# Patient Record
Sex: FEMALE | Race: WHITE | Employment: OTHER
[De-identification: names, ages, dates, MRNs, and addresses within clinical notes are randomized per-mention and may not be internally consistent; named-entity substitution may affect disease eponyms.]

---

## 2017-01-13 ENCOUNTER — TELEPHONE (OUTPATIENT)
Dept: INTERNAL MEDICINE | Facility: CLINIC | Age: 77
End: 2017-01-13

## 2017-01-13 RX ORDER — METRONIDAZOLE 250 MG/1
250 TABLET ORAL 3 TIMES DAILY
Qty: 30 TABLET | Refills: 0 | Status: SHIPPED | OUTPATIENT
Start: 2017-01-13 | End: 2017-01-23

## 2017-03-16 ENCOUNTER — OFFICE VISIT (OUTPATIENT)
Dept: INTERNAL MEDICINE CLINIC | Age: 77
End: 2017-03-16
Payer: MEDICARE

## 2017-03-16 VITALS
BODY MASS INDEX: 21.18 KG/M2 | WEIGHT: 143 LBS | DIASTOLIC BLOOD PRESSURE: 80 MMHG | HEIGHT: 69 IN | SYSTOLIC BLOOD PRESSURE: 136 MMHG

## 2017-03-16 DIAGNOSIS — I10 ESSENTIAL HYPERTENSION, BENIGN: Primary | ICD-10-CM

## 2017-03-16 DIAGNOSIS — K52.9 COLITIS: ICD-10-CM

## 2017-03-16 DIAGNOSIS — M79.7 FIBROMYALGIA: ICD-10-CM

## 2017-03-16 DIAGNOSIS — E06.3 HYPOTHYROIDISM DUE TO HASHIMOTO'S THYROIDITIS: ICD-10-CM

## 2017-03-16 DIAGNOSIS — E03.8 HYPOTHYROIDISM DUE TO HASHIMOTO'S THYROIDITIS: ICD-10-CM

## 2017-03-16 DIAGNOSIS — E78.00 PURE HYPERCHOLESTEROLEMIA: ICD-10-CM

## 2017-03-16 DIAGNOSIS — N18.30 CHRONIC KIDNEY DISEASE, STAGE III (MODERATE) (HCC): ICD-10-CM

## 2017-03-16 PROCEDURE — 99214 OFFICE O/P EST MOD 30 MIN: CPT | Performed by: INTERNAL MEDICINE

## 2017-03-16 PROCEDURE — 4040F PNEUMOC VAC/ADMIN/RCVD: CPT | Performed by: INTERNAL MEDICINE

## 2017-03-16 PROCEDURE — 1090F PRES/ABSN URINE INCON ASSESS: CPT | Performed by: INTERNAL MEDICINE

## 2017-03-16 PROCEDURE — 1123F ACP DISCUSS/DSCN MKR DOCD: CPT | Performed by: INTERNAL MEDICINE

## 2017-03-16 PROCEDURE — G8484 FLU IMMUNIZE NO ADMIN: HCPCS | Performed by: INTERNAL MEDICINE

## 2017-03-16 PROCEDURE — 1036F TOBACCO NON-USER: CPT | Performed by: INTERNAL MEDICINE

## 2017-03-16 PROCEDURE — G8427 DOCREV CUR MEDS BY ELIG CLIN: HCPCS | Performed by: INTERNAL MEDICINE

## 2017-03-16 PROCEDURE — G8419 CALC BMI OUT NRM PARAM NOF/U: HCPCS | Performed by: INTERNAL MEDICINE

## 2017-03-16 PROCEDURE — G8399 PT W/DXA RESULTS DOCUMENT: HCPCS | Performed by: INTERNAL MEDICINE

## 2017-03-16 ASSESSMENT — ENCOUNTER SYMPTOMS
ANAL BLEEDING: 0
VOMITING: 0
RECTAL PAIN: 0
VOICE CHANGE: 0
EYE DISCHARGE: 0
EYE ITCHING: 0
TROUBLE SWALLOWING: 0
RHINORRHEA: 0
BACK PAIN: 1
SORE THROAT: 0
EYE REDNESS: 0
CONSTIPATION: 0
APNEA: 0
COLOR CHANGE: 0
WHEEZING: 0
CHOKING: 0
SINUS PRESSURE: 0
COUGH: 0
PHOTOPHOBIA: 0
ABDOMINAL PAIN: 0
EYE PAIN: 0
FACIAL SWELLING: 0
SHORTNESS OF BREATH: 0
BLOOD IN STOOL: 0
ABDOMINAL DISTENTION: 0
STRIDOR: 0
NAUSEA: 0
DIARRHEA: 0
CHEST TIGHTNESS: 0

## 2017-06-13 ENCOUNTER — HOSPITAL ENCOUNTER (OUTPATIENT)
Age: 77
Setting detail: SPECIMEN
Discharge: HOME | End: 2017-06-13
Payer: MEDICARE

## 2017-06-13 DIAGNOSIS — E06.3 HYPOTHYROIDISM DUE TO HASHIMOTO'S THYROIDITIS: ICD-10-CM

## 2017-06-13 DIAGNOSIS — E03.8 HYPOTHYROIDISM DUE TO HASHIMOTO'S THYROIDITIS: ICD-10-CM

## 2017-06-13 LAB — TSH SERPL DL<=0.05 MIU/L-ACNC: 0.05 MIU/L (ref 0.3–5)

## 2017-06-23 ENCOUNTER — OFFICE VISIT (OUTPATIENT)
Dept: INTERNAL MEDICINE CLINIC | Age: 77
End: 2017-06-23
Payer: MEDICARE

## 2017-06-23 VITALS
DIASTOLIC BLOOD PRESSURE: 78 MMHG | WEIGHT: 141 LBS | HEIGHT: 69 IN | BODY MASS INDEX: 20.88 KG/M2 | SYSTOLIC BLOOD PRESSURE: 122 MMHG

## 2017-06-23 DIAGNOSIS — E06.3 HYPOTHYROIDISM DUE TO HASHIMOTO'S THYROIDITIS: ICD-10-CM

## 2017-06-23 DIAGNOSIS — K52.9 COLITIS: ICD-10-CM

## 2017-06-23 DIAGNOSIS — B02.29: ICD-10-CM

## 2017-06-23 DIAGNOSIS — M79.7 FIBROMYALGIA: Primary | ICD-10-CM

## 2017-06-23 DIAGNOSIS — N18.30 CHRONIC KIDNEY DISEASE, STAGE III (MODERATE) (HCC): ICD-10-CM

## 2017-06-23 DIAGNOSIS — E03.8 HYPOTHYROIDISM DUE TO HASHIMOTO'S THYROIDITIS: ICD-10-CM

## 2017-06-23 DIAGNOSIS — I10 ESSENTIAL HYPERTENSION, BENIGN: ICD-10-CM

## 2017-06-23 PROCEDURE — 99214 OFFICE O/P EST MOD 30 MIN: CPT | Performed by: INTERNAL MEDICINE

## 2017-06-23 PROCEDURE — 4040F PNEUMOC VAC/ADMIN/RCVD: CPT | Performed by: INTERNAL MEDICINE

## 2017-06-23 PROCEDURE — G8399 PT W/DXA RESULTS DOCUMENT: HCPCS | Performed by: INTERNAL MEDICINE

## 2017-06-23 PROCEDURE — G8427 DOCREV CUR MEDS BY ELIG CLIN: HCPCS | Performed by: INTERNAL MEDICINE

## 2017-06-23 PROCEDURE — G8420 CALC BMI NORM PARAMETERS: HCPCS | Performed by: INTERNAL MEDICINE

## 2017-06-23 PROCEDURE — 1090F PRES/ABSN URINE INCON ASSESS: CPT | Performed by: INTERNAL MEDICINE

## 2017-06-23 PROCEDURE — 1123F ACP DISCUSS/DSCN MKR DOCD: CPT | Performed by: INTERNAL MEDICINE

## 2017-06-23 PROCEDURE — 1036F TOBACCO NON-USER: CPT | Performed by: INTERNAL MEDICINE

## 2017-06-23 RX ORDER — LEVOTHYROXINE SODIUM 112 UG/1
112 TABLET ORAL DAILY
Qty: 30 TABLET | Refills: 3 | Status: SHIPPED | OUTPATIENT
Start: 2017-06-23 | End: 2017-08-08 | Stop reason: SDUPTHER

## 2017-06-23 RX ORDER — GABAPENTIN 100 MG/1
300 CAPSULE ORAL 4 TIMES DAILY
Qty: 120 CAPSULE | Refills: 5 | Status: SHIPPED | OUTPATIENT
Start: 2017-06-23 | End: 2017-07-13 | Stop reason: SDUPTHER

## 2017-06-23 ASSESSMENT — ENCOUNTER SYMPTOMS
VOMITING: 0
DIARRHEA: 0
CHOKING: 0
EYE PAIN: 0
ABDOMINAL DISTENTION: 0
TROUBLE SWALLOWING: 0
CONSTIPATION: 0
FACIAL SWELLING: 0
RECTAL PAIN: 0
SORE THROAT: 0
APNEA: 0
VOICE CHANGE: 0
STRIDOR: 0
SINUS PRESSURE: 0
CHEST TIGHTNESS: 0
EYE REDNESS: 0
RHINORRHEA: 0
EYE ITCHING: 0
SHORTNESS OF BREATH: 0
EYE DISCHARGE: 0
NAUSEA: 0
ABDOMINAL PAIN: 0
COLOR CHANGE: 0
COUGH: 0
PHOTOPHOBIA: 0
ANAL BLEEDING: 0
BACK PAIN: 0
BLOOD IN STOOL: 0
WHEEZING: 0

## 2017-07-13 DIAGNOSIS — M10.9 ACUTE GOUTY ARTHROPATHY: ICD-10-CM

## 2017-07-13 DIAGNOSIS — M25.50 ARTHRALGIA, UNSPECIFIED JOINT: ICD-10-CM

## 2017-07-13 DIAGNOSIS — B02.29: ICD-10-CM

## 2017-07-13 RX ORDER — MELOXICAM 15 MG/1
15 TABLET ORAL DAILY
Qty: 30 TABLET | Refills: 5 | Status: SHIPPED | OUTPATIENT
Start: 2017-07-13 | End: 2018-04-19

## 2017-07-13 RX ORDER — GABAPENTIN 100 MG/1
300 CAPSULE ORAL 4 TIMES DAILY
Qty: 360 CAPSULE | Refills: 5 | Status: SHIPPED | OUTPATIENT
Start: 2017-07-13 | End: 2018-07-17 | Stop reason: SDUPTHER

## 2017-07-13 RX ORDER — ALLOPURINOL 100 MG/1
100 TABLET ORAL DAILY
Qty: 30 TABLET | Refills: 5 | Status: SHIPPED | OUTPATIENT
Start: 2017-07-13 | End: 2018-04-19 | Stop reason: SDUPTHER

## 2017-07-27 ENCOUNTER — HOSPITAL ENCOUNTER (OUTPATIENT)
Age: 77
Setting detail: SPECIMEN
Discharge: HOME | End: 2017-07-27
Payer: MEDICARE

## 2017-07-27 DIAGNOSIS — E03.8 HYPOTHYROIDISM DUE TO HASHIMOTO'S THYROIDITIS: ICD-10-CM

## 2017-07-27 DIAGNOSIS — E06.3 HYPOTHYROIDISM DUE TO HASHIMOTO'S THYROIDITIS: ICD-10-CM

## 2017-07-27 LAB — TSH SERPL DL<=0.05 MIU/L-ACNC: 0.6 MIU/L (ref 0.3–5)

## 2017-08-08 ENCOUNTER — TELEPHONE (OUTPATIENT)
Dept: INTERNAL MEDICINE CLINIC | Age: 77
End: 2017-08-08

## 2017-08-08 ENCOUNTER — OFFICE VISIT (OUTPATIENT)
Dept: INTERNAL MEDICINE CLINIC | Age: 77
End: 2017-08-08
Payer: MEDICARE

## 2017-08-08 VITALS
HEIGHT: 69 IN | WEIGHT: 138 LBS | BODY MASS INDEX: 20.44 KG/M2 | SYSTOLIC BLOOD PRESSURE: 118 MMHG | DIASTOLIC BLOOD PRESSURE: 72 MMHG

## 2017-08-08 DIAGNOSIS — E06.3 HYPOTHYROIDISM DUE TO HASHIMOTO'S THYROIDITIS: ICD-10-CM

## 2017-08-08 DIAGNOSIS — M94.9 DISORDER OF CARTILAGE: ICD-10-CM

## 2017-08-08 DIAGNOSIS — M70.51 POPLITEAL BURSITIS OF RIGHT KNEE: ICD-10-CM

## 2017-08-08 DIAGNOSIS — H83.02 OTITIS INTERNA, LEFT: ICD-10-CM

## 2017-08-08 DIAGNOSIS — I10 ESSENTIAL HYPERTENSION, BENIGN: ICD-10-CM

## 2017-08-08 DIAGNOSIS — N18.9 CKD (CHRONIC KIDNEY DISEASE), UNSPECIFIED STAGE: ICD-10-CM

## 2017-08-08 DIAGNOSIS — N18.30 CHRONIC KIDNEY DISEASE, STAGE III (MODERATE) (HCC): ICD-10-CM

## 2017-08-08 DIAGNOSIS — E03.8 HYPOTHYROIDISM DUE TO HASHIMOTO'S THYROIDITIS: ICD-10-CM

## 2017-08-08 DIAGNOSIS — E78.00 PURE HYPERCHOLESTEROLEMIA: Primary | ICD-10-CM

## 2017-08-08 PROCEDURE — 1090F PRES/ABSN URINE INCON ASSESS: CPT | Performed by: INTERNAL MEDICINE

## 2017-08-08 PROCEDURE — G8399 PT W/DXA RESULTS DOCUMENT: HCPCS | Performed by: INTERNAL MEDICINE

## 2017-08-08 PROCEDURE — 4040F PNEUMOC VAC/ADMIN/RCVD: CPT | Performed by: INTERNAL MEDICINE

## 2017-08-08 PROCEDURE — 1036F TOBACCO NON-USER: CPT | Performed by: INTERNAL MEDICINE

## 2017-08-08 PROCEDURE — 20610 DRAIN/INJ JOINT/BURSA W/O US: CPT | Performed by: INTERNAL MEDICINE

## 2017-08-08 PROCEDURE — 99214 OFFICE O/P EST MOD 30 MIN: CPT | Performed by: INTERNAL MEDICINE

## 2017-08-08 PROCEDURE — G8427 DOCREV CUR MEDS BY ELIG CLIN: HCPCS | Performed by: INTERNAL MEDICINE

## 2017-08-08 PROCEDURE — G8420 CALC BMI NORM PARAMETERS: HCPCS | Performed by: INTERNAL MEDICINE

## 2017-08-08 PROCEDURE — 1123F ACP DISCUSS/DSCN MKR DOCD: CPT | Performed by: INTERNAL MEDICINE

## 2017-08-08 RX ORDER — FOLIC ACID 1 MG/1
1 TABLET ORAL DAILY
Qty: 90 TABLET | Refills: 3 | Status: SHIPPED | OUTPATIENT
Start: 2017-08-08

## 2017-08-08 RX ORDER — DOXYCYCLINE HYCLATE 100 MG
100 TABLET ORAL 2 TIMES DAILY
Qty: 20 TABLET | Refills: 0 | Status: SHIPPED | OUTPATIENT
Start: 2017-08-08 | End: 2017-08-18

## 2017-08-08 RX ORDER — METHYLPREDNISOLONE ACETATE 80 MG/ML
80 INJECTION, SUSPENSION INTRA-ARTICULAR; INTRALESIONAL; INTRAMUSCULAR; SOFT TISSUE ONCE
Status: COMPLETED | OUTPATIENT
Start: 2017-08-08 | End: 2017-08-08

## 2017-08-08 RX ORDER — LEVOTHYROXINE SODIUM 112 UG/1
112 TABLET ORAL DAILY
Qty: 90 TABLET | Refills: 3 | Status: SHIPPED | OUTPATIENT
Start: 2017-08-08 | End: 2018-04-19 | Stop reason: SDUPTHER

## 2017-08-08 RX ADMIN — METHYLPREDNISOLONE ACETATE 80 MG: 80 INJECTION, SUSPENSION INTRA-ARTICULAR; INTRALESIONAL; INTRAMUSCULAR; SOFT TISSUE at 15:23

## 2017-08-08 ASSESSMENT — ENCOUNTER SYMPTOMS
SINUS PRESSURE: 0
NAUSEA: 0
EYE REDNESS: 0
ABDOMINAL DISTENTION: 0
RHINORRHEA: 0
CONSTIPATION: 0
VOICE CHANGE: 0
COUGH: 0
WHEEZING: 0
BLOOD IN STOOL: 0
EYE ITCHING: 0
TROUBLE SWALLOWING: 0
CHOKING: 0
COLOR CHANGE: 0
ABDOMINAL PAIN: 0
FACIAL SWELLING: 0
CHEST TIGHTNESS: 0
EYE DISCHARGE: 0
BACK PAIN: 0
DIARRHEA: 0
SHORTNESS OF BREATH: 0
EYE PAIN: 0
VOMITING: 0
SORE THROAT: 0
APNEA: 0
ANAL BLEEDING: 0
RECTAL PAIN: 0
STRIDOR: 0
PHOTOPHOBIA: 0

## 2017-10-10 ENCOUNTER — HOSPITAL ENCOUNTER (OUTPATIENT)
Age: 77
Setting detail: SPECIMEN
Discharge: HOME | End: 2017-10-10
Payer: MEDICARE

## 2017-10-10 DIAGNOSIS — E06.3 HYPOTHYROIDISM DUE TO HASHIMOTO'S THYROIDITIS: ICD-10-CM

## 2017-10-10 DIAGNOSIS — M70.51 POPLITEAL BURSITIS OF RIGHT KNEE: ICD-10-CM

## 2017-10-10 DIAGNOSIS — E78.00 PURE HYPERCHOLESTEROLEMIA: ICD-10-CM

## 2017-10-10 DIAGNOSIS — I10 ESSENTIAL HYPERTENSION, BENIGN: ICD-10-CM

## 2017-10-10 DIAGNOSIS — M94.9 DISORDER OF CARTILAGE: ICD-10-CM

## 2017-10-10 DIAGNOSIS — N18.30 CHRONIC KIDNEY DISEASE, STAGE III (MODERATE) (HCC): ICD-10-CM

## 2017-10-10 DIAGNOSIS — E03.8 HYPOTHYROIDISM DUE TO HASHIMOTO'S THYROIDITIS: ICD-10-CM

## 2017-10-10 LAB
-: ABNORMAL
ABSOLUTE EOS #: 0.5 K/UL (ref 0–0.4)
ABSOLUTE LYMPH #: 1.2 K/UL (ref 1–4.8)
ABSOLUTE MONO #: 0.6 K/UL (ref 0.1–1.2)
ALBUMIN SERPL-MCNC: 4.1 G/DL (ref 3.5–5.2)
ALBUMIN/GLOBULIN RATIO: 1.5 (ref 1–2.5)
ALP BLD-CCNC: 76 U/L (ref 35–104)
ALT SERPL-CCNC: 10 U/L (ref 5–33)
AMORPHOUS: ABNORMAL
ANION GAP SERPL CALCULATED.3IONS-SCNC: 10 MMOL/L (ref 9–17)
AST SERPL-CCNC: 20 U/L
BACTERIA: ABNORMAL
BASOPHILS # BLD: 1 %
BASOPHILS ABSOLUTE: 0.1 K/UL (ref 0–0.2)
BILIRUB SERPL-MCNC: 0.47 MG/DL (ref 0.3–1.2)
BILIRUBIN URINE: NEGATIVE
BUN BLDV-MCNC: 16 MG/DL (ref 8–23)
BUN/CREAT BLD: NORMAL (ref 9–20)
CALCIUM SERPL-MCNC: 10.1 MG/DL (ref 8.6–10.4)
CASTS UA: ABNORMAL /LPF (ref 0–8)
CHLORIDE BLD-SCNC: 104 MMOL/L (ref 98–107)
CHOLESTEROL/HDL RATIO: 3.6
CHOLESTEROL: 200 MG/DL
CO2: 28 MMOL/L (ref 20–31)
COLOR: YELLOW
COMMENT UA: ABNORMAL
CREAT SERPL-MCNC: 0.82 MG/DL (ref 0.5–0.9)
CRYSTALS, UA: ABNORMAL /HPF
DIFFERENTIAL TYPE: ABNORMAL
EOSINOPHILS RELATIVE PERCENT: 8 %
EPITHELIAL CELLS UA: ABNORMAL /HPF (ref 0–5)
GFR AFRICAN AMERICAN: >60 ML/MIN
GFR NON-AFRICAN AMERICAN: >60 ML/MIN
GFR SERPL CREATININE-BSD FRML MDRD: NORMAL ML/MIN/{1.73_M2}
GFR SERPL CREATININE-BSD FRML MDRD: NORMAL ML/MIN/{1.73_M2}
GLUCOSE BLD-MCNC: 95 MG/DL (ref 70–99)
GLUCOSE URINE: NEGATIVE
HCT VFR BLD CALC: 37.6 % (ref 36–46)
HDLC SERPL-MCNC: 55 MG/DL
HEMOGLOBIN: 12.5 G/DL (ref 12–16)
KETONES, URINE: NEGATIVE
LDL CHOLESTEROL: 119 MG/DL (ref 0–130)
LEUKOCYTE ESTERASE, URINE: ABNORMAL
LYMPHOCYTES # BLD: 18 %
MCH RBC QN AUTO: 29.3 PG (ref 26–34)
MCHC RBC AUTO-ENTMCNC: 33.3 G/DL (ref 31–37)
MCV RBC AUTO: 88.1 FL (ref 80–100)
MONOCYTES # BLD: 10 %
MUCUS: ABNORMAL
NITRITE, URINE: POSITIVE
OTHER OBSERVATIONS UA: ABNORMAL
PDW BLD-RTO: 15.7 % (ref 12.5–15.4)
PH UA: 7 (ref 5–8)
PLATELET # BLD: 194 K/UL (ref 140–450)
PLATELET ESTIMATE: ABNORMAL
PMV BLD AUTO: 8.1 FL (ref 6–12)
POTASSIUM SERPL-SCNC: 4.2 MMOL/L (ref 3.7–5.3)
PROTEIN UA: NEGATIVE
RBC # BLD: 4.26 M/UL (ref 4–5.2)
RBC # BLD: ABNORMAL 10*6/UL
RBC UA: ABNORMAL /HPF (ref 0–4)
RENAL EPITHELIAL, UA: ABNORMAL /HPF
SEG NEUTROPHILS: 63 %
SEGMENTED NEUTROPHILS ABSOLUTE COUNT: 4.3 K/UL (ref 1.8–7.7)
SODIUM BLD-SCNC: 142 MMOL/L (ref 135–144)
SPECIFIC GRAVITY UA: 1.01 (ref 1–1.03)
TOTAL PROTEIN: 6.8 G/DL (ref 6.4–8.3)
TRICHOMONAS: ABNORMAL
TRIGL SERPL-MCNC: 129 MG/DL
TSH SERPL DL<=0.05 MIU/L-ACNC: 2.6 MIU/L (ref 0.3–5)
TURBIDITY: ABNORMAL
URINE HGB: ABNORMAL
UROBILINOGEN, URINE: NORMAL
VITAMIN D 25-HYDROXY: 32.1 NG/ML (ref 30–100)
VLDLC SERPL CALC-MCNC: ABNORMAL MG/DL (ref 1–30)
WBC # BLD: 6.8 K/UL (ref 3.5–11)
WBC # BLD: ABNORMAL 10*3/UL
WBC UA: ABNORMAL /HPF (ref 0–5)
YEAST: ABNORMAL

## 2017-10-11 RX ORDER — NITROFURANTOIN 25; 75 MG/1; MG/1
100 CAPSULE ORAL 2 TIMES DAILY
Qty: 20 CAPSULE | Refills: 0 | Status: SHIPPED | OUTPATIENT
Start: 2017-10-11 | End: 2017-10-21

## 2017-10-16 ENCOUNTER — TELEPHONE (OUTPATIENT)
Dept: INTERNAL MEDICINE CLINIC | Age: 77
End: 2017-10-16

## 2017-10-16 DIAGNOSIS — N18.30 CHRONIC KIDNEY DISEASE, STAGE III (MODERATE) (HCC): Primary | ICD-10-CM

## 2017-10-16 NOTE — TELEPHONE ENCOUNTER
Called pt and informed her to stop taking medication and to repeat ua before appt on Friday. Pt understood and I placed ua order as directed by .

## 2017-10-16 NOTE — TELEPHONE ENCOUNTER
Patient has been taking macrobid for UTI since 10/11/17. States that she has an upset stomach after taking it. Is taking with food. Please advise.     Allergies   Allergen Reactions    Etomidate     Penicillins Hives

## 2017-10-17 ENCOUNTER — HOSPITAL ENCOUNTER (OUTPATIENT)
Age: 77
Setting detail: SPECIMEN
Discharge: HOME | End: 2017-10-17
Payer: MEDICARE

## 2017-10-17 LAB
-: NORMAL
AMORPHOUS: NORMAL
BACTERIA: NORMAL
BILIRUBIN URINE: NEGATIVE
CASTS UA: NORMAL /LPF (ref 0–8)
COLOR: YELLOW
COMMENT UA: ABNORMAL
CRYSTALS, UA: NORMAL /HPF
EPITHELIAL CELLS UA: NORMAL /HPF (ref 0–5)
GLUCOSE URINE: NEGATIVE
KETONES, URINE: NEGATIVE
LEUKOCYTE ESTERASE, URINE: ABNORMAL
MUCUS: NORMAL
NITRITE, URINE: NEGATIVE
OTHER OBSERVATIONS UA: NORMAL
PH UA: 6.5 (ref 5–8)
PROTEIN UA: NEGATIVE
RBC UA: NORMAL /HPF (ref 0–4)
RENAL EPITHELIAL, UA: NORMAL /HPF
SPECIFIC GRAVITY UA: 1.01 (ref 1–1.03)
TRICHOMONAS: NORMAL
TURBIDITY: CLEAR
URINE HGB: NEGATIVE
UROBILINOGEN, URINE: NORMAL
WBC UA: NORMAL /HPF (ref 0–5)
YEAST: NORMAL

## 2017-10-20 ENCOUNTER — OFFICE VISIT (OUTPATIENT)
Dept: INTERNAL MEDICINE CLINIC | Age: 77
End: 2017-10-20
Payer: MEDICARE

## 2017-10-20 VITALS
SYSTOLIC BLOOD PRESSURE: 122 MMHG | DIASTOLIC BLOOD PRESSURE: 74 MMHG | HEIGHT: 69 IN | BODY MASS INDEX: 21.18 KG/M2 | WEIGHT: 143 LBS

## 2017-10-20 DIAGNOSIS — E78.00 PURE HYPERCHOLESTEROLEMIA: ICD-10-CM

## 2017-10-20 DIAGNOSIS — E03.8 HYPOTHYROIDISM DUE TO HASHIMOTO'S THYROIDITIS: ICD-10-CM

## 2017-10-20 DIAGNOSIS — N18.30 CHRONIC KIDNEY DISEASE, STAGE III (MODERATE) (HCC): ICD-10-CM

## 2017-10-20 DIAGNOSIS — M17.0 PRIMARY OSTEOARTHRITIS OF BOTH KNEES: ICD-10-CM

## 2017-10-20 DIAGNOSIS — I10 ESSENTIAL HYPERTENSION, BENIGN: Primary | ICD-10-CM

## 2017-10-20 DIAGNOSIS — N30.00 ACUTE CYSTITIS WITHOUT HEMATURIA: ICD-10-CM

## 2017-10-20 DIAGNOSIS — E06.3 HYPOTHYROIDISM DUE TO HASHIMOTO'S THYROIDITIS: ICD-10-CM

## 2017-10-20 PROCEDURE — 1123F ACP DISCUSS/DSCN MKR DOCD: CPT | Performed by: INTERNAL MEDICINE

## 2017-10-20 PROCEDURE — G8484 FLU IMMUNIZE NO ADMIN: HCPCS | Performed by: INTERNAL MEDICINE

## 2017-10-20 PROCEDURE — G8420 CALC BMI NORM PARAMETERS: HCPCS | Performed by: INTERNAL MEDICINE

## 2017-10-20 PROCEDURE — 1090F PRES/ABSN URINE INCON ASSESS: CPT | Performed by: INTERNAL MEDICINE

## 2017-10-20 PROCEDURE — 99213 OFFICE O/P EST LOW 20 MIN: CPT | Performed by: INTERNAL MEDICINE

## 2017-10-20 PROCEDURE — 1036F TOBACCO NON-USER: CPT | Performed by: INTERNAL MEDICINE

## 2017-10-20 PROCEDURE — G8399 PT W/DXA RESULTS DOCUMENT: HCPCS | Performed by: INTERNAL MEDICINE

## 2017-10-20 PROCEDURE — 4040F PNEUMOC VAC/ADMIN/RCVD: CPT | Performed by: INTERNAL MEDICINE

## 2017-10-20 PROCEDURE — G8427 DOCREV CUR MEDS BY ELIG CLIN: HCPCS | Performed by: INTERNAL MEDICINE

## 2017-10-20 RX ORDER — METHYLPREDNISOLONE 4 MG/1
TABLET ORAL
Qty: 1 KIT | Refills: 0 | Status: SHIPPED | OUTPATIENT
Start: 2017-10-20 | End: 2017-10-26

## 2017-10-20 ASSESSMENT — ENCOUNTER SYMPTOMS
EYE DISCHARGE: 0
EYE ITCHING: 0
ABDOMINAL PAIN: 0
FACIAL SWELLING: 0
COUGH: 0
CHEST TIGHTNESS: 0
SHORTNESS OF BREATH: 0
WHEEZING: 0
VOMITING: 0
SINUS PRESSURE: 0
BLOOD IN STOOL: 0
STRIDOR: 0
VOICE CHANGE: 0
CHOKING: 0
TROUBLE SWALLOWING: 0
DIARRHEA: 0
PHOTOPHOBIA: 0
BACK PAIN: 0
NAUSEA: 0
APNEA: 0
ABDOMINAL DISTENTION: 0
RHINORRHEA: 0
COLOR CHANGE: 0
CONSTIPATION: 0
EYE PAIN: 0
RECTAL PAIN: 0
ANAL BLEEDING: 0
EYE REDNESS: 0
SORE THROAT: 0

## 2017-10-20 ASSESSMENT — PATIENT HEALTH QUESTIONNAIRE - PHQ9
SUM OF ALL RESPONSES TO PHQ9 QUESTIONS 1 & 2: 0
2. FEELING DOWN, DEPRESSED OR HOPELESS: 0
SUM OF ALL RESPONSES TO PHQ QUESTIONS 1-9: 0
1. LITTLE INTEREST OR PLEASURE IN DOING THINGS: 0

## 2017-10-20 NOTE — PROGRESS NOTES
Visit Information    Have you changed or started any medications since your last visit including any over-the-counter medicines, vitamins, or herbal medicines? no   Are you having any side effects from any of your medications? -  no  Have you stopped taking any of your medications? Is so, why? -  no    Have you seen any other physician or provider since your last visit? No  Have you had any other diagnostic tests since your last visit? Yes - Records Obtained  Have you been seen in the emergency room and/or had an admission to a hospital since we last saw you? No  Have you had your routine dental cleaning in the past 6 months? no    Have you activated your makr account? If not, what are your barriers?  Yes     Patient Care Team:  Migdalia Siddiqi MD as PCP - General    Medical History Review  Past Medical, Family, and Social History reviewed and does contribute to the patient presenting condition    Health Maintenance   Topic Date Due    DTaP/Tdap/Td vaccine (1 - Tdap) 07/19/1959    Flu vaccine (1) 09/01/2017    Lipid screen  10/10/2022    Zostavax vaccine  Addressed    DEXA (modify frequency per FRAX score)  Addressed    Pneumococcal low/med risk  Completed     Chief Complaint   Patient presents with    Thyroid Problem     hypothyroid had labs     Hypertension     management     Knee Pain     had cortisone injection feels some better

## 2017-10-20 NOTE — PROGRESS NOTES
Never Used    Alcohol use No    Drug use: No    Sexual activity: Not Asked     Other Topics Concern    None     Social History Narrative    None       Current Outpatient Prescriptions   Medication Sig Dispense Refill    methylPREDNISolone (MEDROL, MATTHIEU,) 4 MG tablet Take as directed 1 kit 0    nitrofurantoin, macrocrystal-monohydrate, (MACROBID) 100 MG capsule Take 1 capsule by mouth 2 times daily for 10 days 20 capsule 0    folic acid (FOLVITE) 1 MG tablet Take 1 tablet by mouth daily 90 tablet 3    levothyroxine (LEVOTHROID) 112 MCG tablet Take 1 tablet by mouth daily 90 tablet 3    gabapentin (NEURONTIN) 100 MG capsule Take 3 capsules by mouth 4 times daily 360 capsule 5    allopurinol (ZYLOPRIM) 100 MG tablet Take 1 tablet by mouth daily 30 tablet 5    meloxicam (MOBIC) 15 MG tablet Take 1 tablet by mouth daily 30 tablet 5    atorvastatin (LIPITOR) 40 MG tablet Take 1 tablet by mouth daily 90 tablet 3    carvedilol (COREG) 12.5 MG tablet Take 0.5 tablets by mouth 2 times daily 180 tablet 5    fexofenadine (ALLEGRA ALLERGY) 180 MG tablet Take 180 mg by mouth daily as needed      triamcinolone (NASACORT AQ) 55 MCG/ACT nasal inhaler 1 spray by Nasal route daily as needed      Multiple Vitamins-Minerals (HAIR/SKIN/NAILS) TABS Take 1 tablet by mouth daily      Cholecalciferol (VITAMIN D) 2000 UNITS CAPS capsule Take 2,000 Units by mouth daily      Misc Natural Products (JOINT SUPPORT) CAPS Take 2 capsules by mouth daily       Current Facility-Administered Medications   Medication Dose Route Frequency Provider Last Rate Last Dose    methylPREDNISolone acetate (DEPO-MEDROL) injection 80 mg  80 mg Intramuscular Once Simone Rashid MD        triamcinolone acetonide (KENALOG-40) injection 60 mg  60 mg Intramuscular Once Simone Rashid MD             Review of Systems:    Review of Systems   Constitutional: Negative for activity change, appetite change, chills, diaphoresis, fatigue and fever.    HENT: Negative for congestion, dental problem, drooling, ear discharge, ear pain, facial swelling, hearing loss, mouth sores, nosebleeds, postnasal drip, rhinorrhea, sinus pressure, sneezing, sore throat, tinnitus, trouble swallowing and voice change. Eyes: Negative for photophobia, pain, discharge, redness, itching and visual disturbance. Respiratory: Negative for apnea, cough, choking, chest tightness, shortness of breath, wheezing and stridor. Cardiovascular: Negative for chest pain, palpitations and leg swelling. Gastrointestinal: Negative for abdominal distention, abdominal pain, anal bleeding, blood in stool, constipation, diarrhea, nausea, rectal pain and vomiting. Endocrine: Negative for cold intolerance, heat intolerance, polydipsia, polyphagia and polyuria. Genitourinary: Negative for decreased urine volume, difficulty urinating, dyspareunia, dysuria, enuresis, flank pain, frequency, genital sores, hematuria, menstrual problem, pelvic pain, urgency, vaginal bleeding and vaginal discharge. Musculoskeletal: Positive for arthralgias. Negative for back pain, gait problem, joint swelling, myalgias, neck pain and neck stiffness. Skin: Negative for color change, pallor, rash and wound. Neurological: Negative for dizziness, tremors, seizures, syncope, facial asymmetry, speech difficulty, weakness, light-headedness, numbness and headaches. Hematological: Negative for adenopathy. Does not bruise/bleed easily. Psychiatric/Behavioral: Positive for sleep disturbance. Negative for agitation, behavioral problems, confusion, decreased concentration and dysphoric mood. The patient is nervous/anxious. Objective:     Physical Exam:      Physical Exam   Constitutional: She is oriented to person, place, and time. She appears well-developed and well-nourished. No distress. HENT:   Head: Normocephalic and atraumatic.    Right Ear: External ear normal.   Left Ear: External ear normal.   Nose: Nose normal.   Mouth/Throat: Oropharynx is clear and moist. No oropharyngeal exudate. Eyes: Conjunctivae and EOM are normal. Pupils are equal, round, and reactive to light. Right eye exhibits no discharge. Left eye exhibits no discharge. No scleral icterus. Neck: Normal range of motion. Neck supple. No JVD present. No tracheal deviation present. No thyromegaly present. Cardiovascular: Normal rate, regular rhythm and intact distal pulses. Exam reveals no gallop and no friction rub. Murmur heard. Pulmonary/Chest: Effort normal and breath sounds normal. No respiratory distress. She has no wheezes. She has no rales. She exhibits no tenderness. Abdominal: Soft. Bowel sounds are normal. She exhibits no distension and no mass. There is no tenderness. There is no rebound and no guarding. Genitourinary: Rectal exam shows guaiac negative stool. No vaginal discharge found. Musculoskeletal: She exhibits tenderness. She exhibits no edema. Lymphadenopathy:     She has no cervical adenopathy. Neurological: She is alert and oriented to person, place, and time. She has normal reflexes. No cranial nerve deficit. She exhibits normal muscle tone. Coordination normal.   Skin: Skin is warm and dry. No rash noted. She is not diaphoretic. No erythema. No pallor. Psychiatric: Her behavior is normal. Judgment and thought content normal. Her mood appears anxious.          Results for orders placed or performed during the hospital encounter of 10/17/17   Urinalysis   Result Value Ref Range    Color, UA YELLOW YEL    Turbidity UA CLEAR CLEAR    Glucose, Ur NEGATIVE NEG    Bilirubin Urine NEGATIVE NEG    Ketones, Urine NEGATIVE NEG    Specific Gravity, UA 1.013 1.005 - 1.030    Urine Hgb NEGATIVE NEG    pH, UA 6.5 5.0 - 8.0    Protein, UA NEGATIVE NEG    Urobilinogen, Urine Normal NORM    Nitrite, Urine NEGATIVE NEG    Leukocyte Esterase, Urine MODERATE (A) NEG    Urinalysis Comments NOT REPORTED    Microscopic Urinalysis Result Value Ref Range    -          WBC, UA 2 TO 5 0 - 5 /HPF    RBC, UA 0 TO 2 0 - 4 /HPF    Casts UA 0 TO 2 HYALINE 0 - 8 /LPF    Crystals UA NOT REPORTED NONE /HPF    Epithelial Cells UA 2 TO 5 0 - 5 /HPF    Renal Epithelial, Urine NOT REPORTED 0 /HPF    Bacteria, UA NOT REPORTED NONE    Mucus, UA NOT REPORTED NONE    Trichomonas, UA NOT REPORTED NONE    Amorphous, UA NOT REPORTED NONE    Other Observations UA NOT REPORTED NREQ    Yeast, UA NOT REPORTED NONE     No results found. Assessment:     1. Essential hypertension, benign   controlled cr nlk k nlk cont same   2. Chronic kidney disease, stage III (moderate)   cr 0.8 better    3. Primary osteoarthritis of both knees   worse due to cols\  D weather on mobic add medrol   4. Hypothyroidism due to Hashimoto's thyroiditis   tsh 2 nl cont same dose   5. Pure hypercholesterolemia  At goal cont same   6. Acute cystitis without hematuria   improved completed atb  No diarrhea         Plan:      cont same meds       No orders of the defined types were placed in this encounter. Orders Placed This Encounter   Medications    methylPREDNISolone (MEDROL, MATTHIEU,) 4 MG tablet     Sig: Take as directed     Dispense:  1 kit     Refill:  0             Yaz received counseling on the following healthy behaviors: medication adherence  Reviewed prior labs and health maintenance. Continue current medications, diet and exercise. Discussed use, benefit, and side effects of prescribed medications. Barriers to medication compliance addressed. Patient given educational materials - see patient instructions. All patient questions answered. Patient voiced understanding. 1.  Patient given educational materials - see patient instructions  2. Discussed use, benefit, and side effects of prescribed medications. Barriers to medication compliance addressed. All patient questions answered. Pt voiced understanding. 3.  Reviewed prior labs and health maintenance  4. Continue current medications, diet and exercise.   5. 3 m

## 2018-02-02 ENCOUNTER — OFFICE VISIT (OUTPATIENT)
Dept: INTERNAL MEDICINE CLINIC | Age: 78
End: 2018-02-02
Payer: MEDICARE

## 2018-02-02 VITALS
OXYGEN SATURATION: 92 % | HEIGHT: 69 IN | BODY MASS INDEX: 20.73 KG/M2 | DIASTOLIC BLOOD PRESSURE: 92 MMHG | SYSTOLIC BLOOD PRESSURE: 149 MMHG | WEIGHT: 140 LBS | HEART RATE: 75 BPM

## 2018-02-02 DIAGNOSIS — K50.90 CROHN'S DISEASE IN REMISSION (HCC): ICD-10-CM

## 2018-02-02 DIAGNOSIS — I10 ESSENTIAL HYPERTENSION, BENIGN: ICD-10-CM

## 2018-02-02 DIAGNOSIS — M17.0 PRIMARY OSTEOARTHRITIS OF BOTH KNEES: ICD-10-CM

## 2018-02-02 DIAGNOSIS — K50.10 CROHN'S DISEASE OF LARGE INTESTINE WITHOUT COMPLICATION (HCC): ICD-10-CM

## 2018-02-02 DIAGNOSIS — J44.9 CHRONIC OBSTRUCTIVE PULMONARY DISEASE, UNSPECIFIED COPD TYPE (HCC): Primary | ICD-10-CM

## 2018-02-02 DIAGNOSIS — J43.1 PANLOBULAR EMPHYSEMA (HCC): ICD-10-CM

## 2018-02-02 PROCEDURE — 1123F ACP DISCUSS/DSCN MKR DOCD: CPT | Performed by: INTERNAL MEDICINE

## 2018-02-02 PROCEDURE — G8926 SPIRO NO PERF OR DOC: HCPCS | Performed by: INTERNAL MEDICINE

## 2018-02-02 PROCEDURE — 3023F SPIROM DOC REV: CPT | Performed by: INTERNAL MEDICINE

## 2018-02-02 PROCEDURE — 4040F PNEUMOC VAC/ADMIN/RCVD: CPT | Performed by: INTERNAL MEDICINE

## 2018-02-02 PROCEDURE — G8399 PT W/DXA RESULTS DOCUMENT: HCPCS | Performed by: INTERNAL MEDICINE

## 2018-02-02 PROCEDURE — 1090F PRES/ABSN URINE INCON ASSESS: CPT | Performed by: INTERNAL MEDICINE

## 2018-02-02 PROCEDURE — G8427 DOCREV CUR MEDS BY ELIG CLIN: HCPCS | Performed by: INTERNAL MEDICINE

## 2018-02-02 PROCEDURE — G8484 FLU IMMUNIZE NO ADMIN: HCPCS | Performed by: INTERNAL MEDICINE

## 2018-02-02 PROCEDURE — 1036F TOBACCO NON-USER: CPT | Performed by: INTERNAL MEDICINE

## 2018-02-02 PROCEDURE — 20610 DRAIN/INJ JOINT/BURSA W/O US: CPT | Performed by: INTERNAL MEDICINE

## 2018-02-02 PROCEDURE — 99214 OFFICE O/P EST MOD 30 MIN: CPT | Performed by: INTERNAL MEDICINE

## 2018-02-02 PROCEDURE — G8420 CALC BMI NORM PARAMETERS: HCPCS | Performed by: INTERNAL MEDICINE

## 2018-02-02 RX ORDER — METHYLPREDNISOLONE ACETATE 80 MG/ML
80 INJECTION, SUSPENSION INTRA-ARTICULAR; INTRALESIONAL; INTRAMUSCULAR; SOFT TISSUE ONCE
Status: COMPLETED | OUTPATIENT
Start: 2018-02-02 | End: 2018-02-02

## 2018-02-02 RX ORDER — CARVEDILOL 12.5 MG/1
6.25 TABLET ORAL 2 TIMES DAILY
Qty: 180 TABLET | Refills: 5 | Status: SHIPPED | OUTPATIENT
Start: 2018-02-02 | End: 2019-02-21 | Stop reason: SDUPTHER

## 2018-02-02 RX ADMIN — METHYLPREDNISOLONE ACETATE 80 MG: 80 INJECTION, SUSPENSION INTRA-ARTICULAR; INTRALESIONAL; INTRAMUSCULAR; SOFT TISSUE at 12:01

## 2018-02-02 RX ADMIN — METHYLPREDNISOLONE ACETATE 80 MG: 80 INJECTION, SUSPENSION INTRA-ARTICULAR; INTRALESIONAL; INTRAMUSCULAR; SOFT TISSUE at 12:00

## 2018-02-02 ASSESSMENT — ENCOUNTER SYMPTOMS
RHINORRHEA: 0
EYE DISCHARGE: 0
ABDOMINAL PAIN: 0
CHEST TIGHTNESS: 0
COLOR CHANGE: 0
TROUBLE SWALLOWING: 0
CONSTIPATION: 0
EYE ITCHING: 0
VOICE CHANGE: 0
DIARRHEA: 0
COUGH: 0
EYE PAIN: 0
CHOKING: 0
ABDOMINAL DISTENTION: 0
EYE REDNESS: 0
WHEEZING: 0
STRIDOR: 0
SORE THROAT: 0
BACK PAIN: 0
FACIAL SWELLING: 0
PHOTOPHOBIA: 0
APNEA: 0
SINUS PRESSURE: 0
NAUSEA: 0
VOMITING: 0
RECTAL PAIN: 0
ANAL BLEEDING: 0
SHORTNESS OF BREATH: 0
BLOOD IN STOOL: 0

## 2018-02-02 NOTE — PROGRESS NOTES
Subjective: Mary Jordan is a 76 y.o. female who presents today for follow up and management of her chronic medical conditions as noted below. HPI:    Mary Jordan is c/o of   Chief Complaint   Patient presents with    Hypertension     management     Hypothyroidism     follow up     Other     risk assessment    . both knee worse pred pills helped for few days   myalgias    bp elevayed off coreg hr 90    Past Medical History:   Diagnosis Date    Abdominal pain, unspecified site     Acquired cyst of kidney     Acute gouty arthropathy     Allergic rhinitis, cause unspecified     Anxiety state, unspecified     Arthropathy, unspecified, site unspecified     Chronic airway obstruction, not elsewhere classified     Chronic kidney disease, stage III (moderate)     Diarrhea     Edema     Esophageal reflux     Essential hypertension, benign     Herpes zoster with other nervous system complications(053.19)     Herpes zoster without mention of complication     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)     Mononeuritis of unspecified site     Myalgia and myositis, unspecified     Osteoarthrosis, unspecified whether generalized or localized, unspecified site     Other general symptoms(780.99)     Other iatrogenic hypothyroidism     Other nonspecific abnormal finding of lung field     Other psoriasis     Pure hypercholesterolemia     Regional enteritis of unspecified site     Senile osteoporosis     Sprain of ankle, unspecified site     Unspecified vitamin D deficiency        Past Surgical History:   Procedure Laterality Date    COLONOSCOPY      DILATION AND CURETTAGE OF UTERUS         Family History   Problem Relation Age of Onset    Coronary Art Dis Father        Social History     Social History    Marital status:       Spouse name: N/A    Number of children: N/A    Years of education: N/A     Social History Main Topics    Smoking status: Former Smoker    Smokeless tobacco: Never Used    Alcohol use No    Drug use: No    Sexual activity: Not Asked     Other Topics Concern    None     Social History Narrative    None       Current Outpatient Prescriptions   Medication Sig Dispense Refill    carvedilol (COREG) 12.5 MG tablet Take 0.5 tablets by mouth 2 times daily 987 tablet 5    folic acid (FOLVITE) 1 MG tablet Take 1 tablet by mouth daily 90 tablet 3    levothyroxine (LEVOTHROID) 112 MCG tablet Take 1 tablet by mouth daily 90 tablet 3    gabapentin (NEURONTIN) 100 MG capsule Take 3 capsules by mouth 4 times daily 360 capsule 5    allopurinol (ZYLOPRIM) 100 MG tablet Take 1 tablet by mouth daily 30 tablet 5    meloxicam (MOBIC) 15 MG tablet Take 1 tablet by mouth daily 30 tablet 5    atorvastatin (LIPITOR) 40 MG tablet Take 1 tablet by mouth daily 90 tablet 3    fexofenadine (ALLEGRA ALLERGY) 180 MG tablet Take 180 mg by mouth daily as needed      triamcinolone (NASACORT AQ) 55 MCG/ACT nasal inhaler 1 spray by Nasal route daily as needed      Multiple Vitamins-Minerals (HAIR/SKIN/NAILS) TABS Take 1 tablet by mouth daily      Cholecalciferol (VITAMIN D) 2000 UNITS CAPS capsule Take 2,000 Units by mouth daily      Misc Natural Products (JOINT SUPPORT) CAPS Take 2 capsules by mouth daily       Current Facility-Administered Medications   Medication Dose Route Frequency Provider Last Rate Last Dose    methylPREDNISolone acetate (DEPO-MEDROL) injection 80 mg  80 mg Intramuscular Once Arlean Fothergill, MD        methylPREDNISolone acetate (DEPO-MEDROL) injection 80 mg  80 mg Intramuscular Once Arlean Fothergill, MD        methylPREDNISolone acetate (DEPO-MEDROL) injection 80 mg  80 mg Intramuscular Once Arlean Fothergill, MD        triamcinolone acetonide (KENALOG-40) injection 60 mg  60 mg Intramuscular Once Arlean Fothergill, MD             Review of Systems:    Review of Systems   Constitutional: Positive for fatigue. Negative for activity change, appetite change, chills, diaphoresis and fever. Nose: Nose normal.   Mouth/Throat: Oropharynx is clear and moist. No oropharyngeal exudate. Eyes: Conjunctivae and EOM are normal. Pupils are equal, round, and reactive to light. Right eye exhibits no discharge. Left eye exhibits no discharge. No scleral icterus. Neck: Normal range of motion. Neck supple. No JVD present. No tracheal deviation present. No thyromegaly present. Cardiovascular: Normal rate, regular rhythm, normal heart sounds and intact distal pulses. Exam reveals no gallop and no friction rub. No murmur heard. Pulmonary/Chest: Effort normal and breath sounds normal. No respiratory distress. She has no wheezes. She has no rales. She exhibits no tenderness. Abdominal: Soft. Bowel sounds are normal. She exhibits no distension and no mass. There is no tenderness. There is no rebound and no guarding. Genitourinary: Rectal exam shows guaiac negative stool. No vaginal discharge found. Musculoskeletal: She exhibits no edema. Right knee: She exhibits decreased range of motion. Tenderness found. Left knee: She exhibits decreased range of motion. Tenderness found. Lymphadenopathy:     She has no cervical adenopathy. Neurological: She is alert and oriented to person, place, and time. She has normal reflexes. No cranial nerve deficit. She exhibits normal muscle tone. Coordination normal.   Skin: Skin is warm and dry. No rash noted. She is not diaphoretic. No erythema. No pallor. Psychiatric: She has a normal mood and affect.  Her behavior is normal. Judgment and thought content normal.         Results for orders placed or performed during the hospital encounter of 10/17/17   Urinalysis   Result Value Ref Range    Color, UA YELLOW YEL    Turbidity UA CLEAR CLEAR    Glucose, Ur NEGATIVE NEG    Bilirubin Urine NEGATIVE NEG    Ketones, Urine NEGATIVE NEG    Specific Gravity, UA 1.013 1.005 - 1.030    Urine Hgb NEGATIVE NEG    pH, UA 6.5 5.0 - 8.0    Protein, UA NEGATIVE NEG Urobilinogen, Urine Normal NORM    Nitrite, Urine NEGATIVE NEG    Leukocyte Esterase, Urine MODERATE (A) NEG    Urinalysis Comments NOT REPORTED    Microscopic Urinalysis   Result Value Ref Range    -          WBC, UA 2 TO 5 0 - 5 /HPF    RBC, UA 0 TO 2 0 - 4 /HPF    Casts UA 0 TO 2 HYALINE 0 - 8 /LPF    Crystals UA NOT REPORTED NONE /HPF    Epithelial Cells UA 2 TO 5 0 - 5 /HPF    Renal Epithelial, Urine NOT REPORTED 0 /HPF    Bacteria, UA NOT REPORTED NONE    Mucus, UA NOT REPORTED NONE    Trichomonas, UA NOT REPORTED NONE    Amorphous, UA NOT REPORTED NONE    Other Observations UA NOT REPORTED NREQ    Yeast, UA NOT REPORTED NONE     No results found. Assessment:     1. Chronic obstructive pulmonary disease, unspecified COPD type ([facility] [address]   baseline cont same    2. Crohn's disease of large intestine without complication (HCC)   stable cont same   3.     4.     5. Primary osteoarthritis of both knees  MO ARTHROCENTESIS ASPIR&/INJ MAJOR JT/BURSA W/O US    MO ARTHROCENTESIS ASPIR&/INJ MAJOR JT/BURSA W/O US   6. Essential hypertension, benign  restrt coreg      Injection of Joint Procedure Note    Joint: lt kneeIndication: djd  Material Injected: depomedrol 80 mg    Procedure: The joint was cleansed with iodine and alcohol. Under sterile conditions the joint was injected without complications. The patient tolerated the procedure well. Follow up in 4 weeks was scheduled for joint examination and results. Injection of Joint Procedure Note    Joint: rt knee  Indication:  djd  Material Injected: depomedrol 80 mg    Procedure: The joint was cleansed with iodine and alcohol. Under sterile conditions the joint was injected without complications. The patient tolerated the procedure well. Follow up in 4 weeks was scheduled for joint examination and results.         Plan:     Orders Placed This Encounter   Procedures    MO ARTHROCENTESIS ASPIR&/INJ MAJOR JT/BURSA W/O US    MO ARTHROCENTESIS ASPIR&/INJ MAJOR FARSHAD/DAYDAY W/O US       Orders Placed This Encounter   Medications    carvedilol (COREG) 12.5 MG tablet     Sig: Take 0.5 tablets by mouth 2 times daily     Dispense:  180 tablet     Refill:  5    methylPREDNISolone acetate (DEPO-MEDROL) injection 80 mg    methylPREDNISolone acetate (DEPO-MEDROL) injection 80 mg       Quality & Risk Score Accuracy - MEDICARE ADVANTAGE    Visit Dx:  Panlobular emphysema (HCC)  Stable based upon symptoms and exam. Continue current treatment plan and follow up at least yearly. Visit Dx:  Crohn's disease in remission ([facility] [address]  Stable based upon symptoms and exam. Continue current treatment plan and follow up at least yearly. Additional documentation:  Based on review of the following:   Last edited 02/02/18 11:20 EST by Mary Lou Live MD      restrt coreg     both knees inj   cont neurontin      Yaz received counseling on the following healthy behaviors: exercise  Reviewed prior labs and health maintenance. Continue current medications, diet and exercise. Discussed use, benefit, and side effects of prescribed medications. Barriers to medication compliance addressed. Patient given educational materials - see patient instructions. All patient questions answered. Patient voiced understanding. 1.  Patient given educational materials - see patient instructions  2. Discussed use, benefit, and side effects of prescribed medications. Barriers to medication compliance addressed. All patient questions answered. Pt voiced understanding. 3.  Reviewed prior labs and health maintenance  4. Continue current medications, diet and exercise.   5.  6 weeks

## 2018-04-19 ENCOUNTER — OFFICE VISIT (OUTPATIENT)
Dept: INTERNAL MEDICINE CLINIC | Age: 78
End: 2018-04-19
Payer: MEDICARE

## 2018-04-19 VITALS
HEIGHT: 69 IN | WEIGHT: 138 LBS | SYSTOLIC BLOOD PRESSURE: 151 MMHG | BODY MASS INDEX: 20.44 KG/M2 | DIASTOLIC BLOOD PRESSURE: 77 MMHG | HEART RATE: 55 BPM

## 2018-04-19 DIAGNOSIS — N18.30 CHRONIC KIDNEY DISEASE, STAGE III (MODERATE) (HCC): ICD-10-CM

## 2018-04-19 DIAGNOSIS — M17.0 PRIMARY OSTEOARTHRITIS OF BOTH KNEES: ICD-10-CM

## 2018-04-19 DIAGNOSIS — M79.7 FIBROMYALGIA: ICD-10-CM

## 2018-04-19 DIAGNOSIS — M16.11 PRIMARY OSTEOARTHRITIS OF RIGHT HIP: Primary | ICD-10-CM

## 2018-04-19 DIAGNOSIS — M10.9 ACUTE GOUTY ARTHROPATHY: ICD-10-CM

## 2018-04-19 DIAGNOSIS — I10 ESSENTIAL HYPERTENSION, BENIGN: ICD-10-CM

## 2018-04-19 PROCEDURE — 1123F ACP DISCUSS/DSCN MKR DOCD: CPT | Performed by: INTERNAL MEDICINE

## 2018-04-19 PROCEDURE — 1036F TOBACCO NON-USER: CPT | Performed by: INTERNAL MEDICINE

## 2018-04-19 PROCEDURE — G8427 DOCREV CUR MEDS BY ELIG CLIN: HCPCS | Performed by: INTERNAL MEDICINE

## 2018-04-19 PROCEDURE — G8420 CALC BMI NORM PARAMETERS: HCPCS | Performed by: INTERNAL MEDICINE

## 2018-04-19 PROCEDURE — 99214 OFFICE O/P EST MOD 30 MIN: CPT | Performed by: INTERNAL MEDICINE

## 2018-04-19 PROCEDURE — 1090F PRES/ABSN URINE INCON ASSESS: CPT | Performed by: INTERNAL MEDICINE

## 2018-04-19 PROCEDURE — 4040F PNEUMOC VAC/ADMIN/RCVD: CPT | Performed by: INTERNAL MEDICINE

## 2018-04-19 PROCEDURE — G8399 PT W/DXA RESULTS DOCUMENT: HCPCS | Performed by: INTERNAL MEDICINE

## 2018-04-19 RX ORDER — DULOXETIN HYDROCHLORIDE 30 MG/1
30 CAPSULE, DELAYED RELEASE ORAL DAILY
Qty: 30 CAPSULE | Refills: 3 | Status: SHIPPED | OUTPATIENT
Start: 2018-04-19 | End: 2018-06-04

## 2018-04-19 RX ORDER — LEVOTHYROXINE SODIUM 112 UG/1
112 TABLET ORAL DAILY
Qty: 90 TABLET | Refills: 3 | Status: SHIPPED | OUTPATIENT
Start: 2018-04-19 | End: 2019-07-02 | Stop reason: SDUPTHER

## 2018-04-19 RX ORDER — ALLOPURINOL 100 MG/1
100 TABLET ORAL DAILY
Qty: 90 TABLET | Refills: 3 | Status: SHIPPED | OUTPATIENT
Start: 2018-04-19 | End: 2018-10-02 | Stop reason: SDUPTHER

## 2018-04-19 ASSESSMENT — ENCOUNTER SYMPTOMS
BLOOD IN STOOL: 0
VOICE CHANGE: 0
CHOKING: 0
COLOR CHANGE: 0
RECTAL PAIN: 0
CONSTIPATION: 0
SINUS PRESSURE: 0
PHOTOPHOBIA: 0
VOMITING: 0
FACIAL SWELLING: 0
ABDOMINAL DISTENTION: 0
SORE THROAT: 0
SHORTNESS OF BREATH: 0
BACK PAIN: 1
ANAL BLEEDING: 0
EYE ITCHING: 0
DIARRHEA: 0
ABDOMINAL PAIN: 0
APNEA: 0
EYE PAIN: 0
EYE DISCHARGE: 0
CHEST TIGHTNESS: 0
TROUBLE SWALLOWING: 0
EYE REDNESS: 0
WHEEZING: 0
NAUSEA: 0
STRIDOR: 0
COUGH: 0
RHINORRHEA: 0

## 2018-04-23 ENCOUNTER — TELEPHONE (OUTPATIENT)
Dept: INTERNAL MEDICINE CLINIC | Age: 78
End: 2018-04-23

## 2018-05-23 ENCOUNTER — TELEPHONE (OUTPATIENT)
Dept: INTERNAL MEDICINE CLINIC | Age: 78
End: 2018-05-23

## 2018-05-23 RX ORDER — CIPROFLOXACIN 250 MG/1
250 TABLET, FILM COATED ORAL 2 TIMES DAILY
Qty: 14 TABLET | Refills: 0 | Status: SHIPPED | OUTPATIENT
Start: 2018-05-23 | End: 2018-05-30

## 2018-05-23 RX ORDER — METRONIDAZOLE 250 MG/1
250 TABLET ORAL 2 TIMES DAILY
Qty: 14 TABLET | Refills: 0 | Status: SHIPPED | OUTPATIENT
Start: 2018-05-23 | End: 2018-05-30

## 2018-05-29 ENCOUNTER — HOSPITAL ENCOUNTER (OUTPATIENT)
Dept: GENERAL RADIOLOGY | Facility: CLINIC | Age: 78
Discharge: HOME | End: 2018-05-31
Payer: MEDICARE

## 2018-05-29 ENCOUNTER — HOSPITAL ENCOUNTER (OUTPATIENT)
Facility: CLINIC | Age: 78
Discharge: HOME | End: 2018-05-31
Payer: MEDICARE

## 2018-05-29 DIAGNOSIS — M17.0 PRIMARY OSTEOARTHRITIS OF BOTH KNEES: ICD-10-CM

## 2018-05-29 PROCEDURE — 73502 X-RAY EXAM HIP UNI 2-3 VIEWS: CPT

## 2018-06-04 ENCOUNTER — OFFICE VISIT (OUTPATIENT)
Dept: INTERNAL MEDICINE CLINIC | Age: 78
End: 2018-06-04
Payer: MEDICARE

## 2018-06-04 VITALS
SYSTOLIC BLOOD PRESSURE: 136 MMHG | HEIGHT: 69 IN | WEIGHT: 136 LBS | BODY MASS INDEX: 20.14 KG/M2 | DIASTOLIC BLOOD PRESSURE: 78 MMHG | HEART RATE: 55 BPM

## 2018-06-04 DIAGNOSIS — I10 ESSENTIAL HYPERTENSION, BENIGN: Primary | ICD-10-CM

## 2018-06-04 DIAGNOSIS — N18.30 CHRONIC KIDNEY DISEASE, STAGE III (MODERATE) (HCC): ICD-10-CM

## 2018-06-04 DIAGNOSIS — M70.61 TROCHANTERIC BURSITIS OF RIGHT HIP: ICD-10-CM

## 2018-06-04 DIAGNOSIS — K52.9 COLITIS: ICD-10-CM

## 2018-06-04 PROCEDURE — G8427 DOCREV CUR MEDS BY ELIG CLIN: HCPCS | Performed by: INTERNAL MEDICINE

## 2018-06-04 PROCEDURE — 99213 OFFICE O/P EST LOW 20 MIN: CPT | Performed by: INTERNAL MEDICINE

## 2018-06-04 PROCEDURE — 20610 DRAIN/INJ JOINT/BURSA W/O US: CPT | Performed by: INTERNAL MEDICINE

## 2018-06-04 PROCEDURE — 4040F PNEUMOC VAC/ADMIN/RCVD: CPT | Performed by: INTERNAL MEDICINE

## 2018-06-04 PROCEDURE — 1123F ACP DISCUSS/DSCN MKR DOCD: CPT | Performed by: INTERNAL MEDICINE

## 2018-06-04 PROCEDURE — G8420 CALC BMI NORM PARAMETERS: HCPCS | Performed by: INTERNAL MEDICINE

## 2018-06-04 PROCEDURE — G8399 PT W/DXA RESULTS DOCUMENT: HCPCS | Performed by: INTERNAL MEDICINE

## 2018-06-04 PROCEDURE — 1090F PRES/ABSN URINE INCON ASSESS: CPT | Performed by: INTERNAL MEDICINE

## 2018-06-04 PROCEDURE — 1036F TOBACCO NON-USER: CPT | Performed by: INTERNAL MEDICINE

## 2018-06-04 RX ORDER — METHYLPREDNISOLONE ACETATE 80 MG/ML
80 INJECTION, SUSPENSION INTRA-ARTICULAR; INTRALESIONAL; INTRAMUSCULAR; SOFT TISSUE ONCE
Status: COMPLETED | OUTPATIENT
Start: 2018-06-04 | End: 2018-06-04

## 2018-06-04 RX ORDER — METHYLPREDNISOLONE SODIUM SUCCINATE 125 MG/2ML
125 INJECTION, POWDER, LYOPHILIZED, FOR SOLUTION INTRAMUSCULAR; INTRAVENOUS ONCE
Status: SHIPPED | OUTPATIENT
Start: 2018-06-04

## 2018-06-04 RX ADMIN — METHYLPREDNISOLONE ACETATE 80 MG: 80 INJECTION, SUSPENSION INTRA-ARTICULAR; INTRALESIONAL; INTRAMUSCULAR; SOFT TISSUE at 15:46

## 2018-06-04 ASSESSMENT — ENCOUNTER SYMPTOMS
EYE ITCHING: 0
RHINORRHEA: 0
STRIDOR: 0
SHORTNESS OF BREATH: 0
VOICE CHANGE: 0
CHOKING: 0
NAUSEA: 0
FACIAL SWELLING: 0
SORE THROAT: 0
SINUS PRESSURE: 0
APNEA: 0
COLOR CHANGE: 0
CONSTIPATION: 0
BACK PAIN: 1
EYE PAIN: 0
TROUBLE SWALLOWING: 0
EYE REDNESS: 0
EYE DISCHARGE: 0
ANAL BLEEDING: 0
PHOTOPHOBIA: 0
COUGH: 0
VOMITING: 0
RECTAL PAIN: 0
BLOOD IN STOOL: 0
ABDOMINAL DISTENTION: 1
CHEST TIGHTNESS: 0
DIARRHEA: 0
ABDOMINAL PAIN: 0
WHEEZING: 0

## 2018-07-17 RX ORDER — GABAPENTIN 100 MG/1
300 CAPSULE ORAL 4 TIMES DAILY
Qty: 360 CAPSULE | Refills: 5 | Status: SHIPPED | OUTPATIENT
Start: 2018-07-17 | End: 2019-09-24 | Stop reason: SDUPTHER

## 2018-07-17 NOTE — TELEPHONE ENCOUNTER
Medication: gabapentin  Last visit: 06/04/18  Next visit: 9/25/2018  Last refill: 07/2017 + 5 refills  [facility]

## 2018-10-02 DIAGNOSIS — M10.9 ACUTE GOUTY ARTHROPATHY: ICD-10-CM

## 2018-10-02 RX ORDER — ATORVASTATIN CALCIUM 40 MG/1
40 TABLET, FILM COATED ORAL DAILY
Qty: 90 TABLET | Refills: 3 | Status: SHIPPED | OUTPATIENT
Start: 2018-10-02 | End: 2020-02-26 | Stop reason: SDUPTHER

## 2018-10-02 RX ORDER — ALLOPURINOL 100 MG/1
100 TABLET ORAL DAILY
Qty: 90 TABLET | Refills: 3 | Status: SHIPPED | OUTPATIENT
Start: 2018-10-02 | End: 2019-09-24 | Stop reason: SDUPTHER

## 2018-10-02 NOTE — TELEPHONE ENCOUNTER
From: Emma Robledo  Sent: 10/2/2018 12:44 PM EDT  Subject: Medication Renewal Request    Jasper Molly.  Herman Moseley would like a refill of the following medications:     atorvastatin (LIPITOR) 40 MG tablet [Edgar Sanchez MD]    Preferred pharmacy: [facility]

## 2018-10-25 ENCOUNTER — OFFICE VISIT (OUTPATIENT)
Dept: INTERNAL MEDICINE CLINIC | Age: 78
End: 2018-10-25
Payer: MEDICARE

## 2018-10-25 VITALS
WEIGHT: 133 LBS | HEIGHT: 68 IN | DIASTOLIC BLOOD PRESSURE: 86 MMHG | BODY MASS INDEX: 20.16 KG/M2 | SYSTOLIC BLOOD PRESSURE: 157 MMHG

## 2018-10-25 DIAGNOSIS — I10 ESSENTIAL HYPERTENSION, BENIGN: Primary | ICD-10-CM

## 2018-10-25 DIAGNOSIS — E06.3 HYPOTHYROIDISM DUE TO HASHIMOTO'S THYROIDITIS: ICD-10-CM

## 2018-10-25 DIAGNOSIS — N18.30 CHRONIC KIDNEY DISEASE, STAGE III (MODERATE) (HCC): ICD-10-CM

## 2018-10-25 DIAGNOSIS — E03.8 HYPOTHYROIDISM DUE TO HASHIMOTO'S THYROIDITIS: ICD-10-CM

## 2018-10-25 DIAGNOSIS — M79.7 FIBROMYALGIA: ICD-10-CM

## 2018-10-25 DIAGNOSIS — K50.10 CROHN'S DISEASE OF LARGE INTESTINE WITHOUT COMPLICATION (HCC): ICD-10-CM

## 2018-10-25 DIAGNOSIS — M70.62 TROCHANTERIC BURSITIS OF LEFT HIP: ICD-10-CM

## 2018-10-25 DIAGNOSIS — E78.00 PURE HYPERCHOLESTEROLEMIA: ICD-10-CM

## 2018-10-25 PROCEDURE — 1036F TOBACCO NON-USER: CPT | Performed by: INTERNAL MEDICINE

## 2018-10-25 PROCEDURE — 1090F PRES/ABSN URINE INCON ASSESS: CPT | Performed by: INTERNAL MEDICINE

## 2018-10-25 PROCEDURE — 1101F PT FALLS ASSESS-DOCD LE1/YR: CPT | Performed by: INTERNAL MEDICINE

## 2018-10-25 PROCEDURE — G8420 CALC BMI NORM PARAMETERS: HCPCS | Performed by: INTERNAL MEDICINE

## 2018-10-25 PROCEDURE — 99214 OFFICE O/P EST MOD 30 MIN: CPT | Performed by: INTERNAL MEDICINE

## 2018-10-25 PROCEDURE — 4040F PNEUMOC VAC/ADMIN/RCVD: CPT | Performed by: INTERNAL MEDICINE

## 2018-10-25 PROCEDURE — G8482 FLU IMMUNIZE ORDER/ADMIN: HCPCS | Performed by: INTERNAL MEDICINE

## 2018-10-25 PROCEDURE — G8399 PT W/DXA RESULTS DOCUMENT: HCPCS | Performed by: INTERNAL MEDICINE

## 2018-10-25 PROCEDURE — G8427 DOCREV CUR MEDS BY ELIG CLIN: HCPCS | Performed by: INTERNAL MEDICINE

## 2018-10-25 PROCEDURE — 1123F ACP DISCUSS/DSCN MKR DOCD: CPT | Performed by: INTERNAL MEDICINE

## 2018-10-25 RX ORDER — METHYLPREDNISOLONE 4 MG/1
TABLET ORAL
Qty: 1 KIT | Refills: 0 | Status: SHIPPED | OUTPATIENT
Start: 2018-10-25 | End: 2018-10-25 | Stop reason: SDUPTHER

## 2018-10-25 RX ORDER — METHYLPREDNISOLONE 4 MG/1
TABLET ORAL
Qty: 1 KIT | Refills: 0 | Status: SHIPPED | OUTPATIENT
Start: 2018-10-25 | End: 2018-10-31

## 2018-10-25 ASSESSMENT — ENCOUNTER SYMPTOMS
PHOTOPHOBIA: 0
ABDOMINAL DISTENTION: 0
CHEST TIGHTNESS: 0
COUGH: 0
ANAL BLEEDING: 0
VOICE CHANGE: 0
WHEEZING: 0
VOMITING: 0
EYE REDNESS: 0
APNEA: 0
BLOOD IN STOOL: 0
SINUS PRESSURE: 0
NAUSEA: 0
STRIDOR: 0
CONSTIPATION: 0
FACIAL SWELLING: 0
DIARRHEA: 0
SORE THROAT: 0
COLOR CHANGE: 0
SHORTNESS OF BREATH: 0
RHINORRHEA: 0
EYE ITCHING: 0
TROUBLE SWALLOWING: 0
BACK PAIN: 0
CHOKING: 0
EYE PAIN: 0
EYE DISCHARGE: 0
ABDOMINAL PAIN: 0
RECTAL PAIN: 0

## 2018-10-25 ASSESSMENT — PATIENT HEALTH QUESTIONNAIRE - PHQ9
2. FEELING DOWN, DEPRESSED OR HOPELESS: 0
SUM OF ALL RESPONSES TO PHQ QUESTIONS 1-9: 0
1. LITTLE INTEREST OR PLEASURE IN DOING THINGS: 0
SUM OF ALL RESPONSES TO PHQ9 QUESTIONS 1 & 2: 0
SUM OF ALL RESPONSES TO PHQ QUESTIONS 1-9: 0

## 2018-10-25 NOTE — PROGRESS NOTES
Subjective: Stef Porter is a [de-identified] y.o. female who presents today for follow up and management of her chronic medical conditions as noted below. HPI:    Stef Porter is c/o of   Chief Complaint   Patient presents with    Hypertension     management elevated today     Chronic Kidney Disease     due for blood work     COPD     baseline    .   hip pain back   inj helped for a while     cr better     taking meds     Did not take this morning     Past Medical History:   Diagnosis Date    Abdominal pain, unspecified site     Acquired cyst of kidney     Acute gouty arthropathy     Allergic rhinitis, cause unspecified     Anxiety state, unspecified     Arthropathy, unspecified, site unspecified     Chronic airway obstruction, not elsewhere classified     Chronic kidney disease, stage III (moderate) (HCC)     Diarrhea     Edema     Esophageal reflux     Essential hypertension, benign     Herpes zoster with other nervous system complications(053.19)     Herpes zoster without mention of complication     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)     Mononeuritis of unspecified site     Myalgia and myositis, unspecified     Osteoarthrosis, unspecified whether generalized or localized, unspecified site     Other general symptoms(780.99)     Other iatrogenic hypothyroidism     Other nonspecific abnormal finding of lung field     Other psoriasis     Pure hypercholesterolemia     Regional enteritis of unspecified site     Senile osteoporosis     Sprain of ankle, unspecified site     Unspecified vitamin D deficiency        Past Surgical History:   Procedure Laterality Date    COLONOSCOPY      DILATION AND CURETTAGE OF UTERUS         Family History   Problem Relation Age of Onset    Coronary Art Dis Father        Social History     Social History    Marital status:       Spouse name: N/A    Number of children: N/A    Years of education: N/A     Social History Main Topics    Smoking status: Former discharge. Left eye exhibits no discharge. No scleral icterus. Neck: Normal range of motion. Neck supple. No JVD present. No tracheal deviation present. No thyromegaly present. Cardiovascular: Normal rate, regular rhythm, normal heart sounds and intact distal pulses. Exam reveals no gallop and no friction rub. No murmur heard. Pulmonary/Chest: Effort normal and breath sounds normal. No respiratory distress. She has no wheezes. She has no rales. She exhibits no tenderness. Abdominal: Soft. Bowel sounds are normal. She exhibits no distension and no mass. There is no tenderness. There is no rebound and no guarding. Genitourinary: Rectal exam shows guaiac negative stool. No vaginal discharge found. Musculoskeletal: She exhibits tenderness. She exhibits no edema. Lymphadenopathy:     She has no cervical adenopathy. Neurological: She is alert and oriented to person, place, and time. She has normal reflexes. She displays normal reflexes. No cranial nerve deficit. She exhibits normal muscle tone. Coordination normal.   Skin: Skin is warm and dry. No rash noted. She is not diaphoretic. No erythema. No pallor. Psychiatric: Her behavior is normal. Judgment and thought content normal. Her mood appears anxious.          Results for orders placed or performed during the hospital encounter of 10/17/17   Urinalysis   Result Value Ref Range    Color, UA YELLOW YEL    Turbidity UA CLEAR CLEAR    Glucose, Ur NEGATIVE NEG    Bilirubin Urine NEGATIVE NEG    Ketones, Urine NEGATIVE NEG    Specific Gravity, UA 1.013 1.005 - 1.030    Urine Hgb NEGATIVE NEG    pH, UA 6.5 5.0 - 8.0    Protein, UA NEGATIVE NEG    Urobilinogen, Urine Normal NORM    Nitrite, Urine NEGATIVE NEG    Leukocyte Esterase, Urine MODERATE (A) NEG    Urinalysis Comments NOT REPORTED    Microscopic Urinalysis   Result Value Ref Range    -          WBC, UA 2 TO 5 0 - 5 /HPF    RBC, UA 0 TO 2 0 - 4 /HPF    Casts UA 0 TO 2 HYALINE 0 - 8 /LPF    Crystals UA NOT REPORTED NONE /HPF    Epithelial Cells UA 2 TO 5 0 - 5 /HPF    Renal Epithelial, Urine NOT REPORTED 0 /HPF    Bacteria, UA NOT REPORTED NONE    Mucus, UA NOT REPORTED NONE    Trichomonas, UA NOT REPORTED NONE    Amorphous, UA NOT REPORTED NONE    Other Observations UA NOT REPORTED NREQ    Yeast, UA NOT REPORTED NONE     No results found. Assessment:      Diagnosis Orders   1. Essential hypertension, benign   elevated  Did notb take meds today   Check bmp will follow    2. Trochanteric bursitis of left hip   inj helped  Try medrol pack   3. Pure hypercholesterolemia  At goal cont same   4. Chronic kidney disease, stage III (moderate) (HCC)   cr better  recheck   5. Fibromyalgia    Worse  Cold weather  Cont neurontin   6. Hypothyroidism due to Hashimoto's thyroiditis   recheck tsh    7. Crohn's disease of large intestine without complication ([facility] [address]   stable         Plan:       Orders Placed This Encounter   Procedures    Comprehensive Metabolic Panel     Standing Status:   Future     Standing Expiration Date:   10/25/2019       Orders Placed This Encounter   Medications    methylPREDNISolone (MEDROL, MATTHIEU,) 4 MG tablet     Sig: Take as directed     Dispense:  1 kit     Refill:  0        cont other meds     recheck bp      take meds  On appt day        will inj hip again if no betetr   last ;asted 4 mo       Yaz received counseling on the following healthy behaviors: nutrition, exercise and medication adherence  Reviewed prior labs and health maintenance. Continue current medications, diet and exercise. Discussed use, benefit, and side effects of prescribed medications. Barriers to medication compliance addressed. Patient given educational materials - see patient instructions. All patient questions answered. Patient voiced understanding. 1.  Patient given educational materials - see patient instructions  2. Discussed use, benefit, and side effects of prescribed medications.   Barriers to

## 2018-12-27 ENCOUNTER — HOSPITAL ENCOUNTER (OUTPATIENT)
Age: 78
Setting detail: SPECIMEN
Discharge: HOME | End: 2018-12-27
Payer: MEDICARE

## 2018-12-27 DIAGNOSIS — I10 ESSENTIAL HYPERTENSION, BENIGN: ICD-10-CM

## 2018-12-27 LAB
ALBUMIN SERPL-MCNC: 4 G/DL (ref 3.5–5.2)
ALBUMIN/GLOBULIN RATIO: 1.6 (ref 1–2.5)
ALP BLD-CCNC: 135 U/L (ref 35–104)
ALT SERPL-CCNC: 34 U/L (ref 5–33)
ANION GAP SERPL CALCULATED.3IONS-SCNC: 8 MMOL/L (ref 9–17)
AST SERPL-CCNC: 45 U/L
BILIRUB SERPL-MCNC: 0.46 MG/DL (ref 0.3–1.2)
BUN BLDV-MCNC: 11 MG/DL (ref 8–23)
BUN/CREAT BLD: ABNORMAL (ref 9–20)
CALCIUM SERPL-MCNC: 10 MG/DL (ref 8.6–10.4)
CHLORIDE BLD-SCNC: 102 MMOL/L (ref 98–107)
CO2: 29 MMOL/L (ref 20–31)
CREAT SERPL-MCNC: 0.78 MG/DL (ref 0.5–0.9)
GFR AFRICAN AMERICAN: >60 ML/MIN
GFR NON-AFRICAN AMERICAN: >60 ML/MIN
GFR SERPL CREATININE-BSD FRML MDRD: ABNORMAL ML/MIN/{1.73_M2}
GFR SERPL CREATININE-BSD FRML MDRD: ABNORMAL ML/MIN/{1.73_M2}
GLUCOSE BLD-MCNC: 149 MG/DL (ref 70–99)
POTASSIUM SERPL-SCNC: 3.9 MMOL/L (ref 3.7–5.3)
SODIUM BLD-SCNC: 139 MMOL/L (ref 135–144)
TOTAL PROTEIN: 6.5 G/DL (ref 6.4–8.3)

## 2019-01-07 ENCOUNTER — OFFICE VISIT (OUTPATIENT)
Dept: INTERNAL MEDICINE CLINIC | Age: 79
End: 2019-01-07
Payer: COMMERCIAL

## 2019-01-07 VITALS
SYSTOLIC BLOOD PRESSURE: 170 MMHG | DIASTOLIC BLOOD PRESSURE: 90 MMHG | HEIGHT: 68 IN | WEIGHT: 134 LBS | BODY MASS INDEX: 20.31 KG/M2

## 2019-01-07 DIAGNOSIS — I10 ESSENTIAL HYPERTENSION: Primary | ICD-10-CM

## 2019-01-07 DIAGNOSIS — M79.89 RIGHT LEG SWELLING: ICD-10-CM

## 2019-01-07 DIAGNOSIS — E03.9 HYPOTHYROIDISM, UNSPECIFIED TYPE: ICD-10-CM

## 2019-01-07 PROCEDURE — 1090F PRES/ABSN URINE INCON ASSESS: CPT | Performed by: INTERNAL MEDICINE

## 2019-01-07 PROCEDURE — G8482 FLU IMMUNIZE ORDER/ADMIN: HCPCS | Performed by: INTERNAL MEDICINE

## 2019-01-07 PROCEDURE — 4040F PNEUMOC VAC/ADMIN/RCVD: CPT | Performed by: INTERNAL MEDICINE

## 2019-01-07 PROCEDURE — G8420 CALC BMI NORM PARAMETERS: HCPCS | Performed by: INTERNAL MEDICINE

## 2019-01-07 PROCEDURE — 99213 OFFICE O/P EST LOW 20 MIN: CPT | Performed by: INTERNAL MEDICINE

## 2019-01-07 PROCEDURE — 1123F ACP DISCUSS/DSCN MKR DOCD: CPT | Performed by: INTERNAL MEDICINE

## 2019-01-07 PROCEDURE — G8427 DOCREV CUR MEDS BY ELIG CLIN: HCPCS | Performed by: INTERNAL MEDICINE

## 2019-01-07 PROCEDURE — 1036F TOBACCO NON-USER: CPT | Performed by: INTERNAL MEDICINE

## 2019-01-07 PROCEDURE — G8399 PT W/DXA RESULTS DOCUMENT: HCPCS | Performed by: INTERNAL MEDICINE

## 2019-01-07 PROCEDURE — 1101F PT FALLS ASSESS-DOCD LE1/YR: CPT | Performed by: INTERNAL MEDICINE

## 2019-01-07 RX ORDER — MEDICAL SUPPLY, MISCELLANEOUS
1 EACH MISCELLANEOUS DAILY
Qty: 1 EACH | Refills: 0 | Status: SHIPPED | OUTPATIENT
Start: 2019-01-07 | End: 2019-04-30

## 2019-01-10 ENCOUNTER — HOSPITAL ENCOUNTER (OUTPATIENT)
Dept: VASCULAR LAB | Age: 79
Discharge: HOME | End: 2019-01-10
Payer: COMMERCIAL

## 2019-01-10 DIAGNOSIS — M79.89 RIGHT LEG SWELLING: ICD-10-CM

## 2019-01-10 PROCEDURE — 93971 EXTREMITY STUDY: CPT

## 2019-01-16 ENCOUNTER — HOSPITAL ENCOUNTER (OUTPATIENT)
Age: 79
Setting detail: SPECIMEN
Discharge: HOME | End: 2019-01-16
Payer: COMMERCIAL

## 2019-01-16 DIAGNOSIS — E03.9 HYPOTHYROIDISM, UNSPECIFIED TYPE: ICD-10-CM

## 2019-01-16 LAB — TSH SERPL DL<=0.05 MIU/L-ACNC: 0.89 MIU/L (ref 0.3–5)

## 2019-01-17 ENCOUNTER — HOSPITAL ENCOUNTER (OUTPATIENT)
Age: 79
Setting detail: SPECIMEN
Discharge: HOME | End: 2019-01-17
Payer: COMMERCIAL

## 2019-01-17 DIAGNOSIS — E03.9 HYPOTHYROIDISM, UNSPECIFIED TYPE: ICD-10-CM

## 2019-01-17 DIAGNOSIS — M79.89 RIGHT LEG SWELLING: ICD-10-CM

## 2019-01-17 LAB
-: ABNORMAL
AMORPHOUS: ABNORMAL
BACTERIA: ABNORMAL
BILIRUBIN URINE: NEGATIVE
CASTS UA: ABNORMAL /LPF (ref 0–8)
COLOR: YELLOW
COMMENT UA: ABNORMAL
CRYSTALS, UA: ABNORMAL /HPF
EPITHELIAL CELLS UA: ABNORMAL /HPF (ref 0–5)
GLUCOSE URINE: NEGATIVE
KETONES, URINE: NEGATIVE
LEUKOCYTE ESTERASE, URINE: ABNORMAL
MUCUS: ABNORMAL
NITRITE, URINE: POSITIVE
OTHER OBSERVATIONS UA: ABNORMAL
PH UA: 6 (ref 5–8)
PROTEIN UA: NEGATIVE
RBC UA: ABNORMAL /HPF (ref 0–4)
RENAL EPITHELIAL, UA: ABNORMAL /HPF
SPECIFIC GRAVITY UA: 1.01 (ref 1–1.03)
TRICHOMONAS: ABNORMAL
TURBIDITY: ABNORMAL
URINE HGB: ABNORMAL
UROBILINOGEN, URINE: NORMAL
WBC UA: ABNORMAL /HPF (ref 0–5)
YEAST: ABNORMAL

## 2019-01-20 ASSESSMENT — ENCOUNTER SYMPTOMS
DIARRHEA: 0
BACK PAIN: 0
CONSTIPATION: 0
CHEST TIGHTNESS: 0
APNEA: 0
COLOR CHANGE: 0
BLOOD IN STOOL: 0
EYE ITCHING: 0
COUGH: 0
EYE DISCHARGE: 0
EYE REDNESS: 0
CHOKING: 0
ABDOMINAL PAIN: 0
EYE PAIN: 0
ABDOMINAL DISTENTION: 0
SHORTNESS OF BREATH: 0

## 2019-01-21 ENCOUNTER — TELEPHONE (OUTPATIENT)
Dept: INTERNAL MEDICINE CLINIC | Age: 79
End: 2019-01-21

## 2019-01-21 DIAGNOSIS — N39.0 URINARY TRACT INFECTION WITHOUT HEMATURIA, SITE UNSPECIFIED: Primary | ICD-10-CM

## 2019-01-21 RX ORDER — CIPROFLOXACIN 500 MG/1
500 TABLET, FILM COATED ORAL 2 TIMES DAILY
Qty: 10 TABLET | Refills: 0 | Status: SHIPPED | OUTPATIENT
Start: 2019-01-21 | End: 2019-01-26

## 2019-01-28 ENCOUNTER — OFFICE VISIT (OUTPATIENT)
Dept: INTERNAL MEDICINE CLINIC | Age: 79
End: 2019-01-28
Payer: COMMERCIAL

## 2019-01-28 VITALS
SYSTOLIC BLOOD PRESSURE: 110 MMHG | HEIGHT: 68 IN | DIASTOLIC BLOOD PRESSURE: 80 MMHG | BODY MASS INDEX: 21.67 KG/M2 | WEIGHT: 143 LBS

## 2019-01-28 DIAGNOSIS — M79.10 MYALGIA: ICD-10-CM

## 2019-01-28 DIAGNOSIS — K50.10 CROHN'S DISEASE OF LARGE INTESTINE WITHOUT COMPLICATION (HCC): ICD-10-CM

## 2019-01-28 DIAGNOSIS — E78.00 PURE HYPERCHOLESTEROLEMIA: ICD-10-CM

## 2019-01-28 DIAGNOSIS — N18.30 CHRONIC KIDNEY DISEASE, STAGE III (MODERATE) (HCC): ICD-10-CM

## 2019-01-28 DIAGNOSIS — M79.7 FIBROMYALGIA: ICD-10-CM

## 2019-01-28 DIAGNOSIS — J44.9 CHRONIC OBSTRUCTIVE PULMONARY DISEASE, UNSPECIFIED COPD TYPE (HCC): ICD-10-CM

## 2019-01-28 DIAGNOSIS — I10 ESSENTIAL HYPERTENSION, BENIGN: Primary | ICD-10-CM

## 2019-01-28 PROCEDURE — 99214 OFFICE O/P EST MOD 30 MIN: CPT | Performed by: INTERNAL MEDICINE

## 2019-01-28 PROCEDURE — G8926 SPIRO NO PERF OR DOC: HCPCS | Performed by: INTERNAL MEDICINE

## 2019-01-28 PROCEDURE — 1090F PRES/ABSN URINE INCON ASSESS: CPT | Performed by: INTERNAL MEDICINE

## 2019-01-28 PROCEDURE — G8399 PT W/DXA RESULTS DOCUMENT: HCPCS | Performed by: INTERNAL MEDICINE

## 2019-01-28 PROCEDURE — G8427 DOCREV CUR MEDS BY ELIG CLIN: HCPCS | Performed by: INTERNAL MEDICINE

## 2019-01-28 PROCEDURE — G8420 CALC BMI NORM PARAMETERS: HCPCS | Performed by: INTERNAL MEDICINE

## 2019-01-28 PROCEDURE — 4040F PNEUMOC VAC/ADMIN/RCVD: CPT | Performed by: INTERNAL MEDICINE

## 2019-01-28 PROCEDURE — 1101F PT FALLS ASSESS-DOCD LE1/YR: CPT | Performed by: INTERNAL MEDICINE

## 2019-01-28 PROCEDURE — 3023F SPIROM DOC REV: CPT | Performed by: INTERNAL MEDICINE

## 2019-01-28 PROCEDURE — G8482 FLU IMMUNIZE ORDER/ADMIN: HCPCS | Performed by: INTERNAL MEDICINE

## 2019-01-28 PROCEDURE — 1123F ACP DISCUSS/DSCN MKR DOCD: CPT | Performed by: INTERNAL MEDICINE

## 2019-01-28 PROCEDURE — 1036F TOBACCO NON-USER: CPT | Performed by: INTERNAL MEDICINE

## 2019-01-28 ASSESSMENT — ENCOUNTER SYMPTOMS
NAUSEA: 0
COUGH: 0
SORE THROAT: 0
BLOOD IN STOOL: 0
SHORTNESS OF BREATH: 0
BACK PAIN: 0
TROUBLE SWALLOWING: 0
DIARRHEA: 0
CHOKING: 0
STRIDOR: 0
EYE DISCHARGE: 0
COLOR CHANGE: 0
EYE ITCHING: 0
APNEA: 0
EYE PAIN: 0
VOMITING: 0
VOICE CHANGE: 0
RECTAL PAIN: 0
RHINORRHEA: 0
FACIAL SWELLING: 0
CHEST TIGHTNESS: 0
WHEEZING: 0
ABDOMINAL DISTENTION: 0
EYE REDNESS: 0
CONSTIPATION: 0
ABDOMINAL PAIN: 0
ANAL BLEEDING: 0
PHOTOPHOBIA: 0
SINUS PRESSURE: 0

## 2019-02-21 RX ORDER — CARVEDILOL 12.5 MG/1
TABLET ORAL
Qty: 90 TABLET | Refills: 4 | Status: SHIPPED | OUTPATIENT
Start: 2019-02-21 | End: 2020-02-26 | Stop reason: SDUPTHER

## 2019-04-04 ENCOUNTER — TELEPHONE (OUTPATIENT)
Dept: INTERNAL MEDICINE CLINIC | Age: 79
End: 2019-04-04

## 2019-04-04 NOTE — LETTER
[facility]AR[facility]  [address] [place] [address]  Phone: [phone]  Fax: Mohit Garvey MD        April 5, 2019     Patient: Fer Oconnor   YOB: 1940   Date of Visit: 4/4/2019       To Whom It May Concern: It is my medical opinion that Uma Ruiz requires a disability parking placard for the following reasons:  She has limited walking ability due to an arthritic condition. Duration of need: permanent    If you have any questions or concerns, please don't hesitate to call.     Sincerely,        Radha English MD

## 2019-04-04 NOTE — LETTER
KAMARI MENEZES [facility]  [address] [place] [address]  Phone: [phone]  Fax: Vi Devries MD        April 5, 2019     Patient: Nithya Henson   YOB: 1940   Date of Visit: 4/4/2019       To Whom It May Concern: It is my medical opinion that Mitali Smith requires a disability parking placard for the following reasons:  She has limited walking ability due to an arthritic condition. Duration of need: permanent    If you have any questions or concerns, please don't hesitate to call.     Sincerely,        Christa Grey MD

## 2019-04-30 ENCOUNTER — OFFICE VISIT (OUTPATIENT)
Dept: INTERNAL MEDICINE CLINIC | Age: 79
End: 2019-04-30
Payer: COMMERCIAL

## 2019-04-30 VITALS
DIASTOLIC BLOOD PRESSURE: 84 MMHG | SYSTOLIC BLOOD PRESSURE: 124 MMHG | BODY MASS INDEX: 19.7 KG/M2 | HEIGHT: 68 IN | WEIGHT: 130 LBS

## 2019-04-30 DIAGNOSIS — M79.7 FIBROMYALGIA: ICD-10-CM

## 2019-04-30 DIAGNOSIS — K52.9 COLITIS: ICD-10-CM

## 2019-04-30 DIAGNOSIS — L40.8 OTHER PSORIASIS: ICD-10-CM

## 2019-04-30 DIAGNOSIS — I10 ESSENTIAL HYPERTENSION, BENIGN: Primary | ICD-10-CM

## 2019-04-30 DIAGNOSIS — N18.30 CHRONIC KIDNEY DISEASE, STAGE III (MODERATE) (HCC): ICD-10-CM

## 2019-04-30 PROCEDURE — 1090F PRES/ABSN URINE INCON ASSESS: CPT | Performed by: INTERNAL MEDICINE

## 2019-04-30 PROCEDURE — 1036F TOBACCO NON-USER: CPT | Performed by: INTERNAL MEDICINE

## 2019-04-30 PROCEDURE — G8399 PT W/DXA RESULTS DOCUMENT: HCPCS | Performed by: INTERNAL MEDICINE

## 2019-04-30 PROCEDURE — 4040F PNEUMOC VAC/ADMIN/RCVD: CPT | Performed by: INTERNAL MEDICINE

## 2019-04-30 PROCEDURE — G8427 DOCREV CUR MEDS BY ELIG CLIN: HCPCS | Performed by: INTERNAL MEDICINE

## 2019-04-30 PROCEDURE — G8420 CALC BMI NORM PARAMETERS: HCPCS | Performed by: INTERNAL MEDICINE

## 2019-04-30 PROCEDURE — 99214 OFFICE O/P EST MOD 30 MIN: CPT | Performed by: INTERNAL MEDICINE

## 2019-04-30 PROCEDURE — 1123F ACP DISCUSS/DSCN MKR DOCD: CPT | Performed by: INTERNAL MEDICINE

## 2019-04-30 RX ORDER — CLOBETASOL PROPIONATE 0.5 MG/G
AEROSOL, FOAM TOPICAL
Qty: 1 CAN | Refills: 5 | Status: SHIPPED | OUTPATIENT
Start: 2019-04-30 | End: 2019-09-24

## 2019-04-30 ASSESSMENT — ENCOUNTER SYMPTOMS
DIARRHEA: 0
SORE THROAT: 0
CONSTIPATION: 0
WHEEZING: 0
PHOTOPHOBIA: 0
VOICE CHANGE: 0
RHINORRHEA: 0
ABDOMINAL PAIN: 0
EYE ITCHING: 0
BACK PAIN: 0
CHOKING: 0
VOMITING: 0
ABDOMINAL DISTENTION: 0
RECTAL PAIN: 0
ANAL BLEEDING: 0
BLOOD IN STOOL: 0
TROUBLE SWALLOWING: 0
FACIAL SWELLING: 0
EYE DISCHARGE: 0
STRIDOR: 0
EYE PAIN: 0
CHEST TIGHTNESS: 0
SHORTNESS OF BREATH: 0
COUGH: 0
COLOR CHANGE: 0
NAUSEA: 0
SINUS PRESSURE: 0
EYE REDNESS: 0
APNEA: 0

## 2019-04-30 ASSESSMENT — PATIENT HEALTH QUESTIONNAIRE - PHQ9
SUM OF ALL RESPONSES TO PHQ QUESTIONS 1-9: 1
1. LITTLE INTEREST OR PLEASURE IN DOING THINGS: 0
SUM OF ALL RESPONSES TO PHQ9 QUESTIONS 1 & 2: 1
2. FEELING DOWN, DEPRESSED OR HOPELESS: 1
SUM OF ALL RESPONSES TO PHQ QUESTIONS 1-9: 1

## 2019-04-30 NOTE — PROGRESS NOTES
Chronic Disease Visit Information    BP Readings from Last 3 Encounters:   01/28/19 110/80   01/07/19 (!) 170/90   10/25/18 (!) 157/86          LDL Cholesterol (mg/dL)   Date Value   10/10/2017 119     HDL (mg/dL)   Date Value   10/10/2017 55     BUN (mg/dL)   Date Value   12/27/2018 11     CREATININE (mg/dL)   Date Value   12/27/2018 0.78     Glucose (mg/dL)   Date Value   12/27/2018 149 (H)   12/02/2011 88            Have you changed or started any medications since your last visit including any over-the-counter medicines, vitamins, or herbal medicines? no   Are you having any side effects from any of your medications? -  no  Have you stopped taking any of your medications? Is so, why? -  no    Have you seen any other physician or provider since your last visit? No  Have you had any other diagnostic tests since your last visit? No  Have you been seen in the emergency room and/or had an admission to a hospital since we last saw you? No  Have you had your annual diabetic retinal (eye) exam? No  Have you had your routine dental cleaning in the past 6 months? no    Have you activated your [employer] account? If not, what are your barriers?  No:      Patient Care Team:  Jyotsna Villarreal MD as PCP - Neha Sanchez MD as PCP - S Attributed Provider         Medical History Review  Past Medical, Family, and Social History reviewed and does contribute to the patient presenting condition  Chief Complaint   Patient presents with    Hypertension     management     Chronic Kidney Disease     cr 0.7     Health Maintenance   Topic Date Due    DTaP/Tdap/Td vaccine (1 - Tdap) 07/19/1957    Shingles Vaccine (1 of 2) 07/19/1988    Potassium monitoring  12/27/2019    Creatinine monitoring  12/27/2019    Flu vaccine  Completed    Pneumococcal 65+ years Vaccine  Completed    DEXA (modify frequency per FRAX score)  Addressed

## 2019-04-30 NOTE — PROGRESS NOTES
Subjective: Mona Carpio is a [de-identified] y.o. female who presents today for follow up and management of her chronic medical conditions as noted below. HPI:    Mona Carpio is c/o of   Chief Complaint   Patient presents with    Hypertension     management     Chronic Kidney Disease     cr 0.7   . Joint pains   psoarisis worse  Past Medical History:   Diagnosis Date    Abdominal pain, unspecified site     Acquired cyst of kidney     Acute gouty arthropathy     Allergic rhinitis, cause unspecified     Anxiety state, unspecified     Arthropathy, unspecified, site unspecified     Chronic airway obstruction, not elsewhere classified     Chronic kidney disease, stage III (moderate) (HCC)     Diarrhea     Edema     Esophageal reflux     Essential hypertension, benign     Herpes zoster with other nervous system complications(053.19)     Herpes zoster without mention of complication     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)     Mononeuritis of unspecified site     Myalgia and myositis, unspecified     Osteoarthrosis, unspecified whether generalized or localized, unspecified site     Other general symptoms(780.99)     Other iatrogenic hypothyroidism     Other nonspecific abnormal finding of lung field     Other psoriasis     Pure hypercholesterolemia     Regional enteritis of unspecified site     Senile osteoporosis     Sprain of ankle, unspecified site     Unspecified vitamin D deficiency        Past Surgical History:   Procedure Laterality Date    COLONOSCOPY      DILATION AND CURETTAGE OF UTERUS         Family History   Problem Relation Age of Onset    Coronary Art Dis Father        Social History     Socioeconomic History    Marital status:       Spouse name: None    Number of children: None    Years of education: None    Highest education level: None   Occupational History    None   Social Needs    Financial resource strain: None    Food insecurity:     Worry: None     Inability: None  Transportation needs:     Medical: None     Non-medical: None   Tobacco Use    Smoking status: Former Smoker     Packs/day: 0.75     Years: 2.00     Pack years: 1.50     Types: Cigarettes     Start date: 10/25/1958     Last attempt to quit: 10/25/2018     Years since quittin.5    Smokeless tobacco: Never Used   Substance and Sexual Activity    Alcohol use: No     Alcohol/week: 0.0 oz    Drug use: No    Sexual activity: None   Lifestyle    Physical activity:     Days per week: None     Minutes per session: None    Stress: None   Relationships    Social connections:     Talks on phone: None     Gets together: None     Attends [employer] service: None     Active member of club or organization: None     Attends meetings of clubs or organizations: None     Relationship status: None    Intimate partner violence:     Fear of current or ex partner: None     Emotionally abused: None     Physically abused: None     Forced sexual activity: None   Other Topics Concern    None   Social History Narrative    None       Current Outpatient Medications   Medication Sig Dispense Refill    carvedilol (COREG) 12.5 MG tablet TAKE 1/2 TABLET BY MOUTH TWO TIMES A DAY 90 tablet 4    atorvastatin (LIPITOR) 40 MG tablet Take 1 tablet by mouth daily 90 tablet 3    allopurinol (ZYLOPRIM) 100 MG tablet Take 1 tablet by mouth daily 90 tablet 3    levothyroxine (LEVOTHROID) 112 MCG tablet Take 1 tablet by mouth daily 90 tablet 3    folic acid (FOLVITE) 1 MG tablet Take 1 tablet by mouth daily 90 tablet 3    Multiple Vitamins-Minerals (HAIR/SKIN/NAILS) TABS Take 1 tablet by mouth daily      Cholecalciferol (VITAMIN D) 2000 UNITS CAPS capsule Take 2,000 Units by mouth daily      gabapentin (NEURONTIN) 100 MG capsule Take 3 capsules by mouth 4 times daily for 31 days. . 360 capsule 5     Current Facility-Administered Medications   Medication Dose Route Frequency Provider Last Rate Last Dose    methylPREDNISolone sodium (SOLU-MEDROL) injection 125 mg  125 mg Intramuscular Once Radha English MD        methylPREDNISolone acetate (DEPO-MEDROL) injection 80 mg  80 mg Intramuscular Once Radha English MD        triamcinolone acetonide (KENALOG-40) injection 60 mg  60 mg Intramuscular Once Radha English MD             Review of Systems:    Review of Systems   Constitutional: Positive for fatigue. Negative for activity change, appetite change, chills, diaphoresis and fever. HENT: Negative for congestion, dental problem, drooling, ear discharge, ear pain, facial swelling, hearing loss, mouth sores, nosebleeds, postnasal drip, rhinorrhea, sinus pressure, sneezing, sore throat, tinnitus, trouble swallowing and voice change. Eyes: Negative for photophobia, pain, discharge, redness, itching and visual disturbance. Respiratory: Negative for apnea, cough, choking, chest tightness, shortness of breath, wheezing and stridor. Cardiovascular: Negative for chest pain, palpitations and leg swelling. Gastrointestinal: Negative for abdominal distention, abdominal pain, anal bleeding, blood in stool, constipation, diarrhea, nausea, rectal pain and vomiting. Endocrine: Negative for cold intolerance, heat intolerance, polydipsia, polyphagia and polyuria. Genitourinary: Negative for decreased urine volume, difficulty urinating, dyspareunia, dysuria, enuresis, flank pain, frequency, genital sores, hematuria, menstrual problem, pelvic pain, urgency, vaginal bleeding and vaginal discharge. Musculoskeletal: Positive for arthralgias and myalgias. Negative for back pain, gait problem, joint swelling, neck pain and neck stiffness. Skin: Positive for rash. Negative for color change, pallor and wound. Neurological: Negative for dizziness, tremors, seizures, syncope, facial asymmetry, speech difficulty, weakness, light-headedness, numbness and headaches. Hematological: Negative for adenopathy. Does not bruise/bleed easily. Psychiatric/Behavioral: Positive for sleep disturbance. Negative for agitation, behavioral problems, confusion, decreased concentration and dysphoric mood. The patient is nervous/anxious. Objective:     PhysicalExam:      Physical Exam   Constitutional: She is oriented to person, place, and time. She appears well-developed and well-nourished. No distress. HENT:   Head: Normocephalic and atraumatic. Right Ear: External ear normal.   Left Ear: External ear normal.   Nose: Nose normal.   Mouth/Throat: Oropharynx is clear and moist. No oropharyngeal exudate. Eyes: Pupils are equal, round, and reactive to light. Conjunctivae and EOM are normal. Right eye exhibits no discharge. Left eye exhibits no discharge. No scleral icterus. Neck: Normal range of motion. Neck supple. No JVD present. No tracheal deviation present. No thyromegaly present. Cardiovascular: Normal rate, regular rhythm, normal heart sounds and intact distal pulses. Exam reveals no gallop and no friction rub. No murmur heard. Pulmonary/Chest: Effort normal and breath sounds normal. No respiratory distress. She has no wheezes. She has no rales. She exhibits no tenderness. Abdominal: Soft. Bowel sounds are normal. She exhibits no distension and no mass. There is no tenderness. There is no rebound and no guarding. Genitourinary: Rectal exam shows guaiac negative stool. No vaginal discharge found. Musculoskeletal: She exhibits tenderness. She exhibits no edema. Lymphadenopathy:     She has no cervical adenopathy. Neurological: She is alert and oriented to person, place, and time. She has normal reflexes. She displays normal reflexes. No cranial nerve deficit. She exhibits normal muscle tone. Coordination normal.   Skin: Skin is warm and dry. Rash noted. She is not diaphoretic. No erythema. No pallor. Psychiatric: She has a normal mood and affect.  Her behavior is normal. Judgment and thought content normal.         Results psoariasis        Yaz received counseling on the following healthy behaviors: nutrition, exercise and medication adherence  Reviewed prior labs and health maintenance. Continue current medications, diet and exercise. Discussed use, benefit, and side effects of prescribed medications. Barriers to medication compliance addressed. Patient given educational materials - see patient instructions. All patient questions answered. Patient voiced understanding. 1.  Patient given educational materials - see patient instructions  2. Discussed use, benefit, and side effects of prescribed medications. Barriers to medication compliance addressed. All patient questions answered. Pt voiced understanding. 3.  Reviewed prior labs and health maintenance  4. Continue current medications, diet and exercise.   5.   3m

## 2019-06-06 ENCOUNTER — HOSPITAL ENCOUNTER (OUTPATIENT)
Age: 79
Setting detail: SPECIMEN
Discharge: HOME | End: 2019-06-06
Payer: COMMERCIAL

## 2019-06-06 DIAGNOSIS — E78.00 PURE HYPERCHOLESTEROLEMIA: ICD-10-CM

## 2019-06-06 LAB
CHOLESTEROL/HDL RATIO: 3
CHOLESTEROL: 181 MG/DL
HDLC SERPL-MCNC: 60 MG/DL
LDL CHOLESTEROL: 101 MG/DL (ref 0–130)
TRIGL SERPL-MCNC: 101 MG/DL
VLDLC SERPL CALC-MCNC: NORMAL MG/DL (ref 1–30)

## 2019-06-14 ENCOUNTER — HOSPITAL ENCOUNTER (EMERGENCY)
Age: 79
Discharge: HOME | End: 2019-06-14
Attending: EMERGENCY MEDICINE
Payer: COMMERCIAL

## 2019-06-14 ENCOUNTER — ANESTHESIA (OUTPATIENT)
Dept: OPERATING ROOM | Age: 79
End: 2019-06-14
Payer: COMMERCIAL

## 2019-06-14 ENCOUNTER — ANESTHESIA EVENT (OUTPATIENT)
Dept: OPERATING ROOM | Age: 79
End: 2019-06-14
Payer: COMMERCIAL

## 2019-06-14 ENCOUNTER — APPOINTMENT (OUTPATIENT)
Dept: GENERAL RADIOLOGY | Age: 79
End: 2019-06-14
Payer: COMMERCIAL

## 2019-06-14 VITALS
SYSTOLIC BLOOD PRESSURE: 129 MMHG | OXYGEN SATURATION: 94 % | WEIGHT: 135 LBS | HEART RATE: 64 BPM | RESPIRATION RATE: 18 BRPM | DIASTOLIC BLOOD PRESSURE: 89 MMHG | HEIGHT: 68 IN | TEMPERATURE: 97.7 F | BODY MASS INDEX: 20.46 KG/M2

## 2019-06-14 VITALS — TEMPERATURE: 96.8 F | OXYGEN SATURATION: 100 % | DIASTOLIC BLOOD PRESSURE: 77 MMHG | SYSTOLIC BLOOD PRESSURE: 155 MMHG

## 2019-06-14 DIAGNOSIS — T18.128A ESOPHAGEAL OBSTRUCTION DUE TO FOOD IMPACTION: Primary | ICD-10-CM

## 2019-06-14 DIAGNOSIS — K22.2 ESOPHAGEAL OBSTRUCTION DUE TO FOOD IMPACTION: Primary | ICD-10-CM

## 2019-06-14 PROCEDURE — 6360000002 HC RX W HCPCS: Performed by: NURSE ANESTHETIST, CERTIFIED REGISTERED

## 2019-06-14 PROCEDURE — 99283 EMERGENCY DEPT VISIT LOW MDM: CPT | Performed by: INTERNAL MEDICINE

## 2019-06-14 PROCEDURE — 99284 EMERGENCY DEPT VISIT MOD MDM: CPT

## 2019-06-14 PROCEDURE — 96374 THER/PROPH/DIAG INJ IV PUSH: CPT

## 2019-06-14 PROCEDURE — 3700000001 HC ADD 15 MINUTES (ANESTHESIA): Performed by: INTERNAL MEDICINE

## 2019-06-14 PROCEDURE — 2709999900 HC NON-CHARGEABLE SUPPLY: Performed by: INTERNAL MEDICINE

## 2019-06-14 PROCEDURE — 7100000001 HC PACU RECOVERY - ADDTL 15 MIN: Performed by: INTERNAL MEDICINE

## 2019-06-14 PROCEDURE — 3700000000 HC ANESTHESIA ATTENDED CARE: Performed by: INTERNAL MEDICINE

## 2019-06-14 PROCEDURE — 2500000003 HC RX 250 WO HCPCS: Performed by: NURSE ANESTHETIST, CERTIFIED REGISTERED

## 2019-06-14 PROCEDURE — 7100000000 HC PACU RECOVERY - FIRST 15 MIN: Performed by: INTERNAL MEDICINE

## 2019-06-14 PROCEDURE — 2500000003 HC RX 250 WO HCPCS: Performed by: EMERGENCY MEDICINE

## 2019-06-14 PROCEDURE — 2580000003 HC RX 258: Performed by: EMERGENCY MEDICINE

## 2019-06-14 PROCEDURE — 3609012900 HC EGD FOREIGN BODY REMOVAL: Performed by: INTERNAL MEDICINE

## 2019-06-14 PROCEDURE — 6370000000 HC RX 637 (ALT 250 FOR IP): Performed by: EMERGENCY MEDICINE

## 2019-06-14 PROCEDURE — 71046 X-RAY EXAM CHEST 2 VIEWS: CPT

## 2019-06-14 RX ORDER — OXYCODONE HYDROCHLORIDE AND ACETAMINOPHEN 5; 325 MG/1; MG/1
1 TABLET ORAL PRN
Status: DISCONTINUED | OUTPATIENT
Start: 2019-06-14 | End: 2019-06-14 | Stop reason: HOSPADM

## 2019-06-14 RX ORDER — SODIUM CHLORIDE 9 MG/ML
INJECTION, SOLUTION INTRAVENOUS CONTINUOUS
Status: DISCONTINUED | OUTPATIENT
Start: 2019-06-14 | End: 2019-06-14 | Stop reason: HOSPADM

## 2019-06-14 RX ORDER — OXYCODONE HYDROCHLORIDE AND ACETAMINOPHEN 5; 325 MG/1; MG/1
2 TABLET ORAL PRN
Status: DISCONTINUED | OUTPATIENT
Start: 2019-06-14 | End: 2019-06-14 | Stop reason: HOSPADM

## 2019-06-14 RX ORDER — PROMETHAZINE HYDROCHLORIDE 25 MG/ML
6.25 INJECTION, SOLUTION INTRAMUSCULAR; INTRAVENOUS
Status: DISCONTINUED | OUTPATIENT
Start: 2019-06-14 | End: 2019-06-14 | Stop reason: HOSPADM

## 2019-06-14 RX ORDER — NITROGLYCERIN 0.4 MG/1
0.4 TABLET SUBLINGUAL EVERY 5 MIN PRN
Status: DISCONTINUED | OUTPATIENT
Start: 2019-06-14 | End: 2019-06-14 | Stop reason: HOSPADM

## 2019-06-14 RX ORDER — DIPHENHYDRAMINE HYDROCHLORIDE 50 MG/ML
12.5 INJECTION INTRAMUSCULAR; INTRAVENOUS
Status: DISCONTINUED | OUTPATIENT
Start: 2019-06-14 | End: 2019-06-14 | Stop reason: HOSPADM

## 2019-06-14 RX ORDER — ROCURONIUM BROMIDE 10 MG/ML
INJECTION, SOLUTION INTRAVENOUS PRN
Status: DISCONTINUED | OUTPATIENT
Start: 2019-06-14 | End: 2019-06-14 | Stop reason: SDUPTHER

## 2019-06-14 RX ORDER — PROPOFOL 10 MG/ML
INJECTION, EMULSION INTRAVENOUS PRN
Status: DISCONTINUED | OUTPATIENT
Start: 2019-06-14 | End: 2019-06-14 | Stop reason: SDUPTHER

## 2019-06-14 RX ORDER — SUCCINYLCHOLINE/SOD CL,ISO/PF 200MG/10ML
SYRINGE (ML) INTRAVENOUS PRN
Status: DISCONTINUED | OUTPATIENT
Start: 2019-06-14 | End: 2019-06-14 | Stop reason: SDUPTHER

## 2019-06-14 RX ORDER — DEXAMETHASONE SODIUM PHOSPHATE 4 MG/ML
INJECTION, SOLUTION INTRA-ARTICULAR; INTRALESIONAL; INTRAMUSCULAR; INTRAVENOUS; SOFT TISSUE PRN
Status: DISCONTINUED | OUTPATIENT
Start: 2019-06-14 | End: 2019-06-14 | Stop reason: SDUPTHER

## 2019-06-14 RX ORDER — LIDOCAINE HYDROCHLORIDE 20 MG/ML
INJECTION, SOLUTION EPIDURAL; INFILTRATION; INTRACAUDAL; PERINEURAL PRN
Status: DISCONTINUED | OUTPATIENT
Start: 2019-06-14 | End: 2019-06-14 | Stop reason: SDUPTHER

## 2019-06-14 RX ORDER — ONDANSETRON 2 MG/ML
INJECTION INTRAMUSCULAR; INTRAVENOUS PRN
Status: DISCONTINUED | OUTPATIENT
Start: 2019-06-14 | End: 2019-06-14 | Stop reason: SDUPTHER

## 2019-06-14 RX ORDER — MEPERIDINE HYDROCHLORIDE 50 MG/ML
12.5 INJECTION INTRAMUSCULAR; INTRAVENOUS; SUBCUTANEOUS EVERY 5 MIN PRN
Status: DISCONTINUED | OUTPATIENT
Start: 2019-06-14 | End: 2019-06-14 | Stop reason: HOSPADM

## 2019-06-14 RX ORDER — LABETALOL 20 MG/4 ML (5 MG/ML) INTRAVENOUS SYRINGE
5 EVERY 10 MIN PRN
Status: DISCONTINUED | OUTPATIENT
Start: 2019-06-14 | End: 2019-06-14 | Stop reason: HOSPADM

## 2019-06-14 RX ADMIN — SODIUM CHLORIDE: 9 INJECTION, SOLUTION INTRAVENOUS at 15:05

## 2019-06-14 RX ADMIN — DEXAMETHASONE SODIUM PHOSPHATE 4 MG: 4 INJECTION, SOLUTION INTRA-ARTICULAR; INTRALESIONAL; INTRAMUSCULAR; INTRAVENOUS; SOFT TISSUE at 17:21

## 2019-06-14 RX ADMIN — ROCURONIUM BROMIDE 5 MG: 10 INJECTION INTRAVENOUS at 17:13

## 2019-06-14 RX ADMIN — ONDANSETRON 4 MG: 2 INJECTION INTRAMUSCULAR; INTRAVENOUS at 17:21

## 2019-06-14 RX ADMIN — LIDOCAINE HYDROCHLORIDE 60 MG: 20 INJECTION, SOLUTION EPIDURAL; INFILTRATION; INTRACAUDAL; PERINEURAL at 17:13

## 2019-06-14 RX ADMIN — NITROGLYCERIN 0.4 MG: 0.4 TABLET SUBLINGUAL at 15:45

## 2019-06-14 RX ADMIN — GLUCAGON HYDROCHLORIDE 1 MG: KIT at 15:04

## 2019-06-14 RX ADMIN — Medication 100 MG: at 17:14

## 2019-06-14 RX ADMIN — PROPOFOL 130 MG: 10 INJECTION, EMULSION INTRAVENOUS at 17:13

## 2019-06-14 ASSESSMENT — ENCOUNTER SYMPTOMS
NAUSEA: 1
TROUBLE SWALLOWING: 1
BACK PAIN: 0
SHORTNESS OF BREATH: 0
VOMITING: 1

## 2019-06-14 ASSESSMENT — PULMONARY FUNCTION TESTS
PIF_VALUE: 9
PIF_VALUE: 1
PIF_VALUE: 19
PIF_VALUE: 25
PIF_VALUE: 13
PIF_VALUE: 9
PIF_VALUE: 9
PIF_VALUE: 1
PIF_VALUE: 11
PIF_VALUE: 9
PIF_VALUE: 1
PIF_VALUE: 2
PIF_VALUE: 18
PIF_VALUE: 13
PIF_VALUE: 10
PIF_VALUE: 1
PIF_VALUE: 15
PIF_VALUE: 1
PIF_VALUE: 10
PIF_VALUE: 1
PIF_VALUE: 9
PIF_VALUE: 20
PIF_VALUE: 1
PIF_VALUE: 1
PIF_VALUE: 9
PIF_VALUE: 2
PIF_VALUE: 21
PIF_VALUE: 10
PIF_VALUE: 13
PIF_VALUE: 10
PIF_VALUE: 23
PIF_VALUE: 1
PIF_VALUE: 1

## 2019-06-14 ASSESSMENT — PAIN DESCRIPTION - PAIN TYPE: TYPE: ACUTE PAIN

## 2019-06-14 ASSESSMENT — PAIN SCALES - GENERAL
PAINLEVEL_OUTOF10: 0
PAINLEVEL_OUTOF10: 3

## 2019-06-14 ASSESSMENT — PAIN DESCRIPTION - LOCATION: LOCATION: THROAT

## 2019-06-14 NOTE — ED PROVIDER NOTES
[facility]  eMERGENCY dEPARTMENT eNCOUnter      Pt Name: Tk Gonzalez  MRN: [id number]  [place] 1940  Date of evaluation: 6/14/19      CHIEF COMPLAINT     Hot dog stuck in throat      HISTORY OF PRESENT ILLNESS   HPI [de-identified] y.o. female resents with complaints of a hot dog stuck in her throat. She says that she was eating a hot dog about 20 minutes to arrival when all of a sudden she could not swallow and it felt like it was stuck in her throat. She has been unable to eat or drink anything since that happened. She did not take any medications prior to arrival.  She reports a mild retrosternal discomfort. She has tried to drink some water but she threw it right up. She is able to swallow her spit. REVIEW OF SYSTEMS     Review of Systems   Constitutional: Negative for chills and fever. HENT: Positive for trouble swallowing. Respiratory: Negative for shortness of breath. Cardiovascular: Positive for chest pain. Gastrointestinal: Positive for nausea and vomiting. Genitourinary: Negative for dysuria. Musculoskeletal: Negative for back pain. Skin: Negative for rash. Neurological: Negative for headaches. Hematological: Does not bruise/bleed easily.      PAST MEDICAL HISTORY     Past Medical History:   Diagnosis Date    Abdominal pain, unspecified site     Acquired cyst of kidney     Acute gouty arthropathy     Allergic rhinitis, cause unspecified     Anxiety state, unspecified     Arthropathy, unspecified, site unspecified     Chronic airway obstruction, not elsewhere classified     Chronic kidney disease, stage III (moderate) (HCC)     Diarrhea     Edema     Esophageal reflux     Essential hypertension, benign     Herpes zoster with other nervous system complications(053.19)     Herpes zoster without mention of complication     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)     Mononeuritis of unspecified site     Myalgia and myositis, unspecified     Osteoarthrosis, unspecified whether generalized She has a 1.50 pack-year smoking history. She has never used smokeless tobacco. She reports that she does not drink alcohol or use drugs. PHYSICAL EXAM     INITIAL VITALS: /89   Pulse 64   Temp 97.7 °F (36.5 °C) (Infrared)   Resp 18   Ht 5' 8\" (1.727 m)   Wt 135 lb (61.2 kg)   SpO2 94%   BMI 20.53 kg/m²   Gen: Appears uncomfortable  Head: NC/at  Eyes: PERRL   Neck:   No stridor  CVS: RRR  PULM: CTA  ABD: Soft, no TTP  MSK: Decreased muscle bulk  SKIN: No Rash or wound. HEME: No ecchymosis. Neuro: Alert and oriented. Speech is fluent. MEDICAL DECISION MAKING:     MDM  This is an 56-year-old female presenting with esophageal food impaction. She is unable to swallow her secretions. Will treat her with nitroglycerin if that does not relieve the obstruction will give a dose of glucagon, if that does not relieve the instruction she will need to have an EGD. Hospital course:  Patient was treated with nitroglycerin, glucagon, cola soda, she is unable to swallow she continues to vomit. Discussed with Dr. Rashad Jones, and they will perform an EGD. DIAGNOSTIC RESULTS     RADIOLOGY:All plain film, CT, MRI, and formal ultrasound images (except ED bedside ultrasound) are read by the radiologist and the images and interpretations are directly viewed by the emergency physician. XR CHEST STANDARD (2 VW)   Final Result   No acute abnormality seen.            EMERGENCY DEPARTMENT COURSE:   Vitals:    Vitals:    06/14/19 1800 06/14/19 1810 06/14/19 1820 06/14/19 1830   BP: 138/61 (!) 134/59 117/88 129/89   Pulse: 61 63 62 64   Resp: 13 16 16 18   Temp:       TempSrc:       SpO2: 97% 97% 97% 94%   Weight:       Height:           The patient was given the following medications while in the emergency department:  Orders Placed This Encounter   Medications    DISCONTD: nitroGLYCERIN (NITROSTAT) SL tablet 0.4 mg    DISCONTD: 0.9 % sodium chloride infusion    glucagon (rDNA) injection 1 mg    DISCONTD: HYDROmorphone (DILAUDID) injection 0.25 mg    DISCONTD: HYDROmorphone (DILAUDID) injection 0.5 mg    DISCONTD: HYDROmorphone (DILAUDID) injection 0.25 mg    DISCONTD: HYDROmorphone (DILAUDID) injection 0.5 mg    DISCONTD: oxyCODONE-acetaminophen (PERCOCET) 5-325 MG per tablet 1 tablet    DISCONTD: oxyCODONE-acetaminophen (PERCOCET) 5-325 MG per tablet 2 tablet    DISCONTD: diphenhydrAMINE (BENADRYL) injection 12.5 mg    DISCONTD: promethazine (PHENERGAN) injection 6.25 mg    DISCONTD: labetalol (NORMODYNE;TRANDATE) injection syringe 5 mg    DISCONTD: meperidine (DEMEROL) injection 12.5 mg     -------------------------  CRITICAL CARE:   CONSULTS: IP CONSULT TO GI  PROCEDURES: Procedures     FINAL IMPRESSION      1.  Esophageal obstruction due to food impaction          DISPOSITION/PLAN   DISPOSITION        PATIENT REFERRED TO:  Heath Mosher MD  [address] [place] [address]  [phone]    Schedule an appointment as soon as possible for a visit in 3 weeks  Post op follow up      DISCHARGE MEDICATIONS:  Discharge Medication List as of 6/14/2019  6:33 PM            Savannah Salas MD  Attending Emergency Physician                      Savannah Salas MD  06/15/19 0615       Savannah Salas MD  06/15/19 0036

## 2019-06-14 NOTE — ED NOTES
Per Dr Charles Skaggs RN gave pt a cola to see if airway was clear to swallow.  Upon drinking a sip the pt began to throw up     Leighton Fernandez RN  06/14/19 3332

## 2019-06-14 NOTE — OP NOTE
ESOPHAGOGASTRODUODENOSCOPY   ( EGD )  DATE OF PROCEDURE: 6/16/2019     SURGEON: Romina Armstrong MD    ASSISTANT: None    PREOPERATIVE DIAGNOSIS: Complete dysphagia. POSTOPERATIVE DIAGNOSIS: Foreign body at the superior esophageal sphincter removed. OPERATION: EGD, foreign body removal.    ANESTHESIA: MAC    ESTIMATED BLOOD LOSS: None    COMPLICATIONS: None. SPECIMENS:  Was Not Obtained    HISTORY: The patient is a [de-identified]y.o. year old female with history of above preop diagnosis. I recommended esophagogastroduodenoscopy with possible biopsy and I explained the risk, benefits, expected outcome, and alternatives to the procedure. Risks included but are not limited to bleeding, infection, respiratory distress, hypotension, and perforation of the esophagus, stomach, or duodenum. Patient understands and is in agreement. PROCEDURE: The patient was given IV conscious sedation. The patient's SPO2 remained above 90% throughout the procedure. Cetacaine spray given. Patient placed in left lateral position. Olympus  videogastroscope was inserted orally under vision into the esophagus without difficulty and advanced into the stomach then through the pylorus up to the second part of duodenum. Findings:    Retropharyngeal area was grossly normal appearing    Esophagus: abnormal: Just at the superior esophageal sphincter there is a huge foreign body/meat noted. It was very difficult as the foreign body is just at the superior esophageal splinter, partially occluding the sphincter. I was able to remove part of the meat piece by using different snares. Also Betsy Nordmann net was used. After removing the foreign body there appears to be a ring just below the superior esophageal sphincter which is broken. Mucosae and the rest of the body of the esophagus unremarkable. No critical stricture seen.     Stomach:    Fundus and Cardia Examined in Retroflexed View: normal    Body: normal    Antrum: normal    Duodenum:     Descending: normal    Bulb: normal    While withdrawing the scope the above findings were verified and the scope was removed. The patient has tolerated the procedure and conscious sedation without unusual events. Recommendations/Plan:   1. Make food into small pieces, chewing well and swallow. 2. F/U In Office as instructed  3. Discussed with the family  4.  to see in the office in the next 3 weeks.   5.                    Electronically signed by Jailene Payne MD  on 06/14/1019 at 5:00 PM

## 2019-06-14 NOTE — CONSULTS
GI Consult Note:    Name: Ba Blevins  MRN: [id number]     [place]: [de-identified]  Room: [facility] OR Pool/NONE    Admit Date: 6/14/2019  PCP: Pricila Giron MD    Physician Requesting Consult: No att. providers found     Reason for Consult: Complete dysphagia and discomfort in the throat. Chief Complaint:     Chief Complaint   Patient presents with    Airway Obstruction     hot dog       History Obtained From:     patient, electronic medical record, emergency room physician. History of Present Illness:      Ba Blevins is a  [de-identified] y.o.  female who presents with Airway Obstruction (hot dog)      Symptoms:  Patient seen at [facility] emergency department. Patient seen with the symptoms of complete dysphagia since about 1 PM.  At that time she was swallowing hotdog, and it got stuck in the throat. Since then she is not able to swallow saliva. She has discomfort. She also is having mild shortness of breath. No chest pain. No abdominal pain. No dyspepsia. Denies taking anticoagulation therapy. No prior history of liver disease. No weight loss. Bowel movements has been satisfactory. In the emergency department patient was given glucagon and other smooth muscle relaxants and patient symptoms did not resolve. Subsequently I am asked to see the patient. She did not have similar symptoms in the past.  No GERD symptoms. Her medications are reviewed and a past medical history reviewed. Has a history of anxiety as well. She has a remote history of reflux disease.   Past Medical History:     Past Medical History:   Diagnosis Date    Abdominal pain, unspecified site     Acquired cyst of kidney     Acute gouty arthropathy     Allergic rhinitis, cause unspecified     Anxiety state, unspecified     Arthropathy, unspecified, site unspecified     Chronic airway obstruction, not elsewhere classified     Chronic kidney disease, stage III (moderate) (HCC)     Diarrhea     Edema     Esophageal reflux     Essential hypertension, benign     Herpes zoster with other nervous system complications(053.19)     Herpes zoster without mention of complication     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)     Mononeuritis of unspecified site     Myalgia and myositis, unspecified     Osteoarthrosis, unspecified whether generalized or localized, unspecified site     Other general symptoms(780.99)     Other iatrogenic hypothyroidism     Other nonspecific abnormal finding of lung field     Other psoriasis     Pure hypercholesterolemia     Regional enteritis of unspecified site     Senile osteoporosis     Sprain of ankle, unspecified site     Unspecified vitamin D deficiency         Past Surgical History:     Past Surgical History:   Procedure Laterality Date    COLONOSCOPY      DILATION AND CURETTAGE OF UTERUS          Medications Prior to Admission:       Prior to Admission medications    Medication Sig Start Date End Date Taking? Authorizing Provider   clobetasol (OLUX) 0.05 % foam Apply topically 2 times daily. 4/30/19  Yes Luis Huertas MD   carvedilol (COREG) 12.5 MG tablet TAKE 1/2 TABLET BY MOUTH TWO TIMES A DAY 2/21/19  Yes Luis Huertas MD   atorvastatin (LIPITOR) 40 MG tablet Take 1 tablet by mouth daily 10/2/18  Yes Luis Huertas MD   allopurinol (ZYLOPRIM) 100 MG tablet Take 1 tablet by mouth daily 10/2/18  Yes Luis Huertas MD   gabapentin (NEURONTIN) 100 MG capsule Take 3 capsules by mouth 4 times daily for 31 days. . 7/17/18 6/14/19 Yes Marcin Tyson MD   levothyroxine (LEVOTHROID) 112 MCG tablet Take 1 tablet by mouth daily 4/19/18  Yes Luis Huertas MD   folic acid (FOLVITE) 1 MG tablet Take 1 tablet by mouth daily 8/8/17  Yes Luis Huertas MD   Multiple Vitamins-Minerals (HAIR/SKIN/NAILS) TABS Take 1 tablet by mouth daily   Yes Historical Provider, MD   Cholecalciferol (VITAMIN D) 2000 UNITS CAPS capsule Take 2,000 Units by mouth daily   Yes Historical Provider, MD        Allergies: Etomidate and Penicillins    Social History:     Tobacco:    reports that she quit smoking about 7 months ago. Her smoking use included cigarettes. She started smoking about 60 years ago. She has a 1.50 pack-year smoking history. She has never used smokeless tobacco.  Alcohol:      reports that she does not drink alcohol. Drug Use: reports that she does not use drugs. Family History:     Family History   Problem Relation Age of Onset    Coronary Art Dis Father        Review of Systems:     Review of Systems   Constitutional: Negative for appetite change, fatigue, fever and unexpected weight change. HENT: Positive for trouble swallowing. Negative for mouth sores, sore throat and voice change. Eyes: Negative. Respiratory: Negative for cough, choking, shortness of breath and wheezing. Cardiovascular: Negative for chest pain, palpitations and leg swelling. Gastrointestinal: Negative for abdominal distention, abdominal pain, blood in stool, constipation, diarrhea, nausea and vomiting. Endocrine: Negative for polyphagia and polyuria. Genitourinary: Negative for difficulty urinating, frequency, hematuria, pelvic pain, urgency and vaginal bleeding. Musculoskeletal: Negative for arthralgias, back pain, gait problem and joint swelling. Skin: Negative for color change, pallor and rash. Allergic/Immunologic: Negative for food allergies. Neurological: Negative for dizziness, seizures, weakness, light-headedness and headaches. Hematological: Negative for adenopathy. Does not bruise/bleed easily. Psychiatric/Behavioral: Negative for sleep disturbance. The patient is not nervous/anxious.         Code Status:  Prior    Physical Exam:     Vitals:  /75   Pulse 68   Temp 97.4 °F (36.3 °C) (Axillary)   Resp 17   Ht 5' 8\" (1.727 m)   Wt 135 lb (61.2 kg)   SpO2 98%   BMI 20.53 kg/m²   Temp (24hrs), Av °F (36.1 °C), Min:96.8 °F (36 °C), Max:97.4 °F (36.3 °C)      Physical Exam Constitutional: She is oriented to person, place, and time. She appears well-developed and well-nourished. Patient is very anxious. She is spitting saliva constantly. HENT:   Head: Normocephalic and atraumatic. No oral lesions   Eyes: Pupils are equal, round, and reactive to light. Conjunctivae are normal. No scleral icterus. Neck: Normal range of motion. Neck supple. No hepatojugular reflux and no JVD present. No tracheal deviation present. No thyromegaly present. Cardiovascular: Normal rate, regular rhythm, normal heart sounds and intact distal pulses. Pulmonary/Chest: Effort normal and breath sounds normal. No respiratory distress. She has no wheezes. She has no rales. Abdominal: Soft. Bowel sounds are normal. She exhibits no distension, no ascites and no mass. There is no hepatomegaly. There is no tenderness. There is no rebound. No hernia. Musculoskeletal: She exhibits no edema or tenderness. No joint swelling   Lymphadenopathy:     She has no cervical adenopathy. Neurological: She is alert and oriented to person, place, and time. No cranial nerve deficit. Skin: Skin is warm. No bruising, no ecchymosis and no rash noted. No erythema. Psychiatric: Thought content normal.   Nursing note and vitals reviewed.     Data:   CBC:   Lab Results   Component Value Date    WBC 6.8 10/10/2017    RBC 4.26 10/10/2017    RBC 4.06 12/02/2011    HGB 12.5 10/10/2017    HCT 37.6 10/10/2017    MCV 88.1 10/10/2017    MCH 29.3 10/10/2017    MCHC 33.3 10/10/2017    RDW 15.7 10/10/2017     10/10/2017     12/02/2011    MPV 8.1 10/10/2017     CBC with Differential:  Lab Results   Component Value Date    WBC 6.8 10/10/2017    RBC 4.26 10/10/2017    RBC 4.06 12/02/2011    HGB 12.5 10/10/2017    HCT 37.6 10/10/2017     10/10/2017     12/02/2011    MCV 88.1 10/10/2017    MCH 29.3 10/10/2017    MCHC 33.3 10/10/2017    RDW 15.7 10/10/2017    LYMPHOPCT 18 10/10/2017    MONOPCT 10 10/10/2017 BASOPCT 1 10/10/2017    MONOSABS 0.60 10/10/2017    LYMPHSABS 1.20 10/10/2017    EOSABS 0.50 10/10/2017    BASOSABS 0.10 10/10/2017    DIFFTYPE NOT REPORTED 10/10/2017     Hemoglobin/Hematocrit:  Lab Results   Component Value Date    HGB 12.5 10/10/2017    HCT 37.6 10/10/2017     CMP:    Lab Results   Component Value Date     12/27/2018    K 3.9 12/27/2018     12/27/2018    CO2 29 12/27/2018    BUN 11 12/27/2018    CREATININE 0.78 12/27/2018    GFRAA >60 12/27/2018    LABGLOM >60 12/27/2018    GLUCOSE 149 12/27/2018    GLUCOSE 88 12/02/2011    PROT 6.5 12/27/2018    LABALBU 4.0 12/27/2018    LABALBU 3.8 12/02/2011    CALCIUM 10.0 12/27/2018    BILITOT 0.46 12/27/2018    ALKPHOS 135 12/27/2018    AST 45 12/27/2018    ALT 34 12/27/2018     BMP:  Lab Results   Component Value Date     12/27/2018    K 3.9 12/27/2018     12/27/2018    CO2 29 12/27/2018    BUN 11 12/27/2018    LABALBU 4.0 12/27/2018    LABALBU 3.8 12/02/2011    CREATININE 0.78 12/27/2018    CALCIUM 10.0 12/27/2018    GFRAA >60 12/27/2018    LABGLOM >60 12/27/2018    GLUCOSE 149 12/27/2018    GLUCOSE 88 12/02/2011     PT/INR:  No results found for: PROTIME, INR  PTT:  No results found for: APTT, PTT[APTT}    Assesment:     Primary Problem  <principal problem not specified>    There are no active hospital problems to display for this patient. Complete dysphagia. Possible foreign body esophagus. Plan:     1. As patient is having difficulty with swallowing and severe discomfort in the throat, she may need EGD as early as possible. 2. Discussed with the patient regarding the procedure, risks and benefits. 3. She understood and consented and this is arranged. Thank you for allowing me to participate in the care of your patient. Please feel free to contact me with anyquestions or concerns.      Electronically signed by Theadore Buchanan, MD on 6/14/2019 at 5:31 PM     Copy sent to Dr. Sherman Jeong MD    Note is dictated utilizing voice recognition software. Unfortunately this leads to occasional typographical errors. Please contact our office if you have any questions.

## 2019-06-14 NOTE — ANESTHESIA PRE PROCEDURE
Department of Anesthesiology  Preprocedure Note       Name:  Angelic Griffin   Age:  [de-identified] y.o.  :  1940                                          MRN:  [id number]         Date:  2019      Surgeon: Santos Galaviz):  Aleks Lemons MD    Procedure: EGD FOREIGN BODY REMOVAL (N/A )    Medications prior to admission:   Prior to Admission medications    Medication Sig Start Date End Date Taking? Authorizing Provider   clobetasol (OLUX) 0.05 % foam Apply topically 2 times daily. 19  Yes J Luis Elder MD   carvedilol (COREG) 12.5 MG tablet TAKE 1/2 TABLET BY MOUTH TWO TIMES A DAY 19  Yes J Luis Elder MD   atorvastatin (LIPITOR) 40 MG tablet Take 1 tablet by mouth daily 10/2/18  Yes J Luis Elder MD   allopurinol (ZYLOPRIM) 100 MG tablet Take 1 tablet by mouth daily 10/2/18  Yes J Luis Elder MD   gabapentin (NEURONTIN) 100 MG capsule Take 3 capsules by mouth 4 times daily for 31 days. . 18 Yes Angelika Avila MD   levothyroxine (LEVOTHROID) 112 MCG tablet Take 1 tablet by mouth daily 18  Yes J Luis Elder MD   folic acid (FOLVITE) 1 MG tablet Take 1 tablet by mouth daily 17  Yes J Luis Elder MD   Multiple Vitamins-Minerals (HAIR/SKIN/NAILS) TABS Take 1 tablet by mouth daily   Yes Historical Provider, MD   Cholecalciferol (VITAMIN D) 2000 UNITS CAPS capsule Take 2,000 Units by mouth daily   Yes Historical Provider, MD       Current medications:    Current Facility-Administered Medications   Medication Dose Route Frequency Provider Last Rate Last Dose    nitroGLYCERIN (NITROSTAT) SL tablet 0.4 mg  0.4 mg Sublingual Q5 Min PRN Penny Benson MD   0.4 mg at 19 1545    0.9 % sodium chloride infusion   Intravenous Continuous Penny Benson  mL/hr at 19 1505      methylPREDNISolone sodium (SOLU-MEDROL) injection 125 mg  125 mg Intramuscular Once J Luis Elder MD        methylPREDNISolone acetate (DEPO-MEDROL) injection 80 mg  80 mg Intramuscular Once Jay Iverson MD        triamcinolone acetonide [facility]) injection 60 mg  60 mg Intramuscular Once Jay Iverson MD         Current Outpatient Medications   Medication Sig Dispense Refill    clobetasol (OLUX) 0.05 % foam Apply topically 2 times daily. 1 Can 5    carvedilol (COREG) 12.5 MG tablet TAKE 1/2 TABLET BY MOUTH TWO TIMES A DAY 90 tablet 4    atorvastatin (LIPITOR) 40 MG tablet Take 1 tablet by mouth daily 90 tablet 3    allopurinol (ZYLOPRIM) 100 MG tablet Take 1 tablet by mouth daily 90 tablet 3    gabapentin (NEURONTIN) 100 MG capsule Take 3 capsules by mouth 4 times daily for 31 days. . 360 capsule 5    levothyroxine (LEVOTHROID) 112 MCG tablet Take 1 tablet by mouth daily 90 tablet 3    folic acid (FOLVITE) 1 MG tablet Take 1 tablet by mouth daily 90 tablet 3    Multiple Vitamins-Minerals (HAIR/SKIN/NAILS) TABS Take 1 tablet by mouth daily      Cholecalciferol (VITAMIN D) 2000 UNITS CAPS capsule Take 2,000 Units by mouth daily         Allergies:     Allergies   Allergen Reactions    Etomidate     Penicillins Hives       Problem List:    Patient Active Problem List   Diagnosis Code    Vitamin D deficiency E55.9    Other general symptoms R68.89    Abdominal pain R10.9    Herpes zoster with other nervous system complications N96.84    Acute gouty arthropathy M10.9    Herpes zoster B02.9    Sprain of ankle S93.409A    Edema R60.9    Acquired cyst of kidney N28.1    Myalgia and myositis VNT8626    Other nonspecific abnormal finding of lung field R91.8    COPD (chronic obstructive pulmonary disease) (HCC) J44.9    Mitral valve disorder I05.9    Allergic rhinitis J30.9    Diarrhea R19.7    Esophageal reflux K21.9    Other psoriasis L40.8    Arthropathy M12.9    Pure hypercholesterolemia E78.00    Anxiety state F41.1    Osteoarthritis M19.90    Essential hypertension, benign I10    Chronic kidney disease, stage III (moderate) ([facility]) N18.3    Regional enteritis ([address] Alcohol/week: 0.0 oz                                Counseling given: Not Answered      Vital Signs (Current):   Vitals:    06/14/19 1356 06/14/19 1500 06/14/19 1524 06/14/19 1636   BP: (!) 180/90 124/77 (!) 163/94 137/75   Pulse: 95  77 68   Resp: 18 18 17   Temp: 97.4 °F (36.3 °C)      TempSrc: Axillary      SpO2: 97%  98% 98%   Weight: 135 lb (61.2 kg)      Height: 5' 8\" (1.727 m)                                                 BP Readings from Last 3 Encounters:   06/14/19 137/75   04/30/19 124/84   01/28/19 110/80       NPO Status: Time of last liquid consumption: 1340                        Time of last solid consumption: 1340(hot dog)                        Date of last liquid consumption: 06/14/19                        Date of last solid food consumption: 06/14/19    BMI:   Wt Readings from Last 3 Encounters:   06/14/19 135 lb (61.2 kg)   04/30/19 130 lb (59 kg)   01/28/19 143 lb (64.9 kg)     Body mass index is 20.53 kg/m². CBC:   Lab Results   Component Value Date    WBC 6.8 10/10/2017    RBC 4.26 10/10/2017    RBC 4.06 12/02/2011    HGB 12.5 10/10/2017    HCT 37.6 10/10/2017    MCV 88.1 10/10/2017    RDW 15.7 10/10/2017     10/10/2017     12/02/2011       CMP:   Lab Results   Component Value Date     12/27/2018    K 3.9 12/27/2018     12/27/2018    CO2 29 12/27/2018    BUN 11 12/27/2018    CREATININE 0.78 12/27/2018    GFRAA >60 12/27/2018    LABGLOM >60 12/27/2018    GLUCOSE 149 12/27/2018    GLUCOSE 88 12/02/2011    PROT 6.5 12/27/2018    CALCIUM 10.0 12/27/2018    BILITOT 0.46 12/27/2018    ALKPHOS 135 12/27/2018    AST 45 12/27/2018    ALT 34 12/27/2018       POC Tests: No results for input(s): POCGLU, POCNA, POCK, POCCL, POCBUN, POCHEMO, POCHCT in the last 72 hours.     Coags: No results found for: PROTIME, INR, APTT    HCG (If Applicable): No results found for: PREGTESTUR, PREGSERUM, HCG, HCGQUANT     ABGs: No results found for: PHART, PO2ART, RNG7JAB, MBL7IFQ, BEART,

## 2019-06-16 ASSESSMENT — ENCOUNTER SYMPTOMS
VOICE CHANGE: 0
VOMITING: 0
TROUBLE SWALLOWING: 1
BLOOD IN STOOL: 0
ABDOMINAL PAIN: 0
DIARRHEA: 0
ABDOMINAL DISTENTION: 0
COUGH: 0
NAUSEA: 0
WHEEZING: 0
CONSTIPATION: 0
SORE THROAT: 0
EYES NEGATIVE: 1
COLOR CHANGE: 0
SHORTNESS OF BREATH: 0
CHOKING: 0
BACK PAIN: 0

## 2019-06-26 ENCOUNTER — OFFICE VISIT (OUTPATIENT)
Dept: INTERNAL MEDICINE CLINIC | Age: 79
End: 2019-06-26
Payer: COMMERCIAL

## 2019-06-26 ENCOUNTER — HOSPITAL ENCOUNTER (OUTPATIENT)
Age: 79
Setting detail: SPECIMEN
Discharge: HOME | End: 2019-06-26
Payer: COMMERCIAL

## 2019-06-26 VITALS
SYSTOLIC BLOOD PRESSURE: 138 MMHG | DIASTOLIC BLOOD PRESSURE: 84 MMHG | HEIGHT: 68 IN | HEART RATE: 68 BPM | WEIGHT: 135 LBS | BODY MASS INDEX: 20.46 KG/M2 | OXYGEN SATURATION: 97 %

## 2019-06-26 DIAGNOSIS — R30.0 DYSURIA: Primary | ICD-10-CM

## 2019-06-26 DIAGNOSIS — R35.0 FREQUENCY OF MICTURITION: ICD-10-CM

## 2019-06-26 DIAGNOSIS — R30.0 DYSURIA: ICD-10-CM

## 2019-06-26 LAB
-: ABNORMAL
AMORPHOUS: ABNORMAL
BACTERIA: ABNORMAL
BILIRUBIN URINE: NEGATIVE
CASTS UA: ABNORMAL /LPF (ref 0–2)
COLOR: YELLOW
COMMENT UA: ABNORMAL
CRYSTALS, UA: ABNORMAL /HPF
EPITHELIAL CELLS UA: ABNORMAL /HPF (ref 0–5)
GLUCOSE URINE: NEGATIVE
KETONES, URINE: NEGATIVE
LEUKOCYTE ESTERASE, URINE: ABNORMAL
MUCUS: ABNORMAL
NITRITE, URINE: NEGATIVE
OTHER OBSERVATIONS UA: ABNORMAL
PH UA: 7 (ref 5–8)
PROTEIN UA: ABNORMAL
RBC UA: ABNORMAL /HPF (ref 0–2)
RENAL EPITHELIAL, UA: ABNORMAL /HPF
SPECIFIC GRAVITY UA: 1.01 (ref 1–1.03)
TRICHOMONAS: ABNORMAL
TURBIDITY: ABNORMAL
URINE HGB: ABNORMAL
UROBILINOGEN, URINE: NORMAL
WBC UA: ABNORMAL /HPF (ref 0–5)
YEAST: ABNORMAL

## 2019-06-26 PROCEDURE — 1090F PRES/ABSN URINE INCON ASSESS: CPT | Performed by: PHYSICIAN ASSISTANT

## 2019-06-26 PROCEDURE — G8399 PT W/DXA RESULTS DOCUMENT: HCPCS | Performed by: PHYSICIAN ASSISTANT

## 2019-06-26 PROCEDURE — G8420 CALC BMI NORM PARAMETERS: HCPCS | Performed by: PHYSICIAN ASSISTANT

## 2019-06-26 PROCEDURE — 4040F PNEUMOC VAC/ADMIN/RCVD: CPT | Performed by: PHYSICIAN ASSISTANT

## 2019-06-26 PROCEDURE — 1036F TOBACCO NON-USER: CPT | Performed by: PHYSICIAN ASSISTANT

## 2019-06-26 PROCEDURE — 99213 OFFICE O/P EST LOW 20 MIN: CPT | Performed by: PHYSICIAN ASSISTANT

## 2019-06-26 PROCEDURE — 1123F ACP DISCUSS/DSCN MKR DOCD: CPT | Performed by: PHYSICIAN ASSISTANT

## 2019-06-26 PROCEDURE — G8427 DOCREV CUR MEDS BY ELIG CLIN: HCPCS | Performed by: PHYSICIAN ASSISTANT

## 2019-06-26 RX ORDER — NITROFURANTOIN 25; 75 MG/1; MG/1
100 CAPSULE ORAL 2 TIMES DAILY
Qty: 14 CAPSULE | Refills: 0 | Status: SHIPPED | OUTPATIENT
Start: 2019-06-26 | End: 2019-07-03

## 2019-06-26 RX ORDER — NITROFURANTOIN 25; 75 MG/1; MG/1
100 CAPSULE ORAL 2 TIMES DAILY
Qty: 14 CAPSULE | Refills: 0 | Status: SHIPPED | OUTPATIENT
Start: 2019-06-26 | End: 2019-06-26

## 2019-06-26 ASSESSMENT — ENCOUNTER SYMPTOMS
SHORTNESS OF BREATH: 0
BACK PAIN: 0
CHEST TIGHTNESS: 0
NAUSEA: 0
ABDOMINAL PAIN: 0
COUGH: 0
VOMITING: 0

## 2019-06-28 LAB
CULTURE: ABNORMAL
Lab: ABNORMAL
SPECIMEN DESCRIPTION: ABNORMAL

## 2019-07-02 RX ORDER — LEVOTHYROXINE SODIUM 112 UG/1
TABLET ORAL
Qty: 90 TABLET | Refills: 3 | Status: SHIPPED | OUTPATIENT
Start: 2019-07-02 | End: 2020-02-26 | Stop reason: SDUPTHER

## 2019-07-08 ENCOUNTER — TELEPHONE (OUTPATIENT)
Dept: INTERNAL MEDICINE CLINIC | Age: 79
End: 2019-07-08

## 2019-07-08 DIAGNOSIS — N30.00 ACUTE CYSTITIS WITHOUT HEMATURIA: Primary | ICD-10-CM

## 2019-07-08 RX ORDER — NITROFURANTOIN 25; 75 MG/1; MG/1
100 CAPSULE ORAL 2 TIMES DAILY
Qty: 14 CAPSULE | Refills: 0 | Status: SHIPPED | OUTPATIENT
Start: 2019-07-08 | End: 2019-07-15

## 2019-07-08 NOTE — TELEPHONE ENCOUNTER
Patient saw Nilesh Murray a few weeks ago for dysuria, had urine culture done. Finished atbx therapy from ana and states that she is still having burning and urgency. Wants to know if you want to repeat atbx?     Allergies   Allergen Reactions    Etomidate     Penicillins Hives         kroger patric

## 2019-07-24 ENCOUNTER — TELEPHONE (OUTPATIENT)
Dept: INTERNAL MEDICINE CLINIC | Age: 79
End: 2019-07-24

## 2019-09-24 ENCOUNTER — OFFICE VISIT (OUTPATIENT)
Dept: INTERNAL MEDICINE CLINIC | Age: 79
End: 2019-09-24
Payer: COMMERCIAL

## 2019-09-24 VITALS
WEIGHT: 137 LBS | DIASTOLIC BLOOD PRESSURE: 64 MMHG | SYSTOLIC BLOOD PRESSURE: 122 MMHG | BODY MASS INDEX: 20.76 KG/M2 | HEIGHT: 68 IN

## 2019-09-24 DIAGNOSIS — E78.00 PURE HYPERCHOLESTEROLEMIA: ICD-10-CM

## 2019-09-24 DIAGNOSIS — M10.9 ACUTE GOUTY ARTHROPATHY: ICD-10-CM

## 2019-09-24 DIAGNOSIS — R10.84 GENERALIZED ABDOMINAL PAIN: ICD-10-CM

## 2019-09-24 DIAGNOSIS — I10 ESSENTIAL HYPERTENSION, BENIGN: Primary | ICD-10-CM

## 2019-09-24 DIAGNOSIS — N18.30 CHRONIC KIDNEY DISEASE, STAGE III (MODERATE) (HCC): ICD-10-CM

## 2019-09-24 DIAGNOSIS — M25.551 PAIN OF RIGHT HIP JOINT: ICD-10-CM

## 2019-09-24 DIAGNOSIS — K21.9 GASTROESOPHAGEAL REFLUX DISEASE WITHOUT ESOPHAGITIS: ICD-10-CM

## 2019-09-24 PROCEDURE — 4040F PNEUMOC VAC/ADMIN/RCVD: CPT | Performed by: INTERNAL MEDICINE

## 2019-09-24 PROCEDURE — 1090F PRES/ABSN URINE INCON ASSESS: CPT | Performed by: INTERNAL MEDICINE

## 2019-09-24 PROCEDURE — G8399 PT W/DXA RESULTS DOCUMENT: HCPCS | Performed by: INTERNAL MEDICINE

## 2019-09-24 PROCEDURE — 99214 OFFICE O/P EST MOD 30 MIN: CPT | Performed by: INTERNAL MEDICINE

## 2019-09-24 PROCEDURE — G8427 DOCREV CUR MEDS BY ELIG CLIN: HCPCS | Performed by: INTERNAL MEDICINE

## 2019-09-24 PROCEDURE — 1036F TOBACCO NON-USER: CPT | Performed by: INTERNAL MEDICINE

## 2019-09-24 PROCEDURE — 1123F ACP DISCUSS/DSCN MKR DOCD: CPT | Performed by: INTERNAL MEDICINE

## 2019-09-24 PROCEDURE — G8420 CALC BMI NORM PARAMETERS: HCPCS | Performed by: INTERNAL MEDICINE

## 2019-09-24 RX ORDER — ALLOPURINOL 100 MG/1
100 TABLET ORAL DAILY
Qty: 90 TABLET | Refills: 3 | Status: SHIPPED | OUTPATIENT
Start: 2019-09-24 | End: 2020-02-26 | Stop reason: SDUPTHER

## 2019-09-24 RX ORDER — METHYLPREDNISOLONE 4 MG/1
TABLET ORAL
Qty: 1 KIT | Refills: 0 | Status: SHIPPED | OUTPATIENT
Start: 2019-09-24 | End: 2019-09-30

## 2019-09-24 RX ORDER — GABAPENTIN 100 MG/1
300 CAPSULE ORAL 4 TIMES DAILY
Qty: 360 CAPSULE | Refills: 5 | Status: SHIPPED | OUTPATIENT
Start: 2019-09-24 | End: 2020-02-26 | Stop reason: SDUPTHER

## 2019-09-24 RX ORDER — ALLOPURINOL 100 MG/1
100 TABLET ORAL DAILY
Qty: 90 TABLET | Refills: 3 | Status: SHIPPED | OUTPATIENT
Start: 2019-09-24 | End: 2019-09-24 | Stop reason: SDUPTHER

## 2019-09-24 RX ORDER — GABAPENTIN 100 MG/1
300 CAPSULE ORAL 4 TIMES DAILY
Qty: 360 CAPSULE | Refills: 5 | Status: SHIPPED | OUTPATIENT
Start: 2019-09-24 | End: 2019-09-24 | Stop reason: SDUPTHER

## 2019-09-24 ASSESSMENT — ENCOUNTER SYMPTOMS
EYE ITCHING: 0
EYE REDNESS: 0
ABDOMINAL PAIN: 1
RHINORRHEA: 0
EYE DISCHARGE: 0
PHOTOPHOBIA: 0
SHORTNESS OF BREATH: 0
CHEST TIGHTNESS: 0
BLOOD IN STOOL: 0
TROUBLE SWALLOWING: 0
ABDOMINAL DISTENTION: 1
COLOR CHANGE: 0
BACK PAIN: 0
COUGH: 0
SORE THROAT: 0
VOICE CHANGE: 0
EYE PAIN: 0
NAUSEA: 0
DIARRHEA: 0
WHEEZING: 0
APNEA: 0
VOMITING: 0
ANAL BLEEDING: 0
CHOKING: 0
RECTAL PAIN: 0
CONSTIPATION: 0
STRIDOR: 0
FACIAL SWELLING: 0
SINUS PRESSURE: 0

## 2019-09-24 NOTE — PROGRESS NOTES
Needs    Financial resource strain: None    Food insecurity:     Worry: None     Inability: None    Transportation needs:     Medical: None     Non-medical: None   Tobacco Use    Smoking status: Former Smoker     Packs/day: 0.75     Years: 2.00     Pack years: 1.50     Types: Cigarettes     Start date: 10/25/1958     Last attempt to quit: 10/25/2018     Years since quittin.9    Smokeless tobacco: Never Used   Substance and Sexual Activity    Alcohol use: No     Alcohol/week: 0.0 standard drinks    Drug use: No    Sexual activity: None   Lifestyle    Physical activity:     Days per week: None     Minutes per session: None    Stress: None   Relationships    Social connections:     Talks on phone: None     Gets together: None     Attends [employer] service: None     Active member of club or organization: None     Attends meetings of clubs or organizations: None     Relationship status: None    Intimate partner violence:     Fear of current or ex partner: None     Emotionally abused: None     Physically abused: None     Forced sexual activity: None   Other Topics Concern    None   Social History Narrative    None       Current Outpatient Medications   Medication Sig Dispense Refill    levothyroxine (SYNTHROID) 112 MCG tablet TAKE 1 TABLET BY MOUTH ONCE DAILY 90 tablet 3    carvedilol (COREG) 12.5 MG tablet TAKE 1/2 TABLET BY MOUTH TWO TIMES A DAY 90 tablet 4    atorvastatin (LIPITOR) 40 MG tablet Take 1 tablet by mouth daily 90 tablet 3    allopurinol (ZYLOPRIM) 100 MG tablet Take 1 tablet by mouth daily 90 tablet 3    folic acid (FOLVITE) 1 MG tablet Take 1 tablet by mouth daily 90 tablet 3    Multiple Vitamins-Minerals (HAIR/SKIN/NAILS) TABS Take 1 tablet by mouth daily      Cholecalciferol (VITAMIN D) 2000 UNITS CAPS capsule Take 2,000 Units by mouth daily      gabapentin (NEURONTIN) 100 MG capsule Take 3 capsules by mouth 4 times daily for 31 days. . 360 capsule 5     Current Facility-Administered Medications   Medication Dose Route Frequency Provider Last Rate Last Dose    methylPREDNISolone sodium (SOLU-MEDROL) injection 125 mg  125 mg Intramuscular Once Shaun Layne MD        methylPREDNISolone acetate (DEPO-MEDROL) injection 80 mg  80 mg Intramuscular Once Shaun Layne MD        triamcinolone acetonide (KENALOG-40) injection 60 mg  60 mg Intramuscular Once Shaun Layne MD             Review of Systems:    Review of Systems   Constitutional: Positive for fatigue. Negative for activity change, appetite change, chills, diaphoresis and fever. HENT: Negative for congestion, dental problem, drooling, ear discharge, ear pain, facial swelling, hearing loss, mouth sores, nosebleeds, postnasal drip, rhinorrhea, sinus pressure, sneezing, sore throat, tinnitus, trouble swallowing and voice change. Eyes: Negative for photophobia, pain, discharge, redness, itching and visual disturbance. Respiratory: Negative for apnea, cough, choking, chest tightness, shortness of breath, wheezing and stridor. Cardiovascular: Negative for chest pain, palpitations and leg swelling. Gastrointestinal: Positive for abdominal distention and abdominal pain. Negative for anal bleeding, blood in stool, constipation, diarrhea, nausea, rectal pain and vomiting. Endocrine: Negative for cold intolerance, heat intolerance, polydipsia, polyphagia and polyuria. Genitourinary: Negative for decreased urine volume, difficulty urinating, dyspareunia, dysuria, enuresis, flank pain, frequency, genital sores, hematuria, menstrual problem, pelvic pain, urgency, vaginal bleeding and vaginal discharge. Musculoskeletal: Positive for arthralgias and myalgias. Negative for back pain, gait problem, joint swelling, neck pain and neck stiffness. Skin: Negative for color change, pallor, rash and wound.    Neurological: Negative for dizziness, tremors, seizures, syncope, facial asymmetry, speech difficulty, weakness, light-headedness, numbness and headaches. Hematological: Negative for adenopathy. Does not bruise/bleed easily. Psychiatric/Behavioral: Positive for sleep disturbance. Negative for agitation, behavioral problems, confusion, decreased concentration and dysphoric mood. The patient is nervous/anxious. Objective:     PhysicalExam:      Physical Exam   Constitutional: She is oriented to person, place, and time. She appears well-developed and well-nourished. No distress. HENT:   Head: Normocephalic and atraumatic. Right Ear: External ear normal.   Left Ear: External ear normal.   Nose: Nose normal.   Mouth/Throat: Oropharynx is clear and moist. No oropharyngeal exudate. Eyes: Pupils are equal, round, and reactive to light. Conjunctivae and EOM are normal. Right eye exhibits no discharge. Left eye exhibits no discharge. No scleral icterus. Neck: Normal range of motion. Neck supple. No JVD present. No tracheal deviation present. No thyromegaly present. Cardiovascular: Normal rate, regular rhythm, normal heart sounds and intact distal pulses. Exam reveals no gallop and no friction rub. No murmur heard. Pulmonary/Chest: Effort normal and breath sounds normal. No respiratory distress. She has no wheezes. She has no rales. She exhibits no tenderness. Abdominal: Soft. Bowel sounds are normal. She exhibits no distension and no mass. There is no tenderness. There is no rebound and no guarding. Genitourinary: Rectal exam shows guaiac negative stool. No vaginal discharge found. Musculoskeletal: She exhibits tenderness. She exhibits no edema. Right hip: She exhibits decreased range of motion, decreased strength and tenderness. Lymphadenopathy:     She has no cervical adenopathy. Neurological: She is alert and oriented to person, place, and time. She has normal reflexes. She displays normal reflexes. No cranial nerve deficit. She exhibits normal muscle tone.  Coordination normal. counseling on the following healthy behaviors: nutrition, exercise and medication adherence  Reviewed prior labs and health maintenance. Continue current medications, diet and exercise. Discussed use, benefit, and side effects of prescribed medications. Barriers to medication compliance addressed. Patient given educational materials - see patient instructions. All patient questions answered. Patient voiced understanding. 1.  Patient given educational materials - see patient instructions  2. Discussed use, benefit, and side effects of prescribed medications. Barriers to medication compliance addressed. All patient questions answered. Pt voiced understanding. 3.  Reviewed prior labs and health maintenance  4. Continue current medications, diet and exercise.   5.   3m

## 2019-09-24 NOTE — PROGRESS NOTES
Visit Information    Have you changed or started any medications since your last visit including any over-the-counter medicines, vitamins, or herbal medicines? no   Are you having any side effects from any of your medications? -  no  Have you stopped taking any of your medications? Is so, why? -  no    Have you seen any other physician or provider since your last visit? No  Have you had any other diagnostic tests since your last visit? No  Have you been seen in the emergency room and/or had an admission to a hospital since we last saw you? No  Have you had your routine dental cleaning in the past 6 months? no    Have you activated your [employer] account? If not, what are your barriers?  Yes     Patient Care Team:  Darell Rowell MD as PCP - Kamini Sanchez MD as PCP - [facility]    Medical History Review  Past Medical, Family, and Social History reviewed and does not contribute to the patient presenting condition    Health Maintenance   Topic Date Due    DTaP/Tdap/Td vaccine (1 - Tdap) 07/19/1957    Shingles Vaccine (1 of 2) 07/19/1988    Annual Wellness Visit (AWV)  05/29/2019    Flu vaccine (1) 09/01/2019    Potassium monitoring  12/27/2019    Creatinine monitoring  12/27/2019    Pneumococcal 65+ years Vaccine  Completed    DEXA (modify frequency per FRAX score)  Addressed     Chief Complaint   Patient presents with    Hip Pain     rt hip pain,     Gas     causes low abdominal pain

## 2019-10-08 ENCOUNTER — TELEPHONE (OUTPATIENT)
Dept: INTERNAL MEDICINE CLINIC | Age: 79
End: 2019-10-08

## 2019-10-08 RX ORDER — COLCHICINE 0.6 MG/1
TABLET ORAL
Qty: 10 TABLET | Refills: 11 | Status: SHIPPED | OUTPATIENT
Start: 2019-10-08 | End: 2021-06-07 | Stop reason: SDUPTHER

## 2020-02-26 ENCOUNTER — OFFICE VISIT (OUTPATIENT)
Dept: INTERNAL MEDICINE CLINIC | Age: 80
End: 2020-02-26
Payer: MEDICARE

## 2020-02-26 VITALS
OXYGEN SATURATION: 96 % | SYSTOLIC BLOOD PRESSURE: 128 MMHG | BODY MASS INDEX: 20.76 KG/M2 | HEART RATE: 70 BPM | DIASTOLIC BLOOD PRESSURE: 74 MMHG | WEIGHT: 137 LBS | HEIGHT: 68 IN

## 2020-02-26 PROBLEM — R31.0 GROSS HEMATURIA: Status: ACTIVE | Noted: 2020-02-26

## 2020-02-26 PROCEDURE — G8427 DOCREV CUR MEDS BY ELIG CLIN: HCPCS | Performed by: INTERNAL MEDICINE

## 2020-02-26 PROCEDURE — G8420 CALC BMI NORM PARAMETERS: HCPCS | Performed by: INTERNAL MEDICINE

## 2020-02-26 PROCEDURE — 1036F TOBACCO NON-USER: CPT | Performed by: INTERNAL MEDICINE

## 2020-02-26 PROCEDURE — G8482 FLU IMMUNIZE ORDER/ADMIN: HCPCS | Performed by: INTERNAL MEDICINE

## 2020-02-26 PROCEDURE — 1123F ACP DISCUSS/DSCN MKR DOCD: CPT | Performed by: INTERNAL MEDICINE

## 2020-02-26 PROCEDURE — G8399 PT W/DXA RESULTS DOCUMENT: HCPCS | Performed by: INTERNAL MEDICINE

## 2020-02-26 PROCEDURE — 1090F PRES/ABSN URINE INCON ASSESS: CPT | Performed by: INTERNAL MEDICINE

## 2020-02-26 PROCEDURE — 99214 OFFICE O/P EST MOD 30 MIN: CPT | Performed by: INTERNAL MEDICINE

## 2020-02-26 PROCEDURE — 4040F PNEUMOC VAC/ADMIN/RCVD: CPT | Performed by: INTERNAL MEDICINE

## 2020-02-26 RX ORDER — LEVOTHYROXINE SODIUM 112 UG/1
TABLET ORAL
Qty: 90 TABLET | Refills: 3 | Status: SHIPPED | OUTPATIENT
Start: 2020-02-26 | End: 2020-10-28 | Stop reason: SDUPTHER

## 2020-02-26 RX ORDER — CLOBETASOL PROPIONATE 0.05 G/100ML
1 SHAMPOO TOPICAL WEEKLY
Qty: 1 BOTTLE | Refills: 0 | Status: SHIPPED | OUTPATIENT
Start: 2020-02-26

## 2020-02-26 RX ORDER — NITROFURANTOIN 25; 75 MG/1; MG/1
CAPSULE ORAL
COMMUNITY
Start: 2019-12-20

## 2020-02-26 RX ORDER — CARVEDILOL 12.5 MG/1
TABLET ORAL
Qty: 90 TABLET | Refills: 4 | Status: SHIPPED | OUTPATIENT
Start: 2020-02-26 | End: 2021-02-18

## 2020-02-26 RX ORDER — CIPROFLOXACIN 500 MG/1
500 TABLET, FILM COATED ORAL 2 TIMES DAILY
Qty: 10 TABLET | Refills: 0 | Status: SHIPPED | OUTPATIENT
Start: 2020-02-26 | End: 2020-03-02

## 2020-02-26 RX ORDER — METHYLPREDNISOLONE 4 MG/1
TABLET ORAL
COMMUNITY
Start: 2020-01-16 | End: 2022-04-13

## 2020-02-26 RX ORDER — ALLOPURINOL 100 MG/1
100 TABLET ORAL DAILY
Qty: 90 TABLET | Refills: 3 | Status: SHIPPED | OUTPATIENT
Start: 2020-02-26 | End: 2020-10-28 | Stop reason: SDUPTHER

## 2020-02-26 RX ORDER — GABAPENTIN 100 MG/1
300 CAPSULE ORAL 4 TIMES DAILY
Qty: 360 CAPSULE | Refills: 5 | Status: SHIPPED | OUTPATIENT
Start: 2020-02-26 | End: 2020-08-06 | Stop reason: SDUPTHER

## 2020-02-26 RX ORDER — ATORVASTATIN CALCIUM 40 MG/1
40 TABLET, FILM COATED ORAL DAILY
Qty: 90 TABLET | Refills: 3 | Status: SHIPPED | OUTPATIENT
Start: 2020-02-26 | End: 2020-11-30

## 2020-02-26 ASSESSMENT — ENCOUNTER SYMPTOMS
EYE PAIN: 0
COLOR CHANGE: 0
WHEEZING: 0
TROUBLE SWALLOWING: 0
EYE DISCHARGE: 0
BLOOD IN STOOL: 0
SHORTNESS OF BREATH: 0
COUGH: 0
DIARRHEA: 0
ABDOMINAL DISTENTION: 0

## 2020-02-26 ASSESSMENT — PATIENT HEALTH QUESTIONNAIRE - PHQ9
2. FEELING DOWN, DEPRESSED OR HOPELESS: 0
SUM OF ALL RESPONSES TO PHQ9 QUESTIONS 1 & 2: 0
1. LITTLE INTEREST OR PLEASURE IN DOING THINGS: 0
SUM OF ALL RESPONSES TO PHQ QUESTIONS 1-9: 0
SUM OF ALL RESPONSES TO PHQ QUESTIONS 1-9: 0

## 2020-02-26 NOTE — PROGRESS NOTES
[address] [facility] [address] [id number]  Dept: [phone]  Dept Fax: [phone]     Obie Miguel is a 78 y.o. female who presents today for her medical conditions/complaintsas noted below.   Obie Miguel is c/o of   Chief Complaint   Patient presents with    New Patient         HPI:      gorss hematuia  Fir intermitent  Ex smoker  HTN  Onset more than 2 years ago  luma mild to mod  Controlled with current po meds  Not associated with headaches or blurry vision  No chest pain        No results found for: LABA1C          ( goal A1Cis < 7)   No results found for: LABMICR  LDL Cholesterol (mg/dL)   Date Value   06/06/2019 101   10/10/2017 119   06/29/2016 111       (goal LDL is <100)   AST (U/L)   Date Value   12/27/2018 45 (H)     ALT (U/L)   Date Value   12/27/2018 34 (H)     BUN (mg/dL)   Date Value   12/27/2018 11     BP Readings from Last 3 Encounters:   02/26/20 128/74   09/24/19 122/64   06/26/19 138/84          (goal 120/80)    Past Medical History:   Diagnosis Date    Abdominal pain, unspecified site     Acquired cyst of kidney     Acute gouty arthropathy     Allergic rhinitis, cause unspecified     Anxiety state, unspecified     Arthropathy, unspecified, site unspecified     Chronic airway obstruction, not elsewhere classified     Chronic kidney disease, stage III (moderate) (HCC)     Diarrhea     Edema     Esophageal reflux     Essential hypertension, benign     Herpes zoster with other nervous system complications(053.19)     Herpes zoster without mention of complication     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)     Mononeuritis of unspecified site     Myalgia and myositis, unspecified     Osteoarthrosis, unspecified whether generalized or localized, unspecified site     Other general symptoms(780.99)     Other iatrogenic hypothyroidism     Other nonspecific abnormal finding of lung field     Other psoriasis     Pure hypercholesterolemia     Regional DAILY      colchicine (COLCRYS) 0.6 MG tablet Take 2 tabs immediately, then 1 tablet 1 hour later. Then one tablet a day for 2 more days. (Patient not taking: Reported on 2/26/2020) 10 tablet 11     Current Facility-Administered Medications   Medication Dose Route Frequency Provider Last Rate Last Dose    methylPREDNISolone sodium (SOLU-MEDROL) injection 125 mg  125 mg Intramuscular Once Donya Clifford MD        methylPREDNISolone acetate (DEPO-MEDROL) injection 80 mg  80 mg Intramuscular Once Donya Clifford MD        triamcinolone acetonide (KENALOG-40) injection 60 mg  60 mg Intramuscular Once Donya Clifford MD         Allergies   Allergen Reactions    Etomidate     Penicillins Hives       Health Maintenance   Topic Date Due    DTaP/Tdap/Td vaccine (1 - Tdap) 07/19/1949    Shingles Vaccine (1 of 2) 07/19/1988    Annual Wellness Visit (AWV)  05/29/2019    Potassium monitoring  12/27/2019    Creatinine monitoring  12/27/2019    Lipid screen  06/06/2020    Flu vaccine  Completed    Pneumococcal 65+ years Vaccine  Completed    DEXA (modify frequency per FRAX score)  Addressed    Hepatitis A vaccine  Aged Out    Hepatitis B vaccine  Aged Out    Hib vaccine  Aged Out    Meningococcal (ACWY) vaccine  Aged Out       Subjective:     Review of Systems   Constitutional: Negative for appetite change, diaphoresis and fatigue. HENT: Negative for ear discharge and trouble swallowing. Eyes: Negative for pain and discharge. Respiratory: Negative for cough, shortness of breath and wheezing. Cardiovascular: Negative for chest pain and palpitations. Gastrointestinal: Negative for abdominal distention, blood in stool and diarrhea. Endocrine: Negative for polydipsia and polyphagia. Genitourinary: Negative for difficulty urinating and frequency. Hematuria     Musculoskeletal: Positive for arthralgias. Negative for gait problem, myalgias and neck pain.    Skin: Negative for color change and rash.   Allergic/Immunologic: Negative for environmental allergies and food allergies. Neurological: Negative for dizziness and headaches. Hematological: Negative for adenopathy. Does not bruise/bleed easily. Psychiatric/Behavioral: Negative for behavioral problems and sleep disturbance. Objective:     Physical Exam  Constitutional:       Appearance: She is well-developed. She is not diaphoretic. HENT:      Head: Normocephalic and atraumatic. Eyes:      General:         Right eye: No discharge. Left eye: No discharge. Extraocular Movements:      Right eye: Normal extraocular motion. Left eye: Normal extraocular motion. Conjunctiva/sclera: Conjunctivae normal.      Right eye: Right conjunctiva is not injected. Left eye: Left conjunctiva is not injected. Neck:      Musculoskeletal: Normal range of motion and neck supple. No edema or erythema. Thyroid: No thyroid mass or thyromegaly. Vascular: No JVD. Cardiovascular:      Rate and Rhythm: Normal rate and regular rhythm. Heart sounds: No murmur. No friction rub. Pulmonary:      Effort: Pulmonary effort is normal. No tachypnea, bradypnea, accessory muscle usage or respiratory distress. Breath sounds: Normal breath sounds. No wheezing or rales. Abdominal:      General: Bowel sounds are normal. There is no distension. Palpations: Abdomen is soft. Tenderness: There is no abdominal tenderness. There is no rebound. Musculoskeletal: Normal range of motion. General: No tenderness. Lymphadenopathy:      Head:      Right side of head: No submental or submandibular adenopathy. Left side of head: No submental or submandibular adenopathy. Cervical: No cervical adenopathy. Skin:     General: Skin is warm. Coloration: Skin is not pale. Findings: No bruising, ecchymosis or rash. Neurological:      Mental Status: She is alert and oriented to person, place, and time. Cranial Nerves: No cranial nerve deficit. Sensory: No sensory deficit. Motor: No atrophy or abnormal muscle tone. Coordination: Coordination normal.   Psychiatric:         Mood and Affect: Mood is not anxious. Affect is not angry. Speech: Speech is not slurred. Behavior: Behavior normal. Behavior is not aggressive. Thought Content: Thought content does not include homicidal ideation. Cognition and Memory: Memory is not impaired. /74   Pulse 70   Ht 5' 7.99\" (1.727 m)   Wt 137 lb (62.1 kg)   SpO2 96%   BMI 20.84 kg/m²     Assessment:          Diagnosis Orders   1. Essential hypertension, benign     2. Acute gouty arthropathy  allopurinol (ZYLOPRIM) 100 MG tablet   3. Mitral valve disorder     4. Primary osteoarthritis of both knees     5. Vitamin D deficiency     6. Gross hematuria  Urinalysis    US RENAL COMPLETE    Urine Culture   7. Other psoriasis               Plan:      Return in about 1 month (around 3/26/2020). Orders Placed This Encounter   Procedures    Urine Culture     Standing Status:   Future     Standing Expiration Date:   2/26/2021     Order Specific Question:   Specify (ex-cath, midstream, cysto, etc)? Answer:   clean cath    US RENAL COMPLETE     Standing Status:   Future     Standing Expiration Date:   2/26/2021     Order Specific Question:   Reason for exam:     Answer:   gross hematuria    Urinalysis     Standing Status:   Future     Standing Expiration Date:   2/26/2021        Orders Placed This Encounter   Medications    levothyroxine (SYNTHROID) 112 MCG tablet     Sig: TAKE 1 TABLET BY MOUTH ONCE DAILY     Dispense:  90 tablet     Refill:  3    gabapentin (NEURONTIN) 100 MG capsule     Sig: Take 3 capsules by mouth 4 times daily for 344 days.      Dispense:  360 capsule     Refill:  5    atorvastatin (LIPITOR) 40 MG tablet     Sig: Take 1 tablet by mouth daily     Dispense:  90 tablet     Refill:  3    carvedilol (COREG) 12.5 MG tablet     Sig: TAKE 1/2 TABLET BY MOUTH TWO TIMES A DAY     Dispense:  90 tablet     Refill:  4    allopurinol (ZYLOPRIM) 100 MG tablet     Sig: Take 1 tablet by mouth daily     Dispense:  90 tablet     Refill:  3    ciprofloxacin (CIPRO) 500 MG tablet     Sig: Take 1 tablet by mouth 2 times daily for 5 days     Dispense:  10 tablet     Refill:  0    Clobetasol Propionate (CLOBEX) 0.05 % SHAM     Sig: Apply 1 Act topically once a week     Dispense:  1 Bottle     Refill:  0    no dysuria  intermitendt hematuira  Twice  Ex smoker  Will ret st  Us renal  May need cyst if us negative and peristn hematuai  nw pt   M[facility]  Wants me to be her pcp4   Patient given educational materials - see patient instructions. Discussed use, benefit, and side effects of prescribed medications. All patientquestions answered. Pt voiced understanding. Reviewed health maintenance. Instructedto continue current medications, diet and exercise. Patient agreed with treatmentplan. Follow up as directed.      Electronically signed by Dandre Lacey MD on 2/26/2020 at 1:58 PM

## 2020-03-04 ENCOUNTER — HOSPITAL ENCOUNTER (OUTPATIENT)
Dept: ULTRASOUND IMAGING | Age: 80
Discharge: HOME | End: 2020-03-06
Payer: MEDICARE

## 2020-03-04 PROCEDURE — 76770 US EXAM ABDO BACK WALL COMP: CPT

## 2020-03-12 ENCOUNTER — HOSPITAL ENCOUNTER (OUTPATIENT)
Age: 80
Setting detail: SPECIMEN
Discharge: HOME | End: 2020-03-12
Payer: MEDICARE

## 2020-03-14 LAB
CULTURE: ABNORMAL
Lab: ABNORMAL
SPECIMEN DESCRIPTION: ABNORMAL

## 2020-03-14 RX ORDER — CIPROFLOXACIN 500 MG/1
500 TABLET, FILM COATED ORAL 2 TIMES DAILY
Qty: 10 TABLET | Refills: 0 | Status: SHIPPED | OUTPATIENT
Start: 2020-03-14 | End: 2020-03-19

## 2020-03-18 ENCOUNTER — TELEPHONE (OUTPATIENT)
Dept: INTERNAL MEDICINE CLINIC | Age: 80
End: 2020-03-18

## 2020-03-20 ENCOUNTER — HOSPITAL ENCOUNTER (OUTPATIENT)
Age: 80
Setting detail: SPECIMEN
Discharge: HOME | End: 2020-03-20
Payer: MEDICARE

## 2020-03-21 LAB
CULTURE: NORMAL
Lab: NORMAL
SPECIMEN DESCRIPTION: NORMAL

## 2020-03-24 ENCOUNTER — TELEPHONE (OUTPATIENT)
Dept: INTERNAL MEDICINE CLINIC | Age: 80
End: 2020-03-24

## 2020-05-06 ENCOUNTER — TELEPHONE (OUTPATIENT)
Dept: INTERNAL MEDICINE CLINIC | Age: 80
End: 2020-05-06

## 2020-05-08 ENCOUNTER — TELEMEDICINE (OUTPATIENT)
Dept: INTERNAL MEDICINE CLINIC | Age: 80
End: 2020-05-08
Payer: MEDICARE

## 2020-05-08 PROCEDURE — G8428 CUR MEDS NOT DOCUMENT: HCPCS | Performed by: PHYSICIAN ASSISTANT

## 2020-05-08 PROCEDURE — 1090F PRES/ABSN URINE INCON ASSESS: CPT | Performed by: PHYSICIAN ASSISTANT

## 2020-05-08 PROCEDURE — 4040F PNEUMOC VAC/ADMIN/RCVD: CPT | Performed by: PHYSICIAN ASSISTANT

## 2020-05-08 PROCEDURE — G8399 PT W/DXA RESULTS DOCUMENT: HCPCS | Performed by: PHYSICIAN ASSISTANT

## 2020-05-08 PROCEDURE — 99213 OFFICE O/P EST LOW 20 MIN: CPT | Performed by: PHYSICIAN ASSISTANT

## 2020-05-08 PROCEDURE — 1123F ACP DISCUSS/DSCN MKR DOCD: CPT | Performed by: PHYSICIAN ASSISTANT

## 2020-05-08 RX ORDER — PREDNISONE 20 MG/1
20 TABLET ORAL 2 TIMES DAILY
Qty: 10 TABLET | Refills: 0 | Status: SHIPPED | OUTPATIENT
Start: 2020-05-08 | End: 2020-05-13

## 2020-05-08 ASSESSMENT — ENCOUNTER SYMPTOMS
SHORTNESS OF BREATH: 0
VOMITING: 0
CHEST TIGHTNESS: 0
ABDOMINAL PAIN: 0
COUGH: 0
NAUSEA: 0

## 2020-06-24 ENCOUNTER — OFFICE VISIT (OUTPATIENT)
Dept: INTERNAL MEDICINE CLINIC | Age: 80
End: 2020-06-24
Payer: MEDICARE

## 2020-06-24 VITALS
HEIGHT: 68 IN | BODY MASS INDEX: 21.52 KG/M2 | OXYGEN SATURATION: 94 % | SYSTOLIC BLOOD PRESSURE: 124 MMHG | WEIGHT: 142 LBS | DIASTOLIC BLOOD PRESSURE: 72 MMHG | TEMPERATURE: 97.9 F | HEART RATE: 69 BPM

## 2020-06-24 PROCEDURE — 1123F ACP DISCUSS/DSCN MKR DOCD: CPT | Performed by: INTERNAL MEDICINE

## 2020-06-24 PROCEDURE — 4040F PNEUMOC VAC/ADMIN/RCVD: CPT | Performed by: INTERNAL MEDICINE

## 2020-06-24 PROCEDURE — G0438 PPPS, INITIAL VISIT: HCPCS | Performed by: INTERNAL MEDICINE

## 2020-06-24 ASSESSMENT — PATIENT HEALTH QUESTIONNAIRE - PHQ9
SUM OF ALL RESPONSES TO PHQ QUESTIONS 1-9: 0
SUM OF ALL RESPONSES TO PHQ QUESTIONS 1-9: 0

## 2020-06-24 ASSESSMENT — LIFESTYLE VARIABLES: HOW OFTEN DO YOU HAVE A DRINK CONTAINING ALCOHOL: 0

## 2020-06-24 NOTE — PROGRESS NOTES
Visit Information    Have you changed or started any medications since your last visit including any over-the-counter medicines, vitamins, or herbal medicines? no   Are you having any side effects from any of your medications? -  no  Have you stopped taking any of your medications? Is so, why? -  no    Have you seen any other physician or provider since your last visit? No  Have you had any other diagnostic tests since your last visit? No  Have you been seen in the emergency room and/or had an admission to a hospital since we last saw you? No  Have you had your routine dental cleaning in the past 6 months? no    Have you activated your [employer] account? If not, what are your barriers?  Yes     Patient Care Team:  Zeus Woodard MD as PCP - General (Internal Medicine)  Zeus Woodard MD as PCP - [facility]    Medical History Review  Past Medical, Family, and Social History reviewed and does not contribute to the patient presenting condition    Health Maintenance   Topic Date Due    DTaP/Tdap/Td vaccine (1 - Tdap) 07/19/1957    Shingles Vaccine (1 of 2) 07/19/1988    Annual Wellness Visit (AWV)  05/29/2019    Potassium monitoring  12/27/2019    Creatinine monitoring  12/27/2019    Lipid screen  06/06/2020    Flu vaccine  Completed    Pneumococcal 65+ years Vaccine  Completed    DEXA (modify frequency per FRAX score)  Addressed    Hepatitis A vaccine  Aged Out    Hepatitis B vaccine  Aged Out    Hib vaccine  Aged Out    Meningococcal (ACWY) vaccine  Aged Out

## 2020-06-24 NOTE — PATIENT INSTRUCTIONS
Personalized Preventive Plan for Hector Pham - 6/24/2020  Medicare offers a range of preventive health benefits. Some of the tests and screenings are paid in full while other may be subject to a deductible, co-insurance, and/or copay. Some of these benefits include a comprehensive review of your medical history including lifestyle, illnesses that may run in your family, and various assessments and screenings as appropriate. After reviewing your medical record and screening and assessments performed today your provider may have ordered immunizations, labs, imaging, and/or referrals for you. A list of these orders (if applicable) as well as your Preventive Care list are included within your After Visit Summary for your review. Other Preventive Recommendations:    · A preventive eye exam performed by an eye specialist is recommended every 1-2 years to screen for glaucoma; cataracts, macular degeneration, and other eye disorders. · A preventive dental visit is recommended every 6 months. · Try to get at least 150 minutes of exercise per week or 10,000 steps per day on a pedometer . · Order or download the FREE \"Exercise & Physical Activity: Your Everyday Guide\" from [employer]. Call 5-366.474.9938 or search [employer] online. · You need 5122-4355 mg of calcium and 2553-6791 IU of vitamin D per day. It is possible to meet your calcium requirement with diet alone, but a vitamin D supplement is usually necessary to meet this goal.  · When exposed to the sun, use a sunscreen that protects against both UVA and UVB radiation with an SPF of 30 or greater. Reapply every 2 to 3 hours or after sweating, drying off with a towel, or swimming. · Always wear a seat belt when traveling in a car. Always wear a helmet when riding a bicycle or motorcycle.

## 2020-06-24 NOTE — PROGRESS NOTES
 COLONOSCOPY      DILATION AND CURETTAGE OF UTERUS      UPPER GASTROINTESTINAL ENDOSCOPY N/A 2019    EGD FOREIGN BODY REMOVAL performed by Jose Ascencio MD at [address] History   Problem Relation Age of Onset    Coronary Art Dis Father           Social History     Tobacco Use    Smoking status: Former Smoker     Packs/day: 0.75     Years: 2.00     Pack years: 1.50     Types: Cigarettes     Start date: 10/25/1958     Last attempt to quit: 10/25/2018     Years since quittin.6    Smokeless tobacco: Never Used   Substance Use Topics    Alcohol use: No     Alcohol/week: 0.0 standard drinks         Current Outpatient Medications   Medication Sig Dispense Refill    levothyroxine (SYNTHROID) 112 MCG tablet TAKE 1 TABLET BY MOUTH ONCE DAILY 90 tablet 3    gabapentin (NEURONTIN) 100 MG capsule Take 3 capsules by mouth 4 times daily for 344 days. 360 capsule 5    atorvastatin (LIPITOR) 40 MG tablet Take 1 tablet by mouth daily 90 tablet 3    carvedilol (COREG) 12.5 MG tablet TAKE 1/2 TABLET BY MOUTH TWO TIMES A DAY 90 tablet 4    allopurinol (ZYLOPRIM) 100 MG tablet Take 1 tablet by mouth daily 90 tablet 3    Clobetasol Propionate (CLOBEX) 0.05 % SHAM Apply 1 Act topically once a week 1 Bottle 0    colchicine (COLCRYS) 0.6 MG tablet Take 2 tabs immediately, then 1 tablet 1 hour later. Then one tablet a day for 2 more days.  10 tablet 11    folic acid (FOLVITE) 1 MG tablet Take 1 tablet by mouth daily 90 tablet 3    Multiple Vitamins-Minerals (HAIR/SKIN/NAILS) TABS Take 1 tablet by mouth daily      Cholecalciferol (VITAMIN D) 2000 UNITS CAPS capsule Take 2,000 Units by mouth daily      methylPREDNISolone (MEDROL DOSEPACK) 4 MG tablet       nitrofurantoin, macrocrystal-monohydrate, (MACROBID) 100 MG capsule TAKE 1 CAPSULE BY MOUTH TWICE DAILY       Current Facility-Administered Medications   Medication Dose Route Frequency Provider Last Rate Last Dose    methylPREDNISolone sodium (SOLU-MEDROL) injection 125 mg  125 mg Intramuscular Once Kingsley Santos MD        methylPREDNISolone acetate (DEPO-MEDROL) injection 80 mg  80 mg Intramuscular Once Kingsley Santos MD        triamcinolone acetonide (KENALOG-40) injection 60 mg  60 mg Intramuscular Once Kingsley Santos MD         Allergies   Allergen Reactions    Etomidate     Penicillins Hives       Health Maintenance   Topic Date Due    DTaP/Tdap/Td vaccine (1 - Tdap) 07/19/1957    Shingles Vaccine (1 of 2) 07/19/1988    Annual Wellness Visit (AWV)  05/29/2019    Potassium monitoring  12/27/2019    Creatinine monitoring  12/27/2019    Lipid screen  06/06/2020    Flu vaccine  Completed    Pneumococcal 65+ years Vaccine  Completed    DEXA (modify frequency per FRAX score)  Addressed    Hepatitis A vaccine  Aged Out    Hepatitis B vaccine  Aged Out    Hib vaccine  Aged Out    Meningococcal (ACWY) vaccine  Aged Out       Subjective:     Review of Systems    Objective:     Physical Exam  /72   Pulse 69   Temp 97.9 °F (36.6 °C)   Ht 5' 7.99\" (1.727 m)   Wt 142 lb (64.4 kg)   SpO2 94%   BMI 21.60 kg/m²     Assessment:          Diagnosis Orders   1. Screening for hyperlipidemia  Comprehensive Metabolic Panel    Lipid, Fasting    CBC Auto Differential   2. Routine general medical examination at a health care facility               Plan:      Return for Medicare Annual Wellness Visit in 1 year. Orders Placed This Encounter   Procedures    Comprehensive Metabolic Panel     Standing Status:   Future     Standing Expiration Date:   6/24/2021    Lipid, Fasting     Standing Status:   Future     Standing Expiration Date:   6/24/2021    CBC Auto Differential     Standing Status:   Future     Standing Expiration Date:   9/22/2020      No orders of the defined types were placed in this encounter. Patient given educational materials - see patient instructions. Discussed use, benefit, and side effects of prescribed medications. Take 2,000 Units by mouth daily Yes Historical Provider, MD   methylPREDNISolone (MEDROL DOSEPACK) 4 MG tablet   Historical Provider, MD   nitrofurantoin, macrocrystal-monohydrate, (MACROBID) 100 MG capsule TAKE 1 CAPSULE BY MOUTH TWICE DAILY  Historical Provider, MD         Past Medical History:   Diagnosis Date    Abdominal pain, unspecified site     Acquired cyst of kidney     Acute gouty arthropathy     Allergic rhinitis, cause unspecified     Anxiety state, unspecified     Arthropathy, unspecified, site unspecified     Chronic airway obstruction, not elsewhere classified     Chronic kidney disease, stage III (moderate) (HCC)     Diarrhea     Edema     Esophageal reflux     Essential hypertension, benign     Herpes zoster with other nervous system complications(053.19)     Herpes zoster without mention of complication     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)     Mononeuritis of unspecified site     Myalgia and myositis, unspecified     Osteoarthrosis, unspecified whether generalized or localized, unspecified site     Other general symptoms(780.99)     Other iatrogenic hypothyroidism     Other nonspecific abnormal finding of lung field     Other psoriasis     Pure hypercholesterolemia     Regional enteritis of unspecified site     Senile osteoporosis     Sprain of ankle, unspecified site     Unspecified vitamin D deficiency        Past Surgical History:   Procedure Laterality Date    COLONOSCOPY      DILATION AND CURETTAGE OF UTERUS      UPPER GASTROINTESTINAL ENDOSCOPY N/A 6/14/2019    EGD FOREIGN BODY REMOVAL performed by Lexa Soares MD at [facility] OR         Family History   Problem Relation Age of Onset    Coronary Art Dis Father        CareTeam (Including outside providers/suppliers regularly involved in providing care):   Patient Care Team:  Kristan Monsivais MD as PCP - General (Internal Medicine)  Kristan Monsivais MD as PCP - [facility] Empaneled Provider    Wt Readings from Last 3

## 2020-08-06 RX ORDER — GABAPENTIN 100 MG/1
300 CAPSULE ORAL 4 TIMES DAILY
Qty: 360 CAPSULE | Refills: 5 | Status: SHIPPED | OUTPATIENT
Start: 2020-08-06 | End: 2020-08-17 | Stop reason: SDUPTHER

## 2020-08-07 ENCOUNTER — OFFICE VISIT (OUTPATIENT)
Dept: INTERNAL MEDICINE CLINIC | Age: 80
End: 2020-08-07
Payer: MEDICARE

## 2020-08-07 VITALS
TEMPERATURE: 98.3 F | OXYGEN SATURATION: 95 % | SYSTOLIC BLOOD PRESSURE: 130 MMHG | BODY MASS INDEX: 21.64 KG/M2 | DIASTOLIC BLOOD PRESSURE: 84 MMHG | HEIGHT: 68 IN | HEART RATE: 93 BPM | WEIGHT: 142.8 LBS

## 2020-08-07 PROCEDURE — G8427 DOCREV CUR MEDS BY ELIG CLIN: HCPCS | Performed by: PHYSICIAN ASSISTANT

## 2020-08-07 PROCEDURE — 1123F ACP DISCUSS/DSCN MKR DOCD: CPT | Performed by: PHYSICIAN ASSISTANT

## 2020-08-07 PROCEDURE — 1090F PRES/ABSN URINE INCON ASSESS: CPT | Performed by: PHYSICIAN ASSISTANT

## 2020-08-07 PROCEDURE — 1036F TOBACCO NON-USER: CPT | Performed by: PHYSICIAN ASSISTANT

## 2020-08-07 PROCEDURE — 4040F PNEUMOC VAC/ADMIN/RCVD: CPT | Performed by: PHYSICIAN ASSISTANT

## 2020-08-07 PROCEDURE — 99214 OFFICE O/P EST MOD 30 MIN: CPT | Performed by: PHYSICIAN ASSISTANT

## 2020-08-07 PROCEDURE — G8420 CALC BMI NORM PARAMETERS: HCPCS | Performed by: PHYSICIAN ASSISTANT

## 2020-08-07 PROCEDURE — G8399 PT W/DXA RESULTS DOCUMENT: HCPCS | Performed by: PHYSICIAN ASSISTANT

## 2020-08-07 RX ORDER — METRONIDAZOLE 500 MG/1
500 TABLET ORAL 3 TIMES DAILY
Qty: 30 TABLET | Refills: 0 | Status: SHIPPED | OUTPATIENT
Start: 2020-08-07 | End: 2020-08-17

## 2020-08-07 RX ORDER — CIPROFLOXACIN 500 MG/1
500 TABLET, FILM COATED ORAL 2 TIMES DAILY
Qty: 20 TABLET | Refills: 0 | Status: SHIPPED | OUTPATIENT
Start: 2020-08-07 | End: 2020-09-21 | Stop reason: SDUPTHER

## 2020-08-07 ASSESSMENT — ENCOUNTER SYMPTOMS
VOMITING: 0
EYE PAIN: 0
TROUBLE SWALLOWING: 0
RHINORRHEA: 0
COLOR CHANGE: 0
BACK PAIN: 0
NAUSEA: 1
EYE ITCHING: 0
SORE THROAT: 0
SHORTNESS OF BREATH: 0
CONSTIPATION: 0
ABDOMINAL PAIN: 1
BLOOD IN STOOL: 0
VOICE CHANGE: 0
DIARRHEA: 0
WHEEZING: 0
EYE DISCHARGE: 0
CHEST TIGHTNESS: 0
COUGH: 0
SINUS PAIN: 0

## 2020-08-07 NOTE — PROGRESS NOTES
[address]  [place] [address]  Dept: [phone]  Dept Fax: [phone]    OfficeProgress/Follow Up Note  Date of patient's visit: 8/7/2020  Patient's Name:  Cachorro Brink YOB: 1940            Patient Care Team:  Delgado Bridges MD as PCP - General (Internal Medicine)  Delgado Bridges MD as PCP - [facility] Empaneled Provider    REASON FOR VISIT:  Same day visit    HISTORY OF PRESENT ILLNESS:      Chief Complaint   Patient presents with    Abdominal Pain     onset yesterday morning, more so cramping on left side, possible diverticulitis, may have ate something with seeds, has had gas and been belching   [address] Other     is there anything to do for neuropathy, legs get cold     History was obtained from the patient. Cachorro Brink is a 78 y.o. female here today for above. Patient complains of abdominal pain, started yesterday morning. Pain described as an ache (7/10) left lower quadrant without radiation. She reports associated nausea without vomiting. No constipation, bowel movement yesterday, no rectal bleeding or black stools. Patient reports history of diverticulitis with very similar presentation. She is tolerating oral intake, no fever/chills. No urinary symptoms.      Patient Active Problem List   Diagnosis    Vitamin D deficiency    Other general symptoms    Abdominal pain    Herpes zoster with other nervous system complications    Acute gouty arthropathy    Herpes zoster    Sprain of ankle    Edema    Acquired cyst of kidney    Myalgia and myositis    Other nonspecific abnormal finding of lung field    COPD (chronic obstructive pulmonary disease) (HCC)    Mitral valve disorder    Allergic rhinitis    Diarrhea    Esophageal reflux    Other psoriasis    Arthropathy    Pure hypercholesterolemia    Anxiety state    Osteoarthritis    Essential hypertension, benign    Chronic kidney disease, stage III (moderate) (HCC)    Regional enteritis (HCC)    Senile osteoporosis    Mononeuritis    Conjunctivitis    Skin lesion of left leg    Skin lesion of right leg    Fibromyalgia    DJD (degenerative joint disease) of knee    Delirium due to another medical condition, acute, mixed level of activity    Sepsis ([facility] [address]    Gout attack    Colitis    Esophageal obstruction due to food impaction    Pain of right hip joint    Gross hematuria     MEDICATIONS:      Current Outpatient Medications   Medication Sig Dispense Refill    ciprofloxacin (CIPRO) 500 MG tablet Take 1 tablet by mouth 2 times daily for 10 days 20 tablet 0    metroNIDAZOLE (FLAGYL) 500 MG tablet Take 1 tablet by mouth 3 times daily for 10 days 30 tablet 0    gabapentin (NEURONTIN) 100 MG capsule Take 3 capsules by mouth 4 times daily for 344 days. 360 capsule 5    methylPREDNISolone (MEDROL DOSEPACK) 4 MG tablet       nitrofurantoin, macrocrystal-monohydrate, (MACROBID) 100 MG capsule TAKE 1 CAPSULE BY MOUTH TWICE DAILY      levothyroxine (SYNTHROID) 112 MCG tablet TAKE 1 TABLET BY MOUTH ONCE DAILY 90 tablet 3    atorvastatin (LIPITOR) 40 MG tablet Take 1 tablet by mouth daily 90 tablet 3    carvedilol (COREG) 12.5 MG tablet TAKE 1/2 TABLET BY MOUTH TWO TIMES A DAY 90 tablet 4    allopurinol (ZYLOPRIM) 100 MG tablet Take 1 tablet by mouth daily 90 tablet 3    Clobetasol Propionate (CLOBEX) 0.05 % SHAM Apply 1 Act topically once a week 1 Bottle 0    colchicine (COLCRYS) 0.6 MG tablet Take 2 tabs immediately, then 1 tablet 1 hour later. Then one tablet a day for 2 more days.  10 tablet 11    folic acid (FOLVITE) 1 MG tablet Take 1 tablet by mouth daily 90 tablet 3    Multiple Vitamins-Minerals (HAIR/SKIN/NAILS) TABS Take 1 tablet by mouth daily      Cholecalciferol (VITAMIN D) 2000 UNITS CAPS capsule Take 2,000 Units by mouth daily       Current Facility-Administered Medications   Medication Dose Route Frequency Provider Last Rate Last Dose    methylPREDNISolone sodium (SOLU-MEDROL) injection 125 mg  125 mg Intramuscular Once Yuridia Barrett MD        methylPREDNISolone acetate (DEPO-MEDROL) injection 80 mg  80 mg Intramuscular Once Yuridia Barrett MD        triamcinolone acetonide (KENALOG-40) injection 60 mg  60 mg Intramuscular Once Yuridia Barrett MD         ALLERGIES:      Allergies   Allergen Reactions    Etomidate     Penicillins Hives     SOCIAL HISTORY   Reviewed and no change from previous record. Juan C Blevins  reports that she quit smoking about 21 months ago. Her smoking use included cigarettes. She started smoking about 61 years ago. She has a 1.50 pack-year smoking history. She has never used smokeless tobacco.    FAMILY HISTORY:    Reviewed and no change from previous visit    REVIEW OF SYSTEMS:    Review of Systems   Constitutional: Negative for chills, diaphoresis, fatigue and fever. HENT: Negative for congestion, ear discharge, ear pain, hearing loss, rhinorrhea, sinus pain, sore throat, trouble swallowing and voice change. Eyes: Negative for pain, discharge, itching and visual disturbance. Respiratory: Negative for cough, chest tightness, shortness of breath and wheezing. Cardiovascular: Negative for chest pain, palpitations and leg swelling. Gastrointestinal: Positive for abdominal pain and nausea. Negative for blood in stool, constipation, diarrhea and vomiting. Endocrine: Negative for cold intolerance and heat intolerance. Genitourinary: Negative for difficulty urinating, dysuria, flank pain, frequency, hematuria and urgency. Musculoskeletal: Negative for arthralgias, back pain, neck pain and neck stiffness. Skin: Negative for color change, pallor and rash. Neurological: Negative for dizziness, weakness, light-headedness, numbness and headaches. Hematological: Negative for adenopathy.       PHYSICAL EXAM:      Vitals:    08/07/20 1335   BP: 130/84   Pulse: 93   Temp: 98.3 °F (36.8 °C)   SpO2: 95% Weight: 142 lb 12.8 oz (64.8 kg)   Height: 5' 8\" (1.727 m)     Wt Readings from Last 3 Encounters:   08/07/20 142 lb 12.8 oz (64.8 kg)   06/24/20 142 lb (64.4 kg)   02/26/20 137 lb (62.1 kg)     General -alert, well appearing, non toxic, in no distress  Skin - normal coloration and turgor, no rash  Mouth - mucous membranes are moist, pharynx normal without lesions  Neck - supple, no significant adenopathy  Lymphatics - no palpable lymphadenopathy, no hepatosplenomegaly  Chest - clear to auscultation, no wheezes, rales or rhonchi, symmetric air entry  Heart - normal rate, regular rhythm, no murmurs, rubs, clicks or gallops  Abdomen - inspection unremarkable, normoactive bowel sounds x 4, abdomen is non distended, soft on palpation, localized tenderness to palpation left lower quadrant, no guarding or rigidity, no rebound tenderness, no palpable masses or organomegaly appreciated   Back - no flank tenderness bilaterally  Neurological - alert, oriented x 3, normal speech, no focal sensory or motor deficit  Extremities - peripheral pulses normal, no pedal edema    ASSESSMENT AND PLAN:      1. Acute left lower quadrant pain  - Suspect diverticulitis, patient clinically appears well, discussed labs/imaging, she declines at present  - Start antibiotics, remain hydrated, analgesics, advised ER for new or worsening symptoms   -- ciprofloxacin (CIPRO) 500 MG tablet; Take 1 tablet by mouth 2 times daily for 10 days   -- metroNIDAZOLE (FLAGYL) 500 MG tablet; Take 1 tablet by mouth 3 times daily for 10 days    FOLLOW UP AND INSTRUCTIONS:   Return if symptoms worsen or fail to improve. Patient educated on signs and symptoms which require emergent evaluation and treatment, instructed to present to emergency room should these develop, she understands and agrees with plan. Discussed use, benefit, and side effects of prescribed medications. All patient questions answered. Patient voiced understanding.      Patient given educational materials - see patient instructions    ISSA Stephenson [facility]  8/7/2020, 2:14 PM    Please note that this chart wasgenerated using voice recognition Dragon dictation software. Although every effort was made to ensure the accuracy of this automated transcription, some errors in transcription may have occurred.

## 2020-08-13 ENCOUNTER — TELEPHONE (OUTPATIENT)
Dept: INTERNAL MEDICINE CLINIC | Age: 80
End: 2020-08-13

## 2020-08-17 RX ORDER — GABAPENTIN 100 MG/1
300 CAPSULE ORAL 4 TIMES DAILY
Qty: 360 CAPSULE | Refills: 5 | Status: SHIPPED | OUTPATIENT
Start: 2020-08-17 | End: 2020-12-10

## 2020-08-31 ENCOUNTER — HOSPITAL ENCOUNTER (OUTPATIENT)
Age: 80
Setting detail: SPECIMEN
Discharge: HOME | End: 2020-08-31
Payer: MEDICARE

## 2020-08-31 LAB
ABSOLUTE EOS #: 0.44 K/UL (ref 0–0.44)
ABSOLUTE IMMATURE GRANULOCYTE: <0.03 K/UL (ref 0–0.3)
ABSOLUTE LYMPH #: 1.41 K/UL (ref 1.1–3.7)
ABSOLUTE MONO #: 0.57 K/UL (ref 0.1–1.2)
ALBUMIN SERPL-MCNC: 3.8 G/DL (ref 3.5–5.2)
ALBUMIN/GLOBULIN RATIO: 1.4 (ref 1–2.5)
ALP BLD-CCNC: 96 U/L (ref 35–104)
ALT SERPL-CCNC: 16 U/L (ref 5–33)
ANION GAP SERPL CALCULATED.3IONS-SCNC: 11 MMOL/L (ref 9–17)
AST SERPL-CCNC: 30 U/L
BASOPHILS # BLD: 1 % (ref 0–2)
BASOPHILS ABSOLUTE: 0.09 K/UL (ref 0–0.2)
BILIRUB SERPL-MCNC: 0.45 MG/DL (ref 0.3–1.2)
BUN BLDV-MCNC: 15 MG/DL (ref 8–23)
BUN/CREAT BLD: ABNORMAL (ref 9–20)
CALCIUM SERPL-MCNC: 10.1 MG/DL (ref 8.6–10.4)
CHLORIDE BLD-SCNC: 104 MMOL/L (ref 98–107)
CHOLESTEROL, FASTING: 185 MG/DL
CHOLESTEROL/HDL RATIO: 3.6
CO2: 26 MMOL/L (ref 20–31)
CREAT SERPL-MCNC: 0.92 MG/DL (ref 0.5–0.9)
DIFFERENTIAL TYPE: ABNORMAL
EOSINOPHILS RELATIVE PERCENT: 7 % (ref 1–4)
GFR AFRICAN AMERICAN: >60 ML/MIN
GFR NON-AFRICAN AMERICAN: 58 ML/MIN
GFR SERPL CREATININE-BSD FRML MDRD: ABNORMAL ML/MIN/{1.73_M2}
GFR SERPL CREATININE-BSD FRML MDRD: ABNORMAL ML/MIN/{1.73_M2}
GLUCOSE BLD-MCNC: 94 MG/DL (ref 70–99)
HCT VFR BLD CALC: 43.2 % (ref 36.3–47.1)
HDLC SERPL-MCNC: 52 MG/DL
HEMOGLOBIN: 13.2 G/DL (ref 11.9–15.1)
IMMATURE GRANULOCYTES: 0 %
LDL CHOLESTEROL: 109 MG/DL (ref 0–130)
LYMPHOCYTES # BLD: 22 % (ref 24–43)
MCH RBC QN AUTO: 28.3 PG (ref 25.2–33.5)
MCHC RBC AUTO-ENTMCNC: 30.6 G/DL (ref 28.4–34.8)
MCV RBC AUTO: 92.7 FL (ref 82.6–102.9)
MONOCYTES # BLD: 9 % (ref 3–12)
NRBC AUTOMATED: 0 PER 100 WBC
PDW BLD-RTO: 13.5 % (ref 11.8–14.4)
PLATELET # BLD: 189 K/UL (ref 138–453)
PLATELET ESTIMATE: ABNORMAL
PMV BLD AUTO: 10.6 FL (ref 8.1–13.5)
POTASSIUM SERPL-SCNC: 4.4 MMOL/L (ref 3.7–5.3)
RBC # BLD: 4.66 M/UL (ref 3.95–5.11)
RBC # BLD: ABNORMAL 10*6/UL
SEG NEUTROPHILS: 61 % (ref 36–65)
SEGMENTED NEUTROPHILS ABSOLUTE COUNT: 3.93 K/UL (ref 1.5–8.1)
SODIUM BLD-SCNC: 141 MMOL/L (ref 135–144)
TOTAL PROTEIN: 6.5 G/DL (ref 6.4–8.3)
TRIGLYCERIDE, FASTING: 120 MG/DL
VLDLC SERPL CALC-MCNC: NORMAL MG/DL (ref 1–30)
WBC # BLD: 6.5 K/UL (ref 3.5–11.3)
WBC # BLD: ABNORMAL 10*3/UL

## 2020-09-21 RX ORDER — CIPROFLOXACIN 500 MG/1
500 TABLET, FILM COATED ORAL 2 TIMES DAILY
Qty: 20 TABLET | Refills: 0 | Status: SHIPPED | OUTPATIENT
Start: 2020-09-21 | End: 2020-10-01

## 2020-09-21 NOTE — TELEPHONE ENCOUNTER
Patient is going to [place] for a week to move her daughter. She feels the diverticulitis may be flaring up and she does not want to get there and have nothing. Can you please approve a refill?

## 2020-09-29 ENCOUNTER — OFFICE VISIT (OUTPATIENT)
Dept: INTERNAL MEDICINE CLINIC | Age: 80
End: 2020-09-29
Payer: MEDICARE

## 2020-09-29 VITALS
BODY MASS INDEX: 21.82 KG/M2 | WEIGHT: 144 LBS | HEART RATE: 78 BPM | DIASTOLIC BLOOD PRESSURE: 74 MMHG | TEMPERATURE: 97.4 F | HEIGHT: 68 IN | SYSTOLIC BLOOD PRESSURE: 124 MMHG | OXYGEN SATURATION: 94 %

## 2020-09-29 PROBLEM — M25.561 CHRONIC PAIN OF RIGHT KNEE: Status: ACTIVE | Noted: 2020-09-29

## 2020-09-29 PROBLEM — R31.0 GROSS HEMATURIA: Status: RESOLVED | Noted: 2020-02-26 | Resolved: 2020-09-29

## 2020-09-29 PROBLEM — E03.9 ACQUIRED HYPOTHYROIDISM: Status: ACTIVE | Noted: 2020-09-29

## 2020-09-29 PROBLEM — M1A.09X0 CHRONIC GOUT OF MULTIPLE SITES: Status: ACTIVE | Noted: 2020-09-29

## 2020-09-29 PROBLEM — G89.29 CHRONIC PAIN OF RIGHT KNEE: Status: ACTIVE | Noted: 2020-09-29

## 2020-09-29 PROBLEM — G62.9 NEUROPATHY: Status: ACTIVE | Noted: 2020-09-29

## 2020-09-29 PROCEDURE — 4040F PNEUMOC VAC/ADMIN/RCVD: CPT | Performed by: INTERNAL MEDICINE

## 2020-09-29 PROCEDURE — G8399 PT W/DXA RESULTS DOCUMENT: HCPCS | Performed by: INTERNAL MEDICINE

## 2020-09-29 PROCEDURE — G8420 CALC BMI NORM PARAMETERS: HCPCS | Performed by: INTERNAL MEDICINE

## 2020-09-29 PROCEDURE — G8926 SPIRO NO PERF OR DOC: HCPCS | Performed by: INTERNAL MEDICINE

## 2020-09-29 PROCEDURE — 99214 OFFICE O/P EST MOD 30 MIN: CPT | Performed by: INTERNAL MEDICINE

## 2020-09-29 PROCEDURE — 1123F ACP DISCUSS/DSCN MKR DOCD: CPT | Performed by: INTERNAL MEDICINE

## 2020-09-29 PROCEDURE — 3023F SPIROM DOC REV: CPT | Performed by: INTERNAL MEDICINE

## 2020-09-29 PROCEDURE — G8427 DOCREV CUR MEDS BY ELIG CLIN: HCPCS | Performed by: INTERNAL MEDICINE

## 2020-09-29 PROCEDURE — 1036F TOBACCO NON-USER: CPT | Performed by: INTERNAL MEDICINE

## 2020-09-29 PROCEDURE — 1090F PRES/ABSN URINE INCON ASSESS: CPT | Performed by: INTERNAL MEDICINE

## 2020-09-29 ASSESSMENT — ENCOUNTER SYMPTOMS
SHORTNESS OF BREATH: 0
EYE PAIN: 0
BLOOD IN STOOL: 0
ABDOMINAL DISTENTION: 0
TROUBLE SWALLOWING: 0
EYE DISCHARGE: 0
COUGH: 0
WHEEZING: 0
DIARRHEA: 0
COLOR CHANGE: 0

## 2020-09-29 ASSESSMENT — PATIENT HEALTH QUESTIONNAIRE - PHQ9
SUM OF ALL RESPONSES TO PHQ QUESTIONS 1-9: 2
SUM OF ALL RESPONSES TO PHQ QUESTIONS 1-9: 2
2. FEELING DOWN, DEPRESSED OR HOPELESS: 1
SUM OF ALL RESPONSES TO PHQ9 QUESTIONS 1 & 2: 2
1. LITTLE INTEREST OR PLEASURE IN DOING THINGS: 1

## 2020-09-29 NOTE — PROGRESS NOTES
[address] [address]  Dept: [phone]  Dept Fax: [phone]    Junie Weaver is a 78 y.o. female who presents today for her medical conditions/complaintsas noted below.   Junie Weaver is c/o of   Chief Complaint   Patient presents with    COPD     hcc     Chronic Kidney Disease     hcc         HPI:     Left lower agod pain  For few days  Was seen and treted for diverticuut  Feels better  Fatigue        No results found for: LABA1C          ( goal A1Cis < 7)   No results found for: LABMICR  LDL Cholesterol (mg/dL)   Date Value   08/31/2020 109   06/06/2019 101   10/10/2017 119       (goal LDL is <100)   AST (U/L)   Date Value   08/31/2020 30     ALT (U/L)   Date Value   08/31/2020 16     BUN (mg/dL)   Date Value   08/31/2020 15     BP Readings from Last 3 Encounters:   09/29/20 124/74   08/07/20 130/84   06/24/20 124/72          (goal 120/80)    Past Medical History:   Diagnosis Date    Abdominal pain, unspecified site     Acquired cyst of kidney     Acute gouty arthropathy     Allergic rhinitis, cause unspecified     Anxiety state, unspecified     Arthropathy, unspecified, site unspecified     Chronic airway obstruction, not elsewhere classified     Chronic kidney disease, stage III (moderate) (HCC)     Diarrhea     Edema     Esophageal reflux     Essential hypertension, benign     Herpes zoster with other nervous system complications(053.19)     Herpes zoster without mention of complication     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)     Mononeuritis of unspecified site     Myalgia and myositis, unspecified     Osteoarthrosis, unspecified whether generalized or localized, unspecified site     Other general symptoms(780.99)     Other iatrogenic hypothyroidism     Other nonspecific abnormal finding of lung field     Other psoriasis     Pure hypercholesterolemia     Regional enteritis of unspecified site     Senile osteoporosis     Sprain of ankle, unspecified site     Unspecified vitamin D deficiency       Past Surgical History:   Procedure Laterality Date    COLONOSCOPY      DILATION AND CURETTAGE OF UTERUS      UPPER GASTROINTESTINAL ENDOSCOPY N/A 2019    EGD FOREIGN BODY REMOVAL performed by Alphonse Howard MD at [facility] OR       Family History   Problem Relation Age of Onset    Coronary Art Dis Father        Social History     Tobacco Use    Smoking status: Former Smoker     Packs/day: 0.75     Years: 2.00     Pack years: 1.50     Types: Cigarettes     Start date: 10/25/1958     Last attempt to quit: 10/25/2018     Years since quittin.9    Smokeless tobacco: Never Used   Substance Use Topics    Alcohol use: No     Alcohol/week: 0.0 standard drinks      Current Outpatient Medications   Medication Sig Dispense Refill    gabapentin (NEURONTIN) 100 MG capsule Take 3 capsules by mouth 4 times daily for 344 days. 360 capsule 5    methylPREDNISolone (MEDROL DOSEPACK) 4 MG tablet       nitrofurantoin, macrocrystal-monohydrate, (MACROBID) 100 MG capsule TAKE 1 CAPSULE BY MOUTH TWICE DAILY      levothyroxine (SYNTHROID) 112 MCG tablet TAKE 1 TABLET BY MOUTH ONCE DAILY 90 tablet 3    atorvastatin (LIPITOR) 40 MG tablet Take 1 tablet by mouth daily 90 tablet 3    carvedilol (COREG) 12.5 MG tablet TAKE 1/2 TABLET BY MOUTH TWO TIMES A DAY 90 tablet 4    allopurinol (ZYLOPRIM) 100 MG tablet Take 1 tablet by mouth daily 90 tablet 3    Clobetasol Propionate (CLOBEX) 0.05 % SHAM Apply 1 Act topically once a week 1 Bottle 0    colchicine (COLCRYS) 0.6 MG tablet Take 2 tabs immediately, then 1 tablet 1 hour later. Then one tablet a day for 2 more days.  10 tablet 11    folic acid (FOLVITE) 1 MG tablet Take 1 tablet by mouth daily 90 tablet 3    Multiple Vitamins-Minerals (HAIR/SKIN/NAILS) TABS Take 1 tablet by mouth daily      Cholecalciferol (VITAMIN D) 2000 UNITS CAPS capsule Take 2,000 Units by mouth daily      ciprofloxacin (CIPRO) Neurological: Negative for dizziness and headaches. Hematological: Negative for adenopathy. Does not bruise/bleed easily. Psychiatric/Behavioral: Negative for behavioral problems and sleep disturbance. Objective:     Physical Exam  Constitutional:       Appearance: She is well-developed. She is not diaphoretic. Comments: Walks with cane     HENT:      Head: Normocephalic and atraumatic. Eyes:      General:         Right eye: No discharge. Left eye: No discharge. Extraocular Movements:      Right eye: Normal extraocular motion. Left eye: Normal extraocular motion. Conjunctiva/sclera: Conjunctivae normal.      Right eye: Right conjunctiva is not injected. Left eye: Left conjunctiva is not injected. Neck:      Musculoskeletal: Normal range of motion and neck supple. No edema or erythema. Thyroid: No thyroid mass or thyromegaly. Vascular: No JVD. Cardiovascular:      Rate and Rhythm: Normal rate and regular rhythm. Heart sounds: No murmur. No friction rub. Pulmonary:      Effort: Pulmonary effort is normal. No tachypnea, bradypnea, accessory muscle usage or respiratory distress. Breath sounds: Normal breath sounds. No wheezing or rales. Abdominal:      General: Bowel sounds are normal. There is no distension. Palpations: Abdomen is soft. Tenderness: There is no abdominal tenderness. There is no rebound. Musculoskeletal: Normal range of motion. General: No tenderness. Lymphadenopathy:      Head:      Right side of head: No submental or submandibular adenopathy. Left side of head: No submental or submandibular adenopathy. Cervical: No cervical adenopathy. Skin:     General: Skin is warm. Coloration: Skin is not pale. Findings: No bruising, ecchymosis or rash. Neurological:      Mental Status: She is alert and oriented to person, place, and time. Cranial Nerves: No cranial nerve deficit. Sensory: No sensory deficit. Motor: No atrophy or abnormal muscle tone. Coordination: Coordination normal.   Psychiatric:         Mood and Affect: Mood is not anxious. Affect is not angry. Speech: Speech is not slurred. Behavior: Behavior normal. Behavior is not aggressive. Thought Content: Thought content does not include homicidal ideation. Cognition and Memory: Memory is not impaired. /74   Pulse 78   Temp 97.4 °F (36.3 °C)   Ht 5' 8\" (1.727 m)   Wt 144 lb (65.3 kg)   SpO2 94%   BMI 21.90 kg/m²     Assessment:       Diagnosis Orders   1. Left lower quadrant abdominal pain     2. Chronic obstructive pulmonary disease, unspecified COPD type ([facility] [address]     3. Crohn's disease of large intestine without complication ([facility] [address]     4. Pure hypercholesterolemia     5. Fibromyalgia  Vitamin D 25 Hydroxy   6. Idiopathic chronic gout of multiple sites without tophus  Uric Acid   7. Neuropathy     8. Acquired hypothyroidism  TSH With Reflex Ft4   9. Chronic pain of right knee  XR KNEE RIGHT (3 VIEWS)             Plan:      No follow-ups on file. Orders Placed This Encounter   Procedures    XR KNEE RIGHT (3 VIEWS)     Standing Status:   Future     Standing Expiration Date:   9/29/2021     Order Specific Question:   Reason for exam:     Answer:   right knee pain    TSH With Reflex Ft4     Standing Status:   Future     Standing Expiration Date:   9/29/2021    Vitamin D 25 Hydroxy     Standing Status:   Future     Standing Expiration Date:   9/29/2021    Uric Acid     Standing Status:   Future     Standing Expiration Date:   9/29/2021     No orders of the defined types were placed in this encounter. treated for diverticuluts  Finished abx  Right knee pain  Seen dr Phil Jara in past  Pt has seen rafael before     Patient given educational materials - see patient instructions. Discussed use, benefit, and side effects of prescribed medications. All patientquestions answered.  Pt

## 2020-09-29 NOTE — PROGRESS NOTES
Visit Information    Have you changed or started any medications since your last visit including any over-the-counter medicines, vitamins, or herbal medicines? no   Are you having any side effects from any of your medications? -  no  Have you stopped taking any of your medications? Is so, why? -  no    Have you seen any other physician or provider since your last visit? No  Have you had any other diagnostic tests since your last visit? yes  Have you been seen in the emergency room and/or had an admission to a hospital since we last saw you? no  Have you had your routine dental cleaning in the past 6 months? no    Have you activated your [employer] account? If not, what are your barriers?  Yes     Patient Care Team:  David Gandara MD as PCP - General (Internal Medicine)  David Gandara MD as PCP - [address] [place] [address] Dr Hull Provider    Medical History Review  Past Medical, Family, and Social History reviewed and does not contribute to the patient presenting condition    Health Maintenance   Topic Date Due    DTaP/Tdap/Td vaccine (1 - Tdap) 07/19/1957    Shingles Vaccine (1 of 2) 07/19/1988    Annual Wellness Visit (AWV)  06/25/2021    Lipid screen  08/31/2021    Potassium monitoring  08/31/2021    Creatinine monitoring  08/31/2021    Flu vaccine  Completed    Pneumococcal 65+ years Vaccine  Completed    DEXA (modify frequency per FRAX score)  Addressed    Hepatitis A vaccine  Aged Out    Hepatitis B vaccine  Aged Out    Hib vaccine  Aged Out    Meningococcal (ACWY) vaccine  Aged Out

## 2020-10-21 ENCOUNTER — HOSPITAL ENCOUNTER (OUTPATIENT)
Age: 80
Setting detail: SPECIMEN
Discharge: HOME | End: 2020-10-21
Payer: MEDICARE

## 2020-10-21 ENCOUNTER — HOSPITAL ENCOUNTER (OUTPATIENT)
Facility: CLINIC | Age: 80
Discharge: HOME | End: 2020-10-23
Payer: MEDICARE

## 2020-10-21 ENCOUNTER — HOSPITAL ENCOUNTER (OUTPATIENT)
Dept: GENERAL RADIOLOGY | Facility: CLINIC | Age: 80
Discharge: HOME | End: 2020-10-23
Payer: MEDICARE

## 2020-10-21 LAB
THYROXINE, FREE: 0.93 NG/DL (ref 0.93–1.7)
TSH SERPL DL<=0.05 MIU/L-ACNC: 10.4 MIU/L (ref 0.3–5)
URIC ACID: 7.2 MG/DL (ref 2.4–5.7)

## 2020-10-21 PROCEDURE — 73562 X-RAY EXAM OF KNEE 3: CPT

## 2020-10-28 ENCOUNTER — OFFICE VISIT (OUTPATIENT)
Dept: INTERNAL MEDICINE CLINIC | Age: 80
End: 2020-10-28
Payer: MEDICARE

## 2020-10-28 VITALS
WEIGHT: 142 LBS | BODY MASS INDEX: 21.52 KG/M2 | SYSTOLIC BLOOD PRESSURE: 122 MMHG | DIASTOLIC BLOOD PRESSURE: 74 MMHG | TEMPERATURE: 97.5 F | HEIGHT: 68 IN | OXYGEN SATURATION: 95 % | HEART RATE: 72 BPM

## 2020-10-28 PROCEDURE — 1123F ACP DISCUSS/DSCN MKR DOCD: CPT | Performed by: INTERNAL MEDICINE

## 2020-10-28 PROCEDURE — G8482 FLU IMMUNIZE ORDER/ADMIN: HCPCS | Performed by: INTERNAL MEDICINE

## 2020-10-28 PROCEDURE — 4040F PNEUMOC VAC/ADMIN/RCVD: CPT | Performed by: INTERNAL MEDICINE

## 2020-10-28 PROCEDURE — G8427 DOCREV CUR MEDS BY ELIG CLIN: HCPCS | Performed by: INTERNAL MEDICINE

## 2020-10-28 PROCEDURE — 1090F PRES/ABSN URINE INCON ASSESS: CPT | Performed by: INTERNAL MEDICINE

## 2020-10-28 PROCEDURE — G8926 SPIRO NO PERF OR DOC: HCPCS | Performed by: INTERNAL MEDICINE

## 2020-10-28 PROCEDURE — G8420 CALC BMI NORM PARAMETERS: HCPCS | Performed by: INTERNAL MEDICINE

## 2020-10-28 PROCEDURE — G8399 PT W/DXA RESULTS DOCUMENT: HCPCS | Performed by: INTERNAL MEDICINE

## 2020-10-28 PROCEDURE — 1036F TOBACCO NON-USER: CPT | Performed by: INTERNAL MEDICINE

## 2020-10-28 PROCEDURE — 3023F SPIROM DOC REV: CPT | Performed by: INTERNAL MEDICINE

## 2020-10-28 PROCEDURE — 99214 OFFICE O/P EST MOD 30 MIN: CPT | Performed by: INTERNAL MEDICINE

## 2020-10-28 RX ORDER — ALLOPURINOL 300 MG/1
300 TABLET ORAL DAILY
Qty: 90 TABLET | Refills: 5 | Status: SHIPPED | OUTPATIENT
Start: 2020-10-28 | End: 2022-04-13 | Stop reason: SDUPTHER

## 2020-10-28 RX ORDER — LEVOTHYROXINE SODIUM 0.12 MG/1
TABLET ORAL
Qty: 90 TABLET | Refills: 3 | Status: SHIPPED | OUTPATIENT
Start: 2020-10-28 | End: 2021-08-23

## 2020-10-28 ASSESSMENT — ENCOUNTER SYMPTOMS
ABDOMINAL DISTENTION: 0
EYE DISCHARGE: 0
SHORTNESS OF BREATH: 0
EYE PAIN: 0
COUGH: 0
COLOR CHANGE: 0
DIARRHEA: 0
WHEEZING: 0
TROUBLE SWALLOWING: 0
BLOOD IN STOOL: 0

## 2020-10-28 NOTE — PROGRESS NOTES
Visit Information    Have you changed or started any medications since your last visit including any over-the-counter medicines, vitamins, or herbal medicines? no   Are you having any side effects from any of your medications? -  no  Have you stopped taking any of your medications? Is so, why? -  no    Have you seen any other physician or provider since your last visit? No  Have you had any other diagnostic tests since your last visit? Yes - Records Obtained  Have you been seen in the emergency room and/or had an admission to a hospital since we last saw you? No  Have you had your routine dental cleaning in the past 6 months? no    Have you activated your [employer] account? If not, what are your barriers?  Yes     Patient Care Team:  Brianna Barrett MD as PCP - General (Internal Medicine)  Brianna Barrett MD as PCP - [facility]    Medical History Review  Past Medical, Family, and Social History reviewed and does not contribute to the patient presenting condition    Health Maintenance   Topic Date Due    DTaP/Tdap/Td vaccine (1 - Tdap) 07/19/1957    Shingles Vaccine (1 of 2) 07/19/1988    Annual Wellness Visit (AWV)  06/25/2021    Lipid screen  08/31/2021    Potassium monitoring  08/31/2021    Creatinine monitoring  08/31/2021    TSH testing  10/21/2021    Flu vaccine  Completed    Pneumococcal 65+ years Vaccine  Completed    DEXA (modify frequency per FRAX score)  Addressed    Hepatitis A vaccine  Aged Out    Hepatitis B vaccine  Aged Out    Hib vaccine  Aged Out    Meningococcal (ACWY) vaccine  Aged Out

## 2020-10-28 NOTE — PROGRESS NOTES
[address] [address]  Dept: [phone]  Dept Fax: [phone]    Sarahi Leon is a 78 y.o. female who presents today for her medical conditions/complaintsas noted below.   Sarahi Leon is c/o of   Chief Complaint   Patient presents with    Abdominal Pain     better now          HPI:     abdo pain form diverticulut resovled  Off abx  Here for kne epain right x ray done  High uric acid but no gout  Vit d def        No results found for: LABA1C          ( goal A1Cis < 7)   No results found for: LABMICR  LDL Cholesterol (mg/dL)   Date Value   08/31/2020 109   06/06/2019 101   10/10/2017 119       (goal LDL is <100)   AST (U/L)   Date Value   08/31/2020 30     ALT (U/L)   Date Value   08/31/2020 16     BUN (mg/dL)   Date Value   08/31/2020 15     BP Readings from Last 3 Encounters:   10/28/20 122/74   09/29/20 124/74   08/07/20 130/84          (goal 120/80)    Past Medical History:   Diagnosis Date    Abdominal pain, unspecified site     Acquired cyst of kidney     Acute gouty arthropathy     Allergic rhinitis, cause unspecified     Anxiety state, unspecified     Arthropathy, unspecified, site unspecified     Chronic airway obstruction, not elsewhere classified     Chronic kidney disease, stage III (moderate)     Diarrhea     Edema     Esophageal reflux     Essential hypertension, benign     Herpes zoster with other nervous system complications(053.19)     Herpes zoster without mention of complication     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)     Mononeuritis of unspecified site     Myalgia and myositis, unspecified     Osteoarthrosis, unspecified whether generalized or localized, unspecified site     Other general symptoms(780.99)     Other iatrogenic hypothyroidism     Other nonspecific abnormal finding of lung field     Other psoriasis     Pure hypercholesterolemia     Regional enteritis of unspecified site     Senile osteoporosis     Sprain of ankle, unspecified site     Unspecified vitamin D deficiency       Past Surgical History:   Procedure Laterality Date    COLONOSCOPY      DILATION AND CURETTAGE OF UTERUS      UPPER GASTROINTESTINAL ENDOSCOPY N/A 2019    EGD FOREIGN BODY REMOVAL performed by Alexander De La O MD at [facility] OR       Family History   Problem Relation Age of Onset    Coronary Art Dis Father        Social History     Tobacco Use    Smoking status: Former Smoker     Packs/day: 0.75     Years: 2.00     Pack years: 1.50     Types: Cigarettes     Start date: 10/25/1958     Last attempt to quit: 10/25/2018     Years since quittin.0    Smokeless tobacco: Never Used   Substance Use Topics    Alcohol use: No     Alcohol/week: 0.0 standard drinks      Current Outpatient Medications   Medication Sig Dispense Refill    allopurinol (ZYLOPRIM) 300 MG tablet Take 1 tablet by mouth daily 90 tablet 5    levothyroxine (SYNTHROID) 125 MCG tablet TAKE 1 TABLET BY MOUTH ONCE DAILY 90 tablet 3    gabapentin (NEURONTIN) 100 MG capsule Take 3 capsules by mouth 4 times daily for 344 days. 360 capsule 5    nitrofurantoin, macrocrystal-monohydrate, (MACROBID) 100 MG capsule TAKE 1 CAPSULE BY MOUTH TWICE DAILY      atorvastatin (LIPITOR) 40 MG tablet Take 1 tablet by mouth daily 90 tablet 3    carvedilol (COREG) 12.5 MG tablet TAKE 1/2 TABLET BY MOUTH TWO TIMES A DAY 90 tablet 4    Clobetasol Propionate (CLOBEX) 0.05 % SHAM Apply 1 Act topically once a week 1 Bottle 0    colchicine (COLCRYS) 0.6 MG tablet Take 2 tabs immediately, then 1 tablet 1 hour later. Then one tablet a day for 2 more days.  10 tablet 11    folic acid (FOLVITE) 1 MG tablet Take 1 tablet by mouth daily 90 tablet 3    Multiple Vitamins-Minerals (HAIR/SKIN/NAILS) TABS Take 1 tablet by mouth daily      Cholecalciferol (VITAMIN D) 2000 UNITS CAPS capsule Take 2,000 Units by mouth daily      methylPREDNISolone (MEDROL DOSEPACK) 4 MG tablet        Current and Affect: Mood is not anxious. Affect is not angry. Speech: Speech is not slurred. Behavior: Behavior normal. Behavior is not aggressive. Thought Content: Thought content does not include homicidal ideation. Cognition and Memory: Memory is not impaired. /74   Pulse 72   Temp 97.5 °F (36.4 °C)   Ht 5' 8\" (1.727 m)   Wt 142 lb (64.4 kg)   SpO2 95%   BMI 21.59 kg/m²     Assessment:       Diagnosis Orders   1. Acquired hypothyroidism  levothyroxine (SYNTHROID) 125 MCG tablet   2. Idiopathic chronic gout of multiple sites without tophus     3. Chronic pain of right knee     4. Chronic obstructive pulmonary disease, unspecified COPD type ([facility]     5. Primary osteoarthritis of right knee     6. Essential hypertension, benign     7. Cro[facility]'s disease of large intestine without complication ([facility]     8. Vitamin D deficiency     9. Acute gouty arthropathy  allopurinol (ZYLOPRIM) 300 MG tablet             Plan:      No follow-ups on file. No orders of the defined types were placed in this encounter. Orders Placed This Encounter   Medications    allopurinol (ZYLOPRIM) 300 MG tablet     Sig: Take 1 tablet by mouth daily     Dispense:  90 tablet     Refill:  5    levothyroxine (SYNTHROID) 125 MCG tablet     Sig: TAKE 1 TABLET BY MOUTH ONCE DAILY     Dispense:  90 tablet     Refill:  3    incrase syntrhorid to 125  incrase allopurinol to 300  Oa righ knee  But very functional no surg at this time     Patient given educational materials - see patient instructions. Discussed use, benefit, and side effects of prescribed medications. All patientquestions answered. Pt voiced understanding. Reviewed health maintenance. Instructedto continue current medications, diet and exercise. Patient agreed with treatmentplan. Follow up as directed.      Electronically signed by John Paul Morton MD on 10/28/2020 at 9:18 AM

## 2020-11-30 RX ORDER — ATORVASTATIN CALCIUM 40 MG/1
40 TABLET, FILM COATED ORAL DAILY
Qty: 90 TABLET | Refills: 3 | Status: SHIPPED | OUTPATIENT
Start: 2020-11-30 | End: 2021-10-22

## 2020-12-10 RX ORDER — GABAPENTIN 300 MG/1
300 CAPSULE ORAL 4 TIMES DAILY
Qty: 120 CAPSULE | Refills: 0 | Status: SHIPPED | OUTPATIENT
Start: 2020-12-10 | End: 2022-05-05

## 2021-01-14 ENCOUNTER — OFFICE VISIT (OUTPATIENT)
Dept: INTERNAL MEDICINE CLINIC | Age: 81
End: 2021-01-14
Payer: MEDICARE

## 2021-01-14 VITALS
BODY MASS INDEX: 21.67 KG/M2 | TEMPERATURE: 97.4 F | SYSTOLIC BLOOD PRESSURE: 138 MMHG | HEART RATE: 77 BPM | OXYGEN SATURATION: 97 % | WEIGHT: 143 LBS | HEIGHT: 68 IN | DIASTOLIC BLOOD PRESSURE: 86 MMHG

## 2021-01-14 DIAGNOSIS — E03.9 ACQUIRED HYPOTHYROIDISM: Primary | ICD-10-CM

## 2021-01-14 DIAGNOSIS — E55.9 VITAMIN D DEFICIENCY: ICD-10-CM

## 2021-01-14 DIAGNOSIS — J01.00 ACUTE NON-RECURRENT MAXILLARY SINUSITIS: ICD-10-CM

## 2021-01-14 DIAGNOSIS — N18.31 STAGE 3A CHRONIC KIDNEY DISEASE (HCC): ICD-10-CM

## 2021-01-14 DIAGNOSIS — J43.1 PANLOBULAR EMPHYSEMA (HCC): ICD-10-CM

## 2021-01-14 DIAGNOSIS — G62.9 NEUROPATHY: ICD-10-CM

## 2021-01-14 DIAGNOSIS — R91.8 PULMONARY NODULES: ICD-10-CM

## 2021-01-14 DIAGNOSIS — K50.10 CROHN'S DISEASE OF LARGE INTESTINE WITHOUT COMPLICATION (HCC): ICD-10-CM

## 2021-01-14 DIAGNOSIS — I10 ESSENTIAL HYPERTENSION, BENIGN: ICD-10-CM

## 2021-01-14 PROCEDURE — G8926 SPIRO NO PERF OR DOC: HCPCS | Performed by: INTERNAL MEDICINE

## 2021-01-14 PROCEDURE — 99214 OFFICE O/P EST MOD 30 MIN: CPT | Performed by: INTERNAL MEDICINE

## 2021-01-14 PROCEDURE — 4040F PNEUMOC VAC/ADMIN/RCVD: CPT | Performed by: INTERNAL MEDICINE

## 2021-01-14 PROCEDURE — 1090F PRES/ABSN URINE INCON ASSESS: CPT | Performed by: INTERNAL MEDICINE

## 2021-01-14 PROCEDURE — G8427 DOCREV CUR MEDS BY ELIG CLIN: HCPCS | Performed by: INTERNAL MEDICINE

## 2021-01-14 PROCEDURE — 1123F ACP DISCUSS/DSCN MKR DOCD: CPT | Performed by: INTERNAL MEDICINE

## 2021-01-14 PROCEDURE — 3023F SPIROM DOC REV: CPT | Performed by: INTERNAL MEDICINE

## 2021-01-14 PROCEDURE — G8399 PT W/DXA RESULTS DOCUMENT: HCPCS | Performed by: INTERNAL MEDICINE

## 2021-01-14 PROCEDURE — G8420 CALC BMI NORM PARAMETERS: HCPCS | Performed by: INTERNAL MEDICINE

## 2021-01-14 PROCEDURE — G8482 FLU IMMUNIZE ORDER/ADMIN: HCPCS | Performed by: INTERNAL MEDICINE

## 2021-01-14 PROCEDURE — 1036F TOBACCO NON-USER: CPT | Performed by: INTERNAL MEDICINE

## 2021-01-14 RX ORDER — DOXYCYCLINE HYCLATE 100 MG/1
100 CAPSULE ORAL 2 TIMES DAILY
Qty: 20 CAPSULE | Refills: 0 | Status: SHIPPED | OUTPATIENT
Start: 2021-01-14 | End: 2021-01-24

## 2021-01-14 ASSESSMENT — ENCOUNTER SYMPTOMS
TROUBLE SWALLOWING: 0
ABDOMINAL DISTENTION: 0
EYE DISCHARGE: 0
WHEEZING: 0
SHORTNESS OF BREATH: 0
EYE PAIN: 0
BACK PAIN: 1
SINUS PAIN: 1
BLOOD IN STOOL: 0
DIARRHEA: 0
COUGH: 0
COLOR CHANGE: 0

## 2021-01-14 ASSESSMENT — PATIENT HEALTH QUESTIONNAIRE - PHQ9
SUM OF ALL RESPONSES TO PHQ QUESTIONS 1-9: 0
1. LITTLE INTEREST OR PLEASURE IN DOING THINGS: 0
SUM OF ALL RESPONSES TO PHQ9 QUESTIONS 1 & 2: 0
SUM OF ALL RESPONSES TO PHQ QUESTIONS 1-9: 0
SUM OF ALL RESPONSES TO PHQ QUESTIONS 1-9: 0
2. FEELING DOWN, DEPRESSED OR HOPELESS: 0

## 2021-01-14 NOTE — PROGRESS NOTES
Visit Information    Have you changed or started any medications since your last visit including any over-the-counter medicines, vitamins, or herbal medicines? no   Are you having any side effects from any of your medications? -  no  Have you stopped taking any of your medications? Is so, why? -  no    Have you seen any other physician or provider since your last visit? No  Have you had any other diagnostic tests since your last visit? No  Have you been seen in the emergency room and/or had an admission to a hospital since we last saw you? No  Have you had your routine dental cleaning in the past 6 months? no    Have you activated your [employer] account? If not, what are your barriers?  Yes     Patient Care Team:  Oren Gutierrez MD as PCP - General (Internal Medicine)  Oren Gutierrez MD as PCP - [facility]    Medical History Review  Past Medical, Family, and Social History reviewed and does not contribute to the patient presenting condition    Health Maintenance   Topic Date Due    DTaP/Tdap/Td vaccine (1 - Tdap) 07/19/1957    Shingles Vaccine (1 of 2) 07/19/1988    Annual Wellness Visit (AWV)  06/25/2021    Lipid screen  08/31/2021    Potassium monitoring  08/31/2021    Creatinine monitoring  08/31/2021    TSH testing  10/21/2021    Flu vaccine  Completed    Pneumococcal 65+ years Vaccine  Completed    DEXA (modify frequency per FRAX score)  Addressed    Hepatitis A vaccine  Aged Out    Hepatitis B vaccine  Aged Out    Hib vaccine  Aged Out    Meningococcal (ACWY) vaccine  Aged Out

## 2021-01-14 NOTE — PROGRESS NOTES
[address] [address]  Dept: [phone]  Dept Fax: [phone]    Jennifer Collier is a 78 y.o. female who presents today for her medical conditions/complaintsas noted below.   Jennifer Collier is c/o of   Chief Complaint   Patient presents with    COPD    Sinusitis         HPI:     Copd stable  Sinus   Left max  No fever        No results found for: LABA1C          ( goal A1Cis < 7)   No results found for: LABMICR  LDL Cholesterol (mg/dL)   Date Value   08/31/2020 109   06/06/2019 101   10/10/2017 119       (goal LDL is <100)   AST (U/L)   Date Value   08/31/2020 30     ALT (U/L)   Date Value   08/31/2020 16     BUN (mg/dL)   Date Value   08/31/2020 15     BP Readings from Last 3 Encounters:   01/14/21 138/86   10/28/20 122/74   09/29/20 124/74          (goal 120/80)    Past Medical History:   Diagnosis Date    Abdominal pain, unspecified site     Acquired cyst of kidney     Acute gouty arthropathy     Allergic rhinitis, cause unspecified     Anxiety state, unspecified     Arthropathy, unspecified, site unspecified     Chronic airway obstruction, not elsewhere classified     Chronic kidney disease, stage III (moderate)     Diarrhea     Edema     Esophageal reflux     Essential hypertension, benign     Herpes zoster with other nervous system complications(053.19)     Herpes zoster without mention of complication     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)     Mononeuritis of unspecified site     Myalgia and myositis, unspecified     Osteoarthrosis, unspecified whether generalized or localized, unspecified site     Other general symptoms(780.99)     Other iatrogenic hypothyroidism     Other nonspecific abnormal finding of lung field     Other psoriasis     Pure hypercholesterolemia     Regional enteritis of unspecified site     Senile osteoporosis     Sprain of ankle, unspecified site     Unspecified vitamin D deficiency       Past Surgical History: Procedure Laterality Date    COLONOSCOPY      DILATION AND CURETTAGE OF UTERUS      UPPER GASTROINTESTINAL ENDOSCOPY N/A 2019    EGD FOREIGN BODY REMOVAL performed by Kathy Mandel MD at [address]  History   Problem Relation Age of Onset    Coronary Art Dis Father        Social History     Tobacco Use    Smoking status: Former Smoker     Packs/day: 0.75     Years: 2.00     Pack years: 1.50     Types: Cigarettes     Start date: 10/25/1958     Quit date: 10/25/2018     Years since quittin.2    Smokeless tobacco: Never Used   Substance Use Topics    Alcohol use: No     Alcohol/week: 0.0 standard drinks      Current Outpatient Medications   Medication Sig Dispense Refill    doxycycline hyclate (VIBRAMYCIN) 100 MG capsule Take 1 capsule by mouth 2 times daily for 10 days Take with food, but avoid dairy, calcium and MTV's 2 hours before and after the dose 20 capsule 0    atorvastatin (LIPITOR) 40 MG tablet TAKE 1 TABLET BY MOUTH  DAILY 90 tablet 3    allopurinol (ZYLOPRIM) 300 MG tablet Take 1 tablet by mouth daily 90 tablet 5    levothyroxine (SYNTHROID) 125 MCG tablet TAKE 1 TABLET BY MOUTH ONCE DAILY 90 tablet 3    methylPREDNISolone (MEDROL DOSEPACK) 4 MG tablet       carvedilol (COREG) 12.5 MG tablet TAKE 1/2 TABLET BY MOUTH TWO TIMES A DAY 90 tablet 4    Clobetasol Propionate (CLOBEX) 0.05 % SHAM Apply 1 Act topically once a week 1 Bottle 0    colchicine (COLCRYS) 0.6 MG tablet Take 2 tabs immediately, then 1 tablet 1 hour later. Then one tablet a day for 2 more days. 10 tablet 11    folic acid (FOLVITE) 1 MG tablet Take 1 tablet by mouth daily 90 tablet 3    Multiple Vitamins-Minerals (HAIR/SKIN/NAILS) TABS Take 1 tablet by mouth daily      Cholecalciferol (VITAMIN D) 2000 UNITS CAPS capsule Take 2,000 Units by mouth daily      gabapentin (NEURONTIN) 300 MG capsule Take 1 capsule by mouth 4 times daily for 30 days.  120 capsule 0    nitrofurantoin, macrocrystal-monohydrate, (MACROBID) 100 MG capsule TAKE 1 CAPSULE BY MOUTH TWICE DAILY       Current Facility-Administered Medications   Medication Dose Route Frequency Provider Last Rate Last Admin    methylPREDNISolone sodium (SOLU-MEDROL) injection 125 mg  125 mg Intramuscular Once Feliciano Saxena MD        methylPREDNISolone acetate (DEPO-MEDROL) injection 80 mg  80 mg Intramuscular Once Feliciano Saxena MD        triamcinolone acetonide (KENALOG-40) injection 60 mg  60 mg Intramuscular Once Feliciano Saxena MD         Allergies   Allergen Reactions    Etomidate     Penicillins Hives       Health Maintenance   Topic Date Due    DTaP/Tdap/Td vaccine (1 - Tdap) 07/19/1957    Shingles Vaccine (1 of 2) 07/19/1988    Annual Wellness Visit (AWV)  06/25/2021    Lipid screen  08/31/2021    Potassium monitoring  08/31/2021    Creatinine monitoring  08/31/2021    TSH testing  10/21/2021    Flu vaccine  Completed    Pneumococcal 65+ years Vaccine  Completed    DEXA (modify frequency per FRAX score)  Addressed    Hepatitis A vaccine  Aged Out    Hepatitis B vaccine  Aged Out    Hib vaccine  Aged Out    Meningococcal (ACWY) vaccine  Aged Out       Subjective:     Review of Systems   Constitutional: Negative for appetite change, diaphoresis and fatigue. HENT: Positive for sinus pain. Negative for ear discharge and trouble swallowing. Eyes: Negative for pain and discharge. Respiratory: Negative for cough, shortness of breath and wheezing. Cardiovascular: Negative for chest pain and palpitations. Gastrointestinal: Negative for abdominal distention, blood in stool and diarrhea. Endocrine: Negative for polydipsia and polyphagia. Genitourinary: Negative for difficulty urinating and frequency. Musculoskeletal: Positive for back pain. Negative for gait problem, myalgias and neck pain. Skin: Negative for color change and rash.    Allergic/Immunologic: Negative for environmental allergies and food allergies. Neurological: Negative for dizziness and headaches. Hematological: Negative for adenopathy. Does not bruise/bleed easily. Psychiatric/Behavioral: Negative for behavioral problems and sleep disturbance. Objective:     Physical Exam  Constitutional:       Appearance: She is well-developed. She is not diaphoretic. HENT:      Head: Normocephalic and atraumatic. Eyes:      General:         Right eye: No discharge. Left eye: No discharge. Extraocular Movements:      Right eye: Normal extraocular motion. Left eye: Normal extraocular motion. Conjunctiva/sclera: Conjunctivae normal.      Right eye: Right conjunctiva is not injected. Left eye: Left conjunctiva is not injected. Neck:      Musculoskeletal: Normal range of motion and neck supple. No edema or erythema. Thyroid: No thyroid mass or thyromegaly. Vascular: No JVD. Cardiovascular:      Rate and Rhythm: Normal rate and regular rhythm. Heart sounds: No murmur. No friction rub. Pulmonary:      Effort: Pulmonary effort is normal. No tachypnea, bradypnea, accessory muscle usage or respiratory distress. Breath sounds: Normal breath sounds. No wheezing or rales. Abdominal:      General: Bowel sounds are normal. There is no distension. Palpations: Abdomen is soft. Tenderness: There is no abdominal tenderness. There is no rebound. Musculoskeletal: Normal range of motion. General: No tenderness. Lymphadenopathy:      Head:      Right side of head: No submental or submandibular adenopathy. Left side of head: No submental or submandibular adenopathy. Cervical: No cervical adenopathy. Skin:     General: Skin is warm. Coloration: Skin is not pale. Findings: No bruising, ecchymosis or rash. Neurological:      Mental Status: She is alert and oriented to person, place, and time. Cranial Nerves: No cranial nerve deficit. Sensory: No sensory deficit. Motor: No atrophy or abnormal muscle tone. Coordination: Coordination normal.   Psychiatric:         Mood and Affect: Mood is not anxious. Affect is not angry. Speech: Speech is not slurred. Behavior: Behavior normal. Behavior is not aggressive. Thought Content: Thought content does not include homicidal ideation. Cognition and Memory: Memory is not impaired. /86   Pulse 77   Temp 97.4 °F (36.3 °C)   Ht 5' 8\" (1.727 m)   Wt 143 lb (64.9 kg)   SpO2 97%   BMI 21.74 kg/m²     Assessment:       Diagnosis Orders   1. Acquired hypothyroidism  TSH With Reflex Ft4   2. Stage 3a chronic kidney disease     3. Panlobular emphysema ([facility]  CT CHEST W CONTRAST   4. Essential hypertension, benign     5. Vitamin D deficiency  Vitamin D 25 Hydroxy   6. Crohn's disease of large intestine without complication ([facility]     7. Neuropathy     8. Acute non-recurrent maxillary sinusitis     9. Pulmonary nodules               Plan:      Return in about 2 months (around 3/14/2021). Orders Placed This Encounter   Procedures    CT CHEST W CONTRAST     Standing Status:   Future     Standing Expiration Date:   2/14/2021     Order Specific Question:   Reason for exam:     Answer:   pulm nodules    Vitamin D 25 Hydroxy     Standing Status:   Future     Standing Expiration Date:   1/14/2022    TSH With Reflex Ft4     Standing Status:   Future     Standing Expiration Date:   1/14/2022     Orders Placed This Encounter   Medications    doxycycline hyclate (VIBRAMYCIN) 100 MG capsule     Sig: Take 1 capsule by mouth 2 times daily for 10 days Take with food, but avoid dairy, calcium and MTV's 2 hours before and after the dose     Dispense:  20 capsule     Refill:  0    hx of smokign only short preorid  Quit in her 25s  Lung nodules noted on abdo ct  Will rev with ct lungs  No wt loss no other sytmoms     Patient given educational materials - see patient instructions. Discussed use, benefit, and side effects of prescribed medications. All patientquestions answered. Pt voiced understanding. Reviewed health maintenance. Instructedto continue current medications, diet and exercise. Patient agreed with treatmentplan. Follow up as directed.      Electronically signed by Kimberly Nassar MD on 1/14/2021 at 9:42 AM

## 2021-02-05 ENCOUNTER — APPOINTMENT (OUTPATIENT)
Dept: CT IMAGING | Facility: CLINIC | Age: 81
End: 2021-02-05
Payer: MEDICARE

## 2021-02-17 ENCOUNTER — HOSPITAL ENCOUNTER (OUTPATIENT)
Age: 81
Setting detail: SPECIMEN
Discharge: HOME | End: 2021-02-17
Payer: MEDICARE

## 2021-02-17 ENCOUNTER — HOSPITAL ENCOUNTER (OUTPATIENT)
Dept: CT IMAGING | Facility: CLINIC | Age: 81
Discharge: HOME | End: 2021-02-19
Payer: MEDICARE

## 2021-02-17 DIAGNOSIS — E03.9 ACQUIRED HYPOTHYROIDISM: ICD-10-CM

## 2021-02-17 DIAGNOSIS — E55.9 VITAMIN D DEFICIENCY: ICD-10-CM

## 2021-02-17 DIAGNOSIS — J43.1 PANLOBULAR EMPHYSEMA (HCC): ICD-10-CM

## 2021-02-17 LAB
POC CREATININE: 1.1 MG/DL (ref 0.6–1.4)
THYROXINE, FREE: 1.19 NG/DL (ref 0.93–1.7)
TSH SERPL DL<=0.05 MIU/L-ACNC: 7.53 MIU/L (ref 0.3–5)
VITAMIN D 25-HYDROXY: 42.4 NG/ML (ref 30–100)

## 2021-02-17 PROCEDURE — 6360000004 HC RX CONTRAST MEDICATION: Performed by: INTERNAL MEDICINE

## 2021-02-17 PROCEDURE — 71260 CT THORAX DX C+: CPT

## 2021-02-17 PROCEDURE — 2580000003 HC RX 258: Performed by: INTERNAL MEDICINE

## 2021-02-17 RX ORDER — SODIUM CHLORIDE 0.9 % (FLUSH) 0.9 %
10 SYRINGE (ML) INJECTION PRN
Status: DISCONTINUED | OUTPATIENT
Start: 2021-02-17 | End: 2021-02-20 | Stop reason: HOSPADM

## 2021-02-17 RX ORDER — 0.9 % SODIUM CHLORIDE 0.9 %
80 INTRAVENOUS SOLUTION INTRAVENOUS ONCE
Status: COMPLETED | OUTPATIENT
Start: 2021-02-17 | End: 2021-02-17

## 2021-02-17 RX ADMIN — SODIUM CHLORIDE 80 ML: 9 INJECTION, SOLUTION INTRAVENOUS at 10:37

## 2021-02-17 RX ADMIN — Medication 10 ML: at 10:37

## 2021-02-17 RX ADMIN — IOVERSOL 75 ML: 741 INJECTION INTRA-ARTERIAL; INTRAVENOUS at 10:37

## 2021-02-18 RX ORDER — CARVEDILOL 12.5 MG/1
TABLET ORAL
Qty: 90 TABLET | Refills: 3 | Status: SHIPPED | OUTPATIENT
Start: 2021-02-18 | End: 2022-01-17

## 2021-03-10 ENCOUNTER — OFFICE VISIT (OUTPATIENT)
Dept: INTERNAL MEDICINE CLINIC | Age: 81
End: 2021-03-10
Payer: MEDICARE

## 2021-03-10 VITALS
TEMPERATURE: 98.2 F | WEIGHT: 140 LBS | OXYGEN SATURATION: 98 % | HEART RATE: 79 BPM | DIASTOLIC BLOOD PRESSURE: 74 MMHG | BODY MASS INDEX: 21.22 KG/M2 | HEIGHT: 68 IN | SYSTOLIC BLOOD PRESSURE: 118 MMHG

## 2021-03-10 DIAGNOSIS — J43.1 PANLOBULAR EMPHYSEMA (HCC): Primary | ICD-10-CM

## 2021-03-10 DIAGNOSIS — E03.9 ACQUIRED HYPOTHYROIDISM: ICD-10-CM

## 2021-03-10 DIAGNOSIS — M79.7 FIBROMYALGIA: ICD-10-CM

## 2021-03-10 DIAGNOSIS — I10 ESSENTIAL HYPERTENSION, BENIGN: ICD-10-CM

## 2021-03-10 DIAGNOSIS — K50.10 CROHN'S DISEASE OF LARGE INTESTINE WITHOUT COMPLICATION (HCC): ICD-10-CM

## 2021-03-10 PROCEDURE — G8420 CALC BMI NORM PARAMETERS: HCPCS | Performed by: INTERNAL MEDICINE

## 2021-03-10 PROCEDURE — 99214 OFFICE O/P EST MOD 30 MIN: CPT | Performed by: INTERNAL MEDICINE

## 2021-03-10 PROCEDURE — G8427 DOCREV CUR MEDS BY ELIG CLIN: HCPCS | Performed by: INTERNAL MEDICINE

## 2021-03-10 PROCEDURE — G8482 FLU IMMUNIZE ORDER/ADMIN: HCPCS | Performed by: INTERNAL MEDICINE

## 2021-03-10 PROCEDURE — 1090F PRES/ABSN URINE INCON ASSESS: CPT | Performed by: INTERNAL MEDICINE

## 2021-03-10 PROCEDURE — 1123F ACP DISCUSS/DSCN MKR DOCD: CPT | Performed by: INTERNAL MEDICINE

## 2021-03-10 PROCEDURE — 4040F PNEUMOC VAC/ADMIN/RCVD: CPT | Performed by: INTERNAL MEDICINE

## 2021-03-10 PROCEDURE — G8399 PT W/DXA RESULTS DOCUMENT: HCPCS | Performed by: INTERNAL MEDICINE

## 2021-03-10 PROCEDURE — 1036F TOBACCO NON-USER: CPT | Performed by: INTERNAL MEDICINE

## 2021-03-10 PROCEDURE — G8926 SPIRO NO PERF OR DOC: HCPCS | Performed by: INTERNAL MEDICINE

## 2021-03-10 PROCEDURE — 3023F SPIROM DOC REV: CPT | Performed by: INTERNAL MEDICINE

## 2021-03-10 ASSESSMENT — ENCOUNTER SYMPTOMS
EYE DISCHARGE: 0
TROUBLE SWALLOWING: 0
COLOR CHANGE: 0
BLOOD IN STOOL: 0
SHORTNESS OF BREATH: 0
COUGH: 0
DIARRHEA: 0
EYE PAIN: 0
ABDOMINAL DISTENTION: 0
WHEEZING: 0

## 2021-03-10 ASSESSMENT — PATIENT HEALTH QUESTIONNAIRE - PHQ9
SUM OF ALL RESPONSES TO PHQ QUESTIONS 1-9: 0
SUM OF ALL RESPONSES TO PHQ QUESTIONS 1-9: 0
SUM OF ALL RESPONSES TO PHQ9 QUESTIONS 1 & 2: 0

## 2021-03-10 NOTE — PROGRESS NOTES
[address] [address]  Dept: [phone]  Dept Fax: [phone]    Kulwant Ndiaye is a 78 y.o. female who presents today for her medical conditions/complaintsas noted below.   Kulwant Ndiaye is c/o of   Chief Complaint   Patient presents with    Hypertension         HPI:     Myalgia  Aches and pain    HTN  Onset more than 2 years ago  luma mild to mod  Controlled with current po meds  Not associated with headaches or blurry vision  No chest pain        No results found for: LABA1C          ( goal A1Cis < 7)   No results found for: LABMICR  LDL Cholesterol (mg/dL)   Date Value   08/31/2020 109   06/06/2019 101   10/10/2017 119       (goal LDL is <100)   AST (U/L)   Date Value   08/31/2020 30     ALT (U/L)   Date Value   08/31/2020 16     BUN (mg/dL)   Date Value   08/31/2020 15     BP Readings from Last 3 Encounters:   03/10/21 118/74   01/14/21 138/86   10/28/20 122/74          (goal 120/80)    Past Medical History:   Diagnosis Date    Abdominal pain, unspecified site     Acquired cyst of kidney     Acute gouty arthropathy     Allergic rhinitis, cause unspecified     Anxiety state, unspecified     Arthropathy, unspecified, site unspecified     Chronic airway obstruction, not elsewhere classified     Chronic kidney disease, stage III (moderate)     Diarrhea     Edema     Esophageal reflux     Essential hypertension, benign     Herpes zoster with other nervous system complications(053.19)     Herpes zoster without mention of complication     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)     Mononeuritis of unspecified site     Myalgia and myositis, unspecified     Osteoarthrosis, unspecified whether generalized or localized, unspecified site     Other general symptoms(780.99)     Other iatrogenic hypothyroidism     Other nonspecific abnormal finding of lung field     Other psoriasis     Pure hypercholesterolemia     Regional enteritis of unspecified site  Senile osteoporosis     Sprain of ankle, unspecified site     Unspecified vitamin D deficiency       Past Surgical History:   Procedure Laterality Date    COLONOSCOPY      DILATION AND CURETTAGE OF UTERUS      UPPER GASTROINTESTINAL ENDOSCOPY N/A 2019    EGD FOREIGN BODY REMOVAL performed by Kathy Mandel MD at [address] [place] [address] OR       Family History   Problem Relation Age of Onset    Coronary Art Dis Father        Social History     Tobacco Use    Smoking status: Former Smoker     Packs/day: 0.75     Years: 2.00     Pack years: 1.50     Types: Cigarettes     Start date: 10/25/1958     Quit date: 10/25/2018     Years since quittin.3    Smokeless tobacco: Never Used   Substance Use Topics    Alcohol use: No     Alcohol/week: 0.0 standard drinks      Current Outpatient Medications   Medication Sig Dispense Refill    carvedilol (COREG) 12.5 MG tablet TAKE ONE-HALF TABLET BY  MOUTH TWO TIMES A DAY 90 tablet 3    atorvastatin (LIPITOR) 40 MG tablet TAKE 1 TABLET BY MOUTH  DAILY 90 tablet 3    allopurinol (ZYLOPRIM) 300 MG tablet Take 1 tablet by mouth daily 90 tablet 5    levothyroxine (SYNTHROID) 125 MCG tablet TAKE 1 TABLET BY MOUTH ONCE DAILY 90 tablet 3    methylPREDNISolone (MEDROL DOSEPACK) 4 MG tablet       nitrofurantoin, macrocrystal-monohydrate, (MACROBID) 100 MG capsule TAKE 1 CAPSULE BY MOUTH TWICE DAILY      Clobetasol Propionate (CLOBEX) 0.05 % SHAM Apply 1 Act topically once a week 1 Bottle 0    colchicine (COLCRYS) 0.6 MG tablet Take 2 tabs immediately, then 1 tablet 1 hour later. Then one tablet a day for 2 more days. 10 tablet 11    folic acid (FOLVITE) 1 MG tablet Take 1 tablet by mouth daily 90 tablet 3    Multiple Vitamins-Minerals (HAIR/SKIN/NAILS) TABS Take 1 tablet by mouth daily      Cholecalciferol (VITAMIN D) 2000 UNITS CAPS capsule Take 2,000 Units by mouth daily      gabapentin (NEURONTIN) 300 MG capsule Take 1 capsule by mouth 4 times daily for 30 days.  106 Omayra Marie capsule 0     Current Facility-Administered Medications   Medication Dose Route Frequency Provider Last Rate Last Admin    methylPREDNISolone sodium (SOLU-MEDROL) injection 125 mg  125 mg Intramuscular Once Leon Brower MD        methylPREDNISolone acetate (DEPO-MEDROL) injection 80 mg  80 mg Intramuscular Once Leon Brower MD        triamcinolone acetonide (KENALOG-40) injection 60 mg  60 mg Intramuscular Once Leon Brower MD         Allergies   Allergen Reactions    Etomidate     Penicillins Hives       Health Maintenance   Topic Date Due    DTaP/Tdap/Td vaccine (1 - Tdap) Never done    Shingles Vaccine (1 of 2) Never done   [employer] Visit (AWV)  06/25/2021    Lipid screen  08/31/2021    Potassium monitoring  08/31/2021    Creatinine monitoring  08/31/2021    TSH testing  02/17/2022    Flu vaccine  Completed    Pneumococcal 65+ years Vaccine  Completed    COVID-19 Vaccine  Completed    DEXA (modify frequency per FRAX score)  Addressed    Hepatitis A vaccine  Aged Out    Hepatitis B vaccine  Aged Out    Hib vaccine  Aged Out    Meningococcal (ACWY) vaccine  Aged Out       Subjective:     Review of Systems   Constitutional: Negative for appetite change, diaphoresis and fatigue. HENT: Negative for ear discharge and trouble swallowing. Eyes: Negative for pain and discharge. Respiratory: Negative for cough, shortness of breath and wheezing. Cardiovascular: Negative for chest pain and palpitations. Gastrointestinal: Negative for abdominal distention, blood in stool and diarrhea. Endocrine: Negative for polydipsia and polyphagia. Genitourinary: Negative for difficulty urinating and frequency. Musculoskeletal: Positive for arthralgias and myalgias. Negative for gait problem and neck pain. Skin: Negative for color change and rash. Allergic/Immunologic: Negative for environmental allergies and food allergies. Neurological: Negative for dizziness and headaches. Hematological: Negative for adenopathy. Does not bruise/bleed easily. Psychiatric/Behavioral: Negative for behavioral problems and sleep disturbance. Objective:     Physical Exam  Constitutional:       Appearance: She is well-developed. She is not diaphoretic. Comments: Walks with cane     HENT:      Head: Normocephalic and atraumatic. Eyes:      General:         Right eye: No discharge. Left eye: No discharge. Extraocular Movements:      Right eye: Normal extraocular motion. Left eye: Normal extraocular motion. Conjunctiva/sclera: Conjunctivae normal.      Right eye: Right conjunctiva is not injected. Left eye: Left conjunctiva is not injected. Neck:      Musculoskeletal: Normal range of motion and neck supple. No edema or erythema. Thyroid: No thyroid mass or thyromegaly. Vascular: No JVD. Cardiovascular:      Rate and Rhythm: Normal rate and regular rhythm. Heart sounds: No murmur. No friction rub. Pulmonary:      Effort: Pulmonary effort is normal. No tachypnea, bradypnea, accessory muscle usage or respiratory distress. Breath sounds: Normal breath sounds. No wheezing or rales. Abdominal:      General: Bowel sounds are normal. There is no distension. Palpations: Abdomen is soft. Tenderness: There is no abdominal tenderness. There is no rebound. Musculoskeletal: Normal range of motion. General: No tenderness. Lymphadenopathy:      Head:      Right side of head: No submental or submandibular adenopathy. Left side of head: No submental or submandibular adenopathy. Cervical: No cervical adenopathy. Skin:     General: Skin is warm. Coloration: Skin is not pale. Findings: No bruising, ecchymosis or rash. Neurological:      Mental Status: She is alert and oriented to person, place, and time. Cranial Nerves: No cranial nerve deficit. Sensory: No sensory deficit.       Motor: No atrophy or abnormal muscle tone. Coordination: Coordination normal.   Psychiatric:         Mood and Affect: Mood is not anxious. Affect is not angry. Speech: Speech is not slurred. Behavior: Behavior normal. Behavior is not aggressive. Thought Content: Thought content does not include homicidal ideation. Cognition and Memory: Memory is not impaired. /74   Pulse 79   Temp 98.2 °F (36.8 °C)   Ht 5' 8\" (1.727 m)   Wt 140 lb (63.5 kg)   SpO2 98%   BMI 21.29 kg/m²     Assessment:       Diagnosis Orders   1. Panlobular emphysema ([facility] [address] [address]     2. Essential hypertension, benign     3. Fibromyalgia     4. Crohn's disease of large intestine without complication ([facility] [address]     5. Acquired hypothyroidism  CK    Myoglobin, Serum    CHRISTINA Screen With Reflex    Sedimentation Rate             Plan:      No follow-ups on file. Orders Placed This Encounter   Procedures    CK     Standing Status:   Future     Standing Expiration Date:   3/10/2022    Myoglobin, Serum     Standing Status:   Future     Standing Expiration Date:   3/10/2022    CHRISTINA Screen With Reflex     Standing Status:   Future     Standing Expiration Date:   3/10/2022    Sedimentation Rate     Standing Status:   Future     Standing Expiration Date:   6/8/2021     No orders of the defined types were placed in this encounter. hx of fibormyalgia  Aches and pains in muscles sometimes also has some weakness in the right upper extremity unlike of fibromyalgia will rule out myositis will review with above blood test TSH noted at 7 for her age and malnutrition will continue the current dose   Patient given educational materials - see patient instructions. Discussed use, benefit, and side effects of prescribed medications. All patientquestions answered. Pt voiced understanding. Reviewed health maintenance. Instructedto continue current medications, diet and exercise. Patient agreed with treatmentplan. Follow up as directed. Electronically signed by Sharyn Rogers MD on 3/10/2021 at 10:16 AM

## 2021-04-13 ENCOUNTER — HOSPITAL ENCOUNTER (OUTPATIENT)
Age: 81
Setting detail: SPECIMEN
Discharge: HOME | End: 2021-04-13
Payer: MEDICARE

## 2021-04-13 DIAGNOSIS — E03.9 ACQUIRED HYPOTHYROIDISM: ICD-10-CM

## 2021-04-13 LAB
MYOGLOBIN: 65 NG/ML (ref 25–58)
SEDIMENTATION RATE, ERYTHROCYTE: 19 MM (ref 0–30)
TOTAL CK: 72 U/L (ref 26–192)

## 2021-04-14 LAB
ANA REFERENCE RANGE:: ABNORMAL
ANTI DNA DOUBLE STRANDED: 14 IU/ML
ANTI JO-1 IGG: 7 U/ML
ANTI RNP AB: 26 U/ML
ANTI SSA: 282 U/ML
ANTI SSB: 19 U/ML
ANTI-CENTROMERE: 7 U/ML
ANTI-NUCLEAR ANTIBODY (ANA): POSITIVE
ANTI-SCLERODERMA: 8 U/ML
ANTI-SMITH: 13 U/ML
HISTONE ANTIBODY: 20 U/ML

## 2021-05-11 ENCOUNTER — OFFICE VISIT (OUTPATIENT)
Dept: INTERNAL MEDICINE CLINIC | Age: 81
End: 2021-05-11
Payer: MEDICARE

## 2021-05-11 VITALS
BODY MASS INDEX: 21.22 KG/M2 | HEART RATE: 66 BPM | OXYGEN SATURATION: 97 % | WEIGHT: 140 LBS | TEMPERATURE: 97.6 F | DIASTOLIC BLOOD PRESSURE: 76 MMHG | SYSTOLIC BLOOD PRESSURE: 128 MMHG | HEIGHT: 68 IN

## 2021-05-11 DIAGNOSIS — E03.9 ACQUIRED HYPOTHYROIDISM: ICD-10-CM

## 2021-05-11 DIAGNOSIS — J43.1 PANLOBULAR EMPHYSEMA (HCC): Primary | ICD-10-CM

## 2021-05-11 DIAGNOSIS — K50.10 CROHN'S DISEASE OF LARGE INTESTINE WITHOUT COMPLICATION (HCC): ICD-10-CM

## 2021-05-11 DIAGNOSIS — I10 ESSENTIAL HYPERTENSION, BENIGN: ICD-10-CM

## 2021-05-11 DIAGNOSIS — N18.31 STAGE 3A CHRONIC KIDNEY DISEASE (HCC): ICD-10-CM

## 2021-05-11 PROCEDURE — 1090F PRES/ABSN URINE INCON ASSESS: CPT | Performed by: INTERNAL MEDICINE

## 2021-05-11 PROCEDURE — 99214 OFFICE O/P EST MOD 30 MIN: CPT | Performed by: INTERNAL MEDICINE

## 2021-05-11 PROCEDURE — G8926 SPIRO NO PERF OR DOC: HCPCS | Performed by: INTERNAL MEDICINE

## 2021-05-11 PROCEDURE — 1123F ACP DISCUSS/DSCN MKR DOCD: CPT | Performed by: INTERNAL MEDICINE

## 2021-05-11 PROCEDURE — 1036F TOBACCO NON-USER: CPT | Performed by: INTERNAL MEDICINE

## 2021-05-11 PROCEDURE — 4040F PNEUMOC VAC/ADMIN/RCVD: CPT | Performed by: INTERNAL MEDICINE

## 2021-05-11 PROCEDURE — G8427 DOCREV CUR MEDS BY ELIG CLIN: HCPCS | Performed by: INTERNAL MEDICINE

## 2021-05-11 PROCEDURE — G8420 CALC BMI NORM PARAMETERS: HCPCS | Performed by: INTERNAL MEDICINE

## 2021-05-11 PROCEDURE — G8399 PT W/DXA RESULTS DOCUMENT: HCPCS | Performed by: INTERNAL MEDICINE

## 2021-05-11 PROCEDURE — 3023F SPIROM DOC REV: CPT | Performed by: INTERNAL MEDICINE

## 2021-05-11 RX ORDER — DULOXETIN HYDROCHLORIDE 20 MG/1
20 CAPSULE, DELAYED RELEASE ORAL DAILY
Qty: 30 CAPSULE | Refills: 3 | Status: SHIPPED | OUTPATIENT
Start: 2021-05-11 | End: 2021-10-04

## 2021-05-11 ASSESSMENT — PATIENT HEALTH QUESTIONNAIRE - PHQ9
SUM OF ALL RESPONSES TO PHQ QUESTIONS 1-9: 0
SUM OF ALL RESPONSES TO PHQ QUESTIONS 1-9: 0
SUM OF ALL RESPONSES TO PHQ9 QUESTIONS 1 & 2: 0
1. LITTLE INTEREST OR PLEASURE IN DOING THINGS: 0
2. FEELING DOWN, DEPRESSED OR HOPELESS: 0
SUM OF ALL RESPONSES TO PHQ QUESTIONS 1-9: 0

## 2021-05-11 NOTE — PROGRESS NOTES
Visit Information    Have you changed or started any medications since your last visit including any over-the-counter medicines, vitamins, or herbal medicines? no   Are you having any side effects from any of your medications? -  no  Have you stopped taking any of your medications? Is so, why? -  no    Have you seen any other physician or provider since your last visit? No  Have you had any other diagnostic tests since your last visit? Yes - Records Obtained  Have you been seen in the emergency room and/or had an admission to a hospital since we last saw you? No  Have you had your routine dental cleaning in the past 6 months? yes -     Have you activated your [employer] account? If not, what are your barriers?  Yes     Patient Care Team:  Kalina Martins MD as PCP - General (Internal Medicine)  Kalina Martins MD as PCP - [facility]    Medical History Review  Past Medical, Family, and Social History reviewed and does not contribute to the patient presenting condition    Health Maintenance   Topic Date Due    DTaP/Tdap/Td vaccine (1 - Tdap) Never done    Shingles Vaccine (1 of 2) Never done   [employer] Visit (AWV)  06/25/2021    Lipid screen  08/31/2021    Potassium monitoring  08/31/2021    Creatinine monitoring  08/31/2021    TSH testing  02/17/2022    Flu vaccine  Completed    Pneumococcal 65+ years Vaccine  Completed    COVID-19 Vaccine  Completed    DEXA (modify frequency per FRAX score)  Addressed    Hepatitis A vaccine  Aged Out    Hepatitis B vaccine  Aged Out    Hib vaccine  Aged Out    Meningococcal (ACWY) vaccine  Aged Out

## 2021-05-11 NOTE — PROGRESS NOTES
[address] [address]  Dept: [phone]  Dept Fax: [phone]    Reagan Rodriguez is a 78 y.o. female who presents today for her medical conditions/complaintsas noted below.   Reagan Rodriguez is c/o of   Chief Complaint   Patient presents with    Hypertension         HPI:     HTN  Onset more than 2 years ago  luma mild to mod  Controlled with current po meds  Not associated with headaches or blurry vision  No chest pain          No results found for: LABA1C          ( goal A1Cis < 7)   No results found for: LABMICR  LDL Cholesterol (mg/dL)   Date Value   08/31/2020 109   06/06/2019 101   10/10/2017 119       (goal LDL is <100)   AST (U/L)   Date Value   08/31/2020 30     ALT (U/L)   Date Value   08/31/2020 16     BUN (mg/dL)   Date Value   08/31/2020 15     BP Readings from Last 3 Encounters:   05/11/21 128/76   03/10/21 118/74   01/14/21 138/86          (goal 120/80)    Past Medical History:   Diagnosis Date    Abdominal pain, unspecified site     Acquired cyst of kidney     Acute gouty arthropathy     Allergic rhinitis, cause unspecified     Anxiety state, unspecified     Arthropathy, unspecified, site unspecified     Chronic airway obstruction, not elsewhere classified     Chronic kidney disease, stage III (moderate)     Diarrhea     Edema     Esophageal reflux     Essential hypertension, benign     Herpes zoster with other nervous system complications(053.19)     Herpes zoster without mention of complication     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)     Mononeuritis of unspecified site     Myalgia and myositis, unspecified     Osteoarthrosis, unspecified whether generalized or localized, unspecified site     Other general symptoms(780.99)     Other iatrogenic hypothyroidism     Other nonspecific abnormal finding of lung field     Other psoriasis     Pure hypercholesterolemia     Regional enteritis of unspecified site     Senile osteoporosis  Sprain of ankle, unspecified site     Unspecified vitamin D deficiency       Past Surgical History:   Procedure Laterality Date    COLONOSCOPY      DILATION AND CURETTAGE OF UTERUS      UPPER GASTROINTESTINAL ENDOSCOPY N/A 2019    EGD FOREIGN BODY REMOVAL performed by Seth Hutton MD at [address] [place] [address] OR       Family History   Problem Relation Age of Onset    Coronary Art Dis Father        Social History     Tobacco Use    Smoking status: Former Smoker     Packs/day: 0.75     Years: 2.00     Pack years: 1.50     Types: Cigarettes     Start date: 10/25/1958     Quit date: 10/25/2018     Years since quittin.5    Smokeless tobacco: Never Used   Substance Use Topics    Alcohol use: No     Alcohol/week: 0.0 standard drinks      Current Outpatient Medications   Medication Sig Dispense Refill    DULoxetine (CYMBALTA) 20 MG extended release capsule Take 1 capsule by mouth daily 30 capsule 3    carvedilol (COREG) 12.5 MG tablet TAKE ONE-HALF TABLET BY  MOUTH TWO TIMES A DAY 90 tablet 3    atorvastatin (LIPITOR) 40 MG tablet TAKE 1 TABLET BY MOUTH  DAILY 90 tablet 3    allopurinol (ZYLOPRIM) 300 MG tablet Take 1 tablet by mouth daily 90 tablet 5    levothyroxine (SYNTHROID) 125 MCG tablet TAKE 1 TABLET BY MOUTH ONCE DAILY 90 tablet 3    Clobetasol Propionate (CLOBEX) 0.05 % SHAM Apply 1 Act topically once a week 1 Bottle 0    colchicine (COLCRYS) 0.6 MG tablet Take 2 tabs immediately, then 1 tablet 1 hour later. Then one tablet a day for 2 more days. 10 tablet 11    folic acid (FOLVITE) 1 MG tablet Take 1 tablet by mouth daily 90 tablet 3    Multiple Vitamins-Minerals (HAIR/SKIN/NAILS) TABS Take 1 tablet by mouth daily      Cholecalciferol (VITAMIN D) 2000 UNITS CAPS capsule Take 2,000 Units by mouth daily      gabapentin (NEURONTIN) 300 MG capsule Take 1 capsule by mouth 4 times daily for 30 days.  120 capsule 0    methylPREDNISolone (MEDROL DOSEPACK) 4 MG tablet       nitrofurantoin, Vascular: No JVD. Cardiovascular:      Rate and Rhythm: Normal rate and regular rhythm. Heart sounds: No murmur. No friction rub. Pulmonary:      Effort: Pulmonary effort is normal. No tachypnea, bradypnea, accessory muscle usage or respiratory distress. Breath sounds: Normal breath sounds. No wheezing or rales. Abdominal:      General: Bowel sounds are normal. There is no distension. Palpations: Abdomen is soft. Tenderness: There is no abdominal tenderness. There is no rebound. Musculoskeletal: Normal range of motion. General: No tenderness. Lymphadenopathy:      Head:      Right side of head: No submental or submandibular adenopathy. Left side of head: No submental or submandibular adenopathy. Cervical: No cervical adenopathy. Skin:     General: Skin is warm. Coloration: Skin is not pale. Findings: No bruising, ecchymosis or rash. Neurological:      Mental Status: She is alert and oriented to person, place, and time. Cranial Nerves: No cranial nerve deficit. Sensory: No sensory deficit. Motor: No atrophy or abnormal muscle tone. Coordination: Coordination normal.   Psychiatric:         Mood and Affect: Mood is not anxious. Affect is not angry. Speech: Speech is not slurred. Behavior: Behavior normal. Behavior is not aggressive. Thought Content: Thought content does not include homicidal ideation. Cognition and Memory: Memory is not impaired. /76   Pulse 66   Temp 97.6 °F (36.4 °C)   Ht 5' 8\" (1.727 m)   Wt 140 lb (63.5 kg)   SpO2 97%   BMI 21.29 kg/m²     Assessment:       Diagnosis Orders   1. Panlobular emphysema (HCC)  No exacerbation   2. Acquired hypothyroidism     3. Stage 3a chronic kidney disease     4. Essential hypertension, benign     5. Crohn's disease of large intestine without complication ([facility]  stable             Plan:      No follow-ups on file.     No orders of the defined types were placed in this encounter. Orders Placed This Encounter   Medications    DULoxetine (CYMBALTA) 20 MG extended release capsule     Sig: Take 1 capsule by mouth daily     Dispense:  30 capsule     Refill:  3    muscler pain fibrmyalgia  Ck normal range  Normal sed rate  streching and workout recommended  jeniffer add cymbalta     Patient given educational materials - see patient instructions. Discussed use, benefit, and side effects of prescribed medications. All patientquestions answered. Pt voiced understanding. Reviewed health maintenance. Instructedto continue current medications, diet and exercise. Patient agreed with treatmentplan. Follow up as directed.      Electronically signed by Hussein Zuñiga MD on 5/11/2021 at 2:57 PM

## 2021-06-04 ENCOUNTER — TELEPHONE (OUTPATIENT)
Dept: INTERNAL MEDICINE CLINIC | Age: 81
End: 2021-06-04

## 2021-06-07 ENCOUNTER — OFFICE VISIT (OUTPATIENT)
Dept: INTERNAL MEDICINE CLINIC | Age: 81
End: 2021-06-07
Payer: MEDICARE

## 2021-06-07 ENCOUNTER — HOSPITAL ENCOUNTER (OUTPATIENT)
Age: 81
Setting detail: SPECIMEN
Discharge: HOME | End: 2021-06-07
Payer: MEDICARE

## 2021-06-07 VITALS
SYSTOLIC BLOOD PRESSURE: 120 MMHG | HEART RATE: 73 BPM | BODY MASS INDEX: 20.76 KG/M2 | WEIGHT: 137 LBS | OXYGEN SATURATION: 97 % | HEIGHT: 68 IN | DIASTOLIC BLOOD PRESSURE: 78 MMHG

## 2021-06-07 DIAGNOSIS — M25.462 EFFUSION OF LEFT KNEE: Primary | ICD-10-CM

## 2021-06-07 DIAGNOSIS — M25.562 ACUTE PAIN OF LEFT KNEE: ICD-10-CM

## 2021-06-07 PROCEDURE — 20610 DRAIN/INJ JOINT/BURSA W/O US: CPT | Performed by: INTERNAL MEDICINE

## 2021-06-07 RX ORDER — METHYLPREDNISOLONE ACETATE 80 MG/ML
80 INJECTION, SUSPENSION INTRA-ARTICULAR; INTRALESIONAL; INTRAMUSCULAR; SOFT TISSUE ONCE
Status: COMPLETED | OUTPATIENT
Start: 2021-06-07 | End: 2021-06-07

## 2021-06-07 RX ORDER — COLCHICINE 0.6 MG/1
TABLET ORAL
Qty: 10 TABLET | Refills: 11 | Status: SHIPPED | OUTPATIENT
Start: 2021-06-07

## 2021-06-07 RX ADMIN — METHYLPREDNISOLONE ACETATE 80 MG: 80 INJECTION, SUSPENSION INTRA-ARTICULAR; INTRALESIONAL; INTRAMUSCULAR; SOFT TISSUE at 15:39

## 2021-06-09 LAB
CRYSTALS, FLUID: NORMAL
SOURCE: NORMAL
SPECIMEN TYPE: NORMAL
URIC ACID, BODY FLUID: 3.9 MG/DL

## 2021-06-14 LAB
CULTURE: ABNORMAL
DIRECT EXAM: ABNORMAL
Lab: ABNORMAL
SPECIMEN DESCRIPTION: ABNORMAL

## 2021-07-26 ENCOUNTER — OFFICE VISIT (OUTPATIENT)
Dept: INTERNAL MEDICINE CLINIC | Age: 81
End: 2021-07-26
Payer: MEDICARE

## 2021-07-26 VITALS
DIASTOLIC BLOOD PRESSURE: 80 MMHG | WEIGHT: 139 LBS | BODY MASS INDEX: 21.07 KG/M2 | SYSTOLIC BLOOD PRESSURE: 120 MMHG | HEIGHT: 68 IN | OXYGEN SATURATION: 96 % | HEART RATE: 74 BPM

## 2021-07-26 DIAGNOSIS — M35.01 SJOGREN'S SYNDROME WITH KERATOCONJUNCTIVITIS SICCA (HCC): ICD-10-CM

## 2021-07-26 DIAGNOSIS — Z00.00 ROUTINE GENERAL MEDICAL EXAMINATION AT A HEALTH CARE FACILITY: Primary | ICD-10-CM

## 2021-07-26 PROCEDURE — G0439 PPPS, SUBSEQ VISIT: HCPCS | Performed by: INTERNAL MEDICINE

## 2021-07-26 PROCEDURE — 4040F PNEUMOC VAC/ADMIN/RCVD: CPT | Performed by: INTERNAL MEDICINE

## 2021-07-26 PROCEDURE — 1123F ACP DISCUSS/DSCN MKR DOCD: CPT | Performed by: INTERNAL MEDICINE

## 2021-07-26 SDOH — ECONOMIC STABILITY: FOOD INSECURITY: WITHIN THE PAST 12 MONTHS, THE FOOD YOU BOUGHT JUST DIDN'T LAST AND YOU DIDN'T HAVE MONEY TO GET MORE.: NEVER TRUE

## 2021-07-26 SDOH — ECONOMIC STABILITY: FOOD INSECURITY: WITHIN THE PAST 12 MONTHS, YOU WORRIED THAT YOUR FOOD WOULD RUN OUT BEFORE YOU GOT MONEY TO BUY MORE.: NEVER TRUE

## 2021-07-26 ASSESSMENT — PATIENT HEALTH QUESTIONNAIRE - PHQ9
SUM OF ALL RESPONSES TO PHQ QUESTIONS 1-9: 0
SUM OF ALL RESPONSES TO PHQ9 QUESTIONS 1 & 2: 0
1. LITTLE INTEREST OR PLEASURE IN DOING THINGS: 0
SUM OF ALL RESPONSES TO PHQ QUESTIONS 1-9: 0
2. FEELING DOWN, DEPRESSED OR HOPELESS: 0
SUM OF ALL RESPONSES TO PHQ QUESTIONS 1-9: 0

## 2021-07-26 ASSESSMENT — LIFESTYLE VARIABLES: HOW OFTEN DO YOU HAVE A DRINK CONTAINING ALCOHOL: 0

## 2021-07-26 ASSESSMENT — SOCIAL DETERMINANTS OF HEALTH (SDOH): HOW HARD IS IT FOR YOU TO PAY FOR THE VERY BASICS LIKE FOOD, HOUSING, MEDICAL CARE, AND HEATING?: NOT HARD AT ALL

## 2021-07-26 NOTE — PROGRESS NOTES
Visit Information    Have you changed or started any medications since your last visit including any over-the-counter medicines, vitamins, or herbal medicines? no   Are you having any side effects from any of your medications? -  no  Have you stopped taking any of your medications? Is so, why? -  no    Have you seen any other physician or provider since your last visit? Yes - Records Obtained  Have you had any other diagnostic tests since your last visit? No  Have you been seen in the emergency room and/or had an admission to a hospital since we last saw you? No  Have you had your routine dental cleaning in the past 6 months? no    Have you activated your [employer] account? If not, what are your barriers?  Yes     Patient Care Team:  Leidy Almendarez MD as PCP - General (Internal Medicine)  Leidy Almendarez MD as PCP - [facility]    Medical History Review  Past Medical, Family, and Social History reviewed and does not contribute to the patient presenting condition    Health Maintenance   Topic Date Due    DTaP/Tdap/Td vaccine (1 - Tdap) Never done    Shingles Vaccine (1 of 2) Never done   [employer] Visit (AWV)  Never done    Lipid screen  08/31/2021    Potassium monitoring  08/31/2021    Creatinine monitoring  08/31/2021    Flu vaccine (1) 09/01/2021    TSH testing  02/17/2022    Pneumococcal 65+ years Vaccine  Completed    COVID-19 Vaccine  Completed    DEXA (modify frequency per FRAX score)  Addressed    Hepatitis A vaccine  Aged Out    Hepatitis B vaccine  Aged Out    Hib vaccine  Aged Out    Meningococcal (ACWY) vaccine  Aged Out

## 2021-07-26 NOTE — PATIENT INSTRUCTIONS
Personalized Preventive Plan for Ama Roberson - 7/26/2021  Medicare offers a range of preventive health benefits. Some of the tests and screenings are paid in full while other may be subject to a deductible, co-insurance, and/or copay. Some of these benefits include a comprehensive review of your medical history including lifestyle, illnesses that may run in your family, and various assessments and screenings as appropriate. After reviewing your medical record and screening and assessments performed today your provider may have ordered immunizations, labs, imaging, and/or referrals for you. A list of these orders (if applicable) as well as your Preventive Care list are included within your After Visit Summary for your review. Other Preventive Recommendations:    · A preventive eye exam performed by an eye specialist is recommended every 1-2 years to screen for glaucoma; cataracts, macular degeneration, and other eye disorders. · A preventive dental visit is recommended every 6 months. · Try to get at least 150 minutes of exercise per week or 10,000 steps per day on a pedometer . · Order or download the FREE \"Exercise & Physical Activity: Your Everyday Guide\" from [employer]. Call 3-194.469.4600 or search [employer] online. · You need 3566-6116 mg of calcium and 7641-3994 IU of vitamin D per day. It is possible to meet your calcium requirement with diet alone, but a vitamin D supplement is usually necessary to meet this goal.  · When exposed to the sun, use a sunscreen that protects against both UVA and UVB radiation with an SPF of 30 or greater. Reapply every 2 to 3 hours or after sweating, drying off with a towel, or swimming. · Always wear a seat belt when traveling in a car. Always wear a helmet when riding a bicycle or motorcycle.

## 2021-07-26 NOTE — PROGRESS NOTES
Medicare Annual Wellness Visit  Name: Placido Bird Date: 2021   MRN: [id number] Sex: Female   Age: 78 y.o. Ethnicity: Non- / Non    : 1940 Race: White (non-)      Sana Raman is here for Medicare AWV    Screenings for behavioral, psychosocial and functional/safety risks, and cognitive dysfunction are all negative except as indicated below. These results, as well as other patient data from the [address] form, are documented in Flowsheets linked to this Encounter. Allergies   Allergen Reactions    Etomidate     Penicillins Hives         Prior to Visit Medications    Medication Sig Taking? Authorizing Provider   colchicine (COLCRYS) 0.6 MG tablet Take 2 tabs immediately, then 1 tablet 1 hour later. Then one tablet a day for 2 more days.  Yes Hardy Siddiqi MD   carvedilol (COREG) 12.5 MG tablet TAKE ONE-HALF TABLET BY  MOUTH TWO TIMES A DAY Yes Hardy Siddiqi MD   atorvastatin (LIPITOR) 40 MG tablet TAKE 1 TABLET BY MOUTH  DAILY Yes Hardy Siddiqi MD   allopurinol (ZYLOPRIM) 300 MG tablet Take 1 tablet by mouth daily Yes Hardy Siddiqi MD   levothyroxine (SYNTHROID) 125 MCG tablet TAKE 1 TABLET BY MOUTH ONCE DAILY Yes Hardy Siddiqi MD   methylPREDNISolone (MEDROL DOSEPACK) 4 MG tablet  Yes Historical Provider, MD   nitrofurantoin, macrocrystal-monohydrate, (MACROBID) 100 MG capsule TAKE 1 CAPSULE BY MOUTH TWICE DAILY Yes Historical Provider, MD   Clobetasol Propionate (CLOBEX) 0.05 % SHAM Apply 1 Act topically once a week Yes Hardy Siddiqi MD   folic acid (FOLVITE) 1 MG tablet Take 1 tablet by mouth daily Yes Batsheva Cummings MD   Multiple Vitamins-Minerals (HAIR/SKIN/NAILS) TABS Take 1 tablet by mouth daily Yes Historical Provider, MD   Cholecalciferol (VITAMIN D) 2000 UNITS CAPS capsule Take 2,000 Units by mouth daily Yes Historical Provider, MD   DULoxetine (CYMBALTA) 20 MG extended release capsule Take 1 capsule by mouth daily  Patient not taking: Reported on 7/26/2021  Kath Dior MD   gabapentin (NEURONTIN) 300 MG capsule Take 1 capsule by mouth 4 times daily for 30 days.   Kath Dior MD         Past Medical History:   Diagnosis Date    Abdominal pain, unspecified site     Acquired cyst of kidney     Acute gouty arthropathy     Allergic rhinitis, cause unspecified     Anxiety state, unspecified     Arthropathy, unspecified, site unspecified     Chronic airway obstruction, not elsewhere classified     Chronic kidney disease, stage III (moderate) (HCC)     Diarrhea     Edema     Esophageal reflux     Essential hypertension, benign     Herpes zoster with other nervous system complications(053.19)     Herpes zoster without mention of complication     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)     Mononeuritis of unspecified site     Myalgia and myositis, unspecified     Osteoarthrosis, unspecified whether generalized or localized, unspecified site     Other general symptoms(780.99)     Other iatrogenic hypothyroidism     Other nonspecific abnormal finding of lung field     Other psoriasis     Pure hypercholesterolemia     Regional enteritis of unspecified site     Senile osteoporosis     Sprain of ankle, unspecified site     Unspecified vitamin D deficiency        Past Surgical History:   Procedure Laterality Date    COLONOSCOPY      DILATION AND CURETTAGE OF UTERUS      UPPER GASTROINTESTINAL ENDOSCOPY N/A 6/14/2019    EGD FOREIGN BODY REMOVAL performed by Leigha Mera MD at [address]         Family History   Problem Relation Age of Onset    Coronary Art Dis Father        CareTeam (Including outside providers/suppliers regularly involved in providing care):   Patient Care Team:  Kath Dior MD as PCP - General (Internal Medicine)  Kath Dior MD as PCP - [facility] Empaneled Provider    Wt Readings from Last 3 Encounters:   07/26/21 139 lb (63 kg)   06/07/21 137 lb (62.1 kg)   05/11/21 140 lb (63.5 kg) Vitals:    07/26/21 0956   BP: 120/80   Pulse: 74   SpO2: 96%   Weight: 139 lb (63 kg)   Height: 5' 8\" (1.727 m)     Body mass index is 21.13 kg/m². Based upon direct observation of the patient, evaluation of cognition reveals recent and remote memory intact. Skin: warm and dry, no rash or erythema  Head: normocephalic and atraumatic  Eyes: pupils equal, round, and reactive to light, extraocular eye movements intact, conjunctivae normal  ENT: tympanic membrane, external ear and ear canal normal bilaterally, nose without deformity, nasal mucosa and turbinates normal without polyps  Neck: supple and non-tender without mass, no thyromegaly or thyroid nodules, no cervical lymphadenopathy  Pulmonary/Chest: clear to auscultation bilaterally- no wheezes, rales or rhonchi, normal air movement, no respiratory distress  Cardiovascular: normal rate, regular rhythm, normal S1 and S2, no murmurs, rubs, clicks, or gallops, distal pulses intact, no carotid bruits  Abdomen: soft, non-tender, non-distended, normal bowel sounds, no masses or organomegaly  Extremities: no cyanosis, clubbing or edema  Musculoskeletal: normal range of motion, no joint swelling, deformity or tenderness  Neurologic: reflexes normal and symmetric, no cranial nerve deficit, gait, coordination and speech normal    Patient's complete Health Risk Assessment and screening values have been reviewed and are found in Flowsheets. The following problems were reviewed today and where indicated follow up appointments were made and/or referrals ordered. Positive Risk Factor Screenings with Interventions:            General Health and ACP:  General  In general, how would you say your health is?: Good  In the past 7 days, have you experienced any of the following?  New or Increased Pain, New or Increased Fatigue, Loneliness, Social Isolation, Stress or Anger?: (!) New or Increased Pain  Do you get the social and emotional support that you need?: Yes  Do you

## 2021-08-22 DIAGNOSIS — E03.9 ACQUIRED HYPOTHYROIDISM: ICD-10-CM

## 2021-08-23 RX ORDER — LEVOTHYROXINE SODIUM 0.12 MG/1
TABLET ORAL
Qty: 90 TABLET | Refills: 3 | Status: SHIPPED | OUTPATIENT
Start: 2021-08-23 | End: 2022-01-05 | Stop reason: SDUPTHER

## 2021-09-16 ENCOUNTER — HOSPITAL ENCOUNTER (OUTPATIENT)
Age: 81
Setting detail: SPECIMEN
Discharge: HOME | End: 2021-09-16
Payer: MEDICARE

## 2021-09-16 DIAGNOSIS — M35.01 SJOGREN'S SYNDROME WITH KERATOCONJUNCTIVITIS SICCA (HCC): ICD-10-CM

## 2021-09-16 LAB — RHEUMATOID FACTOR: <10 IU/ML

## 2021-10-04 ENCOUNTER — OFFICE VISIT (OUTPATIENT)
Dept: INTERNAL MEDICINE CLINIC | Age: 81
End: 2021-10-04
Payer: MEDICARE

## 2021-10-04 VITALS
BODY MASS INDEX: 20.76 KG/M2 | OXYGEN SATURATION: 96 % | SYSTOLIC BLOOD PRESSURE: 118 MMHG | DIASTOLIC BLOOD PRESSURE: 78 MMHG | HEIGHT: 68 IN | HEART RATE: 64 BPM | WEIGHT: 137 LBS

## 2021-10-04 DIAGNOSIS — K21.9 GASTROESOPHAGEAL REFLUX DISEASE WITHOUT ESOPHAGITIS: ICD-10-CM

## 2021-10-04 DIAGNOSIS — J43.1 PANLOBULAR EMPHYSEMA (HCC): ICD-10-CM

## 2021-10-04 DIAGNOSIS — N18.31 STAGE 3A CHRONIC KIDNEY DISEASE (HCC): ICD-10-CM

## 2021-10-04 DIAGNOSIS — J30.1 ALLERGIC RHINITIS DUE TO POLLEN, UNSPECIFIED SEASONALITY: ICD-10-CM

## 2021-10-04 DIAGNOSIS — E55.9 VITAMIN D DEFICIENCY: ICD-10-CM

## 2021-10-04 DIAGNOSIS — I10 ESSENTIAL HYPERTENSION, BENIGN: ICD-10-CM

## 2021-10-04 DIAGNOSIS — Z13.220 SCREENING FOR HYPERLIPIDEMIA: Primary | ICD-10-CM

## 2021-10-04 DIAGNOSIS — E03.9 ACQUIRED HYPOTHYROIDISM: ICD-10-CM

## 2021-10-04 DIAGNOSIS — M35.01 SJOGREN'S SYNDROME WITH KERATOCONJUNCTIVITIS SICCA (HCC): ICD-10-CM

## 2021-10-04 PROCEDURE — 1036F TOBACCO NON-USER: CPT | Performed by: INTERNAL MEDICINE

## 2021-10-04 PROCEDURE — 99214 OFFICE O/P EST MOD 30 MIN: CPT | Performed by: INTERNAL MEDICINE

## 2021-10-04 PROCEDURE — G8484 FLU IMMUNIZE NO ADMIN: HCPCS | Performed by: INTERNAL MEDICINE

## 2021-10-04 PROCEDURE — 1090F PRES/ABSN URINE INCON ASSESS: CPT | Performed by: INTERNAL MEDICINE

## 2021-10-04 PROCEDURE — G8420 CALC BMI NORM PARAMETERS: HCPCS | Performed by: INTERNAL MEDICINE

## 2021-10-04 PROCEDURE — G8926 SPIRO NO PERF OR DOC: HCPCS | Performed by: INTERNAL MEDICINE

## 2021-10-04 PROCEDURE — 3023F SPIROM DOC REV: CPT | Performed by: INTERNAL MEDICINE

## 2021-10-04 PROCEDURE — G8399 PT W/DXA RESULTS DOCUMENT: HCPCS | Performed by: INTERNAL MEDICINE

## 2021-10-04 PROCEDURE — G8427 DOCREV CUR MEDS BY ELIG CLIN: HCPCS | Performed by: INTERNAL MEDICINE

## 2021-10-04 PROCEDURE — 1123F ACP DISCUSS/DSCN MKR DOCD: CPT | Performed by: INTERNAL MEDICINE

## 2021-10-04 PROCEDURE — 4040F PNEUMOC VAC/ADMIN/RCVD: CPT | Performed by: INTERNAL MEDICINE

## 2021-10-04 ASSESSMENT — ENCOUNTER SYMPTOMS
ABDOMINAL DISTENTION: 0
EYE DISCHARGE: 0
COUGH: 0
SHORTNESS OF BREATH: 0
EYE PAIN: 0
DIARRHEA: 0
COLOR CHANGE: 0
TROUBLE SWALLOWING: 0
BLOOD IN STOOL: 0
WHEEZING: 0

## 2021-10-04 NOTE — PROGRESS NOTES
[address] [address]  Dept: [phone]  Dept Fax: [phone]    Michele Bahena is a 78 y.o. female who presents today for her medical conditions/complaintsas noted below. Michele Bahena is c/o of   Chief Complaint   Patient presents with    Hypertension         HPI:     HTN  Onset more than 2 years ago  luma mild to mod  Controlled with current po meds  Not associated with headaches or blurry vision  No chest pain  hypothroid  Onset more than 2 years ago  No worsening  Mod severity  Not associated with wt loss   No sweating no heat or cold tolerance  Controlled with synthroid.         No results found for: LABA1C          ( goal A1Cis < 7)   No results found for: LABMICR  LDL Cholesterol (mg/dL)   Date Value   08/31/2020 109   06/06/2019 101   10/10/2017 119       (goal LDL is <100)   AST (U/L)   Date Value   08/31/2020 30     ALT (U/L)   Date Value   08/31/2020 16     BUN (mg/dL)   Date Value   08/31/2020 15     BP Readings from Last 3 Encounters:   10/04/21 118/78   07/26/21 120/80   06/07/21 120/78          (goal 120/80)    Past Medical History:   Diagnosis Date    Abdominal pain, unspecified site     Acquired cyst of kidney     Acute gouty arthropathy     Allergic rhinitis, cause unspecified     Anxiety state, unspecified     Arthropathy, unspecified, site unspecified     Chronic airway obstruction, not elsewhere classified     Chronic kidney disease, stage III (moderate) (HCC)     Diarrhea     Edema     Esophageal reflux     Essential hypertension, benign     Herpes zoster with other nervous system complications(053.19)     Herpes zoster without mention of complication     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)     Mononeuritis of unspecified site     Myalgia and myositis, unspecified     Osteoarthrosis, unspecified whether generalized or localized, unspecified site     Other general symptoms(780.99)     Other iatrogenic hypothyroidism     Other nonspecific abnormal finding of lung field     Other psoriasis     Pure hypercholesterolemia     Regional enteritis of unspecified site     Senile osteoporosis     Sprain of ankle, unspecified site     Unspecified vitamin D deficiency       Past Surgical History:   Procedure Laterality Date    COLONOSCOPY      DILATION AND CURETTAGE OF UTERUS      UPPER GASTROINTESTINAL ENDOSCOPY N/A 2019    EGD FOREIGN BODY REMOVAL performed by Gaby Yang MD at [facility]       Family History   Problem Relation Age of Onset    Coronary Art Dis Father        Social History     Tobacco Use    Smoking status: Former Smoker     Packs/day: 0.75     Years: 2.00     Pack years: 1.50     Types: Cigarettes     Start date: 10/25/1958     Quit date: 10/25/2018     Years since quittin.9    Smokeless tobacco: Never Used   Substance Use Topics    Alcohol use: No     Alcohol/week: 0.0 standard drinks      Current Outpatient Medications   Medication Sig Dispense Refill    levothyroxine (SYNTHROID) 125 MCG tablet TAKE 1 TABLET BY MOUTH ONCE DAILY 90 tablet 3    colchicine (COLCRYS) 0.6 MG tablet Take 2 tabs immediately, then 1 tablet 1 hour later. Then one tablet a day for 2 more days.  10 tablet 11    carvedilol (COREG) 12.5 MG tablet TAKE ONE-HALF TABLET BY  MOUTH TWO TIMES A DAY 90 tablet 3    atorvastatin (LIPITOR) 40 MG tablet TAKE 1 TABLET BY MOUTH  DAILY 90 tablet 3    allopurinol (ZYLOPRIM) 300 MG tablet Take 1 tablet by mouth daily 90 tablet 5    methylPREDNISolone (MEDROL DOSEPACK) 4 MG tablet       nitrofurantoin, macrocrystal-monohydrate, (MACROBID) 100 MG capsule TAKE 1 CAPSULE BY MOUTH TWICE DAILY      Clobetasol Propionate (CLOBEX) 0.05 % SHAM Apply 1 Act topically once a week 1 Bottle 0    folic acid (FOLVITE) 1 MG tablet Take 1 tablet by mouth daily 90 tablet 3    Multiple Vitamins-Minerals (HAIR/SKIN/NAILS) TABS Take 1 tablet by mouth daily      Cholecalciferol (VITAMIN D) 2000 UNITS CAPS capsule Take 2,000 Units by mouth daily      gabapentin (NEURONTIN) 300 MG capsule Take 1 capsule by mouth 4 times daily for 30 days. 120 capsule 0     Current Facility-Administered Medications   Medication Dose Route Frequency Provider Last Rate Last Admin    methylPREDNISolone sodium (SOLU-MEDROL) injection 125 mg  125 mg IntraMUSCular Once Katie Ring, MD        methylPREDNISolone acetate (DEPO-MEDROL) injection 80 mg  80 mg IntraMUSCular Once Katie Ring, MD        triamcinolone acetonide (KENALOG-40) injection 60 mg  60 mg IntraMUSCular Once Katie Ring, MD         Allergies   Allergen Reactions    Etomidate     Penicillins Hives       Health Maintenance   Topic Date Due    DTaP/Tdap/Td vaccine (1 - Tdap) Never done    Shingles Vaccine (1 of 2) Never done    Lipid screen  08/31/2021    Potassium monitoring  08/31/2021    Creatinine monitoring  08/31/2021    TSH testing  02/17/2022    Annual Wellness Visit (AWV)  07/27/2022    Flu vaccine  Completed    Pneumococcal 65+ years Vaccine  Completed    COVID-19 Vaccine  Completed    DEXA (modify frequency per FRAX score)  Addressed    Hepatitis A vaccine  Aged Out    Hepatitis B vaccine  Aged Out    Hib vaccine  Aged Out    Meningococcal (ACWY) vaccine  Aged Out       Subjective:     Review of Systems   Constitutional: Negative for appetite change, diaphoresis and fatigue. HENT: Negative for ear discharge and trouble swallowing. Eyes: Negative for pain and discharge. Respiratory: Negative for cough, shortness of breath and wheezing. Cardiovascular: Negative for chest pain and palpitations. Gastrointestinal: Negative for abdominal distention, blood in stool and diarrhea. Endocrine: Negative for polydipsia and polyphagia. Genitourinary: Negative for difficulty urinating and frequency. Musculoskeletal: Positive for arthralgias. Negative for gait problem, myalgias and neck pain.    Skin: Negative for color change and rash.   Allergic/Immunologic: Negative for environmental allergies and food allergies. Neurological: Negative for dizziness and headaches. Hematological: Negative for adenopathy. Does not bruise/bleed easily. Psychiatric/Behavioral: Negative for behavioral problems and sleep disturbance. Objective:     Physical Exam  Constitutional:       Appearance: She is well-developed. She is not diaphoretic. Comments: yesika lady walks with cane     HENT:      Head: Normocephalic and atraumatic. Eyes:      General:         Right eye: No discharge. Left eye: No discharge. Extraocular Movements:      Right eye: Normal extraocular motion. Left eye: Normal extraocular motion. Conjunctiva/sclera: Conjunctivae normal.      Right eye: Right conjunctiva is not injected. Left eye: Left conjunctiva is not injected. Neck:      Thyroid: No thyroid mass or thyromegaly. Vascular: No JVD. Cardiovascular:      Rate and Rhythm: Normal rate and regular rhythm. Heart sounds: No murmur heard. No friction rub. Pulmonary:      Effort: Pulmonary effort is normal. No tachypnea, bradypnea, accessory muscle usage or respiratory distress. Breath sounds: Normal breath sounds. No wheezing or rales. Abdominal:      General: Bowel sounds are normal. There is no distension. Palpations: Abdomen is soft. Tenderness: There is no abdominal tenderness. There is no rebound. Musculoskeletal:         General: No tenderness. Normal range of motion. Cervical back: Normal range of motion and neck supple. No edema or erythema. Lymphadenopathy:      Head:      Right side of head: No submental or submandibular adenopathy. Left side of head: No submental or submandibular adenopathy. Cervical: No cervical adenopathy. Skin:     General: Skin is warm. Coloration: Skin is not pale. Findings: No bruising, ecchymosis or rash.    Neurological:      Mental Status: She is alert and oriented to person, place, and time. Cranial Nerves: No cranial nerve deficit. Sensory: No sensory deficit. Motor: No atrophy or abnormal muscle tone. Coordination: Coordination normal.   Psychiatric:         Mood and Affect: Mood is not anxious. Affect is not angry. Speech: Speech is not slurred. Behavior: Behavior normal. Behavior is not aggressive. Thought Content: Thought content does not include homicidal ideation. Cognition and Memory: Memory is not impaired. /78   Pulse 64   Ht 5' 8\" (1.727 m)   Wt 137 lb (62.1 kg)   SpO2 96%   BMI 20.83 kg/m²     Assessment:       Diagnosis Orders   1. Screening for hyperlipidemia  Lipid, Fasting   2. Panlobular emphysema (HCC)     3. Stage 3a chronic kidney disease ([facility] [[email or web address]ca [facility]     4. Essential hypertension, benign  Comprehensive Metabolic Panel    CBC Auto Differential   5. Allergic rhinitis due to pollen, unspecified seasonality     6. Vitamin D deficiency  Comprehensive Metabolic Panel    CBC Auto Differential    TSH With Reflex Ft4   7. Sjogren's syndrome with keratoconjunctivitis sicca ([facility] [[email or web address]ca [facility].)     8. Acquired hypothyroidism  TSH With Reflex Ft4   9. Gastroesophageal reflux disease without esophagitis               Plan:      Return in about 3 months (around 1/4/2022). Orders Placed This Encounter   Procedures    Lipid, Fasting     Standing Status:   Future     Standing Expiration Date:   10/1/2022    Comprehensive Metabolic Panel     Standing Status:   Future     Standing Expiration Date:   1/2/2022    CBC Auto Differential     Standing Status:   Future     Standing Expiration Date:   1/2/2022    TSH With Reflex Ft4     Standing Status:   Future     Standing Expiration Date:   10/4/2022     No orders of the defined types were placed in this encounter. Patient given educational materials - see patient instructions. Discussed use, benefit, and side effects of prescribed medications.   All patientquestions answered. Pt voiced understanding. Reviewed health maintenance. Instructedto continue current medications, diet and exercise. Patient agreed with treatmentplan. Follow up as directed.      Electronically signed by Victorina Mckeon MD on 10/4/2021 at 2:38 PM

## 2021-10-04 NOTE — PROGRESS NOTES
Visit Information    Have you changed or started any medications since your last visit including any over-the-counter medicines, vitamins, or herbal medicines? no   Are you having any side effects from any of your medications? -  no  Have you stopped taking any of your medications? Is so, why? -  no    Have you seen any other physician or provider since your last visit? No  Have you had any other diagnostic tests since your last visit? No  Have you been seen in the emergency room and/or had an admission to a hospital since we last saw you? No  Have you had your routine dental cleaning in the past 6 months? yes -     Have you activated your [employer] account? If not, what are your barriers?  Yes     Patient Care Team:  Ladonna Rose MD as PCP - General (Internal Medicine)  Ladonna Rose MD as PCP - [facility]    Medical History Review  Past Medical, Family, and Social History reviewed and does contribute to the patient presenting condition    Health Maintenance   Topic Date Due    DTaP/Tdap/Td vaccine (1 - Tdap) Never done    Shingles Vaccine (1 of 2) Never done    Lipid screen  08/31/2021    Potassium monitoring  08/31/2021    Creatinine monitoring  08/31/2021    TSH testing  02/17/2022    Annual Wellness Visit (AWV)  07/27/2022    Flu vaccine  Completed    Pneumococcal 65+ years Vaccine  Completed    COVID-19 Vaccine  Completed    DEXA (modify frequency per FRAX score)  Addressed    Hepatitis A vaccine  Aged Out    Hepatitis B vaccine  Aged Out    Hib vaccine  Aged Out    Meningococcal (ACWY) vaccine  Aged Out

## 2021-10-22 RX ORDER — ATORVASTATIN CALCIUM 40 MG/1
40 TABLET, FILM COATED ORAL DAILY
Qty: 90 TABLET | Refills: 3 | Status: SHIPPED | OUTPATIENT
Start: 2021-10-22 | End: 2022-09-26

## 2022-01-04 ENCOUNTER — HOSPITAL ENCOUNTER (OUTPATIENT)
Age: 82
Setting detail: SPECIMEN
Discharge: HOME | End: 2022-01-04

## 2022-01-04 DIAGNOSIS — E55.9 VITAMIN D DEFICIENCY: ICD-10-CM

## 2022-01-04 DIAGNOSIS — Z13.220 SCREENING FOR HYPERLIPIDEMIA: ICD-10-CM

## 2022-01-04 DIAGNOSIS — E03.9 ACQUIRED HYPOTHYROIDISM: ICD-10-CM

## 2022-01-04 LAB
ABSOLUTE EOS #: 0.52 K/UL (ref 0–0.44)
ABSOLUTE IMMATURE GRANULOCYTE: 0.03 K/UL (ref 0–0.3)
ABSOLUTE LYMPH #: 1.95 K/UL (ref 1.1–3.7)
ABSOLUTE MONO #: 0.65 K/UL (ref 0.1–1.2)
ALBUMIN SERPL-MCNC: 4.2 G/DL (ref 3.5–5.2)
ALBUMIN/GLOBULIN RATIO: 1.5 (ref 1–2.5)
ALP BLD-CCNC: 103 U/L (ref 35–104)
ALT SERPL-CCNC: 13 U/L (ref 5–33)
ANION GAP SERPL CALCULATED.3IONS-SCNC: 13 MMOL/L (ref 9–17)
AST SERPL-CCNC: 27 U/L
BASOPHILS # BLD: 1 % (ref 0–2)
BASOPHILS ABSOLUTE: 0.11 K/UL (ref 0–0.2)
BILIRUB SERPL-MCNC: 0.56 MG/DL (ref 0.3–1.2)
BUN BLDV-MCNC: 16 MG/DL (ref 8–23)
BUN/CREAT BLD: ABNORMAL (ref 9–20)
CALCIUM SERPL-MCNC: 10.4 MG/DL (ref 8.6–10.4)
CHLORIDE BLD-SCNC: 102 MMOL/L (ref 98–107)
CHOLESTEROL, FASTING: 178 MG/DL
CHOLESTEROL/HDL RATIO: 3.8
CO2: 24 MMOL/L (ref 20–31)
CREAT SERPL-MCNC: 1 MG/DL (ref 0.5–0.9)
DIFFERENTIAL TYPE: ABNORMAL
EOSINOPHILS RELATIVE PERCENT: 7 % (ref 1–4)
GFR AFRICAN AMERICAN: >60 ML/MIN
GFR NON-AFRICAN AMERICAN: 53 ML/MIN
GFR SERPL CREATININE-BSD FRML MDRD: ABNORMAL ML/MIN/{1.73_M2}
GFR SERPL CREATININE-BSD FRML MDRD: ABNORMAL ML/MIN/{1.73_M2}
GLUCOSE BLD-MCNC: 96 MG/DL (ref 70–99)
HCT VFR BLD CALC: 39.7 % (ref 36.3–47.1)
HDLC SERPL-MCNC: 47 MG/DL
HEMOGLOBIN: 12.2 G/DL (ref 11.9–15.1)
IMMATURE GRANULOCYTES: 0 %
LDL CHOLESTEROL: 101 MG/DL (ref 0–130)
LYMPHOCYTES # BLD: 25 % (ref 24–43)
MCH RBC QN AUTO: 29.5 PG (ref 25.2–33.5)
MCHC RBC AUTO-ENTMCNC: 30.7 G/DL (ref 28.4–34.8)
MCV RBC AUTO: 95.9 FL (ref 82.6–102.9)
MONOCYTES # BLD: 8 % (ref 3–12)
NRBC AUTOMATED: 0 PER 100 WBC
PDW BLD-RTO: 14.4 % (ref 11.8–14.4)
PLATELET # BLD: 174 K/UL (ref 138–453)
PLATELET ESTIMATE: ABNORMAL
PMV BLD AUTO: 10.9 FL (ref 8.1–13.5)
POTASSIUM SERPL-SCNC: 4.3 MMOL/L (ref 3.7–5.3)
RBC # BLD: 4.14 M/UL (ref 3.95–5.11)
RBC # BLD: ABNORMAL 10*6/UL
SEG NEUTROPHILS: 59 % (ref 36–65)
SEGMENTED NEUTROPHILS ABSOLUTE COUNT: 4.46 K/UL (ref 1.5–8.1)
SODIUM BLD-SCNC: 139 MMOL/L (ref 135–144)
THYROXINE, FREE: 0.83 NG/DL (ref 0.93–1.7)
TOTAL PROTEIN: 7 G/DL (ref 6.4–8.3)
TRIGLYCERIDE, FASTING: 152 MG/DL
TSH SERPL DL<=0.05 MIU/L-ACNC: 28.9 MIU/L (ref 0.3–5)
VLDLC SERPL CALC-MCNC: ABNORMAL MG/DL (ref 1–30)
WBC # BLD: 7.7 K/UL (ref 3.5–11.3)
WBC # BLD: ABNORMAL 10*3/UL

## 2022-01-05 DIAGNOSIS — E03.9 ACQUIRED HYPOTHYROIDISM: ICD-10-CM

## 2022-01-05 RX ORDER — LEVOTHYROXINE SODIUM 0.15 MG/1
TABLET ORAL
Qty: 60 TABLET | Refills: 0 | Status: SHIPPED | OUTPATIENT
Start: 2022-01-05 | End: 2022-04-13 | Stop reason: SDUPTHER

## 2022-01-13 ENCOUNTER — OFFICE VISIT (OUTPATIENT)
Dept: INTERNAL MEDICINE CLINIC | Age: 82
End: 2022-01-13
Payer: MEDICARE

## 2022-01-13 VITALS
BODY MASS INDEX: 21.07 KG/M2 | SYSTOLIC BLOOD PRESSURE: 134 MMHG | HEIGHT: 68 IN | HEART RATE: 70 BPM | DIASTOLIC BLOOD PRESSURE: 80 MMHG | OXYGEN SATURATION: 97 % | WEIGHT: 139 LBS

## 2022-01-13 DIAGNOSIS — N18.31 STAGE 3A CHRONIC KIDNEY DISEASE (HCC): ICD-10-CM

## 2022-01-13 DIAGNOSIS — M35.01 SJOGREN'S SYNDROME WITH KERATOCONJUNCTIVITIS SICCA (HCC): ICD-10-CM

## 2022-01-13 DIAGNOSIS — J43.1 PANLOBULAR EMPHYSEMA (HCC): Primary | ICD-10-CM

## 2022-01-13 DIAGNOSIS — K50.10 CROHN'S DISEASE OF LARGE INTESTINE WITHOUT COMPLICATION (HCC): ICD-10-CM

## 2022-01-13 DIAGNOSIS — H10.33 ACUTE CONJUNCTIVITIS OF BOTH EYES, UNSPECIFIED ACUTE CONJUNCTIVITIS TYPE: ICD-10-CM

## 2022-01-13 PROCEDURE — G8484 FLU IMMUNIZE NO ADMIN: HCPCS | Performed by: INTERNAL MEDICINE

## 2022-01-13 PROCEDURE — 3023F SPIROM DOC REV: CPT | Performed by: INTERNAL MEDICINE

## 2022-01-13 PROCEDURE — 1090F PRES/ABSN URINE INCON ASSESS: CPT | Performed by: INTERNAL MEDICINE

## 2022-01-13 PROCEDURE — G8427 DOCREV CUR MEDS BY ELIG CLIN: HCPCS | Performed by: INTERNAL MEDICINE

## 2022-01-13 PROCEDURE — G8399 PT W/DXA RESULTS DOCUMENT: HCPCS | Performed by: INTERNAL MEDICINE

## 2022-01-13 PROCEDURE — 4040F PNEUMOC VAC/ADMIN/RCVD: CPT | Performed by: INTERNAL MEDICINE

## 2022-01-13 PROCEDURE — G8420 CALC BMI NORM PARAMETERS: HCPCS | Performed by: INTERNAL MEDICINE

## 2022-01-13 PROCEDURE — 99214 OFFICE O/P EST MOD 30 MIN: CPT | Performed by: INTERNAL MEDICINE

## 2022-01-13 PROCEDURE — 1036F TOBACCO NON-USER: CPT | Performed by: INTERNAL MEDICINE

## 2022-01-13 PROCEDURE — 1123F ACP DISCUSS/DSCN MKR DOCD: CPT | Performed by: INTERNAL MEDICINE

## 2022-01-13 RX ORDER — BACITRACIN 500 [USP'U]/G
OINTMENT OPHTHALMIC 3 TIMES DAILY
Qty: 1 EACH | Refills: 3 | Status: SHIPPED | OUTPATIENT
Start: 2022-01-13 | End: 2022-01-13 | Stop reason: SDUPTHER

## 2022-01-13 RX ORDER — BACITRACIN 500 [USP'U]/G
OINTMENT OPHTHALMIC 3 TIMES DAILY
Qty: 1 EACH | Refills: 3 | Status: SHIPPED | OUTPATIENT
Start: 2022-01-13 | End: 2022-01-23

## 2022-01-13 ASSESSMENT — ENCOUNTER SYMPTOMS
EYE REDNESS: 1
TROUBLE SWALLOWING: 0
SHORTNESS OF BREATH: 0
WHEEZING: 0
BLOOD IN STOOL: 0
ABDOMINAL DISTENTION: 0
COUGH: 0
DIARRHEA: 0
COLOR CHANGE: 0
EYE ITCHING: 1
EYE DISCHARGE: 1
EYE PAIN: 1

## 2022-01-13 NOTE — PROGRESS NOTES
Visit Information    Have you changed or started any medications since your last visit including any over-the-counter medicines, vitamins, or herbal medicines? no   Are you having any side effects from any of your medications? -  no  Have you stopped taking any of your medications? Is so, why? -  no    Have you seen any other physician or provider since your last visit? No  Have you had any other diagnostic tests since your last visit? Yes - Records Obtained  Have you been seen in the emergency room and/or had an admission to a hospital since we last saw you? No  Have you had your routine dental cleaning in the past 6 months? no    Have you activated your [employer] account? If not, what are your barriers?  Yes     Patient Care Team:  Elizabeth Nelson MD as PCP - General (Internal Medicine)  Elizabeth Nelson MD as PCP - [facility]    Medical History Review  Past Medical, Family, and Social History reviewed and does contribute to the patient presenting condition    Health Maintenance   Topic Date Due    DTaP/Tdap/Td vaccine (1 - Tdap) Never done    Shingles Vaccine (1 of 2) Never done    COVID-19 Vaccine (3 - Booster for [place] series) 08/18/2021    Depression Screen  07/26/2022    Annual Wellness Visit (AWV)  07/27/2022    Lipid screen  01/04/2023    TSH testing  01/04/2023    Potassium monitoring  01/04/2023    Creatinine monitoring  01/04/2023    Flu vaccine  Completed    Pneumococcal 65+ years Vaccine  Completed    DEXA (modify frequency per FRAX score)  Addressed    Hepatitis A vaccine  Aged Out    Hepatitis B vaccine  Aged Out    Hib vaccine  Aged Out    Meningococcal (ACWY) vaccine  Aged Out

## 2022-01-13 NOTE — PROGRESS NOTES
[address] [address]  Dept: [phone]  Dept Fax: [phone]    Emmanuel Rivera is a 78 y.o. female who presents today for her medical conditions/complaintsas noted below.   Emmanuel Rivera is c/o of   Chief Complaint   Patient presents with    Hypertension    Elbow Pain    Eye Problem         HPI:     HTN  Onset more than 2 years ago  luma mild to mod  Controlled with current po meds  Not associated with headaches or blurry vision  No chest pain    Right elbow bursitis  Better now  Eye discharge redness  But no vision disturbance        No results found for: LABA1C          ( goal A1Cis < 7)   No results found for: LABMICR  LDL Cholesterol (mg/dL)   Date Value   01/04/2022 101   08/31/2020 109   06/06/2019 101       (goal LDL is <100)   AST (U/L)   Date Value   01/04/2022 27     ALT (U/L)   Date Value   01/04/2022 13     BUN (mg/dL)   Date Value   01/04/2022 16     BP Readings from Last 3 Encounters:   01/13/22 134/80   10/04/21 118/78   07/26/21 120/80          (goal 120/80)    Past Medical History:   Diagnosis Date    Abdominal pain, unspecified site     Acquired cyst of kidney     Acute gouty arthropathy     Allergic rhinitis, cause unspecified     Anxiety state, unspecified     Arthropathy, unspecified, site unspecified     Chronic airway obstruction, not elsewhere classified     Chronic kidney disease, stage III (moderate) (HCC)     Diarrhea     Edema     Esophageal reflux     Essential hypertension, benign     Herpes zoster with other nervous system complications(053.19)     Herpes zoster without mention of complication     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)     Mononeuritis of unspecified site     Myalgia and myositis, unspecified     Osteoarthrosis, unspecified whether generalized or localized, unspecified site     Other general symptoms(780.99)     Other iatrogenic hypothyroidism     Other nonspecific abnormal finding of lung field  Other psoriasis     Pure hypercholesterolemia     Regional enteritis of unspecified site     Senile osteoporosis     Sprain of ankle, unspecified site     Unspecified vitamin D deficiency       Past Surgical History:   Procedure Laterality Date    COLONOSCOPY      DILATION AND CURETTAGE OF UTERUS      UPPER GASTROINTESTINAL ENDOSCOPY N/A 6/14/2019    EGD FOREIGN BODY REMOVAL performed by Tutu Collins MD at [facility] OR       Family History   Problem Relation Age of Onset    Coronary Art Dis Father        Social History     Tobacco Use    Smoking status: Former Smoker     Packs/day: 0.75     Years: 2.00     Pack years: 1.50     Types: Cigarettes     Start date: 10/25/1958     Quit date: 10/25/2018     Years since quitting: 3.2    Smokeless tobacco: Never Used   Substance Use Topics    Alcohol use: No     Alcohol/week: 0.0 standard drinks      Current Outpatient Medications   Medication Sig Dispense Refill    bacitracin 500 UNIT/GM ophthalmic ointment Place into both eyes 3 times daily for 10 days 1 each 3    levothyroxine (SYNTHROID) 150 MCG tablet Daily fasting in am one hour before breakfast 60 tablet 0    atorvastatin (LIPITOR) 40 MG tablet TAKE 1 TABLET BY MOUTH  DAILY 90 tablet 3    colchicine (COLCRYS) 0.6 MG tablet Take 2 tabs immediately, then 1 tablet 1 hour later. Then one tablet a day for 2 more days. 10 tablet 11    carvedilol (COREG) 12.5 MG tablet TAKE ONE-HALF TABLET BY  MOUTH TWO TIMES A DAY 90 tablet 3    gabapentin (NEURONTIN) 300 MG capsule Take 1 capsule by mouth 4 times daily for 30 days.  120 capsule 0    allopurinol (ZYLOPRIM) 300 MG tablet Take 1 tablet by mouth daily 90 tablet 5    methylPREDNISolone (MEDROL DOSEPACK) 4 MG tablet       nitrofurantoin, macrocrystal-monohydrate, (MACROBID) 100 MG capsule TAKE 1 CAPSULE BY MOUTH TWICE DAILY      Clobetasol Propionate (CLOBEX) 0.05 % SHAM Apply 1 Act topically once a week 1 Bottle 0    folic acid (FOLVITE) 1 MG tablet Take 1 tablet by mouth daily 90 tablet 3    Multiple Vitamins-Minerals (HAIR/SKIN/NAILS) TABS Take 1 tablet by mouth daily      Cholecalciferol (VITAMIN D) 2000 UNITS CAPS capsule Take 2,000 Units by mouth daily       Current Facility-Administered Medications   Medication Dose Route Frequency Provider Last Rate Last Admin    methylPREDNISolone sodium (SOLU-MEDROL) injection 125 mg  125 mg IntraMUSCular Once Bonilla Lee MD        methylPREDNISolone acetate (DEPO-MEDROL) injection 80 mg  80 mg IntraMUSCular Once Bonilladonna Lee MD        triamcinolone acetonide (KENALOG-40) injection 60 mg  60 mg IntraMUSCular Once Bonilla MD Jesus         Allergies   Allergen Reactions    Etomidate     Penicillins Hives       Health Maintenance   Topic Date Due    DTaP/Tdap/Td vaccine (1 - Tdap) Never done    Shingles Vaccine (1 of 2) Never done    COVID-19 Vaccine (3 - Booster for Moderna series) 08/18/2021    Depression Screen  07/26/2022    Annual Wellness Visit (AWV)  07/27/2022    Lipid screen  01/04/2023    TSH testing  01/04/2023    Potassium monitoring  01/04/2023    Creatinine monitoring  01/04/2023    Flu vaccine  Completed    Pneumococcal 65+ years Vaccine  Completed    DEXA (modify frequency per FRAX score)  Addressed    Hepatitis A vaccine  Aged Out    Hepatitis B vaccine  Aged Out    Hib vaccine  Aged Out    Meningococcal (ACWY) vaccine  Aged Out       Subjective:     Review of Systems   Constitutional: Negative for appetite change, diaphoresis and fatigue. HENT: Negative for ear discharge and trouble swallowing. Eyes: Positive for pain, discharge, redness and itching. Respiratory: Negative for cough, shortness of breath and wheezing. Cardiovascular: Negative for chest pain and palpitations. Gastrointestinal: Negative for abdominal distention, blood in stool and diarrhea. Endocrine: Negative for polydipsia and polyphagia.    Genitourinary: Negative for difficulty urinating and frequency. Musculoskeletal: Positive for arthralgias. Negative for gait problem, myalgias and neck pain. Skin: Negative for color change and rash. Allergic/Immunologic: Negative for environmental allergies and food allergies. Neurological: Negative for dizziness and headaches. Hematological: Negative for adenopathy. Does not bruise/bleed easily. Psychiatric/Behavioral: Negative for behavioral problems and sleep disturbance. Objective:     Physical Exam  Constitutional:       Appearance: She is well-developed. She is not diaphoretic. HENT:      Head: Normocephalic and atraumatic. Eyes:      General:         Right eye: No discharge. Left eye: No discharge. Extraocular Movements:      Right eye: Normal extraocular motion. Left eye: Normal extraocular motion. Conjunctiva/sclera: Conjunctivae normal.      Right eye: Right conjunctiva is not injected. Left eye: Left conjunctiva is not injected. Neck:      Thyroid: No thyroid mass or thyromegaly. Vascular: No JVD. Cardiovascular:      Rate and Rhythm: Normal rate and regular rhythm. Heart sounds: No murmur heard. No friction rub. Pulmonary:      Effort: Pulmonary effort is normal. No tachypnea, bradypnea, accessory muscle usage or respiratory distress. Breath sounds: Normal breath sounds. No wheezing or rales. Abdominal:      General: Bowel sounds are normal. There is no distension. Palpations: Abdomen is soft. Tenderness: There is no abdominal tenderness. There is no rebound. Musculoskeletal:         General: No tenderness. Normal range of motion. Cervical back: Normal range of motion and neck supple. No edema or erythema. Lymphadenopathy:      Head:      Right side of head: No submental or submandibular adenopathy. Left side of head: No submental or submandibular adenopathy. Cervical: No cervical adenopathy. Skin:     General: Skin is warm. Coloration: Skin is not pale. Findings: No bruising, ecchymosis or rash. Neurological:      Mental Status: She is alert and oriented to person, place, and time. Cranial Nerves: No cranial nerve deficit. Sensory: No sensory deficit. Motor: No atrophy or abnormal muscle tone. Coordination: Coordination normal.   Psychiatric:         Mood and Affect: Mood is not anxious. Affect is not angry. Speech: Speech is not slurred. Behavior: Behavior normal. Behavior is not aggressive. Thought Content: Thought content does not include homicidal ideation. Cognition and Memory: Memory is not impaired. /80   Pulse 70   Ht 5' 8\" (1.727 m)   Wt 139 lb (63 kg)   SpO2 97%   BMI 21.13 kg/m²     Assessment:       Diagnosis Orders   1. Panlobular emphysema ([facility] [address]     2. Crohn's disease of large intestine without complication ([facility] [address]     3. Stage 3a chronic kidney disease ([facility] [address]     4. Sjogren's syndrome with keratoconjunctivitis sicca ([facility] [address]     5. Acute conjunctivitis of both eyes, unspecified acute conjunctivitis type  PELON - Yesy Gamboa MD, Ophthalmology, [place]             Plan:      Return in about 3 months (around 4/13/2022). Orders Placed This Encounter   Procedures   Merced Guido MD, Ophthalmology, [place]     Referral Priority:   Routine     Referral Type:   Eval and Treat     Referral Reason:   Specialty Services Required     Referred to Provider:   Alecia Rea MD     Requested Specialty:   Ophthalmology     Number of Visits Requested:   1     Orders Placed This Encounter   Medications    bacitracin 500 UNIT/GM ophthalmic ointment     Sig: Place into both eyes 3 times daily for 10 days     Dispense:  1 each     Refill:  3    Right elbow bursitis  Better now  Eye discharge redness  But no vision disturbance       Patient given educational materials - see patient instructions. Discussed use, benefit, and side effects of prescribed medications. All patientquestions answered. Pt voiced understanding. Reviewed health maintenance. Instructedto continue current medications, diet and exercise. Patient agreed with treatmentplan. Follow up as directed.      Electronically signed by Sterling Fishman MD on 1/13/2022 at 1:17 PM

## 2022-01-17 RX ORDER — CARVEDILOL 12.5 MG/1
TABLET ORAL
Qty: 90 TABLET | Refills: 3 | Status: SHIPPED | OUTPATIENT
Start: 2022-01-17

## 2022-01-18 ENCOUNTER — TELEPHONE (OUTPATIENT)
Dept: INTERNAL MEDICINE CLINIC | Age: 82
End: 2022-01-18

## 2022-01-18 NOTE — TELEPHONE ENCOUNTER
The ointment that you prescribed, it not being manufactured any longer. Can you please prescribe something else?     Allergies   Allergen Reactions    Etomidate     Penicillins Hives

## 2022-01-20 NOTE — TELEPHONE ENCOUNTER
Attempted to call [employer] multiple times with no answer, multiple VM left on the machine awaiting a call back

## 2022-04-13 ENCOUNTER — OFFICE VISIT (OUTPATIENT)
Dept: INTERNAL MEDICINE CLINIC | Age: 82
End: 2022-04-13
Payer: MEDICARE

## 2022-04-13 VITALS
HEART RATE: 70 BPM | DIASTOLIC BLOOD PRESSURE: 76 MMHG | SYSTOLIC BLOOD PRESSURE: 126 MMHG | HEIGHT: 68 IN | OXYGEN SATURATION: 97 % | BODY MASS INDEX: 19.85 KG/M2 | WEIGHT: 131 LBS

## 2022-04-13 DIAGNOSIS — E03.9 ACQUIRED HYPOTHYROIDISM: ICD-10-CM

## 2022-04-13 DIAGNOSIS — M10.9 ACUTE GOUTY ARTHROPATHY: ICD-10-CM

## 2022-04-13 DIAGNOSIS — N93.9 ABNORMAL UTERINE BLEEDING (AUB): Primary | ICD-10-CM

## 2022-04-13 DIAGNOSIS — N30.01 ACUTE CYSTITIS WITH HEMATURIA: ICD-10-CM

## 2022-04-13 DIAGNOSIS — N18.31 STAGE 3A CHRONIC KIDNEY DISEASE (HCC): ICD-10-CM

## 2022-04-13 DIAGNOSIS — J43.1 PANLOBULAR EMPHYSEMA (HCC): ICD-10-CM

## 2022-04-13 PROCEDURE — G8420 CALC BMI NORM PARAMETERS: HCPCS | Performed by: INTERNAL MEDICINE

## 2022-04-13 PROCEDURE — 1090F PRES/ABSN URINE INCON ASSESS: CPT | Performed by: INTERNAL MEDICINE

## 2022-04-13 PROCEDURE — 1123F ACP DISCUSS/DSCN MKR DOCD: CPT | Performed by: INTERNAL MEDICINE

## 2022-04-13 PROCEDURE — 4040F PNEUMOC VAC/ADMIN/RCVD: CPT | Performed by: INTERNAL MEDICINE

## 2022-04-13 PROCEDURE — G8427 DOCREV CUR MEDS BY ELIG CLIN: HCPCS | Performed by: INTERNAL MEDICINE

## 2022-04-13 PROCEDURE — 99214 OFFICE O/P EST MOD 30 MIN: CPT | Performed by: INTERNAL MEDICINE

## 2022-04-13 PROCEDURE — 3023F SPIROM DOC REV: CPT | Performed by: INTERNAL MEDICINE

## 2022-04-13 PROCEDURE — 1036F TOBACCO NON-USER: CPT | Performed by: INTERNAL MEDICINE

## 2022-04-13 PROCEDURE — G8399 PT W/DXA RESULTS DOCUMENT: HCPCS | Performed by: INTERNAL MEDICINE

## 2022-04-13 RX ORDER — ALLOPURINOL 300 MG/1
300 TABLET ORAL DAILY
Qty: 90 TABLET | Refills: 3 | Status: SHIPPED | OUTPATIENT
Start: 2022-04-13

## 2022-04-13 RX ORDER — LEVOTHYROXINE SODIUM 0.15 MG/1
TABLET ORAL
Qty: 90 TABLET | Refills: 3 | Status: SHIPPED | OUTPATIENT
Start: 2022-04-13 | End: 2022-09-12 | Stop reason: SDUPTHER

## 2022-04-13 RX ORDER — CIPROFLOXACIN 500 MG/1
500 TABLET, FILM COATED ORAL 2 TIMES DAILY
Qty: 10 TABLET | Refills: 0 | Status: SHIPPED | OUTPATIENT
Start: 2022-04-13 | End: 2022-04-18

## 2022-04-13 ASSESSMENT — ENCOUNTER SYMPTOMS
ABDOMINAL DISTENTION: 0
SHORTNESS OF BREATH: 0
WHEEZING: 0
TROUBLE SWALLOWING: 0
EYE PAIN: 0
DIARRHEA: 0
BLOOD IN STOOL: 0
COLOR CHANGE: 0
EYE DISCHARGE: 0
COUGH: 0

## 2022-04-13 NOTE — PROGRESS NOTES
[address] [place] [address]  Dept: [phone]  Dept Fax: [phone]    Hu Gold is a 78 y.o. female who presents today for her medical conditions/complaintsas noted below.   Hu Gold is c/o of   Chief Complaint   Patient presents with    Hypertension    Thyroid Problem     dose on levothyroxine          HPI:     pv bleed smal amount    uti  HTN  Onset more than 2 years ago  luma mild to mod  Controlled with current po meds  Not associated with headaches or blurry vision  No chest pain        No results found for: LABA1C          ( goal A1Cis < 7)   No results found for: LABMICR  LDL Cholesterol (mg/dL)   Date Value   01/04/2022 101   08/31/2020 109   06/06/2019 101       (goal LDL is <100)   AST (U/L)   Date Value   01/04/2022 27     ALT (U/L)   Date Value   01/04/2022 13     BUN (mg/dL)   Date Value   01/04/2022 16     BP Readings from Last 3 Encounters:   04/13/22 126/76   01/13/22 134/80   10/04/21 118/78          (goal 120/80)    Past Medical History:   Diagnosis Date    Abdominal pain, unspecified site     Acquired cyst of kidney     Acute gouty arthropathy     Allergic rhinitis, cause unspecified     Anxiety state, unspecified     Arthropathy, unspecified, site unspecified     Chronic airway obstruction, not elsewhere classified     Chronic kidney disease, stage III (moderate) (HCC)     Diarrhea     Edema     Esophageal reflux     Essential hypertension, benign     Herpes zoster with other nervous system complications(053.19)     Herpes zoster without mention of complication     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)     Mononeuritis of unspecified site     Myalgia and myositis, unspecified     Osteoarthrosis, unspecified whether generalized or localized, unspecified site     Other general symptoms(780.99)     Other iatrogenic hypothyroidism     Other nonspecific abnormal finding of lung field     Other psoriasis     Pure hypercholesterolemia     Regional enteritis of unspecified site     Senile osteoporosis     Sprain of ankle, unspecified site     Unspecified vitamin D deficiency       Past Surgical History:   Procedure Laterality Date    COLONOSCOPY      DILATION AND CURETTAGE OF UTERUS      UPPER GASTROINTESTINAL ENDOSCOPY N/A 6/14/2019    EGD FOREIGN BODY REMOVAL performed by Neri Mendieta MD at [facility] OR       Family History   Problem Relation Age of Onset    Coronary Art Dis Father        Social History     Tobacco Use    Smoking status: Former Smoker     Packs/day: 0.75     Years: 2.00     Pack years: 1.50     Types: Cigarettes     Start date: 10/25/1958     Quit date: 10/25/2018     Years since quitting: 3.4    Smokeless tobacco: Never Used   Substance Use Topics    Alcohol use: No     Alcohol/week: 0.0 standard drinks      Current Outpatient Medications   Medication Sig Dispense Refill    allopurinol (ZYLOPRIM) 300 MG tablet Take 1 tablet by mouth daily 90 tablet 3    levothyroxine (SYNTHROID) 150 MCG tablet Daily fasting in am one hour before breakfast 90 tablet 3    ciprofloxacin (CIPRO) 500 MG tablet Take 1 tablet by mouth 2 times daily for 5 days 10 tablet 0    carvedilol (COREG) 12.5 MG tablet TAKE ONE-HALF TABLET BY  MOUTH TWICE DAILY 90 tablet 3    atorvastatin (LIPITOR) 40 MG tablet TAKE 1 TABLET BY MOUTH  DAILY 90 tablet 3    colchicine (COLCRYS) 0.6 MG tablet Take 2 tabs immediately, then 1 tablet 1 hour later. Then one tablet a day for 2 more days.  10 tablet 11    nitrofurantoin, macrocrystal-monohydrate, (MACROBID) 100 MG capsule TAKE 1 CAPSULE BY MOUTH TWICE DAILY      Clobetasol Propionate (CLOBEX) 0.05 % SHAM Apply 1 Act topically once a week 1 Bottle 0    folic acid (FOLVITE) 1 MG tablet Take 1 tablet by mouth daily 90 tablet 3    Multiple Vitamins-Minerals (HAIR/SKIN/NAILS) TABS Take 1 tablet by mouth daily      Cholecalciferol (VITAMIN D) 2000 UNITS CAPS capsule Take 2,000 Units by mouth daily      gabapentin (NEURONTIN) 300 MG capsule Take 1 capsule by mouth 4 times daily for 30 days. 120 capsule 0     Current Facility-Administered Medications   Medication Dose Route Frequency Provider Last Rate Last Admin    methylPREDNISolone sodium (SOLU-MEDROL) injection 125 mg  125 mg IntraMUSCular Once Mauricio Nelson MD        methylPREDNISolone acetate (DEPO-MEDROL) injection 80 mg  80 mg IntraMUSCular Once Mauricio Nelson MD        triamcinolone acetonide (KENALOG-40) injection 60 mg  60 mg IntraMUSCular Once Mauricio Nelson MD         Allergies   Allergen Reactions    Etomidate     Penicillins Hives       Health Maintenance   Topic Date Due    DTaP/Tdap/Td vaccine (1 - Tdap) Never done    Shingles Vaccine (1 of 2) Never done    Depression Screen  07/26/2022    Annual Wellness Visit (AWV)  07/27/2022    Lipid screen  01/04/2023    TSH testing  01/04/2023    Potassium monitoring  01/04/2023    Creatinine monitoring  01/04/2023    Flu vaccine  Completed    Pneumococcal 65+ years Vaccine  Completed    COVID-19 Vaccine  Completed    DEXA (modify frequency per FRAX score)  Addressed    Hepatitis A vaccine  Aged Out    Hepatitis B vaccine  Aged Out    Hib vaccine  Aged Out    Meningococcal (ACWY) vaccine  Aged Out       Subjective:     Review of Systems   Constitutional: Negative for appetite change, diaphoresis and fatigue. HENT: Negative for ear discharge and trouble swallowing. Eyes: Negative for pain and discharge. Respiratory: Negative for cough, shortness of breath and wheezing. Cardiovascular: Negative for chest pain and palpitations. Gastrointestinal: Negative for abdominal distention, blood in stool and diarrhea. Endocrine: Negative for polydipsia and polyphagia. Genitourinary: Positive for dysuria and vaginal bleeding. Negative for difficulty urinating and frequency. Musculoskeletal: Negative for gait problem, myalgias and neck pain.    Skin: Negative for color change and rash. Allergic/Immunologic: Negative for environmental allergies and food allergies. Neurological: Negative for dizziness and headaches. Hematological: Negative for adenopathy. Does not bruise/bleed easily. Psychiatric/Behavioral: Negative for behavioral problems and sleep disturbance. Objective:     Physical Exam  Constitutional:       Appearance: She is well-developed. She is not diaphoretic. Comments: malnurshed     HENT:      Head: Normocephalic and atraumatic. Eyes:      General:         Right eye: No discharge. Left eye: No discharge. Extraocular Movements:      Right eye: Normal extraocular motion. Left eye: Normal extraocular motion. Conjunctiva/sclera: Conjunctivae normal.      Right eye: Right conjunctiva is not injected. Left eye: Left conjunctiva is not injected. Neck:      Thyroid: No thyroid mass or thyromegaly. Vascular: No JVD. Cardiovascular:      Rate and Rhythm: Normal rate and regular rhythm. Heart sounds: No murmur heard. No friction rub. Pulmonary:      Effort: Pulmonary effort is normal. No tachypnea, bradypnea, accessory muscle usage or respiratory distress. Breath sounds: Normal breath sounds. No wheezing or rales. Abdominal:      General: Bowel sounds are normal. There is no distension. Palpations: Abdomen is soft. Tenderness: There is no abdominal tenderness. There is no rebound. Musculoskeletal:         General: No tenderness. Normal range of motion. Cervical back: Normal range of motion and neck supple. No edema or erythema. Lymphadenopathy:      Head:      Right side of head: No submental or submandibular adenopathy. Left side of head: No submental or submandibular adenopathy. Cervical: No cervical adenopathy. Skin:     General: Skin is warm. Coloration: Skin is not pale. Findings: No bruising, ecchymosis or rash.    Neurological:      Mental Status: She is alert and oriented to person, place, and time. Cranial Nerves: No cranial nerve deficit. Sensory: No sensory deficit. Motor: No atrophy or abnormal muscle tone. Coordination: Coordination normal.   Psychiatric:         Mood and Affect: Mood is not anxious. Affect is not angry. Speech: Speech is not slurred. Behavior: Behavior normal. Behavior is not aggressive. Thought Content: Thought content does not include homicidal ideation. Cognition and Memory: Memory is not impaired. /76   Pulse 70   Ht 5' 8\" (1.727 m)   Wt 131 lb (59.4 kg)   SpO2 97%   BMI 19.92 kg/m²     Assessment:       Diagnosis Orders   1. Abnormal uterine bleeding (AUB)  Ambulatory referral to Obstetrics / Gynecology   2. Panlobular emphysema (HCC)     3. Stage 3a chronic kidney disease ([facility] [address]     4. Acute gouty arthropathy  allopurinol (ZYLOPRIM) 300 MG tablet   5. Acquired hypothyroidism  levothyroxine (SYNTHROID) 150 MCG tablet    TSH With Reflex Ft4   6. Acute cystitis with hematuria  Culture, Urine             Plan:      Return in about 1 month (around 5/13/2022). Orders Placed This Encounter   Procedures    Culture, Urine     Standing Status:   Future     Standing Expiration Date:   4/13/2023     Order Specific Question:   Specify (ex-cath, midstream, cysto, etc)?      Answer:   clean cath    TSH With Reflex Ft4     Standing Status:   Future     Standing Expiration Date:   4/13/2023    Ambulatory referral to Obstetrics / Gynecology     Referral Priority:   Routine     Referral Type:   Consult for Advice and Opinion     Referral Reason:   Specialty Services Required     Referred to Provider:   Terry Bo DO     Number of Visits Requested:   1     Orders Placed This Encounter   Medications    allopurinol (ZYLOPRIM) 300 MG tablet     Sig: Take 1 tablet by mouth daily     Dispense:  90 tablet     Refill:  3    levothyroxine (SYNTHROID) 150 MCG tablet     Sig: Daily fasting in am one hour before breakfast     Dispense:  90 tablet     Refill:  3     Requesting 1 year supply    ciprofloxacin (CIPRO) 500 MG tablet     Sig: Take 1 tablet by mouth 2 times daily for 5 days     Dispense:  10 tablet     Refill:  0    uterine blood small resent gyn  uti abx  tsh noted high pt not taking pills as advised  advisd to take fasting in am and no meals for drink except wanter for one hour after pillls  Recheck tsh today  Will rev in  A month     Patient given educational materials - see patient instructions. Discussed use, benefit, and side effects of prescribed medications. All patientquestions answered. Pt voiced understanding. Reviewed health maintenance. Instructedto continue current medications, diet and exercise. Patient agreed with treatmentplan. Follow up as directed. Please note that this chart was generated using voice recognition Dragon dictation software. Although every effort was made to ensure the accuracy of this automated transcription, some errors in transcription may have occurred. Electronically signed by Cameron Nelson MD on 4/13/2022 at 10:55 AMVisit Information    Have you changed or started any medications since your last visit including any over-the-counter medicines, vitamins, or herbal medicines? no   Are you having any side effects from any of your medications? -  no  Have you stopped taking any of your medications? Is so, why? -  no    Have you seen any other physician or provider since your last visit? No  Have you had any other diagnostic tests since your last visit? No  Have you been seen in the emergency room and/or had an admission to a hospital since we last saw you? No  Have you had your routine dental cleaning in the past 6 months? no    Have you activated your [employer] account? If not, what are your barriers?  Yes     Patient Care Team:  Cameron Nelson MD as PCP - General (Internal Medicine)  Cameron Nelson MD as PCP - [facility] Provider    Medical History Review  Past Medical, Family, and Social History reviewed and does contribute to the patient presenting condition    Health Maintenance   Topic Date Due    DTaP/Tdap/Td vaccine (1 - Tdap) Never done    Shingles Vaccine (1 of 2) Never done    Depression Screen  07/26/2022    Annual Wellness Visit (AWV)  07/27/2022    Lipid screen  01/04/2023    TSH testing  01/04/2023    Potassium monitoring  01/04/2023    Creatinine monitoring  01/04/2023    Flu vaccine  Completed    Pneumococcal 65+ years Vaccine  Completed    COVID-19 Vaccine  Completed    DEXA (modify frequency per FRAX score)  Addressed    Hepatitis A vaccine  Aged Out    Hepatitis B vaccine  Aged Out    Hib vaccine  Aged Out    Meningococcal (ACWY) vaccine  Aged Out

## 2022-04-20 ENCOUNTER — HOSPITAL ENCOUNTER (OUTPATIENT)
Age: 82
Setting detail: SPECIMEN
Discharge: HOME | End: 2022-04-20

## 2022-04-20 DIAGNOSIS — E03.9 ACQUIRED HYPOTHYROIDISM: ICD-10-CM

## 2022-04-20 LAB — TSH SERPL DL<=0.05 MIU/L-ACNC: 0.03 UIU/ML (ref 0.3–5)

## 2022-04-21 LAB — THYROXINE, FREE: 1.71 NG/DL (ref 0.93–1.7)

## 2022-04-27 ENCOUNTER — HOSPITAL ENCOUNTER (OUTPATIENT)
Age: 82
Setting detail: SPECIMEN
Discharge: HOME | End: 2022-04-27

## 2022-04-27 DIAGNOSIS — N30.01 ACUTE CYSTITIS WITH HEMATURIA: ICD-10-CM

## 2022-04-29 LAB
CULTURE: ABNORMAL
SPECIMEN DESCRIPTION: ABNORMAL

## 2022-04-29 RX ORDER — DOXYCYCLINE HYCLATE 100 MG/1
100 CAPSULE ORAL 2 TIMES DAILY
Qty: 20 CAPSULE | Refills: 0 | Status: SHIPPED | OUTPATIENT
Start: 2022-04-29 | End: 2022-05-09

## 2022-05-04 RX ORDER — LEVOTHYROXINE SODIUM 0.12 MG/1
TABLET ORAL
COMMUNITY
Start: 2022-03-10 | End: 2022-09-12

## 2022-05-05 ENCOUNTER — OFFICE VISIT (OUTPATIENT)
Dept: INTERNAL MEDICINE CLINIC | Age: 82
End: 2022-05-05
Payer: MEDICARE

## 2022-05-05 VITALS
DIASTOLIC BLOOD PRESSURE: 72 MMHG | OXYGEN SATURATION: 97 % | WEIGHT: 127 LBS | BODY MASS INDEX: 19.25 KG/M2 | HEIGHT: 68 IN | SYSTOLIC BLOOD PRESSURE: 124 MMHG | HEART RATE: 77 BPM

## 2022-05-05 DIAGNOSIS — N18.31 STAGE 3A CHRONIC KIDNEY DISEASE (HCC): ICD-10-CM

## 2022-05-05 DIAGNOSIS — M81.0 SENILE OSTEOPOROSIS: ICD-10-CM

## 2022-05-05 DIAGNOSIS — I10 ESSENTIAL HYPERTENSION, BENIGN: Primary | ICD-10-CM

## 2022-05-05 DIAGNOSIS — M35.01 SJOGREN'S SYNDROME WITH KERATOCONJUNCTIVITIS SICCA (HCC): ICD-10-CM

## 2022-05-05 DIAGNOSIS — J43.1 PANLOBULAR EMPHYSEMA (HCC): ICD-10-CM

## 2022-05-05 DIAGNOSIS — M17.11 PRIMARY OSTEOARTHRITIS OF RIGHT KNEE: ICD-10-CM

## 2022-05-05 PROCEDURE — 20610 DRAIN/INJ JOINT/BURSA W/O US: CPT | Performed by: INTERNAL MEDICINE

## 2022-05-05 RX ORDER — METHYLPREDNISOLONE ACETATE 40 MG/ML
40 INJECTION, SUSPENSION INTRA-ARTICULAR; INTRALESIONAL; INTRAMUSCULAR; SOFT TISSUE ONCE
Status: COMPLETED | OUTPATIENT
Start: 2022-05-05 | End: 2022-05-05

## 2022-05-05 RX ORDER — METHYLPREDNISOLONE ACETATE 80 MG/ML
80 INJECTION, SUSPENSION INTRA-ARTICULAR; INTRALESIONAL; INTRAMUSCULAR; SOFT TISSUE ONCE
Status: SHIPPED | OUTPATIENT
Start: 2022-05-05

## 2022-05-05 RX ADMIN — METHYLPREDNISOLONE ACETATE 80 MG: 40 INJECTION, SUSPENSION INTRA-ARTICULAR; INTRALESIONAL; INTRAMUSCULAR; SOFT TISSUE at 14:27

## 2022-05-05 ASSESSMENT — ENCOUNTER SYMPTOMS
COUGH: 0
TROUBLE SWALLOWING: 0
EYE PAIN: 0
SHORTNESS OF BREATH: 0
EYE DISCHARGE: 0
ABDOMINAL DISTENTION: 0
WHEEZING: 0
COLOR CHANGE: 0
BLOOD IN STOOL: 0
DIARRHEA: 0

## 2022-05-05 NOTE — PROGRESS NOTES
[address] [address]  Dept: [phone]  Dept Fax: [phone]    Ashley Brunner is a 78 y.o. female who presents today for her medical conditions/complaintsas noted below.   Ashley Brunner is c/o of   Chief Complaint   Patient presents with    Hypertension    Urinary Tract Infection     follow up     Toe Pain         HPI:     HTN  Onset more than 2 years ago  luma mild to mod  Controlled with current po meds  Not associated with headaches or blurry vision  No chest pain    uti treated        No results found for: LABA1C          ( goal A1Cis < 7)   No results found for: LABMICR  LDL Cholesterol (mg/dL)   Date Value   01/04/2022 101   08/31/2020 109   06/06/2019 101       (goal LDL is <100)   AST (U/L)   Date Value   01/04/2022 27     ALT (U/L)   Date Value   01/04/2022 13     BUN (mg/dL)   Date Value   01/04/2022 16     BP Readings from Last 3 Encounters:   05/05/22 124/72   04/13/22 126/76   01/13/22 134/80          (goal 120/80)    Past Medical History:   Diagnosis Date    Abdominal pain, unspecified site     Acquired cyst of kidney     Acute gouty arthropathy     Allergic rhinitis, cause unspecified     Anxiety state, unspecified     Arthropathy, unspecified, site unspecified     Chronic airway obstruction, not elsewhere classified     Chronic kidney disease, stage III (moderate) (HCC)     Diarrhea     Edema     Esophageal reflux     Essential hypertension, benign     Herpes zoster with other nervous system complications(053.19)     Herpes zoster without mention of complication     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)     Mononeuritis of unspecified site     Myalgia and myositis, unspecified     Osteoarthrosis, unspecified whether generalized or localized, unspecified site     Other general symptoms(780.99)     Other iatrogenic hypothyroidism     Other nonspecific abnormal finding of lung field     Other psoriasis     Pure hypercholesterolemia     Regional enteritis of unspecified site     Senile osteoporosis     Sprain of ankle, unspecified site     Unspecified vitamin D deficiency       Past Surgical History:   Procedure Laterality Date    COLONOSCOPY      DILATION AND CURETTAGE OF UTERUS      UPPER GASTROINTESTINAL ENDOSCOPY N/A 6/14/2019    EGD FOREIGN BODY REMOVAL performed by Kamini Long MD at [facility]       Family History   Problem Relation Age of Onset    Coronary Art Dis Father        Social History     Tobacco Use    Smoking status: Former Smoker     Packs/day: 0.75     Years: 2.00     Pack years: 1.50     Types: Cigarettes     Start date: 10/25/1958     Quit date: 10/25/2018     Years since quitting: 3.5    Smokeless tobacco: Never Used   Substance Use Topics    Alcohol use: No     Alcohol/week: 0.0 standard drinks      Current Outpatient Medications   Medication Sig Dispense Refill    levothyroxine (SYNTHROID) 125 MCG tablet       doxycycline hyclate (VIBRAMYCIN) 100 MG capsule Take 1 capsule by mouth 2 times daily for 10 days Take with food, but avoid dairy, calcium and MTV's 2 hours before and after the dose 20 capsule 0    allopurinol (ZYLOPRIM) 300 MG tablet Take 1 tablet by mouth daily 90 tablet 3    levothyroxine (SYNTHROID) 150 MCG tablet Daily fasting in am one hour before breakfast 90 tablet 3    carvedilol (COREG) 12.5 MG tablet TAKE ONE-HALF TABLET BY  MOUTH TWICE DAILY 90 tablet 3    atorvastatin (LIPITOR) 40 MG tablet TAKE 1 TABLET BY MOUTH  DAILY 90 tablet 3    colchicine (COLCRYS) 0.6 MG tablet Take 2 tabs immediately, then 1 tablet 1 hour later. Then one tablet a day for 2 more days. 10 tablet 11    gabapentin (NEURONTIN) 300 MG capsule Take 1 capsule by mouth 4 times daily for 30 days.  120 capsule 0    nitrofurantoin, macrocrystal-monohydrate, (MACROBID) 100 MG capsule TAKE 1 CAPSULE BY MOUTH TWICE DAILY      Clobetasol Propionate (CLOBEX) 0.05 % SHAM Apply 1 Act topically once a week 1 Bottle 0    folic acid (FOLVITE) 1 MG tablet Take 1 tablet by mouth daily 90 tablet 3    Multiple Vitamins-Minerals (HAIR/SKIN/NAILS) TABS Take 1 tablet by mouth daily      Cholecalciferol (VITAMIN D) 2000 UNITS CAPS capsule Take 2,000 Units by mouth daily       Current Facility-Administered Medications   Medication Dose Route Frequency Provider Last Rate Last Admin    methylPREDNISolone acetate (DEPO-MEDROL) injection 80 mg  80 mg IntraMUSCular Once Sivan Sadler MD        methylPREDNISolone sodium (SOLU-MEDROL) injection 125 mg  125 mg IntraMUSCular Once Jacki De Dios MD        methylPREDNISolone acetate (DEPO-MEDROL) injection 80 mg  80 mg IntraMUSCular Once Jacki De Dios MD        triamcinolone acetonide (KENALOG-40) injection 60 mg  60 mg IntraMUSCular Once Jacki De Dios MD         Allergies   Allergen Reactions    Etomidate     Penicillins Hives       Health Maintenance   Topic Date Due    DTaP/Tdap/Td vaccine (1 - Tdap) Never done    Shingles vaccine (1 of 2) Never done    Depression Screen  07/26/2022    Annual Wellness Visit (AWV)  07/27/2022    Lipids  01/04/2023    Potassium  01/04/2023    Creatinine  01/04/2023    TSH  04/20/2023    Flu vaccine  Completed    Pneumococcal 65+ years Vaccine  Completed    COVID-19 Vaccine  Completed    DEXA (modify frequency per FRAX score)  Addressed    Hepatitis A vaccine  Aged Out    Hepatitis B vaccine  Aged Out    Hib vaccine  Aged Out    Meningococcal (ACWY) vaccine  Aged Out       Subjective:     Review of Systems   Constitutional: Negative for appetite change, diaphoresis and fatigue. HENT: Negative for ear discharge and trouble swallowing. Eyes: Negative for pain and discharge. Respiratory: Negative for cough, shortness of breath and wheezing. Cardiovascular: Negative for chest pain and palpitations. Gastrointestinal: Negative for abdominal distention, blood in stool and diarrhea.    Endocrine: Negative for polydipsia and polyphagia. Genitourinary: Negative for difficulty urinating and frequency. Musculoskeletal: Positive for arthralgias. Negative for gait problem, myalgias and neck pain. Skin: Negative for color change and rash. Allergic/Immunologic: Negative for environmental allergies and food allergies. Neurological: Negative for dizziness and headaches. Hematological: Negative for adenopathy. Does not bruise/bleed easily. Psychiatric/Behavioral: Negative for behavioral problems and sleep disturbance. Objective:     Physical Exam  Constitutional:       Appearance: She is well-developed. She is not diaphoretic. HENT:      Head: Normocephalic and atraumatic. Eyes:      General:         Right eye: No discharge. Left eye: No discharge. Extraocular Movements:      Right eye: Normal extraocular motion. Left eye: Normal extraocular motion. Conjunctiva/sclera: Conjunctivae normal.      Right eye: Right conjunctiva is not injected. Left eye: Left conjunctiva is not injected. Neck:      Thyroid: No thyroid mass or thyromegaly. Vascular: No JVD. Cardiovascular:      Rate and Rhythm: Normal rate and regular rhythm. Heart sounds: No murmur heard. No friction rub. Pulmonary:      Effort: Pulmonary effort is normal. No tachypnea, bradypnea, accessory muscle usage or respiratory distress. Breath sounds: Normal breath sounds. No wheezing or rales. Abdominal:      General: Bowel sounds are normal. There is no distension. Palpations: Abdomen is soft. Tenderness: There is no abdominal tenderness. There is no rebound. Musculoskeletal:         General: No tenderness. Normal range of motion. Cervical back: Normal range of motion and neck supple. No edema or erythema. Lymphadenopathy:      Head:      Right side of head: No submental or submandibular adenopathy. Left side of head: No submental or submandibular adenopathy.       Cervical: No cervical adenopathy. Skin:     General: Skin is warm. Coloration: Skin is not pale. Findings: No bruising, ecchymosis or rash. Neurological:      Mental Status: She is alert and oriented to person, place, and time. Cranial Nerves: No cranial nerve deficit. Sensory: No sensory deficit. Motor: No atrophy or abnormal muscle tone. Coordination: Coordination normal.   Psychiatric:         Mood and Affect: Mood is not anxious. Affect is not angry. Speech: Speech is not slurred. Behavior: Behavior normal. Behavior is not aggressive. Thought Content: Thought content does not include homicidal ideation. Cognition and Memory: Memory is not impaired. /72   Pulse 77   Ht 5' 8\" (1.727 m)   Wt 127 lb (57.6 kg)   SpO2 97%   BMI 19.31 kg/m²     Assessment:       Diagnosis Orders   1. Essential hypertension, benign     2. Panlobular emphysema (HCC)     3. Stage 3a chronic kidney disease ([facility] [address]     4. Sjogren's syndrome with keratoconjunctivitis sicca ([facility] [address]     5. Senile osteoporosis     6. Primary osteoarthritis of right knee  97870 - [place] DRAIN/INJECT LARGE JOINT/BURSA             Plan:      Return in about 1 month (around 6/5/2022). Orders Placed This Encounter   Procedures    42926 - [place] DRAIN/INJECT LARGE JOINT/BURSA     Orders Placed This Encounter   Medications    methylPREDNISolone acetate (DEPO-MEDROL) injection 80 mg    right knee oa  With verbal consent  Aseptic tech  Well tolerated  Above injected  No touch  Advised to call ortho may need knee replacement  Seen dr Ashley Malik before     Patient given educational materials - see patient instructions. Discussed use, benefit, and side effects of prescribed medications. All patientquestions answered. Pt voiced understanding. Reviewed health maintenance. Instructedto continue current medications, diet and exercise. Patient agreed with treatmentplan. Follow up as directed.        Please note that this chart was generated using voice recognition Dragon dictation software. Although every effort was made to ensure the accuracy of this automated transcription, some errors in transcription may have occurred.      Electronically signed by Jaime Leger MD on 5/5/2022 at 2:17 PM

## 2022-05-05 NOTE — PROGRESS NOTES
Visit Information    Have you changed or started any medications since your last visit including any over-the-counter medicines, vitamins, or herbal medicines? no   Are you having any side effects from any of your medications? -  no  Have you stopped taking any of your medications? Is so, why? -  no    Have you seen any other physician or provider since your last visit? Yes - Records Obtained  Have you had any other diagnostic tests since your last visit? Yes - Records Obtained  Have you been seen in the emergency room and/or had an admission to a hospital since we last saw you? No  Have you had your routine dental cleaning in the past 6 months? yes -     Have you activated your [facility] account? If not, what are your barriers?  Yes     Patient Care Team:  Preston Melgar MD as PCP - General (Internal Medicine)  Preston Melgar MD as PCP - [facility] Provider    Medical History Review  Past Medical, Family, and Social History reviewed and does contribute to the patient presenting condition    Health Maintenance   Topic Date Due    DTaP/Tdap/Td vaccine (1 - Tdap) Never done    Shingles vaccine (1 of 2) Never done    Depression Screen  07/26/2022    Annual Wellness Visit (AWV)  07/27/2022    Lipids  01/04/2023    Potassium  01/04/2023    Creatinine  01/04/2023    TSH  04/20/2023    Flu vaccine  Completed    Pneumococcal 65+ years Vaccine  Completed    COVID-19 Vaccine  Completed    DEXA (modify frequency per FRAX score)  Addressed    Hepatitis A vaccine  Aged Out    Hepatitis B vaccine  Aged Out    Hib vaccine  Aged Out    Meningococcal (ACWY) vaccine  Aged Out

## 2022-06-04 ENCOUNTER — APPOINTMENT (OUTPATIENT)
Dept: CT IMAGING | Age: 82
DRG: 516 | End: 2022-06-04
Payer: MEDICARE

## 2022-06-04 ENCOUNTER — HOSPITAL ENCOUNTER (INPATIENT)
Age: 82
LOS: 3 days | Discharge: HOME HEALTH | DRG: 516 | End: 2022-06-07
Attending: STUDENT IN AN ORGANIZED HEALTH CARE EDUCATION/TRAINING PROGRAM | Admitting: ORTHOPAEDIC SURGERY
Payer: MEDICARE

## 2022-06-04 ENCOUNTER — APPOINTMENT (OUTPATIENT)
Dept: GENERAL RADIOLOGY | Age: 82
DRG: 516 | End: 2022-06-04
Payer: MEDICARE

## 2022-06-04 DIAGNOSIS — M84.48XD SACRAL INSUFFICIENCY FRACTURE WITH ROUTINE HEALING: ICD-10-CM

## 2022-06-04 DIAGNOSIS — S32.591A CLOSED FRACTURE OF RIGHT INFERIOR PUBIC RAMUS, INITIAL ENCOUNTER (HCC): Primary | ICD-10-CM

## 2022-06-04 LAB
CREAT SERPL-MCNC: 1.03 MG/DL (ref 0.5–0.9)
GFR AFRICAN AMERICAN: >60 ML/MIN
GFR NON-AFRICAN AMERICAN: 51 ML/MIN
GFR SERPL CREATININE-BSD FRML MDRD: ABNORMAL ML/MIN/{1.73_M2}

## 2022-06-04 PROCEDURE — 99285 EMERGENCY DEPT VISIT HI MDM: CPT

## 2022-06-04 PROCEDURE — 6360000002 HC RX W HCPCS: Performed by: STUDENT IN AN ORGANIZED HEALTH CARE EDUCATION/TRAINING PROGRAM

## 2022-06-04 PROCEDURE — 1200000000 HC SEMI PRIVATE

## 2022-06-04 PROCEDURE — 36415 COLL VENOUS BLD VENIPUNCTURE: CPT

## 2022-06-04 PROCEDURE — 99222 1ST HOSP IP/OBS MODERATE 55: CPT | Performed by: INTERNAL MEDICINE

## 2022-06-04 PROCEDURE — 82565 ASSAY OF CREATININE: CPT

## 2022-06-04 PROCEDURE — 6370000000 HC RX 637 (ALT 250 FOR IP): Performed by: STUDENT IN AN ORGANIZED HEALTH CARE EDUCATION/TRAINING PROGRAM

## 2022-06-04 PROCEDURE — 72192 CT PELVIS W/O DYE: CPT

## 2022-06-04 PROCEDURE — 73502 X-RAY EXAM HIP UNI 2-3 VIEWS: CPT

## 2022-06-04 PROCEDURE — 96374 THER/PROPH/DIAG INJ IV PUSH: CPT

## 2022-06-04 PROCEDURE — 96375 TX/PRO/DX INJ NEW DRUG ADDON: CPT

## 2022-06-04 PROCEDURE — 2580000003 HC RX 258: Performed by: STUDENT IN AN ORGANIZED HEALTH CARE EDUCATION/TRAINING PROGRAM

## 2022-06-04 RX ORDER — SODIUM CHLORIDE 9 MG/ML
INJECTION, SOLUTION INTRAVENOUS PRN
Status: DISCONTINUED | OUTPATIENT
Start: 2022-06-04 | End: 2022-06-06 | Stop reason: SDUPTHER

## 2022-06-04 RX ORDER — ONDANSETRON 4 MG/1
4 TABLET, ORALLY DISINTEGRATING ORAL EVERY 8 HOURS PRN
Status: DISCONTINUED | OUTPATIENT
Start: 2022-06-04 | End: 2022-06-07 | Stop reason: HOSPADM

## 2022-06-04 RX ORDER — SODIUM CHLORIDE 0.9 % (FLUSH) 0.9 %
5-40 SYRINGE (ML) INJECTION EVERY 12 HOURS SCHEDULED
Status: DISCONTINUED | OUTPATIENT
Start: 2022-06-04 | End: 2022-06-06 | Stop reason: SDUPTHER

## 2022-06-04 RX ORDER — SODIUM CHLORIDE 0.9 % (FLUSH) 0.9 %
5-40 SYRINGE (ML) INJECTION PRN
Status: DISCONTINUED | OUTPATIENT
Start: 2022-06-04 | End: 2022-06-06 | Stop reason: SDUPTHER

## 2022-06-04 RX ORDER — ONDANSETRON 2 MG/ML
4 INJECTION INTRAMUSCULAR; INTRAVENOUS ONCE
Status: COMPLETED | OUTPATIENT
Start: 2022-06-04 | End: 2022-06-04

## 2022-06-04 RX ORDER — HYDRALAZINE HYDROCHLORIDE 20 MG/ML
10 INJECTION INTRAMUSCULAR; INTRAVENOUS EVERY 6 HOURS PRN
Status: DISCONTINUED | OUTPATIENT
Start: 2022-06-04 | End: 2022-06-07 | Stop reason: HOSPADM

## 2022-06-04 RX ORDER — ALLOPURINOL 300 MG/1
300 TABLET ORAL DAILY
Status: DISCONTINUED | OUTPATIENT
Start: 2022-06-04 | End: 2022-06-07 | Stop reason: HOSPADM

## 2022-06-04 RX ORDER — ACETAMINOPHEN 325 MG/1
650 TABLET ORAL EVERY 4 HOURS PRN
Status: DISCONTINUED | OUTPATIENT
Start: 2022-06-04 | End: 2022-06-07 | Stop reason: HOSPADM

## 2022-06-04 RX ORDER — FENTANYL CITRATE 50 UG/ML
50 INJECTION, SOLUTION INTRAMUSCULAR; INTRAVENOUS ONCE
Status: COMPLETED | OUTPATIENT
Start: 2022-06-04 | End: 2022-06-04

## 2022-06-04 RX ORDER — ONDANSETRON 2 MG/ML
4 INJECTION INTRAMUSCULAR; INTRAVENOUS EVERY 6 HOURS PRN
Status: DISCONTINUED | OUTPATIENT
Start: 2022-06-04 | End: 2022-06-07 | Stop reason: HOSPADM

## 2022-06-04 RX ORDER — OXYCODONE HYDROCHLORIDE 5 MG/1
5 TABLET ORAL EVERY 6 HOURS PRN
Status: DISCONTINUED | OUTPATIENT
Start: 2022-06-04 | End: 2022-06-06 | Stop reason: SDUPTHER

## 2022-06-04 RX ORDER — FOLIC ACID 1 MG/1
1 TABLET ORAL DAILY
Status: DISCONTINUED | OUTPATIENT
Start: 2022-06-04 | End: 2022-06-07 | Stop reason: HOSPADM

## 2022-06-04 RX ORDER — POLYETHYLENE GLYCOL 3350 17 G/17G
17 POWDER, FOR SOLUTION ORAL DAILY PRN
Status: DISCONTINUED | OUTPATIENT
Start: 2022-06-04 | End: 2022-06-07 | Stop reason: HOSPADM

## 2022-06-04 RX ORDER — CARVEDILOL 12.5 MG/1
12.5 TABLET ORAL 2 TIMES DAILY WITH MEALS
Status: DISCONTINUED | OUTPATIENT
Start: 2022-06-04 | End: 2022-06-07 | Stop reason: HOSPADM

## 2022-06-04 RX ORDER — ENOXAPARIN SODIUM 100 MG/ML
40 INJECTION SUBCUTANEOUS DAILY
Status: DISCONTINUED | OUTPATIENT
Start: 2022-06-04 | End: 2022-06-06

## 2022-06-04 RX ORDER — ATORVASTATIN CALCIUM 40 MG/1
40 TABLET, FILM COATED ORAL DAILY
Status: DISCONTINUED | OUTPATIENT
Start: 2022-06-04 | End: 2022-06-07 | Stop reason: HOSPADM

## 2022-06-04 RX ORDER — LEVOTHYROXINE SODIUM 0.12 MG/1
125 TABLET ORAL DAILY
Status: DISCONTINUED | OUTPATIENT
Start: 2022-06-05 | End: 2022-06-07 | Stop reason: HOSPADM

## 2022-06-04 RX ORDER — MORPHINE SULFATE 4 MG/ML
4 INJECTION, SOLUTION INTRAMUSCULAR; INTRAVENOUS ONCE
Status: COMPLETED | OUTPATIENT
Start: 2022-06-04 | End: 2022-06-04

## 2022-06-04 RX ADMIN — OXYCODONE HYDROCHLORIDE 5 MG: 5 TABLET ORAL at 21:59

## 2022-06-04 RX ADMIN — ONDANSETRON 4 MG: 2 INJECTION INTRAMUSCULAR; INTRAVENOUS at 14:39

## 2022-06-04 RX ADMIN — MORPHINE SULFATE 4 MG: 4 INJECTION, SOLUTION INTRAMUSCULAR; INTRAVENOUS at 14:40

## 2022-06-04 RX ADMIN — SODIUM CHLORIDE, PRESERVATIVE FREE 10 ML: 5 INJECTION INTRAVENOUS at 22:00

## 2022-06-04 RX ADMIN — FOLIC ACID 1 MG: 1 TABLET ORAL at 18:53

## 2022-06-04 RX ADMIN — CARVEDILOL 12.5 MG: 12.5 TABLET, FILM COATED ORAL at 18:53

## 2022-06-04 RX ADMIN — ALLOPURINOL 300 MG: 300 TABLET ORAL at 18:53

## 2022-06-04 RX ADMIN — ATORVASTATIN CALCIUM 40 MG: 40 TABLET, FILM COATED ORAL at 18:53

## 2022-06-04 RX ADMIN — ENOXAPARIN SODIUM 40 MG: 40 INJECTION SUBCUTANEOUS at 18:54

## 2022-06-04 RX ADMIN — FENTANYL CITRATE 50 MCG: 50 INJECTION, SOLUTION INTRAMUSCULAR; INTRAVENOUS at 12:03

## 2022-06-04 ASSESSMENT — PAIN DESCRIPTION - ORIENTATION
ORIENTATION: RIGHT

## 2022-06-04 ASSESSMENT — PAIN DESCRIPTION - LOCATION
LOCATION: HIP

## 2022-06-04 ASSESSMENT — PAIN SCALES - GENERAL
PAINLEVEL_OUTOF10: 3
PAINLEVEL_OUTOF10: 3
PAINLEVEL_OUTOF10: 4
PAINLEVEL_OUTOF10: 4
PAINLEVEL_OUTOF10: 3
PAINLEVEL_OUTOF10: 10
PAINLEVEL_OUTOF10: 9
PAINLEVEL_OUTOF10: 6

## 2022-06-04 ASSESSMENT — ENCOUNTER SYMPTOMS
BACK PAIN: 0
SHORTNESS OF BREATH: 0
ABDOMINAL PAIN: 0
CONSTIPATION: 0
WHEEZING: 0
DIARRHEA: 0
NAUSEA: 0
COUGH: 0
VOMITING: 0
RHINORRHEA: 0

## 2022-06-04 ASSESSMENT — LIFESTYLE VARIABLES: HOW OFTEN DO YOU HAVE A DRINK CONTAINING ALCOHOL: NEVER

## 2022-06-04 ASSESSMENT — PAIN DESCRIPTION - DESCRIPTORS
DESCRIPTORS: ACHING

## 2022-06-04 ASSESSMENT — PAIN DESCRIPTION - FREQUENCY: FREQUENCY: CONTINUOUS

## 2022-06-04 ASSESSMENT — PAIN DESCRIPTION - PAIN TYPE: TYPE: ACUTE PAIN

## 2022-06-04 ASSESSMENT — PAIN - FUNCTIONAL ASSESSMENT: PAIN_FUNCTIONAL_ASSESSMENT: 0-10

## 2022-06-04 NOTE — ED NOTES
Report called to [address] [place] [address] on med-surg, updated to patient condition and plan of care. Pt is ready for transport upstairs.       Luis A Rose RN  06/04/22 [id number]

## 2022-06-04 NOTE — ED PROVIDER NOTES
[facility]  Emergency Department Encounter  Emergency Medicine Physician     Pt Name: Kody Hanley  MRN: [id number]  [place] 1940  Date of evaluation: 6/4/22  PCP:  Shahram Chan MD    [address] [place] [address]       Chief Complaint   Patient presents with   Rondel Cr    Hip Pain       HISTORY OF PRESENT ILLNESS  (Location/Symptom, Timing/Onset, Context/Setting, Quality, Duration, Modifying Factors, Severity.)    Kody Hanley is a 78 y.o. female who presents with right hip pain. Patient states that she was trying to move something in her basement and she lost her , fell backwards and landed on her right buttock. States she had immediate pain to the area. States that she has been trying to walk on her right leg but has been having pain in her right hip and the right inguinal crease since the fall. This happened earlier this morning. Denies loss of consciousness. She states that she did not hit her back, did not hit her head or neck. Did not lose consciousness. Denies any pain down the right leg, only pain is in the right hip.         PAST MEDICAL / SURGICAL / SOCIAL / FAMILY HISTORY    has a past medical history of Abdominal pain, unspecified site, Acquired cyst of kidney, Acute gouty arthropathy, Allergic rhinitis, cause unspecified, Anxiety state, unspecified, Arthropathy, unspecified, site unspecified, Chronic airway obstruction, not elsewhere classified, Chronic kidney disease, stage III (moderate) (HCC), Diarrhea, Edema, Esophageal reflux, Essential hypertension, benign, Herpes zoster with other nervous system complications(053.19), Herpes zoster without mention of complication, 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 has a past surgical history that includes Dilation and curettage of uterus; Colonoscopy; and Upper gastrointestinal endoscopy (N/A, 6/14/2019). Social History     Socioeconomic History    Marital status:      Spouse name: Not on file    Number of children: Not on file    Years of education: Not on file    Highest education level: Not on file   Occupational History    Not on file   Tobacco Use    Smoking status: Former Smoker     Packs/day: 0.75     Years: 2.00     Pack years: 1.50     Types: Cigarettes     Start date: 10/25/1958     Quit date: 10/25/2018     Years since quitting: 3.6    Smokeless tobacco: Never Used   Substance and Sexual Activity    Alcohol use: No     Alcohol/week: 0.0 standard drinks    Drug use: No    Sexual activity: Not on file   Other Topics Concern    Not on file   Social History Narrative    Not on file     Social Determinants of Health     Financial Resource Strain: Low Risk     Difficulty of Paying Living Expenses: Not hard at all   Food Insecurity: No Food Insecurity    Worried About [facility] in the Last Year: Never true    [facility] in the Last Year: Never true   Transportation Needs:     Lack of Transportation (Medical): Not on file    Lack of Transportation (Non-Medical):  Not on file   Physical Activity:     Days of Exercise per Week: Not on file    Minutes of Exercise per Session: Not on file   Stress:     Feeling of Stress : Not on file   Social Connections:     Frequency of Communication with Friends and Family: Not on file    Frequency of Social Gatherings with Friends and Family: Not on file    Attends [employer] Services: Not on file    Active Member of Clubs or Organizations: Not on file    Attends Club or Organization Meetings: Not on file    Marital Status: Not on file   Intimate Partner Violence:     Fear of Current or Ex-Partner: Not on file    Emotionally Abused: Not on file    Physically Abused: Not on file    Sexually Abused: Not on file   Housing Stability:     Unable to Pay for Housing in the Last Year: Not on file    Number of [place] in the Last Year: Not on file    Unstable Housing in the Last Year: Not on file       Family History   Problem Relation Age of Onset    Coronary Art Dis Father        Allergies:    Penicillins and Etomidate    Home Medications:  Prior to Admission medications    Medication Sig Start Date End Date Taking? Authorizing Provider   levothyroxine (SYNTHROID) 125 MCG tablet  3/10/22   Historical Provider, MD   allopurinol (ZYLOPRIM) 300 MG tablet Take 1 tablet by mouth daily 4/13/22   Cameron Nelson MD   levothyroxine (SYNTHROID) 150 MCG tablet Daily fasting in am one hour before breakfast 4/13/22   Cameron Nelson MD   carvedilol (COREG) 12.5 MG tablet TAKE ONE-HALF TABLET BY  MOUTH TWICE DAILY 1/17/22   Cameron Nelson MD   atorvastatin (LIPITOR) 40 MG tablet TAKE 1 TABLET BY MOUTH  DAILY 10/22/21   Cameron Nelson MD   colchicine (COLCRYS) 0.6 MG tablet Take 2 tabs immediately, then 1 tablet 1 hour later. Then one tablet a day for 2 more days. 6/7/21   Cameron Nelson MD   gabapentin (NEURONTIN) 300 MG capsule Take 1 capsule by mouth 4 times daily for 30 days.  12/10/20 5/5/22  Cameron Nelson MD   nitrofurantoin, macrocrystal-monohydrate, (MACROBID) 100 MG capsule TAKE 1 CAPSULE BY MOUTH TWICE DAILY 12/20/19   Historical Provider, MD   Clobetasol Propionate (CLOBEX) 0.05 % SHAM Apply 1 Act topically once a week 2/26/20   Cameron Nelson MD   folic acid (FOLVITE) 1 MG tablet Take 1 tablet by mouth daily 8/8/17   Derick Horn MD   Multiple Vitamins-Minerals (HAIR/SKIN/NAILS) TABS Take 1 tablet by mouth daily    Historical Provider, MD   Cholecalciferol (VITAMIN D) 2000 UNITS CAPS capsule Take 2,000 Units by mouth daily    Historical Provider, MD       REVIEW OF SYSTEMS    (2-9 systems for level 4, 10 or more for level 5)    Review of Systems   Constitutional: Negative for chills, fatigue and fever. HENT: Negative for congestion and rhinorrhea. Respiratory: Negative for cough and shortness of breath. Cardiovascular: Negative for chest pain. Gastrointestinal: Negative for abdominal pain, nausea and vomiting. Genitourinary: Negative for flank pain. Musculoskeletal: Positive for gait problem. Negative for back pain, myalgias and neck pain.        + Right hip pain   Neurological: Negative for headaches. All other systems reviewed and are negative. PHYSICAL EXAM   (up to 7 for level 4, 8 or more for level 5)    INITIAL VITALS:   ED Triage Vitals [06/04/22 1114]   BP Temp Temp Source Heart Rate Resp SpO2 Height Weight   (!) 137/50 98 °F (36.7 °C) Tympanic 68 15 96 % 5' 8\" (1.727 m) 138 lb (62.6 kg)       Physical Exam  Vitals and nursing note reviewed. Constitutional:       General: She is not in acute distress. Appearance: She is well-developed. She is not ill-appearing. Cardiovascular:      Rate and Rhythm: Normal rate and regular rhythm. Pulses: Normal pulses. Radial pulses are 2+ on the right side and 2+ on the left side. Heart sounds: Normal heart sounds. No murmur heard. Pulmonary:      Effort: Pulmonary effort is normal. No respiratory distress. Breath sounds: Normal breath sounds. No decreased breath sounds. Abdominal:      General: There is no distension. Palpations: Abdomen is soft. Tenderness: There is no abdominal tenderness. Musculoskeletal:         General: Tenderness present. No swelling. Cervical back: Full passive range of motion without pain and normal range of motion. No tenderness or bony tenderness. No pain with movement, spinous process tenderness or muscular tenderness. Normal range of motion.       Thoracic back: No tenderness or bony tenderness. Lumbar back: No tenderness or bony tenderness. Right hip: Tenderness present. Decreased range of motion. Legs:       Comments: Tenderness on palpation to the indicated area. Skin:     General: Skin is warm and dry. Capillary Refill: Capillary refill takes less than 2 seconds. Neurological:      Mental Status: She is alert and oriented to person, place, and time. DIFFERENTIAL  DIAGNOSIS   PLAN (LABS / IMAGING / EKG):  Orders Placed This Encounter   Procedures    XR HIP 2-3 VW W PELVIS RIGHT    CT PELVIS WO CONTRAST Additional Contrast? None    Inpatient consult to Orthopedic Surgery    Inpatient consult to Internal Medicine    ADMIT TO INPATIENT       MEDICATIONS ORDERED:  Orders Placed This Encounter   Medications    fentaNYL (SUBLIMAZE) injection 50 mcg    ondansetron (ZOFRAN) injection 4 mg    morphine sulfate (PF) injection 4 mg         DIAGNOSTIC RESULTS / EMERGENCYDEPARTMENT COURSE / MDM   LABS:  Labs Reviewed - No data to display    RADIOLOGY:  CT PELVIS WO CONTRAST Additional Contrast? None    Result Date: 6/4/2022  EXAMINATION: CT OF THE PELVIS WITHOUT CONTRAST, 6/4/2022 1:36 pm TECHNIQUE: CT of the pelvis was performed without the administration of intravenous contrast.  Multiplanar reformatted images are provided for review. Adjustment of mA and/or kV according to patient size was utilized. Automated exposure control, iterative reconstruction, and/or weight based adjustment of the mA/kV was utilized to reduce the radiation dose to as low as reasonably achievable. COMPARISON: Pelvis/right hip plain radiographs from 06/04/2022. HISTORY: ORDERING SYSTEM PROVIDED HISTORY:  Likely right inferior pubic rami fracture. TECHNOLOGIST PROVIDED HISTORY: Likely right inferior pubic rami fracture.  Decision Support Exception - unselect if not a suspected or confirmed emergency medical condition->Emergency Medical Condition (MA) Reason for Exam:  Likely right inferior pubic rami fracture. Additional signs and symptoms:  Pt states she fell this am in her basement, c/o right hip pain. Relevant Medical/Surgical History:  No surgeries to area. 53-year-old female with suspected right inferior pubic ramus fracture. FINDINGS: There is a comminuted, slightly displaced inferior right pubic ramus fracture best seen on image 88, series 5. Mild degenerative changes and chondrocalcinosis at the pubic symphysis. Both femoral heads are properly located in the bilateral acetabula without femoral head dislocation or femoral head flattening. Nondisplaced fracture at the left pubic body on image 74, series 5. Nondisplaced intra-articular fracture of the anterior/inferior sacrum extending into the right SI joint on image 42, series 5. Moderate to severe degenerative changes in the lumbar spine. Mild bilateral hip osteoarthrosis. Chondrocalcinosis of the bilateral hip joint spaces. Atherosclerotic calcification of the aorta and iliac branch vasculature. Diffuse osteopenia. Mild stool burden. Moderate sigmoid diverticulosis. Mild diverticulosis throughout the remainder of the colon. 1. Comminuted, slightly displaced inferior right pubic ramus fracture. 2. Nondisplaced left pubic body fracture. 3. Nondisplaced intra-articular fracture at the anterior inferior right sacrum extending into the right SI joint. 4. Moderate to severe degenerative changes in the lumbar spine. Mild bilateral hip osteoarthrosis. Diffuse osteopenia. CPPD. 5. Moderate sigmoid diverticulosis. Mild diverticulosis throughout the remainder of the colon. 6. Atherosclerotic calcification of the aorta and iliac branch vasculature.      XR HIP 2-3 VW W PELVIS RIGHT    Result Date: 6/4/2022  EXAMINATION: ONE XRAY VIEW OF THE PELVIS AND TWO XRAY VIEWS RIGHT HIP 6/4/2022 12:29 pm COMPARISON: 05/29/2018 HISTORY: ORDERING SYSTEM PROVIDED HISTORY: fall backwards on right buttocks, right hip painful, possible dislocation TECHNOLOGIST PROVIDED HISTORY: fall backwards on right buttocks, right hip painful, possible dislocation Reason for Exam: right hip pain following fall. 58-year-old female with fall on right hip; right hip pain; possible dislocation FINDINGS: Moderate diffuse degenerative changes throughout the spine. Inferior psoas shadows symmetric in appearance. Moderate stool burden. Mild bilateral hip osteoarthrosis. Mild degenerative changes at the pubic symphysis. Suspected inferior right pubic ramus fracture. 1. Suspected nondisplaced inferior pubic ramus fracture. 2. Mild bilateral hip osteoarthrosis. 3. Moderate diffuse degenerative changes throughout the spine. Impression:  Patient presents after mechanical fall. Ongoing right hip and right inguinal pain. Tenderness to palpation to the area. When palpating the hip and when lifting on the femoral head there is a \"clunk\" on examination. Concern for possible dislocation. Does have some tenderness in the ischial tuberosity as well. Concern for either hip fracture or possible pelvic fracture or instability. Will give patient fentanyl, x-ray, reassess. EMERGENCY DEPARTMENT COURSE:   X-ray completed but due to the fact that it said a suspected fracture, I am concerned that x-ray and sensitive for picking up other injuries. CT as above demonstrating a few other fractures. Spoke about these with orthopedics who recommended admission. Since this was a traumatic injury, orthopedics did admit the patient. Internal medicine consulted for medical management per ortho request    PROCEDURES:  none    CONSULTS:  IP CONSULT TO ORTHOPEDIC SURGERY  IP CONSULT TO INTERNAL MEDICINE    CRITICAL CARE:  There was a high probability of clinically significant/life threatening deterioration in this patient's condition which required my urgent intervention. Total critical care time was 10 minutes. This excludes any time for separately reportable procedures. FINAL IMPRESSION     1. Closed fracture of right inferior pubic ramus, initial encounter ([facility] [address]          DISPOSITION / PLAN   DISPOSITION Admitted 06/04/2022 03:30:26 PM      Evaluation and treatment course in the ED, and plan of care upon discharge was discussed in length with the patient/patient representative. Patient/patient representative had no further questions prior to being discharged and was instructed to return to the ED for new or worsening symptoms. Any changes to existing medications or new prescriptions were reviewed with patient/patient representative and they expressed understanding of how to correctly take their medications and the possible side effects. PATIENT REFERRED TO:  No follow-up provider specified.     DISCHARGE MEDICATIONS:  New Prescriptions    No medications on file       Lary Montes DO  Emergency Medicine Attending    (Please note that portions of this note were completed with a voice recognition program.  Efforts were made to edit the dictations but occasionally words are mis-transcribed.)          Lary Montes DO  06/04/22 [id number]

## 2022-06-04 NOTE — ED TRIAGE NOTES
Pt fell backwards at home while moving a box one hr ago. She states she did not hit her head or lose consciousness. She complains of pain to her rt hip and is unable to bear weight.

## 2022-06-04 NOTE — PLAN OF CARE
Problem: Pain  Goal: Verbalizes/displays adequate comfort level or baseline comfort level  Outcome: Progressing, Pt educated on non-pharmacological pain interventions. Pain medications administered as needed. Problem: Skin/Tissue Integrity  Goal: Absence of new skin breakdown  Description: 1. Monitor for areas of redness and/or skin breakdown  2. Assess vascular access sites hourly  3. Every 4-6 hours minimum:  Change oxygen saturation probe site  4. Every 4-6 hours:  If on nasal continuous positive airway pressure, respiratory therapy assess nares and determine need for appliance change or resting period. Outcome: Progressing, Pt remain free of skin breakdown. Educated on the importance of turning and alternating position. Problem: Safety - Adult  Goal: Free from fall injury  Outcome: Progressing, Patient remains free of incidence/ injury. Bed remains in low position. Side rails up x2.

## 2022-06-05 ENCOUNTER — APPOINTMENT (OUTPATIENT)
Dept: GENERAL RADIOLOGY | Age: 82
DRG: 516 | End: 2022-06-05
Payer: MEDICARE

## 2022-06-05 PROBLEM — M25.561 ACUTE PAIN OF RIGHT KNEE: Status: ACTIVE | Noted: 2022-06-05
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
## 2022-06-05 PROBLEM — N30.00 ACUTE CYSTITIS WITHOUT HEMATURIA: Status: ACTIVE | Noted: 2022-06-05

## 2022-06-05 PROBLEM — M84.48XD SACRAL INSUFFICIENCY FRACTURE WITH ROUTINE HEALING: Status: ACTIVE | Noted: 2022-06-05

## 2022-06-05 LAB
ABSOLUTE EOS #: 0.2 K/UL (ref 0–0.4)
ABSOLUTE LYMPH #: 1 K/UL (ref 1–4.8)
ABSOLUTE MONO #: 0.7 K/UL (ref 0.1–1.3)
ANION GAP SERPL CALCULATED.3IONS-SCNC: 10 MMOL/L (ref 9–17)
BACTERIA: ABNORMAL
BASOPHILS # BLD: 1 % (ref 0–2)
BASOPHILS ABSOLUTE: 0.1 K/UL (ref 0–0.2)
BILIRUBIN URINE: NEGATIVE
BUN BLDV-MCNC: 16 MG/DL (ref 8–23)
CALCIUM SERPL-MCNC: 9.9 MG/DL (ref 8.6–10.4)
CASTS UA: ABNORMAL /LPF
CHLORIDE BLD-SCNC: 101 MMOL/L (ref 98–107)
CO2: 24 MMOL/L (ref 20–31)
COLOR: YELLOW
CREAT SERPL-MCNC: 1.03 MG/DL (ref 0.5–0.9)
EOSINOPHILS RELATIVE PERCENT: 2 % (ref 0–4)
EPITHELIAL CELLS UA: ABNORMAL /HPF
GFR AFRICAN AMERICAN: >60 ML/MIN
GFR NON-AFRICAN AMERICAN: 51 ML/MIN
GFR SERPL CREATININE-BSD FRML MDRD: ABNORMAL ML/MIN/{1.73_M2}
GLUCOSE BLD-MCNC: 116 MG/DL (ref 70–99)
GLUCOSE URINE: NEGATIVE
HCT VFR BLD CALC: 33.2 % (ref 36–46)
HEMOGLOBIN: 10.6 G/DL (ref 12–16)
KETONES, URINE: NEGATIVE
LEUKOCYTE ESTERASE, URINE: ABNORMAL
LYMPHOCYTES # BLD: 12 % (ref 24–44)
MCH RBC QN AUTO: 28.2 PG (ref 26–34)
MCHC RBC AUTO-ENTMCNC: 32 G/DL (ref 31–37)
MCV RBC AUTO: 87.9 FL (ref 80–100)
MONOCYTES # BLD: 8 % (ref 1–7)
NITRITE, URINE: POSITIVE
PDW BLD-RTO: 15.9 % (ref 11.5–14.9)
PH UA: 6.5 (ref 5–8)
PLATELET # BLD: 126 K/UL (ref 150–450)
PMV BLD AUTO: 8.1 FL (ref 6–12)
POTASSIUM SERPL-SCNC: 4.3 MMOL/L (ref 3.7–5.3)
PROTEIN UA: ABNORMAL
RBC # BLD: 3.77 M/UL (ref 4–5.2)
RBC UA: ABNORMAL /HPF
SEG NEUTROPHILS: 77 % (ref 36–66)
SEGMENTED NEUTROPHILS ABSOLUTE COUNT: 6.4 K/UL (ref 1.3–9.1)
SODIUM BLD-SCNC: 135 MMOL/L (ref 135–144)
SPECIFIC GRAVITY UA: 1.01 (ref 1–1.03)
TURBIDITY: ABNORMAL
URINE HGB: ABNORMAL
UROBILINOGEN, URINE: NORMAL
WBC # BLD: 8.4 K/UL (ref 3.5–11)
WBC UA: ABNORMAL /HPF

## 2022-06-05 PROCEDURE — 6370000000 HC RX 637 (ALT 250 FOR IP): Performed by: STUDENT IN AN ORGANIZED HEALTH CARE EDUCATION/TRAINING PROGRAM

## 2022-06-05 PROCEDURE — 85025 COMPLETE CBC W/AUTO DIFF WBC: CPT

## 2022-06-05 PROCEDURE — 99222 1ST HOSP IP/OBS MODERATE 55: CPT | Performed by: ORTHOPAEDIC SURGERY

## 2022-06-05 PROCEDURE — 87186 SC STD MICRODIL/AGAR DIL: CPT

## 2022-06-05 PROCEDURE — 2580000003 HC RX 258

## 2022-06-05 PROCEDURE — 87086 URINE CULTURE/COLONY COUNT: CPT

## 2022-06-05 PROCEDURE — 6360000002 HC RX W HCPCS: Performed by: STUDENT IN AN ORGANIZED HEALTH CARE EDUCATION/TRAINING PROGRAM

## 2022-06-05 PROCEDURE — 87077 CULTURE AEROBIC IDENTIFY: CPT

## 2022-06-05 PROCEDURE — 6360000002 HC RX W HCPCS

## 2022-06-05 PROCEDURE — 36415 COLL VENOUS BLD VENIPUNCTURE: CPT

## 2022-06-05 PROCEDURE — 80048 BASIC METABOLIC PNL TOTAL CA: CPT

## 2022-06-05 PROCEDURE — 1200000000 HC SEMI PRIVATE

## 2022-06-05 PROCEDURE — 2580000003 HC RX 258: Performed by: STUDENT IN AN ORGANIZED HEALTH CARE EDUCATION/TRAINING PROGRAM

## 2022-06-05 PROCEDURE — 81001 URINALYSIS AUTO W/SCOPE: CPT

## 2022-06-05 PROCEDURE — 73560 X-RAY EXAM OF KNEE 1 OR 2: CPT

## 2022-06-05 RX ADMIN — OXYCODONE HYDROCHLORIDE 5 MG: 5 TABLET ORAL at 05:38

## 2022-06-05 RX ADMIN — ACETAMINOPHEN 650 MG: 325 TABLET ORAL at 19:23

## 2022-06-05 RX ADMIN — ALLOPURINOL 300 MG: 300 TABLET ORAL at 08:15

## 2022-06-05 RX ADMIN — OXYCODONE HYDROCHLORIDE 5 MG: 5 TABLET ORAL at 11:59

## 2022-06-05 RX ADMIN — CEFTRIAXONE SODIUM 1000 MG: 1 INJECTION, POWDER, FOR SOLUTION INTRAMUSCULAR; INTRAVENOUS at 10:09

## 2022-06-05 RX ADMIN — ENOXAPARIN SODIUM 40 MG: 40 INJECTION SUBCUTANEOUS at 08:15

## 2022-06-05 RX ADMIN — CARVEDILOL 12.5 MG: 12.5 TABLET, FILM COATED ORAL at 18:48

## 2022-06-05 RX ADMIN — ONDANSETRON 4 MG: 4 TABLET, ORALLY DISINTEGRATING ORAL at 11:59

## 2022-06-05 RX ADMIN — LEVOTHYROXINE SODIUM 125 MCG: 0.12 TABLET ORAL at 06:14

## 2022-06-05 RX ADMIN — ONDANSETRON 4 MG: 4 TABLET, ORALLY DISINTEGRATING ORAL at 22:04

## 2022-06-05 RX ADMIN — OXYCODONE HYDROCHLORIDE 5 MG: 5 TABLET ORAL at 22:03

## 2022-06-05 RX ADMIN — ACETAMINOPHEN 650 MG: 325 TABLET ORAL at 08:15

## 2022-06-05 RX ADMIN — ONDANSETRON 4 MG: 4 TABLET, ORALLY DISINTEGRATING ORAL at 05:38

## 2022-06-05 RX ADMIN — SODIUM CHLORIDE, PRESERVATIVE FREE 10 ML: 5 INJECTION INTRAVENOUS at 22:03

## 2022-06-05 RX ADMIN — SODIUM CHLORIDE: 9 INJECTION, SOLUTION INTRAVENOUS at 10:01

## 2022-06-05 RX ADMIN — CARVEDILOL 12.5 MG: 12.5 TABLET, FILM COATED ORAL at 08:15

## 2022-06-05 RX ADMIN — SODIUM CHLORIDE, PRESERVATIVE FREE 10 ML: 5 INJECTION INTRAVENOUS at 08:15

## 2022-06-05 RX ADMIN — ATORVASTATIN CALCIUM 40 MG: 40 TABLET, FILM COATED ORAL at 08:15

## 2022-06-05 RX ADMIN — FOLIC ACID 1 MG: 1 TABLET ORAL at 08:15

## 2022-06-05 ASSESSMENT — ENCOUNTER SYMPTOMS
ABDOMINAL PAIN: 0
SHORTNESS OF BREATH: 0
SORE THROAT: 0
VOMITING: 0
NAUSEA: 1
COUGH: 0

## 2022-06-05 ASSESSMENT — PAIN SCALES - GENERAL
PAINLEVEL_OUTOF10: 7
PAINLEVEL_OUTOF10: 8
PAINLEVEL_OUTOF10: 8

## 2022-06-05 ASSESSMENT — PAIN DESCRIPTION - LOCATION
LOCATION: HIP;KNEE
LOCATION: HIP
LOCATION: HEAD;HIP

## 2022-06-05 ASSESSMENT — PAIN DESCRIPTION - DESCRIPTORS
DESCRIPTORS: ACHING;SORE
DESCRIPTORS: ACHING

## 2022-06-05 ASSESSMENT — PAIN DESCRIPTION - ORIENTATION
ORIENTATION: RIGHT

## 2022-06-05 NOTE — PROGRESS NOTES
[address] [facility].    PROGRESS NOTE             6/5/2022    9:15 AM    Name:   Ron Babcock  MRN:     [id number]     Acct:      [de-identified]   Room:   2068/2068-01  IP Day:  1  Admit Date:  6/4/2022 11:20 AM    PCP:  Ivett Grace MD  Code Status:  Full Code    Subjective:     C/C:   Chief Complaint   Patient presents with   Lupie Ambrosia    Hip Pain     Interval History Status: improved. Patient seen and examined today bedside. No acute events overnight. Complains of dysuria. Afebrile. Vitals stable. Tolerating oral intake. Was slightly nauseated. No other acute concerns at this time. Brief History:     Ms. Ophelia Staples, 78year old male, presented to the ED at [facility] on 06/04/2022 with close fracture of the right inferior pubic ramus following a fall onto buttocks. CT showed comminuted right pubic ramus fracture. Orthopedics consulted. Review of Systems:     Review of Systems   Constitutional: Negative for fever. HENT: Negative for congestion and sore throat. Respiratory: Negative for cough and shortness of breath. Cardiovascular: Negative for chest pain. Gastrointestinal: Positive for nausea. Negative for abdominal pain and vomiting. Genitourinary: Positive for dysuria. Negative for difficulty urinating and hematuria. Neurological: Negative for syncope and headaches. Psychiatric/Behavioral: Negative for agitation, behavioral problems and confusion. Medications: Allergies:     Allergies   Allergen Reactions    Penicillins Itching    Etomidate Nausea And Vomiting     Current Meds:   Scheduled Meds:    cefTRIAXone (ROCEPHIN) IV  1,000 mg IntraVENous Q24H    sodium chloride flush  5-40 mL IntraVENous 2 times per day    enoxaparin  40 mg SubCUTAneous Daily    allopurinol  300 mg Oral Daily    atorvastatin  40 mg Oral Daily    carvedilol  12.5 mg Oral BID WC    folic acid  1 mg Oral Daily    levothyroxine  125 mcg Oral Daily     Continuous Infusions:    sodium chloride       PRN Meds: sodium chloride flush, sodium chloride, acetaminophen, ondansetron **OR** ondansetron, oxyCODONE, hydrALAZINE, polyethylene glycol    Data:     Past Medical History:   has a past medical history of Abdominal pain, unspecified site, Acquired cyst of kidney, Acute gouty arthropathy, Allergic rhinitis, cause unspecified, Anxiety state, unspecified, Arthropathy, unspecified, site unspecified, Chronic airway obstruction, not elsewhere classified, Chronic kidney disease, stage III (moderate) ([facility]), Diarrhea, Edema, Esophageal reflux, Essential hypertension, benign, Herpes zoster with other nervous system complications(053.19), Herpes zoster without mention of complication, 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 Social History:   reports that she quit smoking about 3 years ago. Her smoking use included cigarettes. She started smoking about 63 years ago. She has a 1.50 pack-year smoking history. She has never used smokeless tobacco. She reports that she does not drink alcohol and does not use drugs. Family History:   Family History   Problem Relation Age of Onset    Coronary Art Dis Father      Vitals:  /80   Pulse 69   Temp 97.5 °F (36.4 °C)   Resp 17   Ht 5' 8\" (1.727 m)   Wt 138 lb (62.6 kg)   SpO2 96%   BMI 20.98 kg/m²   Temp (24hrs), Av.3 °F (36.8 °C), Min:97.5 °F (36.4 °C), Max:99.7 °F (37.6 °C)    No results for input(s): POCGLU in the last 72 hours. I/O(24Hr):     Intake/Output Summary (Last 24 hours) at 6/5/2022 0915  Last data filed at 6/5/2022 0815  Gross per 24 hour   Intake 10 ml   Output --   Net 10 ml       Labs:    Recent Results (from the past 48 hour(s))   Creatinine    Collection Time: 06/04/22 12:07 PM   Result Value Ref Range    CREATININE 1.03 (H) 0.50 - 0.90 mg/dL    GFR Non- 51 (L) >60 mL/min    GFR African American >60 >60 mL/min    GFR Comment         Basic Metabolic Panel w/ Reflex to MG    Collection Time: 06/05/22  6:36 AM   Result Value Ref Range    Glucose 116 (H) 70 - 99 mg/dL    BUN 16 8 - 23 mg/dL    CREATININE 1.03 (H) 0.50 - 0.90 mg/dL    Calcium 9.9 8.6 - 10.4 mg/dL    Sodium 135 135 - 144 mmol/L    Potassium 4.3 3.7 - 5.3 mmol/L    Chloride 101 98 - 107 mmol/L    CO2 24 20 - 31 mmol/L    Anion Gap 10 9 - 17 mmol/L    GFR Non-African American 51 (L) >60 mL/min    GFR African American >60 >60 mL/min    GFR Comment         CBC with Auto Differential    Collection Time: 06/05/22  6:36 AM   Result Value Ref Range    WBC 8.4 3.5 - 11.0 k/uL    RBC 3.77 (L) 4.0 - 5.2 m/uL    Hemoglobin 10.6 (L) 12.0 - 16.0 g/dL    Hematocrit 33.2 (L) 36 - 46 %    MCV 87.9 80 - 100 fL    MCH 28.2 26 - 34 pg    MCHC 32.0 31 - 37 g/dL    RDW 15.9 (H) 11.5 - 14.9 %    Platelets 845 (L) 140 - 450 k/uL    MPV 8.1 6.0 - 12.0 fL    Seg Neutrophils 77 (H) 36 - 66 %    Lymphocytes 12 (L) 24 - 44 %    Monocytes 8 (H) 1 - 7 %    Eosinophils % 2 0 - 4 %    Basophils 1 0 - 2 %    Segs Absolute 6.40 1.3 - 9.1 k/uL    Absolute Lymph # 1.00 1.0 - 4.8 k/uL    Absolute Mono # 0.70 0.1 - 1.3 k/uL    Absolute Eos # 0.20 0.0 - 0.4 k/uL    Basophils Absolute 0.10 0.0 - 0.2 k/uL   Urinalysis    Collection Time: 06/05/22  7:08 AM   Result Value Ref Range    Color, UA Yellow Yellow    Turbidity UA Cloudy (A) Clear    Glucose, Ur NEGATIVE NEGATIVE    Bilirubin Urine NEGATIVE NEGATIVE    Ketones, Urine NEGATIVE NEGATIVE    Specific Gravity, UA 1.012 1.000 - 1.030    Urine Hgb TRACE (A) NEGATIVE    pH, UA 6.5 5.0 - 8.0    Protein, UA TRACE (A) NEGATIVE    Urobilinogen, Urine Normal Normal    Nitrite, Urine POSITIVE (A) NEGATIVE    Leukocyte Esterase, Urine MOD (A) NEGATIVE   Microscopic Urinalysis    Collection Time: 06/05/22  7:08 AM   Result Value Ref Range    WBC, UA TOO NUMEROUS TO COUNT /HPF    RBC, UA 0 TO 2 /HPF    Casts UA 0 TO 2 /LPF    Epithelial Cells UA 0 TO 2 /HPF    Bacteria, UA MANY (A) None       Lab Results   Component Value Date/Time    SPECIAL NOT REPORTED 06/07/2021 09:10 AM     Lab Results   Component Value Date/Time    CULTURE STAPHYLOCOCCUS EPIDERMIDIS >310814 CFU/ML (A) 04/27/2022 02:10 PM     Radiology:    CT PELVIS WO CONTRAST Additional Contrast? None  Result Date: 6/4/2022    1. Comminuted, slightly displaced inferior right pubic ramus fracture. 2. Nondisplaced left pubic body fracture. 3. Nondisplaced intra-articular fracture at the anterior inferior right sacrum extending into the right SI joint. 4. Moderate to severe degenerative changes in the lumbar spine. Mild bilateral hip osteoarthrosis. Diffuse osteopenia. CPPD. 5. Moderate sigmoid diverticulosis. Mild diverticulosis throughout the remainder of the colon. 6. Atherosclerotic calcification of the aorta and iliac branch vasculature. XR HIP 2-3 VW W PELVIS RIGHT  Result Date: 6/4/2022     1. Suspected nondisplaced inferior pubic ramus fracture. 2. Mild bilateral hip osteoarthrosis. 3. Moderate diffuse degenerative changes throughout the spine. Physical Examination:        Physical Exam  Vitals and nursing note reviewed. Constitutional:       General: She is not in acute distress. Appearance: Normal appearance. She is not ill-appearing. Cardiovascular:      Rate and Rhythm: Normal rate and regular rhythm. Heart sounds: Murmur (Systolic) heard. Pulmonary:      Effort: No respiratory distress. Breath sounds: Normal breath sounds. No wheezing. Abdominal:      Palpations: Abdomen is soft. Tenderness: There is no abdominal tenderness. There is no guarding or rebound. Musculoskeletal:         General: No tenderness. Right lower leg: No edema. Left lower leg: No edema. Neurological:      Mental Status: She is alert and oriented to person, place, and time. Sensory: No sensory deficit. Motor: No weakness. Psychiatric:         Mood and Affect: Mood normal.         Behavior: Behavior normal.       Assessment:        Primary Problem  Closed fracture of right inferior pubic ramus [facility])    Active Hospital Problems    Diagnosis Date Noted    Acute cystitis without hematuria [N30.00] 06/05/2022     Priority: Medium    Closed fracture of right inferior pubic ramus ([address] [S32.591A] 06/04/2022     Priority: Medium    Chronic gout of multiple sites [M1A.09X0] 09/29/2020    Acquired hypothyroidism [E03.9] 09/29/2020    Pure hypercholesterolemia [E78.00]     Essential hypertension, benign [I10]      Plan:        - Surgery to follow fracture  - Carvedilol 12.5 mg oral BID  - Hydralazine PRN for SBP >160  - Synthroid 125 mcg daily  - Continue allopurinol 300mg  - Lipitor 40mg nightly  - Will add rocephin for UTI    Samara Wood MD  6/5/2022  9:15 AM     Attestation and add on       I have discussed the care of Tutu Herrera , including pertinent history and exam findings,      6/5/22    with the resident. I have seen and examined the patient and the key elements of all parts of the encounter have been performed by me . I agree with the assessment, plan and orders as documented by the resident.      Hospital Problems           Last Modified POA    * (Principal) Closed fracture of right inferior pubic ramus ([address] 6/4/2022 Yes    Arthritis of right knee 6/5/2022 Yes    Acute cystitis without hematuria 6/5/2022 Yes    Acute pain of right knee 6/5/2022 Yes    Sacral insufficiency fracture with routine healing 6/5/2022 Yes    Fall 6/5/2022 Yes    Pure hypercholesterolemia 6/4/2022 Yes Essential hypertension, benign 6/4/2022 Yes    Chronic gout of multiple sites 6/4/2022 Yes    Acquired hypothyroidism 6/4/2022 Yes             ''''''''''       MD KAMARI [address], [address] [place] [address].    Phone [phone]   Fax: [phone]  Answering Service: [phone]

## 2022-06-05 NOTE — PLAN OF CARE
Problem: Pain  Goal: Verbalizes/displays adequate comfort level or baseline comfort level  Outcome: Progressing, Pt educated on non-pharmacological pain interventions. PRN pain medications administered. Problem: Skin/Tissue Integrity  Goal: Absence of new skin breakdown  Description: 1. Monitor for areas of redness and/or skin breakdown  2. Assess vascular access sites hourly  3. Every 4-6 hours minimum:  Change oxygen saturation probe site  4. Every 4-6 hours:  If on nasal continuous positive airway pressure, respiratory therapy assess nares and determine need for appliance change or resting period. Outcome: Progressing, Pt remain free of skin breakdown. Educated on the importance of turning and alternating position. Problem: Safety - Adult  Goal: Free from fall injury  Outcome: Progressing, Patient remains free of incidence/ injury. Bed remains in low position. Side rails up x2.

## 2022-06-05 NOTE — FLOWSHEET NOTE
06/05/22 1116   Encounter Summary   Encounter Overview/Reason  Volunteer Encounter   Service Provided For: Patient   Referral/Consult From: Rounding   Last Encounter  06/05/22  (V)   Complexity of Encounter Low   Spiritual/Emotional needs   Type Spiritual Support   Rituals, Rites and [address]

## 2022-06-05 NOTE — CONSULTS
Consults  Patient: Dagmar Pham  Unit/Bed: 8970/2634-99  YOB: 1940  MRN: [id number]  Acct: [de-identified]   Admitting Diagnosis: Closed fracture of right inferior pubic ramus, initial encounter [facility]) [facility]  Admit Date:  6/4/2022  Hospital Day: 1    Subjective:    Patient is having problems with right buttock hip and knee pain  HPI  Patient Seen, Chart, Labs, Radiologystudies, and Consults reviewed. Ground level fall post fall inability to bear weight and walk on right side with flair presumably arthritic knee pain    Objective:   /80   Pulse 69   Temp 97.5 °F (36.4 °C)   Resp 17   Ht 5' 8\" (1.727 m)   Wt 138 lb (62.6 kg)   SpO2 96%   BMI 20.98 kg/m²   Intake/Output Summary (Last 24 hours) at 6/5/2022 1213  Last data filed at 6/5/2022 0815  Gross per 24 hour   Intake 10 ml   Output --   Net 10 ml     Review of Systems  Physical Exam  Vitals and nursing note reviewed. Constitutional:       Appearance: She is well-developed. HENT:      Head: Normocephalic and atraumatic. Nose: Nose normal.   Eyes:      Conjunctiva/sclera: Conjunctivae normal.   Pulmonary:      Effort: Pulmonary effort is normal. No respiratory distress. Musculoskeletal:      Cervical back: Normal range of motion and neck supple. Comments: Pillow under right knee hip slightly flexed and abducted n/v grossly intact     Skin:     General: Skin is warm and dry. Neurological:      Mental Status: She is alert and oriented to person, place, and time. Sensory: No sensory deficit. Psychiatric:         Behavior: Behavior normal.         Thought Content: Thought content normal.           Drains:No  Imaging: Yes right sacrum with left inferior rami and symphysis fracture      Medications:    cefTRIAXone (ROCEPHIN) IV  1,000 mg IntraVENous Q24H    sodium chloride flush  5-40 mL IntraVENous 2 times per day    enoxaparin  40 mg SubCUTAneous Daily    allopurinol  300 mg Oral Daily    atorvastatin  40 mg Oral Daily    carvedilol  12.5 mg Oral BID     folic acid  1 mg Oral Daily    levothyroxine  125 mcg Oral Daily     PRN Meds:sodium chloride flush, sodium chloride, acetaminophen, ondansetron **OR** ondansetron, oxyCODONE, hydrALAZINE, polyethylene glycol      Data:  CBC:   Recent Labs     06/05/22  0636   WBC 8.4   RBC 3.77*   HGB 10.6*   HCT 33.2*   MCV 87.9   RDW 15.9*   *     BNP: No results for input(s): BNP in the last 72 hours. PT/INR: No results for input(s): PROTIME, INR in the last 72 hours. Assessment/Plan:  Principal Problem:    Closed fracture of right inferior pubic ramus (HCC)  Active Problems:    Arthritis of right knee    Acute cystitis without hematuria    Acute pain of right knee    Sacral insufficiency fracture with routine healing    Pure hypercholesterolemia    Essential hypertension, benign    Chronic gout of multiple sites    Acquired hypothyroidism  Resolved Problems:    * No resolved hospital problems.  *      MRI sacrum  Npo  Likely sacroplasty tomorrow  Electronically signed by Ara Tanner MD on 6/5/2022 at 12:13 PM    [facility]

## 2022-06-05 NOTE — PLAN OF CARE
Please have patient fill out online electronic MRI screening form. Once form is complete pt will then we scheduled for a time to come down. If there are any questions please call MRI at [phone].    Thanks

## 2022-06-06 ENCOUNTER — APPOINTMENT (OUTPATIENT)
Dept: MRI IMAGING | Age: 82
DRG: 516 | End: 2022-06-06
Payer: MEDICARE

## 2022-06-06 ENCOUNTER — ANESTHESIA (OUTPATIENT)
Dept: OPERATING ROOM | Age: 82
DRG: 516 | End: 2022-06-06
Payer: MEDICARE

## 2022-06-06 ENCOUNTER — ANESTHESIA EVENT (OUTPATIENT)
Dept: OPERATING ROOM | Age: 82
DRG: 516 | End: 2022-06-06
Payer: MEDICARE

## 2022-06-06 ENCOUNTER — APPOINTMENT (OUTPATIENT)
Dept: GENERAL RADIOLOGY | Age: 82
DRG: 516 | End: 2022-06-06
Payer: MEDICARE

## 2022-06-06 LAB
ABSOLUTE EOS #: 0.1 K/UL (ref 0–0.4)
ABSOLUTE LYMPH #: 1.1 K/UL (ref 1–4.8)
ABSOLUTE MONO #: 1.1 K/UL (ref 0.1–1.3)
ANION GAP SERPL CALCULATED.3IONS-SCNC: 7 MMOL/L (ref 9–17)
BASOPHILS # BLD: 1 % (ref 0–2)
BASOPHILS ABSOLUTE: 0 K/UL (ref 0–0.2)
BUN BLDV-MCNC: 17 MG/DL (ref 8–23)
CALCIUM SERPL-MCNC: 9.7 MG/DL (ref 8.6–10.4)
CHLORIDE BLD-SCNC: 101 MMOL/L (ref 98–107)
CO2: 27 MMOL/L (ref 20–31)
CREAT SERPL-MCNC: 1.06 MG/DL (ref 0.5–0.9)
CULTURE: ABNORMAL
EOSINOPHILS RELATIVE PERCENT: 1 % (ref 0–4)
GFR AFRICAN AMERICAN: >60 ML/MIN
GFR NON-AFRICAN AMERICAN: 50 ML/MIN
GFR SERPL CREATININE-BSD FRML MDRD: ABNORMAL ML/MIN/{1.73_M2}
GLUCOSE BLD-MCNC: 116 MG/DL (ref 70–99)
HCT VFR BLD CALC: 32.7 % (ref 36–46)
HEMOGLOBIN: 10.8 G/DL (ref 12–16)
LYMPHOCYTES # BLD: 14 % (ref 24–44)
MCH RBC QN AUTO: 28.5 PG (ref 26–34)
MCHC RBC AUTO-ENTMCNC: 32.9 G/DL (ref 31–37)
MCV RBC AUTO: 86.5 FL (ref 80–100)
MONOCYTES # BLD: 14 % (ref 1–7)
PDW BLD-RTO: 15.5 % (ref 11.5–14.9)
PLATELET # BLD: 122 K/UL (ref 150–450)
PMV BLD AUTO: 7.8 FL (ref 6–12)
POTASSIUM SERPL-SCNC: 4.1 MMOL/L (ref 3.7–5.3)
RBC # BLD: 3.77 M/UL (ref 4–5.2)
SEG NEUTROPHILS: 70 % (ref 36–66)
SEGMENTED NEUTROPHILS ABSOLUTE COUNT: 5.5 K/UL (ref 1.3–9.1)
SODIUM BLD-SCNC: 135 MMOL/L (ref 135–144)
SPECIMEN DESCRIPTION: ABNORMAL
WBC # BLD: 7.9 K/UL (ref 3.5–11)

## 2022-06-06 PROCEDURE — 2580000003 HC RX 258: Performed by: ORTHOPAEDIC SURGERY

## 2022-06-06 PROCEDURE — 2500000003 HC RX 250 WO HCPCS: Performed by: ORTHOPAEDIC SURGERY

## 2022-06-06 PROCEDURE — 3600000002 HC SURGERY LEVEL 2 BASE: Performed by: ORTHOPAEDIC SURGERY

## 2022-06-06 PROCEDURE — 2500000003 HC RX 250 WO HCPCS: Performed by: ANESTHESIOLOGY

## 2022-06-06 PROCEDURE — 2580000003 HC RX 258: Performed by: ANESTHESIOLOGY

## 2022-06-06 PROCEDURE — 3700000000 HC ANESTHESIA ATTENDED CARE: Performed by: ORTHOPAEDIC SURGERY

## 2022-06-06 PROCEDURE — 2500000003 HC RX 250 WO HCPCS

## 2022-06-06 PROCEDURE — 3700000001 HC ADD 15 MINUTES (ANESTHESIA): Performed by: ORTHOPAEDIC SURGERY

## 2022-06-06 PROCEDURE — 6370000000 HC RX 637 (ALT 250 FOR IP): Performed by: STUDENT IN AN ORGANIZED HEALTH CARE EDUCATION/TRAINING PROGRAM

## 2022-06-06 PROCEDURE — 6360000002 HC RX W HCPCS: Performed by: ANESTHESIOLOGY

## 2022-06-06 PROCEDURE — C1713 ANCHOR/SCREW BN/BN,TIS/BN: HCPCS | Performed by: ORTHOPAEDIC SURGERY

## 2022-06-06 PROCEDURE — 72195 MRI PELVIS W/O DYE: CPT

## 2022-06-06 PROCEDURE — 2580000003 HC RX 258

## 2022-06-06 PROCEDURE — 36415 COLL VENOUS BLD VENIPUNCTURE: CPT

## 2022-06-06 PROCEDURE — 1200000000 HC SEMI PRIVATE

## 2022-06-06 PROCEDURE — 80048 BASIC METABOLIC PNL TOTAL CA: CPT

## 2022-06-06 PROCEDURE — 6360000002 HC RX W HCPCS

## 2022-06-06 PROCEDURE — C1894 INTRO/SHEATH, NON-LASER: HCPCS | Performed by: ORTHOPAEDIC SURGERY

## 2022-06-06 PROCEDURE — 2709999900 HC NON-CHARGEABLE SUPPLY: Performed by: ORTHOPAEDIC SURGERY

## 2022-06-06 PROCEDURE — 7100000001 HC PACU RECOVERY - ADDTL 15 MIN: Performed by: ORTHOPAEDIC SURGERY

## 2022-06-06 PROCEDURE — 3600000012 HC SURGERY LEVEL 2 ADDTL 15MIN: Performed by: ORTHOPAEDIC SURGERY

## 2022-06-06 PROCEDURE — 85025 COMPLETE CBC W/AUTO DIFF WBC: CPT

## 2022-06-06 PROCEDURE — 99232 SBSQ HOSP IP/OBS MODERATE 35: CPT | Performed by: INTERNAL MEDICINE

## 2022-06-06 PROCEDURE — 6360000002 HC RX W HCPCS: Performed by: STUDENT IN AN ORGANIZED HEALTH CARE EDUCATION/TRAINING PROGRAM

## 2022-06-06 PROCEDURE — 2580000003 HC RX 258: Performed by: STUDENT IN AN ORGANIZED HEALTH CARE EDUCATION/TRAINING PROGRAM

## 2022-06-06 PROCEDURE — 7100000000 HC PACU RECOVERY - FIRST 15 MIN: Performed by: ORTHOPAEDIC SURGERY

## 2022-06-06 PROCEDURE — 3209999900 FLUORO FOR SURGICAL PROCEDURES

## 2022-06-06 PROCEDURE — 6370000000 HC RX 637 (ALT 250 FOR IP): Performed by: ORTHOPAEDIC SURGERY

## 2022-06-06 PROCEDURE — 0QU10JZ SUPPLEMENT SACRUM WITH SYNTHETIC SUBSTITUTE, OPEN APPROACH: ICD-10-PCS | Performed by: ORTHOPAEDIC SURGERY

## 2022-06-06 DEVICE — CEMENT C01A KYPHX HV-R BONE CEMENT EN
Type: IMPLANTABLE DEVICE | Site: SPINE LUMBAR | Status: FUNCTIONAL
Brand: KYPHON® HV-R® BONE CEMENT

## 2022-06-06 RX ORDER — ACETAMINOPHEN 325 MG/1
650 TABLET ORAL EVERY 6 HOURS
Status: DISCONTINUED | OUTPATIENT
Start: 2022-06-06 | End: 2022-06-07 | Stop reason: HOSPADM

## 2022-06-06 RX ORDER — DIPHENHYDRAMINE HYDROCHLORIDE 50 MG/ML
12.5 INJECTION INTRAMUSCULAR; INTRAVENOUS
Status: DISCONTINUED | OUTPATIENT
Start: 2022-06-06 | End: 2022-06-06 | Stop reason: HOSPADM

## 2022-06-06 RX ORDER — EPHEDRINE SULFATE/0.9% NACL/PF 50 MG/5 ML
SYRINGE (ML) INTRAVENOUS PRN
Status: DISCONTINUED | OUTPATIENT
Start: 2022-06-06 | End: 2022-06-06 | Stop reason: SDUPTHER

## 2022-06-06 RX ORDER — FAMOTIDINE 10 MG/ML
INJECTION, SOLUTION INTRAVENOUS
Status: COMPLETED
Start: 2022-06-06 | End: 2022-06-06

## 2022-06-06 RX ORDER — PROPOFOL 10 MG/ML
INJECTION, EMULSION INTRAVENOUS PRN
Status: DISCONTINUED | OUTPATIENT
Start: 2022-06-06 | End: 2022-06-06 | Stop reason: SDUPTHER

## 2022-06-06 RX ORDER — SODIUM CHLORIDE 0.9 % (FLUSH) 0.9 %
5-40 SYRINGE (ML) INJECTION PRN
Status: DISCONTINUED | OUTPATIENT
Start: 2022-06-06 | End: 2022-06-07 | Stop reason: HOSPADM

## 2022-06-06 RX ORDER — SUCCINYLCHOLINE/SOD CL,ISO/PF 200MG/10ML
SYRINGE (ML) INTRAVENOUS PRN
Status: DISCONTINUED | OUTPATIENT
Start: 2022-06-06 | End: 2022-06-06 | Stop reason: SDUPTHER

## 2022-06-06 RX ORDER — OXYCODONE HYDROCHLORIDE 5 MG/1
10 TABLET ORAL EVERY 4 HOURS PRN
Status: DISCONTINUED | OUTPATIENT
Start: 2022-06-06 | End: 2022-06-07

## 2022-06-06 RX ORDER — LIDOCAINE HYDROCHLORIDE 10 MG/ML
INJECTION, SOLUTION EPIDURAL; INFILTRATION; INTRACAUDAL; PERINEURAL PRN
Status: DISCONTINUED | OUTPATIENT
Start: 2022-06-06 | End: 2022-06-06 | Stop reason: SDUPTHER

## 2022-06-06 RX ORDER — BUPIVACAINE HYDROCHLORIDE AND EPINEPHRINE 2.5; 5 MG/ML; UG/ML
INJECTION, SOLUTION EPIDURAL; INFILTRATION; INTRACAUDAL; PERINEURAL PRN
Status: DISCONTINUED | OUTPATIENT
Start: 2022-06-06 | End: 2022-06-06 | Stop reason: ALTCHOICE

## 2022-06-06 RX ORDER — SODIUM CHLORIDE, SODIUM LACTATE, POTASSIUM CHLORIDE, CALCIUM CHLORIDE 600; 310; 30; 20 MG/100ML; MG/100ML; MG/100ML; MG/100ML
INJECTION, SOLUTION INTRAVENOUS CONTINUOUS PRN
Status: DISCONTINUED | OUTPATIENT
Start: 2022-06-06 | End: 2022-06-06 | Stop reason: SDUPTHER

## 2022-06-06 RX ORDER — CEFAZOLIN SODIUM 1 G/3ML
INJECTION, POWDER, FOR SOLUTION INTRAMUSCULAR; INTRAVENOUS PRN
Status: DISCONTINUED | OUTPATIENT
Start: 2022-06-06 | End: 2022-06-06 | Stop reason: SDUPTHER

## 2022-06-06 RX ORDER — MIDAZOLAM HYDROCHLORIDE 1 MG/ML
INJECTION INTRAMUSCULAR; INTRAVENOUS PRN
Status: DISCONTINUED | OUTPATIENT
Start: 2022-06-06 | End: 2022-06-06 | Stop reason: SDUPTHER

## 2022-06-06 RX ORDER — SODIUM CHLORIDE 9 MG/ML
INJECTION, SOLUTION INTRAVENOUS PRN
Status: DISCONTINUED | OUTPATIENT
Start: 2022-06-06 | End: 2022-06-07 | Stop reason: HOSPADM

## 2022-06-06 RX ORDER — SODIUM CHLORIDE 0.9 % (FLUSH) 0.9 %
5-40 SYRINGE (ML) INJECTION PRN
Status: DISCONTINUED | OUTPATIENT
Start: 2022-06-06 | End: 2022-06-06 | Stop reason: SDUPTHER

## 2022-06-06 RX ORDER — OXYCODONE HYDROCHLORIDE 5 MG/1
5 TABLET ORAL EVERY 4 HOURS PRN
Status: DISCONTINUED | OUTPATIENT
Start: 2022-06-06 | End: 2022-06-07

## 2022-06-06 RX ORDER — SODIUM CHLORIDE 0.9 % (FLUSH) 0.9 %
5-40 SYRINGE (ML) INJECTION EVERY 12 HOURS SCHEDULED
Status: DISCONTINUED | OUTPATIENT
Start: 2022-06-06 | End: 2022-06-06 | Stop reason: SDUPTHER

## 2022-06-06 RX ORDER — ONDANSETRON 2 MG/ML
4 INJECTION INTRAMUSCULAR; INTRAVENOUS
Status: DISCONTINUED | OUTPATIENT
Start: 2022-06-06 | End: 2022-06-06 | Stop reason: HOSPADM

## 2022-06-06 RX ORDER — SODIUM CHLORIDE 9 MG/ML
INJECTION, SOLUTION INTRAVENOUS PRN
Status: DISCONTINUED | OUTPATIENT
Start: 2022-06-06 | End: 2022-06-06 | Stop reason: SDUPTHER

## 2022-06-06 RX ORDER — SODIUM CHLORIDE, SODIUM LACTATE, POTASSIUM CHLORIDE, CALCIUM CHLORIDE 600; 310; 30; 20 MG/100ML; MG/100ML; MG/100ML; MG/100ML
INJECTION, SOLUTION INTRAVENOUS CONTINUOUS
Status: DISCONTINUED | OUTPATIENT
Start: 2022-06-06 | End: 2022-06-06 | Stop reason: HOSPADM

## 2022-06-06 RX ORDER — DEXAMETHASONE SODIUM PHOSPHATE 4 MG/ML
INJECTION, SOLUTION INTRA-ARTICULAR; INTRALESIONAL; INTRAMUSCULAR; INTRAVENOUS; SOFT TISSUE PRN
Status: DISCONTINUED | OUTPATIENT
Start: 2022-06-06 | End: 2022-06-06 | Stop reason: SDUPTHER

## 2022-06-06 RX ORDER — PHENYLEPHRINE HYDROCHLORIDE 10 MG/ML
INJECTION INTRAVENOUS PRN
Status: DISCONTINUED | OUTPATIENT
Start: 2022-06-06 | End: 2022-06-06 | Stop reason: SDUPTHER

## 2022-06-06 RX ORDER — SODIUM CHLORIDE 0.9 % (FLUSH) 0.9 %
5-40 SYRINGE (ML) INJECTION EVERY 12 HOURS SCHEDULED
Status: DISCONTINUED | OUTPATIENT
Start: 2022-06-06 | End: 2022-06-07 | Stop reason: HOSPADM

## 2022-06-06 RX ORDER — FENTANYL CITRATE 50 UG/ML
INJECTION, SOLUTION INTRAMUSCULAR; INTRAVENOUS PRN
Status: DISCONTINUED | OUTPATIENT
Start: 2022-06-06 | End: 2022-06-06 | Stop reason: SDUPTHER

## 2022-06-06 RX ADMIN — SODIUM CHLORIDE, POTASSIUM CHLORIDE, SODIUM LACTATE AND CALCIUM CHLORIDE: 600; 310; 30; 20 INJECTION, SOLUTION INTRAVENOUS at 19:27

## 2022-06-06 RX ADMIN — PHENYLEPHRINE HYDROCHLORIDE 200 MCG: 10 INJECTION INTRAVENOUS at 19:39

## 2022-06-06 RX ADMIN — LEVOTHYROXINE SODIUM 125 MCG: 0.12 TABLET ORAL at 06:27

## 2022-06-06 RX ADMIN — Medication 10 MG: at 20:03

## 2022-06-06 RX ADMIN — SODIUM CHLORIDE, PRESERVATIVE FREE 10 ML: 5 INJECTION INTRAVENOUS at 22:12

## 2022-06-06 RX ADMIN — PROPOFOL 100 MG: 10 INJECTION, EMULSION INTRAVENOUS at 19:32

## 2022-06-06 RX ADMIN — PHENYLEPHRINE HYDROCHLORIDE 200 MCG: 10 INJECTION INTRAVENOUS at 19:49

## 2022-06-06 RX ADMIN — LIDOCAINE HYDROCHLORIDE 40 MG: 10 INJECTION, SOLUTION EPIDURAL; INFILTRATION; INTRACAUDAL; PERINEURAL at 19:32

## 2022-06-06 RX ADMIN — SODIUM CHLORIDE, POTASSIUM CHLORIDE, SODIUM LACTATE AND CALCIUM CHLORIDE: 600; 310; 30; 20 INJECTION, SOLUTION INTRAVENOUS at 18:55

## 2022-06-06 RX ADMIN — ONDANSETRON 4 MG: 2 INJECTION INTRAMUSCULAR; INTRAVENOUS at 19:09

## 2022-06-06 RX ADMIN — FAMOTIDINE 20 MG: 10 INJECTION, SOLUTION INTRAVENOUS at 19:11

## 2022-06-06 RX ADMIN — ACETAMINOPHEN 650 MG: 325 TABLET ORAL at 06:30

## 2022-06-06 RX ADMIN — MIDAZOLAM 1 MG: 1 INJECTION INTRAMUSCULAR; INTRAVENOUS at 19:32

## 2022-06-06 RX ADMIN — ONDANSETRON 4 MG: 2 INJECTION INTRAMUSCULAR; INTRAVENOUS at 08:29

## 2022-06-06 RX ADMIN — PHENYLEPHRINE HYDROCHLORIDE 200 MCG: 10 INJECTION INTRAVENOUS at 19:46

## 2022-06-06 RX ADMIN — CEFAZOLIN 2000 MG: 1 INJECTION, POWDER, FOR SOLUTION INTRAMUSCULAR; INTRAVENOUS at 19:45

## 2022-06-06 RX ADMIN — CARVEDILOL 12.5 MG: 12.5 TABLET, FILM COATED ORAL at 08:27

## 2022-06-06 RX ADMIN — CEFTRIAXONE SODIUM 1000 MG: 1 INJECTION, POWDER, FOR SOLUTION INTRAMUSCULAR; INTRAVENOUS at 08:34

## 2022-06-06 RX ADMIN — SODIUM CHLORIDE, PRESERVATIVE FREE 10 ML: 5 INJECTION INTRAVENOUS at 08:27

## 2022-06-06 RX ADMIN — Medication 20 MG: at 19:51

## 2022-06-06 RX ADMIN — FENTANYL CITRATE 50 MCG: 50 INJECTION, SOLUTION INTRAMUSCULAR; INTRAVENOUS at 19:32

## 2022-06-06 RX ADMIN — Medication 100 MG: at 19:32

## 2022-06-06 RX ADMIN — OXYCODONE HYDROCHLORIDE 5 MG: 5 TABLET ORAL at 08:29

## 2022-06-06 RX ADMIN — ACETAMINOPHEN 650 MG: 325 TABLET ORAL at 22:11

## 2022-06-06 RX ADMIN — PHENYLEPHRINE HYDROCHLORIDE 200 MCG: 10 INJECTION INTRAVENOUS at 19:37

## 2022-06-06 RX ADMIN — DEXAMETHASONE SODIUM PHOSPHATE 4 MG: 4 INJECTION, SOLUTION INTRAMUSCULAR; INTRAVENOUS at 19:43

## 2022-06-06 ASSESSMENT — PAIN SCALES - GENERAL
PAINLEVEL_OUTOF10: 6
PAINLEVEL_OUTOF10: 9
PAINLEVEL_OUTOF10: 0
PAINLEVEL_OUTOF10: 0

## 2022-06-06 ASSESSMENT — PAIN DESCRIPTION - PAIN TYPE: TYPE: ACUTE PAIN;SURGICAL PAIN

## 2022-06-06 ASSESSMENT — PAIN DESCRIPTION - ORIENTATION
ORIENTATION: LOWER;RIGHT
ORIENTATION: LOWER;RIGHT

## 2022-06-06 ASSESSMENT — PAIN DESCRIPTION - LOCATION
LOCATION: BACK;KNEE
LOCATION: BACK;KNEE

## 2022-06-06 ASSESSMENT — PAIN DESCRIPTION - DESCRIPTORS
DESCRIPTORS: ACHING
DESCRIPTORS: ACHING

## 2022-06-06 NOTE — ANESTHESIA PRE PROCEDURE
Department of Anesthesiology  Preprocedure Note       Name:  Ayush Keenan   Age:  78 y.o.  :  1940                                          MRN:  [id number]         Date:  2022      Surgeon: Medardo Ren):  Rocky Gonsalves MD    Procedure: Procedure(s):  SACRALPLASTY    Medications prior to admission:   Prior to Admission medications    Medication Sig Start Date End Date Taking? Authorizing Provider   levothyroxine (SYNTHROID) 125 MCG tablet  3/10/22   Historical Provider, MD   allopurinol (ZYLOPRIM) 300 MG tablet Take 1 tablet by mouth daily 22   Heather Brooks MD   levothyroxine (SYNTHROID) 150 MCG tablet Daily fasting in am one hour before breakfast 22   Heather Brooks MD   carvedilol (COREG) 12.5 MG tablet TAKE ONE-HALF TABLET BY  MOUTH TWICE DAILY 22   Heather Brooks MD   atorvastatin (LIPITOR) 40 MG tablet TAKE 1 TABLET BY MOUTH  DAILY 10/22/21   Heather Brooks MD   colchicine (COLCRYS) 0.6 MG tablet Take 2 tabs immediately, then 1 tablet 1 hour later. Then one tablet a day for 2 more days. Patient not taking: Reported on 2022   Heather Brooks MD   gabapentin (NEURONTIN) 300 MG capsule Take 1 capsule by mouth 4 times daily for 30 days. Patient taking differently: Take 300 mg by mouth 2 times daily.   12/10/20 5/5/22  Heather Brooks MD   nitrofurantoin, macrocrystal-monohydrate, (MACROBID) 100 MG capsule TAKE 1 CAPSULE BY MOUTH TWICE DAILY  Patient not taking: Reported on 19   Historical Provider, MD   Clobetasol Propionate (CLOBEX) 0.05 % SHAM Apply 1 Act topically once a week 20   Heather Brooks MD   folic acid (FOLVITE) 1 MG tablet Take 1 tablet by mouth daily 17   Rosalie West MD   Multiple Vitamins-Minerals (HAIR/SKIN/NAILS) TABS Take 1 tablet by mouth daily    Historical Provider, MD   Cholecalciferol (VITAMIN D) 2000 UNITS CAPS capsule Take 2,000 Units by mouth daily    Historical Provider, MD       Current medications: Current Facility-Administered Medications   Medication Dose Route Frequency Provider Last Rate Last Admin    cefTRIAXone (ROCEPHIN) 1000 mg IVPB in NS 50ml minibag  1,000 mg IntraVENous Q24H Josey Bullard MD   Stopped at 06/06/22 0904    sodium chloride flush 0.9 % injection 5-40 mL  5-40 mL IntraVENous 2 times per day Rob Ac, DO   10 mL at 06/06/22 0827    sodium chloride flush 0.9 % injection 5-40 mL  5-40 mL IntraVENous PRN Gray Nebo, DO        0.9 % sodium chloride infusion   IntraVENous PRN Gray Bruce, DO 10 mL/hr at 06/05/22 1001 New Bag at 06/05/22 1001    enoxaparin (LOVENOX) injection 40 mg  40 mg SubCUTAneous Daily Rob Ac, DO   40 mg at 06/05/22 0815    acetaminophen (TYLENOL) tablet 650 mg  650 mg Oral Q4H PRN Rob Ac, DO   650 mg at 06/06/22 0630    ondansetron (ZOFRAN-ODT) disintegrating tablet 4 mg  4 mg Oral Q8H PRN Rob Ac, DO   4 mg at 06/05/22 2204    Or    ondansetron (ZOFRAN) injection 4 mg  4 mg IntraVENous Q6H PRN Rob Ac, DO   4 mg at 06/06/22 0829    oxyCODONE (ROXICODONE) immediate release tablet 5 mg  5 mg Oral Q6H PRN Isaac Barrera, DO   5 mg at 06/06/22 0829    allopurinol (ZYLOPRIM) tablet 300 mg  300 mg Oral Daily Jeff Clark MD   300 mg at 06/05/22 0815    atorvastatin (LIPITOR) tablet 40 mg  40 mg Oral Daily Jeff Clark MD   40 mg at 06/05/22 0815    carvedilol (COREG) tablet 12.5 mg  12.5 mg Oral BID WC Jeff Clark MD   12.5 mg at 00/80/36 3443    folic acid (FOLVITE) tablet 1 mg  1 mg Oral Daily Jeff Clark MD   1 mg at 06/05/22 0815    levothyroxine (SYNTHROID) tablet 125 mcg  125 mcg Oral Daily Jeff Clark MD   125 mcg at 06/06/22 2713    hydrALAZINE (APRESOLINE) injection 10 mg  10 mg IntraVENous Q6H PRN Jeff Clark MD        polyethylene glycol (GLYCOLAX) packet 17 g  17 g Oral Daily PRN Jeff Clark MD           Allergies:     Allergies   Allergen Reactions    Penicillins Itching    Etomidate Nausea And Vomiting       Problem List:    Patient Active Problem List   Diagnosis Code    Vitamin D deficiency E55.9    Other general symptoms R68.89    Abdominal pain R10.9    Herpes zoster with other nervous system complications V56.51    Herpes zoster B02.9    Sprain of ankle S93.409A    Edema R60.9    Acquired cyst of kidney N28.1    Myalgia and myositis [id number]    COPD (chronic obstructive pulmonary disease) ([facility]) J44.9    Mitral valve disorder I05.9    Allergic rhinitis J30.9    Diarrhea R19.7    Esophageal reflux K21.9    Other psoriasis L40.8    Arthropathy M12.9    Pure hypercholesterolemia E78.00    Anxiety state F41.1    Essential hypertension, benign I10    Chronic kidney disease, stage III (moderate) ([facility]) N18.30    Regional enteritis ([address] K50.90    Senile osteoporosis M81.0    Mononeuritis G58.9    Conjunctivitis H10.9    Skin lesion of left leg L98.9    Skin lesion of right leg L98.9    Fibromyalgia M79.7    DJD (degenerative joint disease) of knee M17.10    Delirium due to another medical condition, acute, mixed level of activity F05    Gout attack M10.9    Colitis K52.9    Esophageal obstruction due to food impaction K22.2, D93.291U    Pain of right hip joint M25.551    Chronic gout of multiple sites M1A. 09X0    Neuropathy G62.9    Acquired hypothyroidism E03.9    Chronic pain of right knee M25.561, G89.29    Pulmonary nodules R91.8    Sjogren's syndrome with keratoconjunctivitis sicca ([facility]) M35.01    Arthritis of right knee M17.11    Closed fracture of right inferior pubic ramus ([facility]) S32.591A    Acute cystitis without hematuria N30.00    Acute pain of right knee M25.561    Sacral insufficiency fracture with routine healing M84.48XD    Fall W19. Nisa Ropes       Past Medical History:        Diagnosis Date    Abdominal pain, unspecified site     Acquired cyst of kidney     Acute gouty arthropathy     Allergic rhinitis, cause unspecified     Anxiety state, unspecified     Arthropathy, unspecified, site unspecified     Chronic airway obstruction, not elsewhere classified     Chronic kidney disease, stage III (moderate) (HCC)     Diarrhea     Edema     Esophageal reflux     Essential hypertension, benign     Herpes zoster with other nervous system complications(053.19)     Herpes zoster without mention of complication     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)     Mononeuritis of unspecified site     Myalgia and myositis, unspecified     Osteoarthrosis, unspecified whether generalized or localized, unspecified site     Other general symptoms(780.99)     Other iatrogenic hypothyroidism     Other nonspecific abnormal finding of lung field     Other psoriasis     Pure hypercholesterolemia     Regional enteritis of unspecified site     Senile osteoporosis     Sprain of ankle, unspecified site     Unspecified vitamin D deficiency        Past Surgical History:        Procedure Laterality Date    COLONOSCOPY      DILATION AND CURETTAGE OF UTERUS      UPPER GASTROINTESTINAL ENDOSCOPY N/A 6/14/2019    EGD FOREIGN BODY REMOVAL performed by Miguel Mahoney MD at [facility] Transmension History:    Social History     Tobacco Use    Smoking status: Former Smoker     Packs/day: 0.75     Years: 2.00     Pack years: 1.50     Types: Cigarettes     Start date: 10/25/1958     Quit date: 10/25/2018     Years since quitting: 3.6    Smokeless tobacco: Never Used   Substance Use Topics    Alcohol use: No     Alcohol/week: 0.0 standard drinks                                Counseling given: Not Answered      Vital Signs (Current):   Vitals:    06/05/22 2233 06/05/22 2328 06/06/22 0629 06/06/22 1240   BP:  (!) 102/56 (!) 168/72 130/70   Pulse:  68 81 57   Resp: 17 18 16 17   Temp:  98.4 °F (36.9 °C) 100.2 °F (37.9 °C) 98.1 °F (36.7 °C)   TempSrc:       SpO2:  93% 93% 97%   Weight:       Height: BP Readings from Last 3 Encounters:   06/06/22 130/70   05/05/22 124/72   04/13/22 126/76       NPO Status:                          Time of last solid consumption: 0000                        Date of last liquid consumption: 06/06/22                        Date of last solid food consumption: 06/06/22    BMI:   Wt Readings from Last 3 Encounters:   06/04/22 138 lb (62.6 kg)   05/05/22 127 lb (57.6 kg)   04/13/22 131 lb (59.4 kg)     Body mass index is 20.98 kg/m². CBC:   Lab Results   Component Value Date    WBC 7.9 06/06/2022    RBC 3.77 06/06/2022    RBC 4.06 12/02/2011    HGB 10.8 06/06/2022    HCT 32.7 06/06/2022    MCV 86.5 06/06/2022    RDW 15.5 06/06/2022     06/06/2022     12/02/2011       CMP:   Lab Results   Component Value Date     06/06/2022    K 4.1 06/06/2022     06/06/2022    CO2 27 06/06/2022    BUN 17 06/06/2022    CREATININE 1.06 06/06/2022    GFRAA >60 06/06/2022    LABGLOM 50 06/06/2022    GLUCOSE 116 06/06/2022    GLUCOSE 88 12/02/2011    PROT 7.0 01/04/2022    CALCIUM 9.7 06/06/2022    BILITOT 0.56 01/04/2022    ALKPHOS 103 01/04/2022    AST 27 01/04/2022    ALT 13 01/04/2022       POC Tests: No results for input(s): POCGLU, POCNA, POCK, POCCL, POCBUN, POCHEMO, POCHCT in the last 72 hours.     Coags: No results found for: PROTIME, INR, APTT    HCG (If Applicable): No results found for: PREGTESTUR, PREGSERUM, HCG, HCGQUANT     ABGs: No results found for: PHART, PO2ART, NRV4FTD, UQF4ALA, BEART, Y4SURIJM     Type & Screen (If Applicable):  No results found for: LABABO, LABRH    Drug/Infectious Status (If Applicable):  No results found for: HIV, HEPCAB    COVID-19 Screening (If Applicable): No results found for: COVID19        Anesthesia Evaluation  Patient summary reviewed and Nursing notes reviewed no history of anesthetic complications:   Airway: Mallampati: II  TM distance: >3 FB   Neck ROM: full  Mouth opening: > = 3 FB   Dental:    (+) upper dentures and lower dentures      Pulmonary:normal exam  breath sounds clear to auscultation  (+) COPD:                             Cardiovascular:    (+) hypertension:, hyperlipidemia      ECG reviewed  Rhythm: regular  Rate: normal           Beta Blocker:  Dose within 24 Hrs         Neuro/Psych:   (+) neuromuscular disease:, psychiatric history:depression/anxiety              ROS comment: Fibromyalgia  H/o Delirium GI/Hepatic/Renal:   (+) GERD:, renal disease: CRI,          ROS comment: Some nausea. Endo/Other:    (+) hypothyroidism: arthritis: OA., .                  ROS comment: Gout  Sjogren's   Close fracture of the right inferior pubic ramus following a fall onto buttocks from fall   Sacral insufficiency fracture  Abdominal:             Vascular: negative vascular ROS. Other Findings:           Anesthesia Plan      general     ASA 3       Induction: intravenous. MIPS: Postoperative opioids intended and Prophylactic antiemetics administered. Anesthetic plan and risks discussed with patient.                   Pepcid and Zofran given IVP in pre op      Sharon Singh MD   6/6/2022

## 2022-06-06 NOTE — PLAN OF CARE
Problem: Discharge Planning  Goal: Discharge to home or other facility with appropriate resources  Outcome: Progressing  Flowsheets (Taken 6/6/2022 1710)  Discharge to home or other facility with appropriate resources:   Identify barriers to discharge with patient and caregiver   Arrange for needed discharge resources and transportation as appropriate   Identify discharge learning needs (meds, wound care, etc)   Arrange for interpreters to assist at discharge as needed   Refer to discharge planning if patient needs post-hospital services based on physician order or complex needs related to functional status, cognitive ability or social support system  Note: Discharge planners following for further discharge needs      Problem: Pain  Goal: Verbalizes/displays adequate comfort level or baseline comfort level  6/6/2022 1710 by Ancelmo Zuñiga RN  Outcome: Progressing  Flowsheets (Taken 6/6/2022 1710)  Verbalizes/displays adequate comfort level or baseline comfort level:   Notify Licensed Independent Practitioner if interventions unsuccessful or patient reports new pain   Consider cultural and social influences on pain and pain management   Implement non-pharmacological measures as appropriate and evaluate response   Administer analgesics based on type and severity of pain and evaluate response   Assess pain using appropriate pain scale   Encourage patient to monitor pain and request assistance  Note: Patients pain level decreased with prescribed pain medications. 6/6/2022 0551 by Kirill Rashid RN  Outcome: Progressing     Problem: Skin/Tissue Integrity  Goal: Absence of new skin breakdown  Description: 1. Monitor for areas of redness and/or skin breakdown  2. Assess vascular access sites hourly  3. Every 4-6 hours minimum:  Change oxygen saturation probe site  4.   Every 4-6 hours:  If on nasal continuous positive airway pressure, respiratory therapy assess nares and determine need for appliance change or resting period. 6/6/2022 1710 by Felicia Marroquin RN  Outcome: Progressing  Note: Skin assessment as charted. No new areas of skin breakdown noted. 6/6/2022 0551 by Balta Barcenas RN  Outcome: Progressing     Problem: Safety - Adult  Goal: Free from fall injury  6/6/2022 1710 by Felicia Marroquin RN  Outcome: Progressing  Flowsheets (Taken 6/6/2022 1710)  Free From Fall Injury:   Russell Xie family/caregiver on patient safety   Based on caregiver fall risk screen, instruct family/caregiver to ask for assistance with transferring infant if caregiver noted to have fall risk factors  Note: No injury this shift. Patients safety maintained. 6/6/2022 0551 by Balta Barcenas RN  Outcome: Progressing     Problem: ABCDS Injury Assessment  Goal: Absence of physical injury  Outcome: Progressing  Flowsheets (Taken 6/6/2022 1710)  Absence of Physical Injury: Implement safety measures based on patient assessment  Note: No injury this shift. Patients safety maintained.

## 2022-06-06 NOTE — PROGRESS NOTES
[facility] Internal Medicine    Progress Note     6/6/2022    2:53 PM    Name:   Agnes Mejia  MRN:     [id number]     Acct:      [de-identified]   Room:   2068/2068-01  IP Day:  2  Admit Date:  6/4/2022 11:20 AM    PCP:   Rosibel Basurto MD  Code Status:  Full Code    Subjective:     C/C:   Chief Complaint   Patient presents with   Gwenette Borne    Hip Pain     Principal Problem:    Closed fracture of right inferior pubic ramus ([address]  Active Problems:    Arthritis of right knee    Acute cystitis without hematuria    Acute pain of right knee    Sacral insufficiency fracture with routine healing    Fall    Pure hypercholesterolemia    Essential hypertension, benign    Chronic gout of multiple sites    Acquired hypothyroidism  Resolved Problems:    * No resolved hospital problems. *      Ms. Gurmeet Mcconnell, 78year old male, presented [facility] ED at [facility] on 06/04/2022 with close fracture of the right inferior pubic ramus following a fall onto buttocks. CT showed comminuted right pubic ramus fracture. Orthopedics will likely do sacroplasty today. Patient was found to have UTI and started on rocephin. Managing HTN with carvedilol and hydralizine PRN, and hypothyroidism with synthroid.         Patient is having problems with right buttock hip and knee pain  HPI  Patient Seen, Chart, Labs, Radiologystudies, and Consults reviewed.     Ground level fall post fall inability to bear weight and walk on right side with flair presumably arthritic knee pain        Significant last 24 hr data reviewed ;   Vitals:    06/05/22 2233 06/05/22 2328 06/06/22 0629 06/06/22 1240   BP:  (!) 102/56 (!) 168/72 130/70   Pulse:  68 81 57   Resp: 17 18 16 17   Temp:  98.4 °F (36.9 °C) 100.2 °F (37.9 °C) 98.1 °F (36.7 °C)   TempSrc:       SpO2:  93% 93% 97%   Weight:       Height:          Recent Results (from the past 24 hour(s))   Basic Metabolic Panel w/ Reflex to MG    Collection Time: 06/06/22 6:21 AM   Result Value Ref Range    Glucose 116 (H) 70 - 99 mg/dL    BUN 17 8 - 23 mg/dL    CREATININE 1.06 (H) 0.50 - 0.90 mg/dL    Calcium 9.7 8.6 - 10.4 mg/dL    Sodium 135 135 - 144 mmol/L    Potassium 4.1 3.7 - 5.3 mmol/L    Chloride 101 98 - 107 mmol/L    CO2 27 20 - 31 mmol/L    Anion Gap 7 (L) 9 - 17 mmol/L    GFR Non-African American 50 (L) >60 mL/min    GFR African American >60 >60 mL/min    GFR Comment         CBC with Auto Differential    Collection Time: 06/06/22  6:21 AM   Result Value Ref Range    WBC 7.9 3.5 - 11.0 k/uL    RBC 3.77 (L) 4.0 - 5.2 m/uL    Hemoglobin 10.8 (L) 12.0 - 16.0 g/dL    Hematocrit 32.7 (L) 36 - 46 %    MCV 86.5 80 - 100 fL    MCH 28.5 26 - 34 pg    MCHC 32.9 31 - 37 g/dL    RDW 15.5 (H) 11.5 - 14.9 %    Platelets 448 (L) 572 - 450 k/uL    MPV 7.8 6.0 - 12.0 fL    Seg Neutrophils 70 (H) 36 - 66 %    Lymphocytes 14 (L) 24 - 44 %    Monocytes 14 (H) 1 - 7 %    Eosinophils % 1 0 - 4 %    Basophils 1 0 - 2 %    Segs Absolute 5.50 1.3 - 9.1 k/uL    Absolute Lymph # 1.10 1.0 - 4.8 k/uL    Absolute Mono # 1.10 0.1 - 1.3 k/uL    Absolute Eos # 0.10 0.0 - 0.4 k/uL    Basophils Absolute 0.00 0.0 - 0.2 k/uL     No results for input(s): POCGLU in the last 72 hours. XR KNEE RIGHT (1-2 VIEWS)    Result Date: 6/5/2022  EXAMINATION: TWO XRAY VIEWS OF THE RIGHT KNEE 6/5/2022 1:34 pm COMPARISON: October 21, 2020 HISTORY: ORDERING SYSTEM PROVIDED HISTORY: increased pain TECHNOLOGIST PROVIDED HISTORY: increased pain Reason for Exam: Pt states chronic right knee pain, recent increase in pain. FINDINGS: Two views obtained. No acute osseous abnormality. Generalized osteopenia. Advanced degenerative change most severe lateral femorotibial compartment loss joint space and osteophyte formation. Small joint effusion. Soft tissues unremarkable. No acute fracture. Advanced osteoarthrosis. Small knee joint effusion. Osteopenia.              On admission         Review of Systems: Constitutional:  negative for chills, fevers, sweats  Respiratory:  negative for cough, dyspnea on exertion, hemoptysis, shortness of breath, wheezing  Cardiovascular:  negative for chest pain, chest pressure/discomfort, lower extremity edema, palpitations  Gastrointestinal:  negative for abdominal pain, constipation, diarrhea, nausea, vomiting  Neurological:  negative for dizziness, headache  Data:     Past Medical History:  no change     Social History:  no change    Family History: @no change    Vitals:      I/O (24Hr): No intake or output data in the 24 hours ending 22 1453    Labs:    Radiology:    Medications: Allergies:      Current Meds:   Scheduled Meds:    cefTRIAXone (ROCEPHIN) IV  1,000 mg IntraVENous Q24H    sodium chloride flush  5-40 mL IntraVENous 2 times per day    enoxaparin  40 mg SubCUTAneous Daily    allopurinol  300 mg Oral Daily    atorvastatin  40 mg Oral Daily    carvedilol  12.5 mg Oral BID WC    folic acid  1 mg Oral Daily    levothyroxine  125 mcg Oral Daily     Continuous Infusions:    sodium chloride 10 mL/hr at 22 1001     PRN Meds: sodium chloride flush, sodium chloride, acetaminophen, ondansetron **OR** ondansetron, oxyCODONE, hydrALAZINE, polyethylene glycol      Physical Examination:        /70   Pulse 57   Temp 98.1 °F (36.7 °C)   Resp 17   Ht 5' 8\" (1.727 m)   Wt 138 lb (62.6 kg)   SpO2 97%   BMI 20.98 kg/m²   Temp (24hrs), Av.5 °F (37.5 °C), Min:98.1 °F (36.7 °C), Max:102 °F (38.9 °C)    No results for input(s): POCGLU in the last 72 hours. No intake or output data in the 24 hours ending 22 1453    General Appearance:  alert, well appearing, and in no acute distress  Mental status:   Head:  normocephalic, atraumatic.   Eye: no icterus, redness, pupils equal and reactive, extraocular eye movements intact, conjunctiva clear  Ear: normal external ear, no discharge, hearing intact  Nose:  no drainage noted  Mouth: mucous membranes moist  Neck: supple, no carotid bruits, thyroid not palpable  Lungs: Bilateral equal air entry, clear to ausculation, no wheezing, rales or rhonchi, normal effort  Cardiovascular: normal rate, regular rhythm, no murmur, gallop, rub. Abdomen: Soft, nontender, nondistended, normal bowel sounds, no hepatomegaly or splenomegaly  Neurologic: There are no new focal motor or sensory deficits,   Skin: No gross lesions, rashes, bruising or bleeding on exposed skin area  Extremities:  peripheral pulses palpable, no pedal edema or calf pain with palpation  Psych:             Assessment:        Primary Problem  Closed fracture of right inferior pubic ramus (HCC)    Principal Problem:    Closed fracture of right inferior pubic ramus (HCC)  Active Problems:    Arthritis of right knee    Acute cystitis without hematuria    Acute pain of right knee    Sacral insufficiency fracture with routine healing    Fall    Pure hypercholesterolemia    Essential hypertension, benign    Chronic gout of multiple sites    Acquired hypothyroidism  Resolved Problems:    * No resolved hospital problems. *       Plan:          6/6/22          . Hospital Problems           Last Modified POA    * (Principal) Closed fracture of right inferior pubic ramus ([address] 6/4/2022 Yes    Arthritis of right knee 6/5/2022 Yes    Acute cystitis without hematuria 6/5/2022 Yes    Acute pain of right knee 6/5/2022 Yes    Sacral insufficiency fracture with routine healing 6/5/2022 Yes    Fall 6/5/2022 Yes    Pure hypercholesterolemia 6/4/2022 Yes    Essential hypertension, benign 6/4/2022 Yes    Chronic gout of multiple sites 6/4/2022 Yes    Acquired hypothyroidism 6/4/2022 Yes                         Thanks for consulting us . Will monitor vitals and clinical course , and  Optimize therapy  as needed .            Betty Greene MD

## 2022-06-06 NOTE — PROGRESS NOTES
Transfer of medicine care to Cristian Antunez    Ms. Derek Elmore, 78year old male, presented to the ED at [facility] on 06/04/2022 with close fracture of the right inferior pubic ramus following a fall onto buttocks. CT showed comminuted right pubic ramus fracture. Orthopedics will likely do sacroplasty today. Patient was found to have UTI and started on rocephin. Managing HTN with carvedilol and hydralizine PRN, and hypothyroidism with synthroid.     Electronically signed by Renetta Riggs MD on 6/6/2022 at 6:59 AM

## 2022-06-06 NOTE — CARE COORDINATION
CASE MANAGEMENT NOTE:    Admission Date:  6/4/2022 Reggie Lorenzana is a 78 y.o.  female    Admitted for : Closed fracture of right inferior pubic ramus, initial encounter ([facility] [address] Lilia Moyer    Met with:  Patient    PCP:  LOUANN/ Hermel[address] Conce[address]:  [facility]      Is patient alert and oriented at time of discussion:  Yes    Current Residence/ Living Arrangements:  independently at home, she lives with her 2 dtrs             Current Services PTA:  No    Does patient go to outpatient dialysis: No  If yes, location and chair time: NA    Is patient agreeable to VNS: Yes    Freedom of choice provided:  Yes    List of [address] provided: No    VNS chosen:  No    DME:  bedside commode, straight cane and walker    Home Oxygen: No    Nebulizer: No    CPAP/BIPAP: No    Supplier: N/A    Potential Assistance Needed: No    SNF needed: No    Freedom of choice and list provided: No    Pharmacy:  [facility]       Is patient currently receiving oral anticoagulation therapy? No    Is the Patient an [facility] with Readmission Risk Score greater than 14%? No  If yes, pt needs a follow up appointment made within 7 days. Family Members/Caregivers that pt would like involved in their care:    Yes    If yes, list name here:  dtrs Megan Hickman and [facility]    Transportation Provider:  Family             Discharge Plan:  6/6/22 Jignesh Jose From home DME: canena walker, BSC VNS:none Pt is agreeable to VNS Referral sent to [address] [place] [address], spoke to Cristal. S/p fall  With pubic rami fracture, Ortho consult MRI sacrum done and OR scheduled for 5:45 pm today for kyphoplasty. IV rocephin ORANGE HEADER 14% RINKU NEEDS TO BE SIGNED/COMPLETED.  Following for needs//JF                 Electronically signed by: Brian Vaca RN on 6/6/2022 at 10:07 AM

## 2022-06-06 NOTE — H&P
Patient: Hu Gold  Unit/Bed: 5982/1295-67  YOB: 1940  MRN: [id number]  Acct: [de-identified]   Admitting Diagnosis: Closed fracture of right inferior pubic ramus, initial encounter [facility]) Kennedy Bianchi  Admit Date:  6/4/2022  Hospital Day: 1     Subjective:    Patient is having problems with right buttock hip and knee pain  HPI  Patient Seen, Chart, Labs, Radiologystudies, and Consults reviewed.     Ground level fall post fall inability to bear weight and walk on right side with flair presumably arthritic knee pain     Objective:   /80   Pulse 69   Temp 97.5 °F (36.4 °C)   Resp 17   Ht 5' 8\" (1.727 m)   Wt 138 lb (62.6 kg)   SpO2 96%   BMI 20.98 kg/m²   Intake/Output Summary (Last 24 hours) at 6/5/2022 1213  Last data filed at 6/5/2022 0815      Gross per 24 hour   Intake 10 ml   Output --   Net 10 ml      Review of Systems  Physical Exam  Vitals and nursing note reviewed. Constitutional:       Appearance: She is well-developed. HENT:      Head: Normocephalic and atraumatic. Nose: Nose normal.   Eyes:      Conjunctiva/sclera: Conjunctivae normal.   Pulmonary:      Effort: Pulmonary effort is normal. No respiratory distress. Musculoskeletal:      Cervical back: Normal range of motion and neck supple. Comments: Pillow under right knee hip slightly flexed and abducted n/v grossly intact     Skin:     General: Skin is warm and dry. Neurological:      Mental Status: She is alert and oriented to person, place, and time. Sensory: No sensory deficit. Psychiatric:         Behavior: Behavior normal.         Thought Content: Thought content normal.               Drains:No  Imaging: Yes right sacrum with left inferior rami and symphysis fracture        Medications:   Scheduled Medications    cefTRIAXone (ROCEPHIN) IV  1,000 mg IntraVENous Q24H    sodium chloride flush  5-40 mL IntraVENous 2 times per day    enoxaparin  40 mg SubCUTAneous Daily    allopurinol  300 mg Oral Daily    atorvastatin  40 mg Oral Daily    carvedilol  12.5 mg Oral BID     folic acid  1 mg Oral Daily    levothyroxine  125 mcg Oral Daily         PRN Meds:  PRN Medications   sodium chloride flush, sodium chloride, acetaminophen, ondansetron **OR** ondansetron, oxyCODONE, hydrALAZINE, polyethylene glycol           Data:  CBC:       Recent Labs     06/05/22  0636   WBC 8.4   RBC 3.77*   HGB 10.6*   HCT 33.2*   MCV 87.9   RDW 15.9*   *      BNP: No results for input(s): BNP in the last 72 hours. PT/INR: No results for input(s): PROTIME, INR in the last 72 hours.     Assessment/Plan:  Principal Problem:    Closed fracture of right inferior pubic ramus (HCC)  Active Problems:    Arthritis of right knee    Acute cystitis without hematuria    Acute pain of right knee    Sacral insufficiency fracture with routine healing    Pure hypercholesterolemia    Essential hypertension, benign    Chronic gout of multiple sites    Acquired hypothyroidism  Resolved Problems:    * No resolved hospital problems.  *        MRI sacrum  Npo  Likely sacroplasty tomorrow  Electronically signed by Meir Hardwick MD on 6/5/2022 at 12:13 PM

## 2022-06-06 NOTE — ACP (ADVANCE CARE PLANNING)
Advance Care Planning     Advance Care Planning Activator (Inpatient)  Conversation Note      Date of ACP Conversation: 6/6/2022     Conversation Conducted with: Patient with Decision Making Capacity    ACP Activator: Eduardo De La Torre RN        Health Care Decision Maker:     Current Designated Health Care Decision Maker:     Primary Decision Maker: Angélica Magaña - Child - [phone]    Primary Decision Maker: Jean Laurent - Child - [phone]  Click here to complete Healthcare Decision Makers including section of the Healthcare Decision Maker Relationship (ie \"Primary\")  Today we documented Decision Maker(s) consistent with Legal Next of Kin hierarchy. Care Preferences    Ventilation: \"If you were in your present state of health and suddenly became very ill and were unable to breathe on your own, what would your preference be about the use of a ventilator (breathing machine) if it were available to you? \"      Would the patient desire the use of ventilator (breathing machine)?: yes    \"If your health worsens and it becomes clear that your chance of recovery is unlikely, what would your preference be about the use of a ventilator (breathing machine) if it were available to you? \"     Would the patient desire the use of ventilator (breathing machine)?: Yes      Resuscitation  \"CPR works best to restart the heart when there is a sudden event, like a heart attack, in someone who is otherwise healthy. Unfortunately, CPR does not typically restart the heart for people who have serious health conditions or who are very sick. \"    \"In the event your heart stopped as a result of an underlying serious health condition, would you want attempts to be made to restart your heart (answer \"yes\" for attempt to resuscitate) or would you prefer a natural death (answer \"no\" for do not attempt to resuscitate)? \" yes       [x] Yes   [] No   Educated Patient / Decision Maker regarding differences between Advance Directives and portable DNR orders.     Length of ACP Conversation in minutes:      Conversation Outcomes:  [x] ACP discussion completed  [] Existing advance directive reviewed with patient; no changes to patient's previously recorded wishes  [] New Advance Directive completed  [] Portable Do Not Rescitate prepared for Provider review and signature  [] POLST/POST/MOLST/MOST prepared for Provider review and signature      Follow-up plan:    [] Schedule follow-up conversation to continue planning  [] Referred individual to Provider for additional questions/concerns   [] Advised patient/agent/surrogate to review completed ACP document and update if needed with changes in condition, patient preferences or care setting    [] This note routed to one or more involved healthcare providers

## 2022-06-07 VITALS
OXYGEN SATURATION: 95 % | RESPIRATION RATE: 20 BRPM | SYSTOLIC BLOOD PRESSURE: 131 MMHG | HEART RATE: 69 BPM | TEMPERATURE: 98.4 F | HEIGHT: 68 IN | BODY MASS INDEX: 20.92 KG/M2 | DIASTOLIC BLOOD PRESSURE: 68 MMHG | WEIGHT: 138 LBS

## 2022-06-07 PROCEDURE — 99232 SBSQ HOSP IP/OBS MODERATE 35: CPT | Performed by: INTERNAL MEDICINE

## 2022-06-07 PROCEDURE — 6370000000 HC RX 637 (ALT 250 FOR IP): Performed by: ORTHOPAEDIC SURGERY

## 2022-06-07 PROCEDURE — 6360000002 HC RX W HCPCS: Performed by: ORTHOPAEDIC SURGERY

## 2022-06-07 PROCEDURE — 99024 POSTOP FOLLOW-UP VISIT: CPT | Performed by: ORTHOPAEDIC SURGERY

## 2022-06-07 PROCEDURE — 97530 THERAPEUTIC ACTIVITIES: CPT

## 2022-06-07 PROCEDURE — 2580000003 HC RX 258: Performed by: ORTHOPAEDIC SURGERY

## 2022-06-07 PROCEDURE — 97162 PT EVAL MOD COMPLEX 30 MIN: CPT

## 2022-06-07 PROCEDURE — 97166 OT EVAL MOD COMPLEX 45 MIN: CPT

## 2022-06-07 RX ORDER — HYDROCODONE BITARTRATE AND ACETAMINOPHEN 5; 325 MG/1; MG/1
1 TABLET ORAL EVERY 6 HOURS PRN
Qty: 28 TABLET | Refills: 0 | Status: SHIPPED | OUTPATIENT
Start: 2022-06-07 | End: 2022-06-14

## 2022-06-07 RX ORDER — CIPROFLOXACIN 500 MG/1
500 TABLET, FILM COATED ORAL
Qty: 3 TABLET | Refills: 0 | Status: SHIPPED | OUTPATIENT
Start: 2022-06-08 | End: 2022-06-17 | Stop reason: SDUPTHER

## 2022-06-07 RX ORDER — CIPROFLOXACIN 500 MG/1
500 TABLET, FILM COATED ORAL
Status: DISCONTINUED | OUTPATIENT
Start: 2022-06-08 | End: 2022-06-07 | Stop reason: HOSPADM

## 2022-06-07 RX ORDER — HYDROCODONE BITARTRATE AND ACETAMINOPHEN 5; 325 MG/1; MG/1
1 TABLET ORAL EVERY 4 HOURS PRN
Status: DISCONTINUED | OUTPATIENT
Start: 2022-06-07 | End: 2022-06-07 | Stop reason: HOSPADM

## 2022-06-07 RX ORDER — HYDROCODONE BITARTRATE AND ACETAMINOPHEN 5; 325 MG/1; MG/1
2 TABLET ORAL EVERY 4 HOURS PRN
Status: DISCONTINUED | OUTPATIENT
Start: 2022-06-07 | End: 2022-06-07 | Stop reason: HOSPADM

## 2022-06-07 RX ADMIN — ACETAMINOPHEN 650 MG: 325 TABLET ORAL at 10:33

## 2022-06-07 RX ADMIN — LEVOTHYROXINE SODIUM 125 MCG: 0.12 TABLET ORAL at 05:43

## 2022-06-07 RX ADMIN — FOLIC ACID 1 MG: 1 TABLET ORAL at 07:55

## 2022-06-07 RX ADMIN — CEFTRIAXONE SODIUM 1000 MG: 1 INJECTION, POWDER, FOR SOLUTION INTRAMUSCULAR; INTRAVENOUS at 10:32

## 2022-06-07 RX ADMIN — SODIUM CHLORIDE, PRESERVATIVE FREE 10 ML: 5 INJECTION INTRAVENOUS at 07:55

## 2022-06-07 RX ADMIN — CARVEDILOL 12.5 MG: 12.5 TABLET, FILM COATED ORAL at 07:55

## 2022-06-07 RX ADMIN — ALLOPURINOL 300 MG: 300 TABLET ORAL at 07:55

## 2022-06-07 RX ADMIN — ATORVASTATIN CALCIUM 40 MG: 40 TABLET, FILM COATED ORAL at 07:55

## 2022-06-07 NOTE — PROGRESS NOTES
Patient clear to discharge. Writer reviews discharge instructions with patient, all questions answered. IV removed. All belongings gathered and sent with patient/family. Writer takes patient down to main entrance where ride awaits, patient helped into vehicle.

## 2022-06-07 NOTE — PROGRESS NOTES
Surgical Progress Note    POD: 1    Patient doing fairly well  Vitals:    22 0637   BP: 138/65   Pulse: 72   Resp: 16   Temp: 97.5 °F (36.4 °C)   SpO2: 94%      Temp (24hrs), Av.9 °F (36.6 °C), Min:97.3 °F (36.3 °C), Max:99.7 °F (37.6 °C)       Pain Control improved  No unusual nausea    Exam: no change pt pending        Lungs:  No respiratory distress    Labs reviewed:  Labs:  WBC/Hgb/Hct/Plts:  7.9/10.8/32.7/122 ( 8024)  BUN/Cr/glu/ALT/AST/amyl/lip:  17/.06/--/--/--/--/-- ( 1490)     Na/K/Cl/CO2:  135/4.1/101/27 ( 5328)    I/O last 3 completed shifts:   In: 12 [I.V.:800]  Out: -     Assessment:    Patient Active Problem List   Diagnosis    Vitamin D deficiency    Other general symptoms    Abdominal pain    Herpes zoster with other nervous system complications    Herpes zoster    Sprain of ankle    Edema    Acquired cyst of kidney    Myalgia and myositis    COPD [facility])    Mitral valve disorder    Allergic rhinitis    Diarrhea    Esophageal reflux    Other psoriasis    Arthropathy    Pure hypercholesterolemia    Anxiety state    Essential hypertension, benign    Chronic kidney disease, stage III (moderate) ([facility])    Regional enteritis ([facility])    Senile osteoporosis    Mononeuritis    Conjunctivitis    Skin lesion of left leg    Skin lesion of right leg    Fibromyalgia    DJD (degenerative joint disease) of knee    Delirium due to another medical condition, acute, mixed level of activity    Gout attack    Colitis    Esophageal obstruction due to food impaction    Pain of right hip joint    Chronic gout of multiple sites    Neuropathy    Acquired hypothyroidism    Chronic pain of right knee    Pulmonary nodules    Sjogren's syndrome with keratoconjunctivitis sicca ([facility])    Arthritis of right knee    Closed fracture of right inferior pubic ramus ([facility])    Acute cystitis without hematuria    Acute pain of right knee  Sacral insufficiency fracture with routine healing    Fall       Plan:  See my orders  Discharge planning    Gretchen Menon MD MD  6/7/2022 8:18 AM

## 2022-06-07 NOTE — CARE COORDINATION
SW met with Patient to Discuss D/C planning, and SNF recommendation from PT. Patient was not sure if she wanted to go to a facility, and stated that she could go home and have support. Patient reported that she has two daughters that live with her, one has medical issues but the other one does not. Per patient both would be around to assist her 24/7 with care. Patient also reported she has multiple family members that live very close to her as well that could assist her if needed . SW provided education on What a SNF is, and the benefits of going to one for additional rehab Versus home. Patient stated that she was \"kind of\" in agreement, but she would like to talk to her family first to determine which would be better for her. SW did discuss SNF locations with her, and if she chooses to go to a facility, her top 3 would be 1. [address] [place] [address] 2. [facility] 3. [facility]. SW informed her that SW will submit the referrals to see who can accept her if she chooses to go so she knows what location we are planning for. Patient was in agreement with this plan. Patient stated that she is going to talk to her daughters today to make a decision. SW expressed understanding and informed her that if needed, SW is happy to speak with her family as well to answer any questions they may have. LSW will send referrals and Follow up with facilities and family. Electronically signed by Elliot Booker on 6/7/22 at 12:30 PM EDT    Addendum: SW notified that all 3 of patients choices are Out of network. SW will meet with Patient to provide SNF options that are in network.      Electronically signed by Elliot Booker on 6/7/22 at 1:55 PM EDT

## 2022-06-07 NOTE — FLOWSHEET NOTE
06/07/22 1040   Encounter Summary   Encounter Overview/Reason   Encounter   Service Provided For: Patient   Referral/Consult From: Kait   Last Encounter  06/07/22   Begin Time 1000   End Time  1015   Total Time Calculated 15 min   Spiritual/Emotional needs   Type Spiritual Support   Rituals, Rites and Sacraments   Type Anointing

## 2022-06-07 NOTE — DISCHARGE INSTR - COC
Continuity of Care Form    Patient Name: Carmen Branham   :  1940  MRN:  [id number]    Admit date:  2022  Discharge date:  ***    Code Status Order: Full Code   Advance Directives:      Admitting Physician:  Vincent Goodell, MD  PCP: Preston Melgar MD    Discharging Nurse: [facility] Unit/Room#: 2068/2068-01  Discharging Unit Phone Number: [phone]    Emergency Contact:   Extended Emergency Contact Information  Primary Emergency Contact: Ghada Heredia  Address: [address] [place] [address]  Home Phone: [phone]  Work Phone: [phone]  Mobile Phone: [phone]  Relation: Child  Secondary Emergency Contact: [address] Phone: [phone]  Work Phone: [phone]  Mobile Phone: [phone]  Relation: Child    Past Surgical History:  Past Surgical History:   Procedure Laterality Date    COLONOSCOPY      DILATION AND CURETTAGE OF UTERUS      UPPER GASTROINTESTINAL ENDOSCOPY N/A 2019    EGD FOREIGN BODY REMOVAL performed by Matthieu Geller MD at [address] [place] [address]        Immunization History:   Immunization History   Administered Date(s) Administered    COVID-19, [place], Primary or Immunocompromised, PF, 100mcg/0.5mL 2021, 2021, 2021    Influenza 2015    Influenza Virus Vaccine 2015, 2020    Influenza, High Dose (Fluzone 65 yrs and older) 2015, 2017, 10/01/2018    Influenza, Quadv, IM, PF (6 mo and older Fluzone, Flulaval, Fluarix, and 3 yrs and older Afluria) 2020    Influenza, Quadv, adjuvanted, 65 yrs +, IM, PF (Fluad) 2020, 2021    Influenza, Triv, inactivated, subunit, adjuvanted, IM (Fluad 65 yrs and older) 2017, 10/01/2018, 2019    Pneumococcal Conjugate 13-valent (Vgbdovr83) 2015    Pneumococcal Conjugate 7-valent (Prevnar7) 10/29/2004    Pneumococcal Polysaccharide (Rayyyzabz01) 2016       Active Problems:  Patient Active Problem List   Diagnosis Code    Vitamin D deficiency E55.9    Other general symptoms R68.89    Abdominal pain R10.9    Herpes zoster with other nervous system complications A10.71    Herpes zoster B02.9    Sprain of ankle S93.409A    Edema R60.9    Acquired cyst of kidney N28.1    Myalgia and myositis OEX6645    COPD (chronic obstructive pulmonary disease) ([facility]) J44.9    Mitral valve disorder I05.9    Allergic rhinitis J30.9    Diarrhea R19.7    Esophageal reflux K21.9    Other psoriasis L40.8    Arthropathy M12.9    Pure hypercholesterolemia E78.00    Anxiety state F41.1    Essential hypertension, benign I10    Chronic kidney disease, stage III (moderate) ([facility]) N18.30    Regional enteritis ([address] K50.90    Senile osteoporosis M81.0    Mononeuritis G58.9    Conjunctivitis H10.9    Skin lesion of left leg L98.9    Skin lesion of right leg L98.9    Fibromyalgia M79.7    DJD (degenerative joint disease) of knee M17.10    Delirium due to another medical condition, acute, mixed level of activity F05    Gout attack M10.9    Colitis K52.9    Esophageal obstruction due to food impaction K22.2, T18.128A    Pain of right hip joint M25.551    Chronic gout of multiple sites M1A. 55A1    Neuropathy G62.9    Acquired hypothyroidism E03.9    Chronic pain of right knee M25.561, G89.29    Pulmonary nodules R91.8    Sjogren's syndrome with keratoconjunctivitis sicca ([facility]) M35.01    Arthritis of right knee M17.11    Closed fracture of right inferior pubic ramus ([facility]) S32.591A    Acute cystitis without hematuria N30.00    Acute pain of right knee M25.561    Sacral insufficiency fracture with routine healing M84.48XD    Fall W19. [facility]       Isolation/Infection:   Isolation            No Isolation          Patient Infection Status       None to display            Nurse Assessment:  Last Vital Signs: /65   Pulse 72   Temp 97.5 °F (36.4 °C)   Resp 16   Ht 5' 8\" (1.727 m)   Wt 138 lb (62.6 kg)   SpO2 94%   BMI 20.98 kg/m²     Last documented pain score (0-10 scale): Pain Level: 6  Last Weight:   Wt Readings from Last 1 Encounters:   06/04/22 138 lb (62.6 kg)     Mental Status:  oriented and alert    IV Access:  - None    Nursing Mobility/ADLs:  Walking   Assisted  Transfer  Assisted  Bathing  Assisted  Dressing  Assisted  Toileting  Assisted  Feeding  Assisted  Med Admin  Independent  Med Delivery   whole    Wound Care Documentation and Therapy:  Puncture 06/06/22 Sacrum (Active)   Drainage Amount Scant 06/06/22 2028   Dressing/Treatment Adhesive bandage 06/06/22 2028   Dressing Status New drainage noted 06/06/22 2028   Dressing/Treatment Adhesive bandage 06/06/22 2028   Number of days: 0        Elimination:  Continence: Bowel: Yes  Bladder: Yes  Urinary Catheter: None   Colostomy/Ileostomy/Ileal Conduit: No       Date of Last BM: 6/7      Intake/Output Summary (Last 24 hours) at 6/7/2022 0819  Last data filed at 6/6/2022 2117  Gross per 24 hour   Intake 800 ml   Output --   Net 800 ml     I/O last 3 completed shifts: In: 800 [I.V.:800]  Out: -     Safety Concerns: At Risk for Falls    Impairments/Disabilities:      None    Nutrition Therapy:  Current Nutrition Therapy:   - Oral Diet:  General    Routes of Feeding: Oral  Liquids: No Restrictions  Daily Fluid Restriction: no  Last Modified Barium Swallow with Video (Video Swallowing Test): not done    Treatments at the Time of Hospital Discharge:   Respiratory Treatments:   Oxygen Therapy:  is not on home oxygen therapy.   Ventilator:    - No ventilator support    Rehab Therapies: Physical Therapy and Occupational Therapy  Weight Bearing Status/Restrictions: No weight bearing restrictions  Other Medical Equipment (for information only, NOT a DME order):  cane, walker, and bedside commode  Other Treatments:     Patient's personal belongings (please select all that are sent with patient):  Bharat    RN SIGNATURE:  Electronically signed by Raulito Mejía RN on 6/7/22 at 4:19 PM EDT    CASE MANAGEMENT/SOCIAL Prabhu  66. Status Date: 6/4/2022    Readmission Risk Assessment Score:  Readmission Risk              Risk of Unplanned Readmission:  11           Discharging to Facility/ [address]  [address] [address]  Phone [phone]  Fax  [phone]     Dialysis Facility (if applicable)   Name:  Address:  Dialysis Schedule:  Phone:  Fax:    / signature: Electronically signed by Dc Griggs RN on 6/7/22 at 4:23 PM EDT    PHYSICIAN SECTION    Prognosis: Good    Condition at Discharge: Stable    Rehab Potential (if transferring to Rehab): Good    Recommended Labs or Other Treatments After Discharge: na    Physician Certification: I certify the above information and transfer of [facility]  is necessary for the continuing treatment of the diagnosis listed and that she requires [place] for less 30 days.      Update Admission H&P: No change in H&P    PHYSICIAN SIGNATURE:  Electronically signed by Siegfried Severs, MD on 6/7/22 at 8:20 AM EDT

## 2022-06-07 NOTE — CARE COORDINATION
ONGOING DISCHARGE PLAN:    Patient is alert and oriented x4. Spoke with patient regarding discharge plan and patient confirms that plan is still home with VNS, referral to [address] [place] [address] St. POD#1 Lumbosacral vertebroplasty sacrum, S1 with Dr Rancho Wright. PT RECS: SNF, SW to follow with patient for options. IV rocephin    Will continue to follow for additional discharge needs.     Electronically signed by Eugenia Chakraborty RN on 6/7/2022 at 10:36 AM

## 2022-06-07 NOTE — PLAN OF CARE
Problem: Discharge Planning  Goal: Discharge to home or other facility with appropriate resources  Outcome: Completed     Problem: Pain  Goal: Verbalizes/displays adequate comfort level or baseline comfort level  Outcome: Completed     Problem: Skin/Tissue Integrity  Goal: Absence of new skin breakdown  Description: 1. Monitor for areas of redness and/or skin breakdown  2. Assess vascular access sites hourly  3. Every 4-6 hours minimum:  Change oxygen saturation probe site  4. Every 4-6 hours:  If on nasal continuous positive airway pressure, respiratory therapy assess nares and determine need for appliance change or resting period.   Outcome: Completed     Problem: Safety - Adult  Goal: Free from fall injury  Outcome: Completed     Problem: ABCDS Injury Assessment  Goal: Absence of physical injury  Outcome: Completed

## 2022-06-07 NOTE — CARE COORDINATION
DISCHARGE PLANNING NOTE:  Family now wished for patient to return to her home with the 2 dtrs. Pt's one dtr has [facility] and they would like us to refer to [facility] for this patient . CM spoke to [place] at [facility] and provided info, awaiting return call to see if they can accept. Family updated.   Electronically signed by Salvador Gottlieb RN on 6/7/2022 at 3:45 PM

## 2022-06-07 NOTE — PROGRESS NOTES
[place] [address] Internal Medicine    Progress Note  Chart Reviewed: Yes  Patient assessed for rehabilitation services?: Yes  Family / Caregiver Present: No  Referring Practitioner: Binta Guevara MD  Referral Date : 06/06/22 6/7/2022    12:44 PM    Name:   Russell Urrutia  MRN:     [id number]     Acct:      [de-identified]   Room:   2068/2068-01   Day:  3  Admit Date:  6/4/2022 11:20 AM    PCP:   Lucila Whitten MD  Code Status:  Full Code    Subjective:     C/C:   Chief Complaint   Patient presents with   Abi Gibes    Hip Pain     Principal Problem:    Closed fracture of right inferior pubic ramus ([address]  Active Problems:    Arthritis of right knee    Acute cystitis without hematuria    Acute pain of right knee    Sacral insufficiency fracture with routine healing    Fall    Pure hypercholesterolemia    Essential hypertension, benign    Chronic gout of multiple sites    Acquired hypothyroidism  Resolved Problems:    * No resolved hospital problems. *    No acute episodes overnight. Patient is heme stable with Tmax of 98.2. CBC and BMP from yesterday reviewed. No CBC or BMP from today. UA reviewed. Urine culture positive for Enterobacter Cloacae. Remains on rocephin. Post op Day 1. Pain control improved. Discharge planning per Primary. Significant last 24 hr data reviewed ;   Vitals:    06/06/22 2120 06/06/22 2132 06/07/22 0010 06/07/22 0637   BP: (!) 151/67 (!) 145/76 133/67 138/65   Pulse: 89 90 72 72   Resp: 17 18 17 16   Temp:  98.2 °F (36.8 °C) 97.5 °F (36.4 °C) 97.5 °F (36.4 °C)   TempSrc:       SpO2: 94% 93% 93% 94%   Weight:       Height:          No results found for this or any previous visit (from the past 24 hour(s)). No results for input(s): POCGLU in the last 72 hours.      XR KNEE RIGHT (1-2 VIEWS)    Result Date: 6/5/2022  EXAMINATION: TWO XRAY VIEWS OF THE RIGHT KNEE 6/5/2022 1:34 pm COMPARISON: October 21, 2020 HISTORY: ORDERING SYSTEM PROVIDED HISTORY: increased pain TECHNOLOGIST PROVIDED HISTORY: increased pain Reason for Exam: Pt states chronic right knee pain, recent increase in pain. FINDINGS: Two views obtained. No acute osseous abnormality. Generalized osteopenia. Advanced degenerative change most severe lateral femorotibial compartment loss joint space and osteophyte formation. Small joint effusion. Soft tissues unremarkable. No acute fracture. Advanced osteoarthrosis. Small knee joint effusion. Osteopenia. FLUORO FOR SURGICAL PROCEDURES    Result Date: 6/6/2022  Radiology exam is complete. No Radiologist dictation. Please follow up with ordering provider. MRI SACRUM COCCYX WO CONTRAST    Result Date: 6/6/2022  EXAMINATION: MRI OF THE SACRUM/SI JOINT WITHOUT CONTRAST, 6/6/2022 3:12 pm TECHNIQUE: Multiplanar multisequence MRI of the sacrum/si joint was performed without the administration of intravenous contrast. COMPARISON: CT pelvis June 4, 2022; pelvis and right hip radiograph June 4, 2022 HISTORY: ORDERING SYSTEM PROVIDED HISTORY: insufficiency fracture TECHNOLOGIST PROVIDED HISTORY: insufficiency fracture Reason for Exam: insufficiency fracture Additional signs and symptoms: BUTTOCK PAIN FINDINGS: SOFT TISSUES: Mild intramuscular edema along the right piriformis muscle. Consistent with muscular strain given trauma history. Mild presacral soft tissue edema. No organized fluid collections. Partially imaged intrapelvic structures appear grossly unremarkable with note again made of colonic diverticulosis. Exiting sacral nerve roots appear grossly unremarkable with normal appearance of the perineural fat. BONE MARROW: Acute versus subacute fracture of the right sacral ala and sacral body. No sacral fracture identified on the left. Fracture line is vertically oriented. This corresponds to fracture seen on comparison CT. JOINTS: Anatomic alignment of the sacroiliac joints with no significant joint effusion.   Mild-to-moderate degenerative changes of the sacroiliac joints. 1. Redemonstration of right sacral fracture involving the sacral ala and sacral body. Adjacent intramuscular edema along the right piriformis muscle compatible with muscular strain. No additional fractures are identified along the imaged sacrum. On admission     Ms. Zoya Ghotra, 78year old male, presented to the ED at [facility] on 06/04/2022 with close fracture of the right inferior pubic ramus following a fall onto buttocks. CT showed comminuted right pubic ramus fracture.     Orthopedics performed sacroplasty yesterday.     Patient was found to have UTI and started on rocephin.     Managing HTN with carvedilol and hydralizine PRN, and hypothyroidism with synthroid. Review of Systems:     Constitutional:  negative for chills, fevers, sweats  Respiratory:  negative for cough, dyspnea on exertion, hemoptysis, shortness of breath, wheezing  Cardiovascular:  negative for chest pain, chest pressure/discomfort, lower extremity edema, palpitations  Gastrointestinal:  negative for abdominal pain, constipation, diarrhea, nausea, vomiting  Neurological:  negative for dizziness, headache  Musculoskeletal: positive for mild back pain    Data:     Past Medical History:  no change     Social History:  no change    Family History: @no change    Vitals:  Reviewed    I/O (24Hr):     Intake/Output Summary (Last 24 hours) at 6/7/2022 1244  Last data filed at 6/6/2022 2117  Gross per 24 hour   Intake 800 ml   Output --   Net 800 ml     Labs:  CBC and BMP from yesterday reviewed    Radiology:  N/A    Medications:     Current Meds:   Scheduled Meds:    sodium chloride flush  5-40 mL IntraVENous 2 times per day    acetaminophen  650 mg Oral Q6H    cefTRIAXone (ROCEPHIN) IV  1,000 mg IntraVENous Q24H    allopurinol  300 mg Oral Daily    atorvastatin  40 mg Oral Daily    carvedilol  12.5 mg Oral BID WC    folic acid  1 mg Oral Daily    levothyroxine  125 mcg Oral Daily Continuous Infusions:    sodium chloride       PRN Meds: HYDROcodone 5 mg - acetaminophen **OR** HYDROcodone 5 mg - acetaminophen, sodium chloride flush, sodium chloride, acetaminophen, ondansetron **OR** ondansetron, hydrALAZINE, polyethylene glycol    Physical Examination:        /65   Pulse 72   Temp 97.5 °F (36.4 °C)   Resp 16   Ht 5' 8\" (1.727 m)   Wt 138 lb (62.6 kg)   SpO2 94%   BMI 20.98 kg/m²   Temp (24hrs), Av.9 °F (36.6 °C), Min:97.3 °F (36.3 °C), Max:99.7 °F (37.6 °C)    No results for input(s): POCGLU in the last 72 hours. Intake/Output Summary (Last 24 hours) at 2022 1244  Last data filed at 2022 2117  Gross per 24 hour   Intake 800 ml   Output --   Net 800 ml     Physical Exam  Vitals and nursing note reviewed. Constitutional:       Appearance: She is well-developed. HENT:      Head: Normocephalic and atraumatic.      Nose: Nose normal.   Eyes:      Conjunctiva/sclera: Conjunctivae normal.   Pulmonary:      Effort: Pulmonary effort is normal. No respiratory distress. Musculoskeletal:      Cervical back: Normal range of motion and neck supple.      Comments: Pillow under right knee hip slightly flexed and abducted n/v grossly intact  Skin:     General: Skin is warm and dry. Neurological:      Mental Status: She is alert and oriented to person, place, and time.      Sensory: No sensory deficit. Psychiatric:         BehaviorBarbara Jose     Thought Content:  Thought content normal.     No change    No respiratory distress      Assessment:        Primary Problem  Closed fracture of right inferior pubic ramus (HCC)    Principal Problem:    Closed fracture of right inferior pubic ramus (HCC)  Active Problems:    Arthritis of right knee    Acute cystitis without hematuria    Acute pain of right knee    Sacral insufficiency fracture with routine healing    Fall    Pure hypercholesterolemia    Essential hypertension, benign    Chronic gout of multiple sites Acquired hypothyroidism  Resolved Problems:    * No resolved hospital problems. *     Plan:        6/7/22    Transitioned from IV Ceftriaxone to oral ciprofloxacin - 500 mg for 3 days starting tomorrow. Med Rec completed. CBC and BMP ordered for tomorrow am. Will follow. Hospital Problems           Last Modified POA    * (Principal) Closed fracture of right inferior pubic ramus ([address] 6/4/2022 Yes    Arthritis of right knee 6/5/2022 Yes    Acute cystitis without hematuria 6/5/2022 Yes    Acute pain of right knee 6/5/2022 Yes    Sacral insufficiency fracture with routine healing 6/5/2022 Yes    Fall 6/5/2022 Yes    Pure hypercholesterolemia 6/4/2022 Yes    Essential hypertension, benign 6/4/2022 Yes    Chronic gout of multiple sites 6/4/2022 Yes    Acquired hypothyroidism 6/4/2022 Yes                  Disposition: Discharge planning per Primary. Thank you for allowing us to participate in the care of this patient. Please do not hesitate to contact us with any questions and concerns. Will continue to follow. Rigoberto Villagran MD  PGY-2, Internal Medicine Resident  [facility]  6/7/2022 1:02 PM    Attestation and add on       I have discussed the care of Yoshi Pascual , including pertinent history and exam findings,      6/7/22    with the resident. I have seen and examined the patient and the key elements of all parts of the encounter have been performed by me . I agree with the assessment, plan and orders as documented by the resident.      Hospital Problems           Last Modified POA    * (Principal) Closed fracture of right inferior pubic ramus ([address] 6/4/2022 Yes    Arthritis of right knee 6/5/2022 Yes    Acute cystitis without hematuria 6/5/2022 Yes    Acute pain of right knee 6/5/2022 Yes    Sacral insufficiency fracture with routine healing 6/5/2022 Yes    Fall 6/5/2022 Yes    Pure hypercholesterolemia 6/4/2022 Yes    Essential hypertension, benign 6/4/2022 Yes    Chronic gout of multiple sites 6/4/2022 Yes    Acquired hypothyroidism 6/4/2022 Yes             ''''''''''       MD KAMARI Tracy[address], [address] [place] [address].    Phone [phone]   Fax: [phone]  Answering Service: [phone]

## 2022-06-07 NOTE — OP NOTE
[address]                 [address] [facility], [address]                                OPERATIVE REPORT    PATIENT NAME: Tam Payne                         :        1940  MED REC NO:   [id number]                              ROOM:       2068  ACCOUNT NO:   [de-identified]                           ADMIT DATE: 2022  PROVIDER:     Jones Harris    DATE OF PROCEDURE:  2022    PREOPERATIVE DIAGNOSIS:  Sacral insufficiency fracture. POSTOPERATIVE DIAGNOSIS:  Sacral insufficiency fracture. PROCEDURE PERFORMED:  Lumbosacral vertebroplasty sacrum, S1.    SURGEON:  Jones Harris MD    ASSISTANT:  None. ANESTHESIA:  General.    ESTIMATED BLOOD LOSS:  Minimal.    COMPLICATIONS:  None. SPECIMEN:  None. IMPLANTS:  ACR Kyphon cement. DRAINS:  None. FINDINGS:  Fluoroscopy used for the procedure. Postprocedure, the  patient with acceptable cement fill at S1.    PROCEDURE IN DETAIL:  The patient taken to the operating room, placed  under general anesthesia, transferred to the [facility] table, checked for  padding and positioning. AP and lateral fluoroscopy were arranged for  the procedure. The patient's back was prepped and draped in the usual sterile fashion. With the aid of biplanar fluoroscopy, 18-gauge spinal needles were  advanced from the skin to the sacrum. Back injected with local  anesthetic. Stab incisions were made. Trocars and cannulas were then  introduced bilaterally at about the level of S2.  Drill was then placed  and advanced up to the top of S1. Balloons were then placed, inflated  and withdrawn slightly and reinflated a little bit. Cement was mixed. All cement insertion was done under live AP intermittent lateral  fluoroscopy. Cement insertion was begun on the right first.  Cement was  injected. After obtaining acceptable cement fill on the right, cement  was injected on the left.   After acceptable cement fill at

## 2022-06-07 NOTE — CARE COORDINATION
SW met with patient to get patients decision of whether she would like to go to a SNF , or if she would like to go home. Patients 2 daughters were present as well. Patient and her daughters were requesting that patient go home with VNS. Patients daughters are requesting [place] . Daughters report that they live with patient and will be with her 24/7. Multiple family members live near by as well, one being an RN at [address] [place] [address]. V's that can stop over to help if needed. Patient's daughters also reported that patient has bedside commode, appropriate bathroom DME, plus some to assist patient . SW informed patient that SW will pass this information on to the D/C planner who can assist with the set up. SW encouraged family to ask for SW if they have any other questions or if they change their mind. Information above passed along to D/C planner.      Electronically signed by Ron Quintanilla on 6/7/22 at 3:53 PM EDT

## 2022-06-07 NOTE — PLAN OF CARE
Problem: Discharge Planning  Goal: Discharge to home or other facility with appropriate resources  6/7/2022 0152 by Judi Burnett RN  Outcome: Progressing  Flowsheets (Taken 6/6/2022 2145)  Discharge to home or other facility with appropriate resources:   Identify barriers to discharge with patient and caregiver   Arrange for needed discharge resources and transportation as appropriate   Identify discharge learning needs (meds, wound care, etc)   Refer to discharge planning if patient needs post-hospital services based on physician order or complex needs related to functional status, cognitive ability or social support system     Problem: Pain  Goal: Verbalizes/displays adequate comfort level or baseline comfort level  6/7/2022 0152 by Judi Burnett RN  Outcome: Progressing     Problem: Skin/Tissue Integrity  Goal: Absence of new skin breakdown  Description: 1. Monitor for areas of redness and/or skin breakdown  2. Assess vascular access sites hourly  3. Every 4-6 hours minimum:  Change oxygen saturation probe site  4. Every 4-6 hours:  If on nasal continuous positive airway pressure, respiratory therapy assess nares and determine need for appliance change or resting period.   6/7/2022 0152 by Judi Burnett RN  Outcome: Progressing     Problem: Safety - Adult  Goal: Free from fall injury  6/7/2022 0152 by Judi Burnett RN  Outcome: Progressing  Flowsheets (Taken 6/7/2022 0151)  Free From Fall Injury:   Instruct family/caregiver on patient safety   Based on caregiver fall risk screen, instruct family/caregiver to ask for assistance with transferring infant if caregiver noted to have fall risk factors     Problem: ABCDS Injury Assessment  Goal: Absence of physical injury  6/7/2022 0152 by Judi Burnett RN  Outcome: Progressing  Flowsheets (Taken 6/7/2022 0151)  Absence of Physical Injury: Implement safety measures based on patient assessment

## 2022-06-07 NOTE — PROGRESS NOTES
[facility]: MINA MALHOTRA   Occupational Therapy Evaluation  Date: 22  Patient Name: Siria Pena       Room: 7955/5464-98  MRN: [id number]  Account: [de-identified]   : 1940  (78 y.o.) Gender: female     Discharge Recommendations:  Further Occupational Therapy is recommended upon facility discharge. OT Equipment Recommendations  Other: TBD    Referring Practitioner: Closed fracture of right inferior pubic ramus  Diagnosis: Callie Basurto MD       Treatment Diagnosis: impaired self care status  Past Medical History:  has a past medical history of Abdominal pain, unspecified site, Acquired cyst of kidney, Acute gouty arthropathy, Allergic rhinitis, cause unspecified, Anxiety state, unspecified, Arthropathy, unspecified, site unspecified, Chronic airway obstruction, not elsewhere classified, Chronic kidney disease, stage III (moderate) (HCC), Diarrhea, Edema, Esophageal reflux, Essential hypertension, benign, Herpes zoster with other nervous system complications(053.19), Herpes zoster without mention of complication, 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 Past Surgical History:   has a past surgical history that includes Dilation and curettage of uterus; Colonoscopy; Upper gastrointestinal endoscopy (N/A, 2019); and Spine surgery (N/A, 2022).     Restrictions  Restrictions/Precautions: General Precautions,Fall Risk (no WB restrictions documented)  Implants present? :  (pt reports metal in her teeth)  Other position/activity restrictions: activity as tolerated  Required Braces or Orthoses?: No     Vitals  Temp: 98.4 °F (36.9 °C)  Heart Rate: 69  Resp: 20  BP: 131/68  Height: 5' 8\" (172.7 cm)  Weight: 138 lb (62.6 kg)  BMI (Calculated): 21  Oxygen Therapy  SpO2: 95 %  Pulse Oximeter Device Mode: Continuous  Pulse Oximeter Device Location: Left,Finger  O2 Device: Nasal cannula  O2 Flow Rate (L/min): 3 L/min  Level of Consciousness: Alert (0)    Subjective  Subjective: Pt resting in bed upon arrival. Pt was pleasant and agreeable to OT/PT eval   Comments: Ok per [employer] for OT/PT eval      Vision  Vision Exceptions: Wears glasses at all times  Hearing  Hearing: Within functional limits  Social/Functional History  Lives With: Daughter (x2 (one is very ill))  Type of Home: House  Home Layout: Two level,Able to Live on Main level with bedroom/bathroom,Performs ADL's on one level  Home Access: Stairs to enter with rails  Entrance Stairs - Number of Steps: 4  Entrance Stairs - Rails: Both (can reach both)  Bathroom Shower/Tub: Walk-in shower,Doors  Bathroom Toilet: Handicap height  Bathroom Equipment: Shower chair,Grab bars in shower,Commode  Bathroom Accessibility: Accessible,Walker accessible  Home Equipment: Brii Nery, standard,Walker, 4 wheeled,Cane,Wheelchair-manual  Has the patient had two or more falls in the past year or any fall with injury in the past year?: Yes  ADL Assistance: Independent  Homemaking Assistance: Needs assistance (daughter does cooking, laundry, shopping)  Homemaking Responsibilities: No  Ambulation Assistance: Independent (uses cane most of the time due to R knee)  Transfer Assistance: Independent  Active : Yes (daughter mainly drives)  Occupation: Retired  Type of Occupation: farming, [employer]  IADL Comments: sleeps in flat bed  Additional Comments: No recent PT OT.  One daughter is rather ill, the other can assist.        Objective      Cognition  Overall Cognitive Status: WFL   Sensation  Overall Sensation Status: WFL (pt denies)   ADL  Feeding: Setup  Grooming: Setup  UE Bathing: Contact guard assistance  LE Bathing: Dependent/Total  UE Dressing: Contact guard assistance  LE Dressing: Dependent/Total  Toileting: Dependent/Total  Toileting Skilled Clinical Factors: External catheter/brief  Additional Comments: ADL scores based on clinical reasoning and skilled observation unless otherwise noted. Pt currenlty limited due to decreased strength, balance, activity tolerance, and pain impating safety and independence with self care tasks    UE Function           LUE Strength  Gross LUE Strength: WFL  L Hand General: 3+/5     LUE Tone: Normotonic     LUE AROM (degrees)  LUE AROM : WFL     Left Hand AROM (degrees)  Left Hand AROM: WFL  RUE Strength  Gross RUE Strength: WFL  R Hand General: 3+/5      RUE Tone: Normotonic     RUE AROM (degrees)  RUE AROM : WFL     Right Hand AROM (degrees)  Right Hand AROM: WFL    Fine Motor Skills  Coordination  Movements Are Fluid And Coordinated: Yes                           Mobility  Supine to Sit: Maximum assistance,2 Person assistance  Sit to Supine: Unable to assess     Balance  Sitting Balance: Contact guard assistance (SBA- CGA)  Standing Balance: Minimal assistance (min A x 1 and CGA x 1)     Functional Mobility  Functional - Mobility Device: Rolling Walker  Activity: Other (Small steps from bed to chair)  Assist Level: Minimal assistance (min A x 1 and CGA x 1)  Bed mobility  Rolling to Left: Maximum assistance;2 Person assistance  Supine to Sit: Maximum assistance;2 Person assistance  Sit to Supine: Unable to assess  Scooting: Minimal assistance  Bed Mobility Comments: Pt attempts EOB x3 with head of bed elevated however pt guarded at R hip and unable to get to edge of bed.       Transfers  Sit to stand: 2 Person assistance,Maximum assistance  Stand to sit: 2 Person assistance,Maximum assistance  Transfer Comments: Verbal cues for hand placement and safety        Assessment  Assessment  Performance deficits / Impairments: Decreased ADL status,Decreased functional mobility ,Decreased strength,Decreased safe awareness,Decreased endurance,Decreased balance,Decreased high-level IADLs,Decreased posture  Treatment Diagnosis: impaired self care status  Prognosis: Fair  Decision Making: Medium Complexity  Discharge Recommendations: Patient would benefit from continued therapy after discharge  Activity Tolerance: Patient limited by fatigue,Patient limited by pain         Functional Outcome Measures  AM-PAC Daily Activity Inpatient   How much help for putting on and taking off regular lower body clothing?: Total  How much help for Bathing?: Total  How much help for Toileting?: Total  How much help for putting on and taking off regular upper body clothing?: A Little  How much help for taking care of personal grooming?: A Little  How much help for eating meals?: A Little  AM-[facility] Inpatient Daily Activity Raw Score: 12  AM-PAC Inpatient ADL T-Scale Score : 30.6  ADL Inpatient CMS 0-100% Score: 66.57  ADL Inpatient CMS G-Code Modifier : CL       Goals  Short Term Goals  Time Frame for Short term goals: By discharge  Short Term Goal 1: Pt will complete lower body dressing with min A and Good safety with use of AE as needed  Short Term Goal 2: Pt will complete functional transfers/mobility during self care tasks with min A and Good safety  Short Term Goal 3: Pt will tolerate standing 3+ minutes during functional activity of choice with Good safety  Short Term Goal 4: Pt will verbalize/demonstrate Good understanding of home safety/fall prevention strategies to increase safety and independence with self care and mobility  Short Term Goal 5: Pt will participate in 15+ mintues of therapeutic exercises/functional activities to increase safety and independence with self care and mobility    Plan        Plan  Times per Week: 5-7 (1-2 times per day)  Current Treatment Recommendations: Self-Care / ADL,Strengthening,Balance training,Functional mobility training,Endurance training,Pain management,Safety education & training,Patient/Caregiver education & training,Equipment evaluation, education, & procurement       OT Equipment Recommendations  Other: TBD  OT Individual Minutes  Time In: 9075  Time Out: 6230  Minutes: 36    Electronically signed by Aly Mccarty OT on 6/7/22 at 4:36 PM EDT

## 2022-06-07 NOTE — ANESTHESIA POSTPROCEDURE EVALUATION
Department of Anesthesiology  Postprocedure Note    Patient: Sarahi Leon  MRN: [id number]  YOB: 1940  Date of evaluation: 6/6/2022  Time:  9:17 PM     Procedure Summary     Date: 06/06/22 Room / Location: [address] [place] [address] Andrea Sprague 01 / 74[address] [facility]     Anesthesia Start: 1927 Anesthesia Stop: 2037    Procedure: SACRALPLASTY (N/A Back) Diagnosis:       Vertebrogenic low back pain      (BACK PAIN IP 2068 SACRAL)    Surgeons: Gretchen Menon MD Responsible Provider: Inder Bermudez MD    Anesthesia Type: general ASA Status: 3          Anesthesia Type: No value filed. Gonzalez Phase I: Gonzalez Score: 9    Gonzalez Phase II:      Last vitals: Reviewed and per EMR flowsheets.        Anesthesia Post Evaluation    Comments: POST- ANESTHESIA EVALUATION       Pt Name: Sarahi Leon  MRN: [id number]  YOB: 1940  Date of evaluation: 6/6/2022  Time:  9:17 PM      BP (!) 143/65   Pulse 82   Temp 97.3 °F (36.3 °C)   Resp 21   Ht 5' 8\" (1.727 m)   Wt 138 lb (62.6 kg)   SpO2 96%   BMI 20.98 kg/m²      Consciousness Level  Awake  Cardiopulmonary Status  Stable  Pain Adequately Treated YES  Nausea / Vomiting  NO  Adequate Hydration  YES  Anesthesia Related Complications NONE      Electronically signed by Inder Bermudez MD on 6/6/2022 at 9:17 PM

## 2022-06-07 NOTE — PROGRESS NOTES
Physical Therapy  Facility/Department: [facility] MED SURG  Physical Therapy Initial Assessment    Name: Danitza Velez  : 1940  MRN: [id number]  Date of Service: 2022    Discharge Recommendations:  Patient would benefit from continued therapy after discharge   PT Equipment Recommendations  Other: TBD      Patient Diagnosis(es): The primary encounter diagnosis was Closed fracture of right inferior pubic ramus, initial encounter ([facility] [address] A diagnosis of Sacral insufficiency fracture with routine healing was also pertinent to this visit. 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 Past Surgical History:  has a past surgical history that includes Dilation and curettage of uterus; Colonoscopy; Upper gastrointestinal endoscopy (N/A, 2019); and Spine surgery (N/A, 2022). Assessment   Assessment: Pt to progress from 2 assist to 1 assist with continued PT. Pt to continued PT and would benefit from therapy prior to returning home.   Treatment Diagnosis: Impaired functional mobility 2* fall  Specific Instructions for Next Treatment: gait, transfers, ROM, standing tolerance  Therapy Prognosis: Fair  Decision Making: Medium Complexity  Exam: ROM, MMT, bed mobility, transfers, amb, balance  Clinical Presentation: Pt alert, cooperative, pleasant  Barriers to Learning: pain  Requires PT Follow-Up: Yes  Activity Tolerance  Activity Tolerance: Patient limited by pain     Plan   Plan  Plan: 2 times a day 7 days a week  Specific Instructions for Next Treatment: gait, transfers, ROM, standing tolerance  Current Treatment Recommendations: Strengthening,ROM,Balance training,Functional mobility training,Transfer training,Endurance training,Gait training,Equipment evaluation, education, & procurement,Patient/Caregiver education & training,Safety education & training,Home exercise program,Pain management,Positioning  Safety Devices  Type of Devices:  All fall risk precautions in place,Call light within reach,Gait belt,Patient at risk for falls,Nurse notified,Left in chair (NICOLETTE Wilhelm)  Restraints  Restraints Initially in Place: No     Restrictions  Restrictions/Precautions  Restrictions/Precautions: General Precautions,Fall Risk (no WB restrictions documented)  Required Braces or Orthoses?: No  Implants present? :  (pt reports metal in her teeth)  Position Activity Restriction  Other position/activity restrictions: activity as tolerated     Subjective   Pain: Pt is 5/10 in R knee and low back  General  Chart Reviewed: Yes  Patient assessed for rehabilitation services?: Yes  Family / Caregiver Present: No  Referring Practitioner: Opal Shultz MD  Referral Date : 06/06/22  Diagnosis: closed fx of right inferior pubic ramus  Follows Commands: Within Functional Limits  Subjective  Subjective: Pt in bed, pleasant and agreeable to PT OT         Social/Functional History  Social/Functional History  Lives With: Daughter (x2 (one is very ill))  Type of Home: House  Home Layout: Two level,Able to Live on Main level with bedroom/bathroom,Performs ADL's on one level  Home Access: Stairs to enter with rails  Entrance Stairs - Number of Steps: 4  Entrance Stairs - Rails: Both (can reach both)  Bathroom Shower/Tub: Walk-in shower,Doors  Bathroom Toilet: Handicap height  Bathroom Equipment: Shower chair,Grab bars in shower,Commode  Bathroom Accessibility: Accessible,Walker accessible  Home Equipment: Alanis Budd, standard,Walker, 4 wheeled,Cane,Wheelchair-manual  Has the patient had two or more falls in the past year or any fall with injury in the past year?: Yes  ADL Assistance: Independent  Homemaking Assistance: Needs assistance (daughter does cooking, laundry, shopping)  Homemaking Responsibilities: No  Ambulation Assistance: Independent (uses cane most of the time due to R knee)  Transfer Assistance: Independent  Active : Yes (daughter mainly drives)  Occupation: Retired  Type of Occupation: farming, [employer]  IADL Comments: sleeps in flat bed  Additional Comments: No recent PT OT.  One daughter is rather ill, the other can assist.   Vision/Hearing  Vision  Vision: Impaired  Vision Exceptions: Wears glasses at all times  Hearing  Hearing: Within functional limits    Cognition   Orientation  Overall Orientation Status: Within Functional Limits  Cognition  Overall Cognitive Status: WFL     Objective   Heart Rate: 69  BP: 131/68  BP Location: Right upper arm  Patient Position: Supine  MAP (Calculated): 89  Resp: 20  SpO2: 95 %              AROM RLE (degrees)  RLE AROM: WFL  RLE General AROM: guarded at R hip  AROM LLE (degrees)  LLE AROM : WFL  AROM RUE (degrees)  RUE General AROM: See OT  AROM LUE (degrees)  LUE General AROM: See OT  Strength RLE  Strength RLE: WFL  Comment: Grossly 3+/5 at knee/ankle, 2- at hip flexion  Strength LLE  Strength LLE: WFL  Comment: Grossly 4-/5  Strength RUE  Comment: See OT  Strength LUE  Comment: See OT     Sensation  Overall Sensation Status: WFL (pt denies)     Bed mobility  Rolling to Left: Maximum assistance;2 Person assistance  Supine to Sit: Maximum assistance;2 Person assistance  Sit to Supine: Unable to assess  Scooting: Minimal assistance  Bed Mobility Comments: Pt attempts EOB x3 with head of bed elevated however pt guarded at R hip and unable to get to edge of bed. Transfers  Sit to Stand: 2 Person Assistance;Maximum Assistance  Stand to sit: 2 Person Assistance;Maximum Assistance  Bed to Chair: Minimal assistance  Stand Pivot Transfers: Minimal Assistance  Comment: Max x2 with RW cues for technique and safety. Ambulation  WB Status: no WB restrictions documented  Ambulation  Surface: level tile  Device: Rolling Walker  Assistance: Minimal assistance  Quality of Gait: small steps, antalgic gait on R LE  Gait Deviations: Slow Damaris  Distance: 6' with 90 degree turn  Comments: Pt demos good technique with cueing for RW, ice packs applied at end of session. Stairs/Curb  Stairs?: No     Balance  Posture: Fair  Sitting - Static: Fair  Sitting - Dynamic: Fair  Standing - Static: Fair  Standing - Dynamic: Fair;-  Comments: Fall risk, standing balance with RW           OutComes Score                                                  AM-PAC Score  AM-PAC Inpatient Mobility Raw Score : 10 (06/07/22 1459)  -PAC Inpatient T-Scale Score : 32.29 (06/07/22 1459)  Mobility Inpatient CMS 0-100% Score: 76.75 (06/07/22 1459)  Mobility Inpatient CMS G-Code Modifier : CL (06/07/22 1459)          Goals  Short Term Goals  Time Frame for Short term goals: 5 days  Short term goal 1: Pt to demo bed mobility min to mod x1. Short term goal 2: Pt to perform transfers with RW min to mod x1. Short term goal 3: Pt to amb 48' CGA with device. Short term goal 4: Pt to improve BLE strength by 1/2 MMG and reduce pain to 2/10 wtih mobility. Short term goal 5: Pt to tolerate 30-45 minute PT session and improve sitting/standing balance to [employer]. Patient Goals   Patient goals :  To go home

## 2022-06-07 NOTE — BRIEF OP NOTE
Brief Postoperative Note      Patient: Yoshi Pascual  YOB: 1940  MRN: [id number]    Date of Procedure: 6/6/2022    Pre-Op Diagnosis:sacral insufficiency fractue    Post-Op Diagnosis: Same       Procedure(s):  SACRALPLASTY    Surgeon(s):  Brittany Alejandra MD    Assistant:  * No surgical staff found *    Anesthesia: General    Estimated Blood Loss (mL): Minimal    Complications: None    Specimens:   * No specimens in log *    Implants:  Implant Name Type Inv.  Item Serial No.  Lot No. LRB No. Used Action   CEMENT BNE HI VISC RADIOPAQUE KYPHON HV-R - [id number]  CEMENT BNE HI VISC RADIOPAQUE KYPHON HV-R  [facility] [id number] N/A 1 Implanted         Drains: * No LDAs found *    Findings: see dictation    Electronically signed by Brittany Alejandra MD on 6/6/2022 at 8:22 PM

## 2022-06-08 ENCOUNTER — CARE COORDINATION (OUTPATIENT)
Dept: CASE MANAGEMENT | Age: 82
End: 2022-06-08

## 2022-06-08 DIAGNOSIS — S32.591A CLOSED FRACTURE OF RIGHT INFERIOR PUBIC RAMUS, INITIAL ENCOUNTER (HCC): Primary | ICD-10-CM

## 2022-06-08 PROCEDURE — 1111F DSCHRG MED/CURRENT MED MERGE: CPT | Performed by: INTERNAL MEDICINE

## 2022-06-08 NOTE — CARE COORDINATION
[facility] Transitions Initial Follow Up Call    Call within 2 business days of discharge: Yes    Patient: Lisa Drew Patient : 1940   MRN: <[id number]>  Reason for Admission:Closed fracture of right inferior pubic ramus Discharge Date: 22 RARS: Readmission Risk Score: 13 ( )      Last Discharge [facility]       Complaint Diagnosis Description Type Department Provider    22 Fall; Hip Pain Closed fracture of right inferior pubic ramus, initial encounter ([facility] [address] . .. ED to Hosp-Admission (Discharged) (ADMITTED) [facility] ISAK Winchester MD; Evaristo Edmond. .. Spoke with: Ethan Saenz who states she is doing good she has some discomfort to her hip and knee but knee swelling has improved. She stays with her daughter and has a commode and walker she is moving around well. Denies chest pain, SOB, dizziness, fever, chills. She is eating and dr inking well with normal bladder elimination has not had a bowel movement but denies hard abdomen and pain taking colace as directed. Medications al reviewed and taking all as directed. 46 f completed she will call PCP today declined assistance. [facility] is supposed to call her today to set up visits. has no current concerns and is agreeable to future calls. Facility: [facility]    Non-face-to-face services provided:  Obtained and reviewed discharge summary and/or continuity of care documents  Education of patient/family/caregiver/guardian to support self-management-moving slow using all DME as directed   Transitions of Care Initial Call    Was this an external facility discharge?  No   Challenges to be reviewed by the provider   Additional needs identified to be addressed with provider: No  none             Method of communication with provider : none    Advance Care Planning:   Does patient have an Advance Directive: reviewed and current, reviewed and needs to be updated, not on file; education provided, patient declined education, decision maker updated and referral to internal ACP facilitator. LPN Care Coordinator contacted the patient by telephone to perform post hospital discharge assessment. Verified name and  with patient as identifiers. Provided introduction to self, and explanation of the LPN CC role. LPN CC reviewed discharge instructions, medical action plan and red flags with patient who verbalized understanding. Patient given an opportunity to ask questions and does not have any further questions or concerns at this time. Were discharge instructions available to patient? Yes. Reviewed appropriate site of care based on symptoms and resources available to patient including: PCP  Specialist  Home health. The patient agrees to contact the PCP office for questions related to their healthcare. Medication reconciliation was performed with patient, who verbalizes understanding of administration of home medications. Advised obtaining a 90-day supply of all daily and as-needed medications. Was patient discharged with a pulse oximeter? no    LPN CC provided contact information. Plan for follow-up call in 5-7 days based on severity of symptoms and risk factors.   Plan for next call: symptom management-knee swelling , mobility improving bowel status improved      Care Transitions 24 Hour Call    Do you have a copy of your discharge instructions?: Yes  Do you have all of your prescriptions and are they filled?: Yes  Have you been contacted by a [address] [place] [address] Avenue?: No  Have you scheduled your follow up appointment?: Yes  How are you going to get to your appointment?: Car - family or friend to transport  Do you feel like you have everything you need to keep you well at home?: Yes  Care Transitions Interventions         Follow Up  Future Appointments   Date Time Provider Anant Piña   2022  1:50 PM Lloyd Sánchez MD [facility]

## 2022-06-09 ENCOUNTER — TELEPHONE (OUTPATIENT)
Dept: INTERNAL MEDICINE CLINIC | Age: 82
End: 2022-06-09

## 2022-06-09 NOTE — TELEPHONE ENCOUNTER
[facility] Transitions Initial Follow Up Call    Outreach made within 2 business days of discharge: Yes    Patient: [facility] Patient : 1940   MRN: [id number]  Reason for Admission: There are no discharge diagnoses documented for the most recent discharge. Discharge Date: 22       Spoke with: Yaz    Discharge department/facility: [facility] Interactive Patient Contact:  Was patient able to fill all prescriptions: Yes  Was patient instructed to bring all medications to the follow-up visit: Yes  Is patient taking all medications as directed in the discharge summary?  Yes  Does patient understand their discharge instructions: Yes  Does patient have questions or concerns that need addressed prior to 7-14 day follow up office visit: no    Scheduled appointment with PCP within 7-14 days    Follow Up  Future Appointments   Date Time Provider Anant Piña   6/10/2022  1:00 PM Bertrand Durham MD [address]   2022  1:50 PM Siegfried Severs, MD [address] [place] [address]

## 2022-06-10 ENCOUNTER — OFFICE VISIT (OUTPATIENT)
Dept: INTERNAL MEDICINE CLINIC | Age: 82
End: 2022-06-10
Payer: MEDICARE

## 2022-06-10 VITALS
OXYGEN SATURATION: 97 % | BODY MASS INDEX: 20.92 KG/M2 | DIASTOLIC BLOOD PRESSURE: 74 MMHG | SYSTOLIC BLOOD PRESSURE: 122 MMHG | WEIGHT: 138 LBS | HEART RATE: 65 BPM | HEIGHT: 68 IN

## 2022-06-10 DIAGNOSIS — M35.01 SJOGREN'S SYNDROME WITH KERATOCONJUNCTIVITIS SICCA (HCC): ICD-10-CM

## 2022-06-10 DIAGNOSIS — J43.1 PANLOBULAR EMPHYSEMA (HCC): ICD-10-CM

## 2022-06-10 DIAGNOSIS — S32.9XXA CLOSED DISPLACED FRACTURE OF PELVIS, UNSPECIFIED PART OF PELVIS, INITIAL ENCOUNTER (HCC): Primary | ICD-10-CM

## 2022-06-10 DIAGNOSIS — K50.10 CROHN'S DISEASE OF LARGE INTESTINE WITHOUT COMPLICATION (HCC): ICD-10-CM

## 2022-06-10 DIAGNOSIS — N18.31 STAGE 3A CHRONIC KIDNEY DISEASE (HCC): ICD-10-CM

## 2022-06-10 DIAGNOSIS — Z09 HOSPITAL DISCHARGE FOLLOW-UP: ICD-10-CM

## 2022-06-10 PROBLEM — N18.30 CHRONIC RENAL DISEASE, STAGE III (HCC): Status: ACTIVE | Noted: 2022-06-10

## 2022-06-10 PROCEDURE — 1111F DSCHRG MED/CURRENT MED MERGE: CPT | Performed by: INTERNAL MEDICINE

## 2022-06-10 PROCEDURE — 99496 TRANSJ CARE MGMT HIGH F2F 7D: CPT | Performed by: INTERNAL MEDICINE

## 2022-06-10 ASSESSMENT — ENCOUNTER SYMPTOMS
BLOOD IN STOOL: 0
TROUBLE SWALLOWING: 0
COUGH: 0
ABDOMINAL DISTENTION: 0
WHEEZING: 0
EYE DISCHARGE: 0
EYE PAIN: 0
SHORTNESS OF BREATH: 0
COLOR CHANGE: 0
DIARRHEA: 0

## 2022-06-10 ASSESSMENT — PATIENT HEALTH QUESTIONNAIRE - PHQ9
SUM OF ALL RESPONSES TO PHQ9 QUESTIONS 1 & 2: 0
1. LITTLE INTEREST OR PLEASURE IN DOING THINGS: 0
SUM OF ALL RESPONSES TO PHQ QUESTIONS 1-9: 0
2. FEELING DOWN, DEPRESSED OR HOPELESS: 0

## 2022-06-10 NOTE — PROGRESS NOTES
[address] [address]  Dept: [phone]  Dept Fax: [phone]    Hu Gold is a 78 y.o. female who presents today for her medical conditions/complaintsas noted below.   Hu Gold is c/o of   Chief Complaint   Patient presents with    Hip Injury     6/4/22 Héctor Larger Otalgia     left ear          HPI:     Pt here for followup since her fall and pelvic fracture  Had kyphoplasty  Left ear bleed now stopped  Pain mld        No results found for: LABA1C          ( goal A1Cis < 7)   No results found for: LABMICR  LDL Cholesterol (mg/dL)   Date Value   01/04/2022 101   08/31/2020 109   06/06/2019 101       (goal LDL is <100)   AST (U/L)   Date Value   01/04/2022 27     ALT (U/L)   Date Value   01/04/2022 13     BUN (mg/dL)   Date Value   06/06/2022 17     BP Readings from Last 3 Encounters:   06/10/22 122/74   06/07/22 131/68   05/05/22 124/72          (goal 120/80)    Past Medical History:   Diagnosis Date    Abdominal pain, unspecified site     Acquired cyst of kidney     Acute gouty arthropathy     Allergic rhinitis, cause unspecified     Anxiety state, unspecified     Arthropathy, unspecified, site unspecified     Chronic airway obstruction, not elsewhere classified     Chronic kidney disease, stage III (moderate) (HCC)     Diarrhea     Edema     Esophageal reflux     Essential hypertension, benign     Herpes zoster with other nervous system complications(053.19)     Herpes zoster without mention of complication     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)     Mononeuritis of unspecified site     Myalgia and myositis, unspecified     Osteoarthrosis, unspecified whether generalized or localized, unspecified site     Other general symptoms(780.99)     Other iatrogenic hypothyroidism     Other nonspecific abnormal finding of lung field     Other psoriasis     Pure hypercholesterolemia     Regional enteritis of unspecified site     Senile osteoporosis     Sprain of ankle, unspecified site     Unspecified vitamin D deficiency       Past Surgical History:   Procedure Laterality Date    COLONOSCOPY      DILATION AND CURETTAGE OF UTERUS      SPINE SURGERY N/A 6/6/2022    LUMBOSACRAL VERTEBROPLASTY S1 performed by Brittany Alejandra MD at [address] N/A 6/14/2019    EGD FOREIGN BODY REMOVAL performed by Bridger Huerta MD at [facility] OR       Family History   Problem Relation Age of Onset    Coronary Art Dis Father        Social History     Tobacco Use    Smoking status: Former Smoker     Packs/day: 0.75     Years: 2.00     Pack years: 1.50     Types: Cigarettes     Start date: 10/25/1958     Quit date: 10/25/2018     Years since quitting: 3.6    Smokeless tobacco: Never Used   Substance Use Topics    Alcohol use: No     Alcohol/week: 0.0 standard drinks      Current Outpatient Medications   Medication Sig Dispense Refill    ciprofloxacin (CIPRO) 500 mg tablet Take 1 tablet by mouth every 24 hours for 3 doses 3 tablet 0    levothyroxine (SYNTHROID) 125 MCG tablet       allopurinol (ZYLOPRIM) 300 MG tablet Take 1 tablet by mouth daily 90 tablet 3    levothyroxine (SYNTHROID) 150 MCG tablet Daily fasting in am one hour before breakfast 90 tablet 3    carvedilol (COREG) 12.5 MG tablet TAKE ONE-HALF TABLET BY  MOUTH TWICE DAILY 90 tablet 3    atorvastatin (LIPITOR) 40 MG tablet TAKE 1 TABLET BY MOUTH  DAILY 90 tablet 3    colchicine (COLCRYS) 0.6 MG tablet Take 2 tabs immediately, then 1 tablet 1 hour later. Then one tablet a day for 2 more days.  10 tablet 11    nitrofurantoin, macrocrystal-monohydrate, (MACROBID) 100 MG capsule TAKE 1 CAPSULE BY MOUTH TWICE DAILY      Clobetasol Propionate (CLOBEX) 0.05 % SHAM Apply 1 Act topically once a week 1 Bottle 0    folic acid (FOLVITE) 1 MG tablet Take 1 tablet by mouth daily 90 tablet 3    Multiple Vitamins-Minerals (HAIR/SKIN/NAILS) TABS Take 1 tablet by mouth daily      Cholecalciferol (VITAMIN D) 2000 UNITS CAPS capsule Take 2,000 Units by mouth daily      HYDROcodone-acetaminophen (NORCO) 5-325 MG per tablet Take 1 tablet by mouth every 6 hours as needed for Pain for up to 7 days. Intended supply: 7 days. Take lowest dose possible to manage pain (Patient not taking: Reported on 6/10/2022) 28 tablet 0    gabapentin (NEURONTIN) 300 MG capsule Take 1 capsule by mouth 4 times daily for 30 days. (Patient taking differently: Take 300 mg by mouth 2 times daily. ) 120 capsule 0     Current Facility-Administered Medications   Medication Dose Route Frequency Provider Last Rate Last Admin    methylPREDNISolone acetate (DEPO-MEDROL) injection 80 mg  80 mg IntraMUSCular Once Andre Castellon MD        methylPREDNISolone sodium (SOLU-MEDROL) injection 125 mg  125 mg IntraMUSCular Once Yuli Lewis MD        methylPREDNISolone acetate (DEPO-MEDROL) injection 80 mg  80 mg IntraMUSCular Once Yuli Lewis MD        triamcinolone acetonide (KENALOG-40) injection 60 mg  60 mg IntraMUSCular Once Yuli Lewis MD         Allergies   Allergen Reactions    Penicillins Itching    Etomidate Nausea And Vomiting       Health Maintenance   Topic Date Due    DTaP/Tdap/Td vaccine (1 - Tdap) Never done    Shingles vaccine (1 of 2) Never done    Depression Screen  07/26/2022    Annual Wellness Visit (AWV)  07/27/2022    Lipids  01/04/2023    Flu vaccine  Completed    Pneumococcal 65+ years Vaccine  Completed    COVID-19 Vaccine  Completed    DEXA (modify frequency per FRAX score)  Addressed    Hepatitis A vaccine  Aged Out    Hepatitis B vaccine  Aged Out    Hib vaccine  Aged Out    Meningococcal (ACWY) vaccine  Aged Out       Subjective:     Review of Systems   Constitutional: Negative for appetite change, diaphoresis and fatigue. HENT: Negative for ear discharge and trouble swallowing. Left ear bleed not stopped   Eyes: Negative for pain and discharge. Respiratory: Negative for cough, shortness of breath and wheezing. Cardiovascular: Negative for chest pain and palpitations. Gastrointestinal: Negative for abdominal distention, blood in stool and diarrhea. Endocrine: Negative for polydipsia and polyphagia. Genitourinary: Negative for difficulty urinating and frequency. Musculoskeletal: Positive for arthralgias. Negative for gait problem, myalgias and neck pain. Skin: Negative for color change and rash. Allergic/Immunologic: Negative for environmental allergies and food allergies. Neurological: Negative for dizziness and headaches. Hematological: Negative for adenopathy. Does not bruise/bleed easily. Psychiatric/Behavioral: Negative for behavioral problems and sleep disturbance. Objective:     Physical Exam  Constitutional:       Appearance: She is well-developed. She is not diaphoretic. Comments: Walks with walker     HENT:      Head: Normocephalic and atraumatic. Comments: Left ear dried blood noted    Eyes:      General:         Right eye: No discharge. Left eye: No discharge. Extraocular Movements:      Right eye: Normal extraocular motion. Left eye: Normal extraocular motion. Conjunctiva/sclera: Conjunctivae normal.      Right eye: Right conjunctiva is not injected. Left eye: Left conjunctiva is not injected. Neck:      Thyroid: No thyroid mass or thyromegaly. Vascular: No JVD. Cardiovascular:      Rate and Rhythm: Normal rate and regular rhythm. Heart sounds: No murmur heard. No friction rub. Pulmonary:      Effort: Pulmonary effort is normal. No tachypnea, bradypnea, accessory muscle usage or respiratory distress. Breath sounds: Normal breath sounds. No wheezing or rales. Abdominal:      General: Bowel sounds are normal. There is no distension. Palpations: Abdomen is soft. Tenderness: There is no abdominal tenderness. There is no rebound.    Musculoskeletal: General: No tenderness. Normal range of motion. Cervical back: Normal range of motion and neck supple. No edema or erythema. Lymphadenopathy:      Head:      Right side of head: No submental or submandibular adenopathy. Left side of head: No submental or submandibular adenopathy. Cervical: No cervical adenopathy. Skin:     General: Skin is warm. Coloration: Skin is not pale. Findings: No bruising, ecchymosis or rash. Neurological:      Mental Status: She is alert and oriented to person, place, and time. Cranial Nerves: No cranial nerve deficit. Sensory: No sensory deficit. Motor: No atrophy or abnormal muscle tone. Coordination: Coordination normal.   Psychiatric:         Mood and Affect: Mood is not anxious. Affect is not angry. Speech: Speech is not slurred. Behavior: Behavior normal. Behavior is not aggressive. Thought Content: Thought content does not include homicidal ideation. Cognition and Memory: Memory is not impaired. /74   Pulse 65   Ht 5' 8\" (1.727 m)   Wt 138 lb (62.6 kg)   SpO2 97%   BMI 20.98 kg/m²     Assessment:       Diagnosis Orders   1. Closed displaced fracture of pelvis, unspecified part of pelvis, initial encounter ([facility]     2. Hospital discharge follow-up  [place] DISCHARGE MEDS RECONCILED W/ CURRENT OUTPATIENT MED LIST   3. Stage 3a chronic kidney disease ([address]     4. Sjogren's syndrome with keratoconjunctivitis sicca ([address]     5. Panlobular emphysema ([address]     6. Crohn's disease of large intestine without complication ([address]               Plan:      Return in about 3 months (around 9/10/2022). Orders Placed This Encounter   Procedures    [place] DISCHARGE MEDS RECONCILED W/ CURRENT OUTPATIENT MED LIST     No orders of the defined types were placed in this encounter.    pt was at [facility] with fall   Fracture pelvics  Multiple,e stots  Post kyphos  Wt bearing as tolerated  walks walker  Pain

## 2022-06-10 NOTE — PROGRESS NOTES
Visit Information    Have you changed or started any medications since your last visit including any over-the-counter medicines, vitamins, or herbal medicines? no   Are you having any side effects from any of your medications? -  no  Have you stopped taking any of your medications? Is so, why? -  no    Have you seen any other physician or provider since your last visit? Yes - Records Obtained  Have you had any other diagnostic tests since your last visit? Yes - Records Obtained  Have you been seen in the emergency room and/or had an admission to a hospital since we last saw you? Yes - Records Obtained  Have you had your routine dental cleaning in the past 6 months? no    Have you activated your [employer] account? If not, what are your barriers?  Yes     Patient Care Team:  Shaq Rodriguez MD as PCP - General (Internal Medicine)  Shaq Rodriguez MD [facility] PCP [facility]  Idalmis Gatyan LPN as Care Transitions Nurse    Medical History Review  Past Medical, Family, and Social History reviewed and does contribute to the patient presenting condition    Health Maintenance   Topic Date Due    DTaP/Tdap/Td vaccine (1 - Tdap) Never done    Shingles vaccine (1 of 2) Never done    Depression Screen  07/26/2022    Annual Wellness Visit (AWV)  07/27/2022    Lipids  01/04/2023    Flu vaccine  Completed    Pneumococcal 65+ years Vaccine  Completed    COVID-19 Vaccine  Completed    DEXA (modify frequency per FRAX score)  Addressed    Hepatitis A vaccine  Aged Out    Hepatitis B vaccine  Aged Out    Hib vaccine  Aged Out    Meningococcal (ACWY) vaccine  Aged Out

## 2022-06-15 ENCOUNTER — CARE COORDINATION (OUTPATIENT)
Dept: CASE MANAGEMENT | Age: 82
End: 2022-06-15

## 2022-06-15 NOTE — CARE COORDINATION
[facility] Transitions Follow Up Call    6/15/2022    Patient: Ashley Brunner  Patient : 1940   MRN: <[id number]>  Reason for Admission: Closed fracture of right inferior pubic ramus  Discharge Date: 22 RARS: Readmission Risk Score: 13 ( )         Spoke with: Called to speak with patient for update with transition of care. Left HIPPA compliant voice message with contact information [phone] for a call  Back with an update. Care Transitions Subsequent and Final Call    Subsequent and Final Calls  Care Transitions Interventions  Other Interventions:            Follow Up  Future Appointments   Date Time Provider Anant Piña   2022  1:50 PM Chely Burdick MD [facility]   2022  2:00 PM Gerhardt Lovings, MD [address] [place] [address]

## 2022-06-16 NOTE — DISCHARGE SUMMARY
Discharge Summary    Attending Physician: No att. providers found  Admit Date: 6/4/2022  Discharge Date: 6/7/2022   Primary Care Physician: Shaq Rodriguez MD    Admitting Diagnosis:  Principal Problem:    Closed fracture of right inferior pubic ramus [facility])  Active Problems:    Arthritis of right knee    Acute cystitis without hematuria    Acute pain of right knee    Sacral insufficiency fracture with routine healing    Fall    Pure hypercholesterolemia    Essential hypertension, benign    Chronic gout of multiple sites    Acquired hypothyroidism  Resolved Problems:    * No resolved hospital problems. *        Discharge Diagnoses:  Principal Problem:    Closed fracture of right inferior pubic ramus (HCC)  Active Problems:    Arthritis of right knee    Acute cystitis without hematuria    Acute pain of right knee    Sacral insufficiency fracture with routine healing    Fall    Pure hypercholesterolemia    Essential hypertension, benign    Chronic gout of multiple sites    Acquired hypothyroidism  Resolved Problems:    * No resolved hospital problems.  *         Past Medical History:   Diagnosis Date    Abdominal pain, unspecified site     Acquired cyst of kidney     Acute gouty arthropathy     Allergic rhinitis, cause unspecified     Anxiety state, unspecified     Arthropathy, unspecified, site unspecified     Chronic airway obstruction, not elsewhere classified     [facility] stage III (moderate) [facility]     Diarrhea     Edema     Esophageal reflux     Essential hypertension, benign     Herpes zoster with other nervous system complications(053.19)     Herpes zoster without mention of complication     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)     Mononeuritis of unspecified site     Myalgia and myositis, unspecified     Osteoarthrosis, unspecified whether generalized or localized, unspecified site     Other general symptoms(780.99)     Other iatrogenic hypothyroidism     Other nonspecific abnormal finding of lung field     Other psoriasis     Pure hypercholesterolemia     Regional enteritis of unspecified site     Senile osteoporosis     Sprain of ankle, unspecified site     Unspecified vitamin D deficiency        Procedures Performed and Findings  Procedure(s):  LUMBOSACRAL VERTEBROPLASTY S1     Consultations Obtained  IP CONSULT TO ORTHOPEDIC SURGERY  IP CONSULT TO [address] [facility]  uncomplicated    Discharge Medications       Medication List      CHANGE how you take these medications    gabapentin 300 MG capsule  Commonly known as: Neurontin  Take 1 capsule by mouth 4 times daily for 30 days. What changed: when to take this        CONTINUE taking these medications    allopurinol 300 MG tablet  Commonly known as: ZYLOPRIM  Take 1 tablet by mouth daily     atorvastatin 40 MG tablet  Commonly known as: LIPITOR  TAKE 1 TABLET BY MOUTH  DAILY     carvedilol 12.5 MG tablet  Commonly known as: COREG  TAKE ONE-HALF TABLET BY  MOUTH TWICE DAILY     Clobetasol Propionate 0.05 % Sham  Commonly known as: Clobex  Apply 1 Act topically once a week     colchicine 0.6 MG tablet  Commonly known as: COLCRYS  Take 2 tabs immediately, then 1 tablet 1 hour later. Then one tablet a day for 2 more days. folic acid 1 MG tablet  Commonly known as: FOLVITE  Take 1 tablet by mouth daily     Hair/Skin/Nails Tabs     * levothyroxine 125 MCG tablet  Commonly known as: SYNTHROID     * levothyroxine 150 MCG tablet  Commonly known as: SYNTHROID  Daily fasting in am one hour before breakfast     nitrofurantoin (macrocrystal-monohydrate) 100 MG capsule  Commonly known as: MACROBID     vitamin D 50 MCG (2000 UT) Caps capsule         * This list has 2 medication(s) that are the same as other medications prescribed for you. Read the directions carefully, and ask your doctor or other care provider to review them with you.             ASK your doctor about these medications    ciprofloxacin 500 MG tablet  Commonly known as: CIPRO  Take 1 tablet by mouth every 24 hours for 3 doses  Ask about: Should I take this medication? HYDROcodone-acetaminophen 5-325 MG per tablet  Commonly known as: Norco  Take 1 tablet by mouth every 6 hours as needed for Pain for up to 7 days. Intended supply: 7 days. Take lowest dose possible to manage pain  Ask about: Should I take this medication?            Where to Get Your Medications      These medications were sent to [facility] [id number] Naomy Hickman [address] [place] [address]    Phone: [phone]   · ciprofloxacin 500 MG tablet     You can get these medications from any pharmacy    Bring a paper prescription for each of these medications  · HYDROcodone-acetaminophen 5-325 MG per tablet          Discharge Condition  Stable       Activity on Discharge  As tolerated       Discharge Disposition:  Home    Discharge Instructions  See Orders    Follow-Up Scheduled    Anjali Parker MD  [address] [place] [address] 76, [address] [facility] [phone]    On 6/21/2022  @ 1:50 pm for post op follow up appointment    THE [place] [id number]  Nikky Hidalgo [phone]    they will call you to schedule a time to come out to the house      Electronically signed by Gloria Chairez MD on 6/16/2022 at 7:02 AM

## 2022-06-17 ENCOUNTER — CARE COORDINATION (OUTPATIENT)
Dept: CASE MANAGEMENT | Age: 82
End: 2022-06-17

## 2022-06-17 ENCOUNTER — TELEPHONE (OUTPATIENT)
Dept: INTERNAL MEDICINE CLINIC | Age: 82
End: 2022-06-17

## 2022-06-17 RX ORDER — CIPROFLOXACIN 500 MG/1
500 TABLET, FILM COATED ORAL
Qty: 3 TABLET | Refills: 0 | Status: SHIPPED | OUTPATIENT
Start: 2022-06-17 | End: 2022-06-20

## 2022-06-17 NOTE — TELEPHONE ENCOUNTER
UTI symptoms have returned last night, burning and pain. Received iv antibiotics 2 weeks ago. Requesting cipro, Please review and sign if appropriate.

## 2022-06-17 NOTE — CARE COORDINATION
[facility] Transitions Follow Up Call    2022    Patient: Hu Gold  Patient : 1940   MRN: <[id number]>  Reason for Admission: Closed fracture of right inferior pubic ramus  Discharge Date: 22 RARS: Readmission Risk Score: 13 ( )         Spoke with: Called to speak with patient for update with transition of care. Left HIPPA compliant voice message with contact information [phone] for a call  Back with an update. Care Transitions Subsequent and Final Call    Subsequent and Final Calls  Care Transitions Interventions  Other Interventions:            Follow Up  Future Appointments   Date Time Provider Anant Piña   2022  1:50 PM Cody Bañeulos MD [facility]   2022  2:00 PM Jaime Leger MD [address] [place] [address]

## 2022-06-21 ENCOUNTER — OFFICE VISIT (OUTPATIENT)
Dept: ORTHOPEDIC SURGERY | Age: 82
End: 2022-06-21

## 2022-06-21 VITALS — HEIGHT: 68 IN | BODY MASS INDEX: 20.92 KG/M2 | WEIGHT: 138 LBS | RESPIRATION RATE: 14 BRPM

## 2022-06-21 DIAGNOSIS — S32.591D CLOSED FRACTURE OF RIGHT INFERIOR PUBIC RAMUS WITH ROUTINE HEALING, SUBSEQUENT ENCOUNTER: Primary | ICD-10-CM

## 2022-06-21 DIAGNOSIS — M84.48XD SACRAL INSUFFICIENCY FRACTURE WITH ROUTINE HEALING: ICD-10-CM

## 2022-06-21 PROCEDURE — 99024 POSTOP FOLLOW-UP VISIT: CPT | Performed by: ORTHOPAEDIC SURGERY

## 2022-06-21 NOTE — PROGRESS NOTES
Patient ID: Allyn Jiménez is a 78 y.o. female    Chief Compliant:  Chief Complaint   Patient presents with    Post-Op Check        Diagnostic imaging:    AP lateral lumbar spine status post sacroplasty otherwise age-appropriate    Assessment and Plan:  1. Low back pain, unspecified back pain laterality, unspecified chronicity, unspecified whether sciatica present        Post sacroplasty, healing well    Follow up 6 weeks    HPI:  This is a 78 y.o. female who presents to the clinic today for two weeks post lumbosacral vertebroplasty S1 6/6/22. She reports improvement of pain compared to pre-operatively. Patient is undergoing PT. Review of Systems   All other systems reviewed and are negative. Past History:    Current Outpatient Medications:     levothyroxine (SYNTHROID) 125 MCG tablet, , Disp: , Rfl:     allopurinol (ZYLOPRIM) 300 MG tablet, Take 1 tablet by mouth daily, Disp: 90 tablet, Rfl: 3    levothyroxine (SYNTHROID) 150 MCG tablet, Daily fasting in am one hour before breakfast, Disp: 90 tablet, Rfl: 3    carvedilol (COREG) 12.5 MG tablet, TAKE ONE-HALF TABLET BY  MOUTH TWICE DAILY, Disp: 90 tablet, Rfl: 3    atorvastatin (LIPITOR) 40 MG tablet, TAKE 1 TABLET BY MOUTH  DAILY, Disp: 90 tablet, Rfl: 3    colchicine (COLCRYS) 0.6 MG tablet, Take 2 tabs immediately, then 1 tablet 1 hour later. Then one tablet a day for 2 more days. , Disp: 10 tablet, Rfl: 11    gabapentin (NEURONTIN) 300 MG capsule, Take 1 capsule by mouth 4 times daily for 30 days.  (Patient taking differently: Take 300 mg by mouth 2 times daily. ), Disp: 120 capsule, Rfl: 0    nitrofurantoin, macrocrystal-monohydrate, (MACROBID) 100 MG capsule, TAKE 1 CAPSULE BY MOUTH TWICE DAILY, Disp: , Rfl:     Clobetasol Propionate (CLOBEX) 0.05 % SHAM, Apply 1 Act topically once a week, Disp: 1 Bottle, Rfl: 0    folic acid (FOLVITE) 1 MG tablet, Take 1 tablet by mouth daily, Disp: 90 tablet, Rfl: 3    Multiple Vitamins-Minerals (HAIR/SKIN/NAILS) TABS, Take 1 tablet by mouth daily, Disp: , Rfl:     Cholecalciferol (VITAMIN D) 2000 UNITS CAPS capsule, Take 2,000 Units by mouth daily, Disp: , Rfl:   Allergies   Allergen Reactions    Penicillins Itching    Etomidate Nausea And Vomiting     Social History     Socioeconomic History    Marital status:      Spouse name: Not on file    Number of children: Not on file    Years of education: Not on file    Highest education level: Not on file   Occupational History    Not on file   Tobacco Use    Smoking status: Former Smoker     Packs/day: 0.75     Years: 2.00     Pack years: 1.50     Types: Cigarettes     Start date: 10/25/1958     Quit date: 10/25/2018     Years since quitting: 3.6    Smokeless tobacco: Never Used   Substance and Sexual Activity    Alcohol use: No     Alcohol/week: 0.0 standard drinks    Drug use: No    Sexual activity: Not on file   Other Topics Concern    Not on file   Social History Narrative    Not on file     Social Determinants of Health     Financial Resource Strain: Low Risk     Difficulty of Paying Living Expenses: Not hard at all   Food Insecurity: No Food Insecurity    Worried About [facility] in the Last Year: Never true    [facility] in the Last Year: Never true   Transportation Needs:     Lack of Transportation (Medical): Not on file    Lack of Transportation (Non-Medical):  Not on file   Physical Activity:     Days of Exercise per Week: Not on file    Minutes of Exercise per Session: Not on file   Stress:     Feeling of Stress : Not on file   Social Connections:     Frequency of Communication with Friends and Family: Not on file    Frequency of Social Gatherings with Friends and Family: Not on file    Attends Ep[employer] Services: Not on file    Active Member of Clubs or Organizations: Not on file    Attends Club or Organization Meetings: Not on file    Marital Status: Not on file   Intimate Partner Violence:     Fear of Current or Ex-Partner: Not on file    Emotionally Abused: Not on file    Physically Abused: Not on file    Sexually Abused: Not on file   Housing Stability:     Unable to Pay for Housing in the Last Year: Not on file    Number of Places Lived in the Last Year: Not on file    Unstable Housing in the Last Year: Not on file     Past Medical History:   Diagnosis Date    Abdominal pain, unspecified site     Acquired cyst of kidney     Acute gouty arthropathy     Allergic rhinitis, cause unspecified     Anxiety state, unspecified     Arthropathy, unspecified, site unspecified     Chronic airway obstruction, not elsewhere classified     Chronic kidney disease, stage III (moderate) (HCC)     Diarrhea     Edema     Esophageal reflux     Essential hypertension, benign     Herpes zoster with other nervous system complications(053.19)     Herpes zoster without mention of complication     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)     Mononeuritis of unspecified site     Myalgia and myositis, unspecified     Osteoarthrosis, unspecified whether generalized or localized, unspecified site     Other general symptoms(780.99)     Other iatrogenic hypothyroidism     Other nonspecific abnormal finding of lung field     Other psoriasis     Pure hypercholesterolemia     Regional enteritis of unspecified site     Senile osteoporosis     Sprain of ankle, unspecified site     Unspecified vitamin D deficiency      Past Surgical History:   Procedure Laterality Date    COLONOSCOPY      DILATION AND CURETTAGE OF UTERUS      SPINE SURGERY N/A 6/6/2022    LUMBOSACRAL VERTEBROPLASTY S1 performed by Sachi Robledo MD at [address] [facility] N/A 6/14/2019    EGD FOREIGN BODY REMOVAL performed by John Mazariegos MD at [facility] [facility]      Family History   Problem Relation Age of Onset    Coronary Art Dis Father         Physical Exam:  Vitals signs and nursing note reviewed.    Constitutional: Appearance: well-developed. HENT:      Head: Normocephalic and atraumatic. Nose: Nose normal.   Eyes:      Conjunctiva/sclera: Conjunctivae normal.   Neck:      Musculoskeletal: Normal range of motion and neck supple. Pulmonary:      Effort: Pulmonary effort is normal. No respiratory distress. Musculoskeletal:      Comments: Normal gait     Skin:     General: Skin is warm and dry. Neurological:      Mental Status: Alert and oriented to person, place, and time. Sensory: No sensory deficit. Psychiatric:         Behavior: Behavior normal.         Thought Content: Thought content normal.        Provider Attestation:  Layla Engle, personally performed the services described in this documentation. All medical record entries made by the scribe were at my direction and in my presence. I have reviewed the chart and discharge instructions and agree that the records reflect my personal performance and is accurate and complete. Loretta Zimmerman MD 6/21/22       Scribe Attestation:  By signing my name below, Joan Artis, attest that this documentation has been prepared under the direction and in the presence of Dr. Familia Morton. Electronically signed: Harvey Norton, 6/21/22     Please note that this chart was generated using voice recognition Dragon dictation software. Although every effort was made to ensure the accuracy of this automated transcription, some errors in transcription may have occurred.
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
## 2022-08-02 ENCOUNTER — OFFICE VISIT (OUTPATIENT)
Dept: ORTHOPEDIC SURGERY | Age: 82
End: 2022-08-02
Payer: MEDICARE

## 2022-08-02 VITALS — WEIGHT: 138 LBS | BODY MASS INDEX: 20.92 KG/M2 | HEIGHT: 68 IN | RESPIRATION RATE: 14 BRPM

## 2022-08-02 DIAGNOSIS — M84.48XD SACRAL INSUFFICIENCY FRACTURE WITH ROUTINE HEALING: ICD-10-CM

## 2022-08-02 DIAGNOSIS — S32.591D CLOSED FRACTURE OF RIGHT INFERIOR PUBIC RAMUS WITH ROUTINE HEALING, SUBSEQUENT ENCOUNTER: Primary | ICD-10-CM

## 2022-08-02 PROCEDURE — 1123F ACP DISCUSS/DSCN MKR DOCD: CPT | Performed by: ORTHOPAEDIC SURGERY

## 2022-08-02 PROCEDURE — G8427 DOCREV CUR MEDS BY ELIG CLIN: HCPCS | Performed by: ORTHOPAEDIC SURGERY

## 2022-08-02 PROCEDURE — 99213 OFFICE O/P EST LOW 20 MIN: CPT | Performed by: ORTHOPAEDIC SURGERY

## 2022-08-02 PROCEDURE — G8399 PT W/DXA RESULTS DOCUMENT: HCPCS | Performed by: ORTHOPAEDIC SURGERY

## 2022-08-02 PROCEDURE — 1036F TOBACCO NON-USER: CPT | Performed by: ORTHOPAEDIC SURGERY

## 2022-08-02 PROCEDURE — G8420 CALC BMI NORM PARAMETERS: HCPCS | Performed by: ORTHOPAEDIC SURGERY

## 2022-08-02 PROCEDURE — 1090F PRES/ABSN URINE INCON ASSESS: CPT | Performed by: ORTHOPAEDIC SURGERY

## 2022-08-02 NOTE — PROGRESS NOTES
Patient ID: Awa Oneill is a 78 y.o. female    Chief Compliant:  Chief Complaint   Patient presents with    Lower Back Pain     SP sacroplasty 6/6/22        Diagnostic imaging:        Assessment and Plan:  1. Closed fracture of right inferior pubic ramus with routine healing, subsequent encounter    2. Sacral insufficiency fracture with routine healing        Post sacroplasty, healing well with significant relief of pain    Follow up prn    HPI:  This is a 78 y.o. female who presents to the clinic today for 8 weeks post lumbosacral vertebroplasty S1 6/6/22. She reports improvement of pain compared to pre-operatively, with only occasional soreness at night. Patient currently ambulates via cane and uses a walker at night    Review of Systems   All other systems reviewed and are negative. Past History:    Current Outpatient Medications:     levothyroxine (SYNTHROID) 125 MCG tablet, , Disp: , Rfl:     allopurinol (ZYLOPRIM) 300 MG tablet, Take 1 tablet by mouth daily, Disp: 90 tablet, Rfl: 3    levothyroxine (SYNTHROID) 150 MCG tablet, Daily fasting in am one hour before breakfast, Disp: 90 tablet, Rfl: 3    carvedilol (COREG) 12.5 MG tablet, TAKE ONE-HALF TABLET BY  MOUTH TWICE DAILY, Disp: 90 tablet, Rfl: 3    atorvastatin (LIPITOR) 40 MG tablet, TAKE 1 TABLET BY MOUTH  DAILY, Disp: 90 tablet, Rfl: 3    colchicine (COLCRYS) 0.6 MG tablet, Take 2 tabs immediately, then 1 tablet 1 hour later. Then one tablet a day for 2 more days. , Disp: 10 tablet, Rfl: 11    gabapentin (NEURONTIN) 300 MG capsule, Take 1 capsule by mouth 4 times daily for 30 days.  (Patient taking differently: Take 300 mg by mouth 2 times daily. ), Disp: 120 capsule, Rfl: 0    nitrofurantoin, macrocrystal-monohydrate, (MACROBID) 100 MG capsule, TAKE 1 CAPSULE BY MOUTH TWICE DAILY, Disp: , Rfl:     Clobetasol Propionate (CLOBEX) 0.05 % SHAM, Apply 1 Act topically once a week, Disp: 1 Bottle, Rfl: 0    folic acid (FOLVITE) 1 MG tablet, Take 1 tablet by mouth daily, Disp: 90 tablet, Rfl: 3    Multiple Vitamins-Minerals (HAIR/SKIN/NAILS) TABS, Take 1 tablet by mouth daily, Disp: , Rfl:     Cholecalciferol (VITAMIN D) 2000 UNITS CAPS capsule, Take 2,000 Units by mouth daily, Disp: , Rfl:   Allergies   Allergen Reactions    Penicillins Itching    Etomidate Nausea And Vomiting     Social History     Socioeconomic History    Marital status:       Spouse name: Not on file    Number of children: Not on file    Years of education: Not on file    Highest education level: Not on file   Occupational History    Not on file   Tobacco Use    Smoking status: Former     Packs/day: 0.75     Years: 2.00     Pack years: 1.50     Types: Cigarettes     Start date: 10/25/1958     Quit date: 10/25/2018     Years since quitting: 3.7    Smokeless tobacco: Never   Substance and Sexual Activity    Alcohol use: No     Alcohol/week: 0.0 standard drinks    Drug use: No    Sexual activity: Not on file   Other Topics Concern    Not on file   Social History Narrative    Not on file     Social Determinants of Health     Financial Resource Strain: Not on file   Food Insecurity: Not on file   Transportation Needs: Not on file   Physical Activity: Not on file   Stress: Not on file   Social Connections: Not on file   Intimate Partner Violence: Not on file   Housing Stability: Not on file     Past Medical History:   Diagnosis Date    Abdominal pain, unspecified site     Acquired cyst of kidney     Acute gouty arthropathy     Allergic rhinitis, cause unspecified     Anxiety state, unspecified     Arthropathy, unspecified, site unspecified     Chronic airway obstruction, not elsewhere classified     Chronic kidney disease, stage III (moderate) (HCC)     Diarrhea     Edema     Esophageal reflux     Essential 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) Mononeuritis of unspecified site     Myalgia and myositis, unspecified     Osteoarthrosis, unspecified whether generalized or localized, unspecified site     Other general symptoms(780.99)     Other iatrogenic hypothyroidism     Other nonspecific abnormal finding of lung field     Other psoriasis     Pure hypercholesterolemia     Regional enteritis of unspecified site     Senile osteoporosis     Sprain of ankle, unspecified site     Unspecified vitamin D deficiency      Past Surgical History:   Procedure Laterality Date    COLONOSCOPY      DILATION AND CURETTAGE OF UTERUS      SPINE SURGERY N/A 6/6/2022    LUMBOSACRAL VERTEBROPLASTY S1 performed by Tuan Moeller MD at [address] N/A 6/14/2019    EGD FOREIGN BODY REMOVAL performed by eBn Mcdaniel MD at [address]     Family History   Problem Relation Age of Onset    Coronary Art Dis Father         Physical Exam:  Vitals signs and nursing note reviewed. Constitutional:       Appearance: well-developed. HENT:      Head: Normocephalic and atraumatic. Nose: Nose normal.   Eyes:      Conjunctiva/sclera: Conjunctivae normal.   Neck:      Musculoskeletal: Normal range of motion and neck supple. Pulmonary:      Effort: Pulmonary effort is normal. No respiratory distress. Musculoskeletal:      Comments: Normal gait     Skin:     General: Skin is warm and dry. Neurological:      Mental Status: Alert and oriented to person, place, and time. Sensory: No sensory deficit. Psychiatric:         Behavior: Behavior normal.         Thought Content: Thought content normal.        Provider Attestation:  Sowmya Engle, personally performed the services described in this documentation. All medical record entries made by the scribe were at my direction and in my presence. I have reviewed the chart and discharge instructions and agree that the records reflect my personal performance and is accurate and complete. Arley Engle, MD 8/2/22       Y[place]ena Attestation:  By signing my name below, Zohra Sexton, attest that this documentation has been prepared under the direction and in the presence of Dr. John Kaur. Electronically signed: Harvey Huang, 8/2/22     Please note that this chart was generated using voice recognition Dragon dictation software. Although every effort was made to ensure the accuracy of this automated transcription, some errors in transcription may have occurred.

## 2022-09-12 ENCOUNTER — OFFICE VISIT (OUTPATIENT)
Dept: INTERNAL MEDICINE CLINIC | Age: 82
End: 2022-09-12
Payer: MEDICARE

## 2022-09-12 VITALS
SYSTOLIC BLOOD PRESSURE: 102 MMHG | BODY MASS INDEX: 18.73 KG/M2 | HEIGHT: 68 IN | OXYGEN SATURATION: 95 % | DIASTOLIC BLOOD PRESSURE: 64 MMHG | HEART RATE: 75 BPM | WEIGHT: 123.6 LBS

## 2022-09-12 DIAGNOSIS — R26.89 POOR BALANCE: ICD-10-CM

## 2022-09-12 DIAGNOSIS — E03.9 ACQUIRED HYPOTHYROIDISM: ICD-10-CM

## 2022-09-12 DIAGNOSIS — Z00.00 MEDICARE ANNUAL WELLNESS VISIT, SUBSEQUENT: Primary | ICD-10-CM

## 2022-09-12 PROCEDURE — G0439 PPPS, SUBSEQ VISIT: HCPCS | Performed by: INTERNAL MEDICINE

## 2022-09-12 PROCEDURE — 1123F ACP DISCUSS/DSCN MKR DOCD: CPT | Performed by: INTERNAL MEDICINE

## 2022-09-12 RX ORDER — ASPIRIN 81 MG/1
81 TABLET ORAL DAILY
Qty: 30 TABLET | Refills: 5 | Status: SHIPPED | OUTPATIENT
Start: 2022-09-12

## 2022-09-12 RX ORDER — LEVOTHYROXINE SODIUM 0.1 MG/1
TABLET ORAL
Qty: 90 TABLET | Refills: 1 | Status: SHIPPED | OUTPATIENT
Start: 2022-09-12

## 2022-09-12 SDOH — ECONOMIC STABILITY: FOOD INSECURITY: WITHIN THE PAST 12 MONTHS, THE FOOD YOU BOUGHT JUST DIDN'T LAST AND YOU DIDN'T HAVE MONEY TO GET MORE.: NEVER TRUE

## 2022-09-12 SDOH — ECONOMIC STABILITY: FOOD INSECURITY: WITHIN THE PAST 12 MONTHS, YOU WORRIED THAT YOUR FOOD WOULD RUN OUT BEFORE YOU GOT MONEY TO BUY MORE.: NEVER TRUE

## 2022-09-12 ASSESSMENT — PATIENT HEALTH QUESTIONNAIRE - PHQ9
1. LITTLE INTEREST OR PLEASURE IN DOING THINGS: 0
SUM OF ALL RESPONSES TO PHQ QUESTIONS 1-9: 0
2. FEELING DOWN, DEPRESSED OR HOPELESS: 0
SUM OF ALL RESPONSES TO PHQ QUESTIONS 1-9: 0
SUM OF ALL RESPONSES TO PHQ9 QUESTIONS 1 & 2: 0

## 2022-09-12 ASSESSMENT — SOCIAL DETERMINANTS OF HEALTH (SDOH): HOW HARD IS IT FOR YOU TO PAY FOR THE VERY BASICS LIKE FOOD, HOUSING, MEDICAL CARE, AND HEATING?: NOT HARD AT ALL

## 2022-09-12 ASSESSMENT — LIFESTYLE VARIABLES
HOW OFTEN DO YOU HAVE A DRINK CONTAINING ALCOHOL: NEVER
HOW MANY STANDARD DRINKS CONTAINING ALCOHOL DO YOU HAVE ON A TYPICAL DAY: PATIENT DOES NOT DRINK

## 2022-09-12 NOTE — PATIENT INSTRUCTIONS
Personalized Preventive Plan for Sadia Garcia - 9/12/2022  Medicare offers a range of preventive health benefits. Some of the tests and screenings are paid in full while other may be subject to a deductible, co-insurance, and/or copay. Some of these benefits include a comprehensive review of your medical history including lifestyle, illnesses that may run in your family, and various assessments and screenings as appropriate. After reviewing your medical record and screening and assessments performed today your provider may have ordered immunizations, labs, imaging, and/or referrals for you. A list of these orders (if applicable) as well as your Preventive Care list are included within your After Visit Summary for your review. Other Preventive Recommendations:    A preventive eye exam performed by an eye specialist is recommended every 1-2 years to screen for glaucoma; cataracts, macular degeneration, and other eye disorders. A preventive dental visit is recommended every 6 months. Try to get at least 150 minutes of exercise per week or 10,000 steps per day on a pedometer . Order or download the FREE \"Exercise & Physical Activity: Your Everyday Guide\" from [employer]. Call 6-891.977.8006 or search [employer] online. You need 7669-0756 mg of calcium and 1612-4531 IU of vitamin D per day. It is possible to meet your calcium requirement with diet alone, but a vitamin D supplement is usually necessary to meet this goal.  When exposed to the sun, use a sunscreen that protects against both UVA and UVB radiation with an SPF of 30 or greater. Reapply every 2 to 3 hours or after sweating, drying off with a towel, or swimming. Always wear a seat belt when traveling in a car. Always wear a helmet when riding a bicycle or motorcycle.

## 2022-09-12 NOTE — PROGRESS NOTES
Visit Information    Have you changed or started any medications since your last visit including any over-the-counter medicines, vitamins, or herbal medicines? no   Are you having any side effects from any of your medications? -  no  Have you stopped taking any of your medications? Is so, why? -  no    Have you seen any other physician or provider since your last visit? No  Have you had any other diagnostic tests since your last visit? No  Have you been seen in the emergency room and/or had an admission to a hospital since we last saw you? No  Have you had your routine dental cleaning in the past 6 months? no    Have you activated your [employer] account? If not, what are your barriers?  Yes     Patient Care Team:  Nancy Jackson MD as PCP - General (Internal Medicine)  Nancy Jackson MD as PCP - [facility] [place] Provider    Medical History Review  Past Medical, Family, and Social History reviewed and does contribute to the patient presenting condition    Health Maintenance   Topic Date Due    DTaP/Tdap/Td vaccine (1 - Tdap) Never done    Shingles vaccine (1 of 2) Never done    Annual Wellness Visit (AWV)  07/27/2022    Flu vaccine (1) 09/01/2022    Lipids  01/04/2023    Depression Screen  06/10/2023    Pneumococcal 65+ years Vaccine  Completed    COVID-19 Vaccine  Completed    DEXA (modify frequency per FRAX score)  Addressed    Hepatitis A vaccine  Aged Out    Hepatitis B vaccine  Aged Out    Hib vaccine  Aged Out    Meningococcal (ACWY) vaccine  Aged Out

## 2022-09-12 NOTE — PROGRESS NOTES
[address] [address]  Dept: [phone]  Dept Fax: [phone]    Rachel Betancur is a 78 y.o. female who presents today for her medical conditions/complaintsas noted below. Rachel Betancur is c/o of   Chief Complaint   Patient presents with    Medicare AWV    Gait Problem     Requesting exercises for balance. Lesion(s)     On left side of nose, would like it checked    Extremity Weakness     Right arm weakness from time to time, no other stroke like symptoms     Eye Drainage     Requesting eyedrops         HPI:     Patient is here for Annual wellness visit. Patient refused shingles vaccine  Patient mention she is getting flu vaccine next week. Quit smoking 60 years ago. Hypothyroidism and taking synthroid. Psoriasis plaque on Right neck and not seen dermatology. Not taking any medication.       No results found for: LABA1C          ( goal A1Cis < 7)   No results found for: LABMICR  LDL Cholesterol (mg/dL)   Date Value   01/04/2022 101   08/31/2020 109   06/06/2019 101       (goal LDL is <100)   AST (U/L)   Date Value   01/04/2022 27     ALT (U/L)   Date Value   01/04/2022 13     BUN (mg/dL)   Date Value   06/06/2022 17     BP Readings from Last 3 Encounters:   09/12/22 102/64   06/10/22 122/74   06/07/22 131/68          (goal 120/80)    Past Medical History:   Diagnosis Date    Abdominal pain, unspecified site     Acquired cyst of kidney     Acute gouty arthropathy     Allergic rhinitis, cause unspecified     Anxiety state, unspecified     Arthropathy, unspecified, site unspecified     Chronic airway obstruction, not elsewhere classified     Chronic kidney disease, stage III (moderate) (HCC)     Diarrhea     Edema     Esophageal reflux     Essential 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)     Mononeuritis of unspecified site Myalgia and myositis, unspecified     Osteoarthrosis, unspecified whether generalized or localized, unspecified site     Other general symptoms(780.99)     Other iatrogenic hypothyroidism     Other nonspecific abnormal finding of lung field     Other psoriasis     Pure hypercholesterolemia     Regional enteritis of unspecified site     Senile osteoporosis     Sprain of ankle, unspecified site     Unspecified vitamin D deficiency       Past Surgical History:   Procedure Laterality Date    COLONOSCOPY      DILATION AND CURETTAGE OF UTERUS      SPINE SURGERY N/A 6/6/2022    LUMBOSACRAL VERTEBROPLASTY S1 performed by Dinesh Lang MD at [address] [place] [address] N/A 6/14/2019    EGD FOREIGN BODY REMOVAL performed by Rocio Beckwith MD at [facility] OR       Family History   Problem Relation Age of Onset    Coronary Art Dis Father        Social History     Tobacco Use    Smoking status: Former     Packs/day: 0.75     Years: 2.00     Pack years: 1.50     Types: Cigarettes     Start date: 10/25/1958     Quit date: 10/25/2018     Years since quitting: 3.8    Smokeless tobacco: Never   Substance Use Topics    Alcohol use: No     Alcohol/week: 0.0 standard drinks      Current Outpatient Medications   Medication Sig Dispense Refill    levothyroxine (SYNTHROID) 100 MCG tablet Daily fasting in am one hour before breakfast 90 tablet 1    allopurinol (ZYLOPRIM) 300 MG tablet Take 1 tablet by mouth daily 90 tablet 3    carvedilol (COREG) 12.5 MG tablet TAKE ONE-HALF TABLET BY  MOUTH TWICE DAILY 90 tablet 3    atorvastatin (LIPITOR) 40 MG tablet TAKE 1 TABLET BY MOUTH  DAILY 90 tablet 3    colchicine (COLCRYS) 0.6 MG tablet Take 2 tabs immediately, then 1 tablet 1 hour later. Then one tablet a day for 2 more days. 10 tablet 11    gabapentin (NEURONTIN) 300 MG capsule Take 1 capsule by mouth 4 times daily for 30 days.  (Patient taking differently: Take 300 mg by mouth 2 times daily. ) 120 capsule 0 nitrofurantoin, macrocrystal-monohydrate, (MACROBID) 100 MG capsule TAKE 1 CAPSULE BY MOUTH TWICE DAILY      Clobetasol Propionate (CLOBEX) 0.05 % SHAM Apply 1 Act topically once a week 1 Bottle 0    folic acid (FOLVITE) 1 MG tablet Take 1 tablet by mouth daily 90 tablet 3    Multiple Vitamins-Minerals (HAIR/SKIN/NAILS) TABS Take 1 tablet by mouth daily      Cholecalciferol (VITAMIN D) 2000 UNITS CAPS capsule Take 2,000 Units by mouth daily       Current Facility-Administered Medications   Medication Dose Route Frequency Provider Last Rate Last Admin    methylPREDNISolone acetate (DEPO-MEDROL) injection 80 mg  80 mg IntraMUSCular Once Nancy Jackson MD        methylPREDNISolone sodium (SOLU-MEDROL) injection 125 mg  125 mg IntraMUSCular Once Natalio Ojeda MD        methylPREDNISolone acetate (DEPO-MEDROL) injection 80 mg  80 mg IntraMUSCular Once Natalio Ojeda MD        triamcinolone acetonide (KENALOG-40) injection 60 mg  60 mg IntraMUSCular Once Natalio Ojeda MD         Allergies   Allergen Reactions    Penicillins Itching    Etomidate Nausea And Vomiting       Health Maintenance   Topic Date Due    DTaP/Tdap/Td vaccine (1 - Tdap) Never done    Shingles vaccine (1 of 2) Never done    Flu vaccine (1) 09/01/2022    Lipids  01/04/2023    Depression Screen  06/10/2023    Annual Wellness Visit (AWV)  09/13/2023    Pneumococcal 65+ years Vaccine  Completed    COVID-19 Vaccine  Completed    DEXA (modify frequency per FRAX score)  Addressed    Hepatitis A vaccine  Aged Out    Hepatitis B vaccine  Aged Out    Hib vaccine  Aged Out    Meningococcal (ACWY) vaccine  Aged Out       Subjective:     Review of Systems    Objective:     Physical Exam  Skin:            Comments: Silvery plaque on Right posterior neck region       /64   Pulse 75   Ht 5' 8\" (1.727 m)   Wt 123 lb 9.6 oz (56.1 kg)   SpO2 95%   BMI 18.79 kg/m²     Assessment:       Diagnosis Orders   1.  Medicare annual wellness visit, subsequent 2. Acquired hypothyroidism  levothyroxine (SYNTHROID) 100 MCG tablet    TSH With Reflex Ft4      3. Poor balance  Ambulatory referral to Physical Therapy                Plan:      Return for Medicare Annual Wellness Visit in 1 year. Orders Placed This Encounter   Procedures    TSH With Reflex Ft4     Standing Status:   Future     Standing Expiration Date:   9/12/2023    Ambulatory referral to Physical Therapy     Referral Priority:   Routine     Referral Type:   Physical Medicine     Requested Specialty:   Physical Therapist     Number of Visits Requested:   1     Orders Placed This Encounter   Medications    levothyroxine (SYNTHROID) 100 MCG tablet     Sig: Daily fasting in am one hour before breakfast     Dispense:  90 tablet     Refill:  1     Requesting 1 year supply       Patient given educational materials - see patient instructions. Discussed use, benefit, and side effects of prescribed medications. All patientquestions answered. Pt voiced understanding. Reviewed health maintenance. Instructedto continue current medications, diet and exercise. Patient agreed with treatmentplan. Follow up as directed. Please note that this chart was generated using voice recognition Dragon dictation software. Although every effort was made to ensure the accuracy of this automated transcription, some errors in transcription may have occurred.      Electronically signed by John Lacey MD on 9/12/2022 at 3:00 myaNUMBER 45 Visit    Katy Palacios is here for Medicare AWV, Gait Problem (Requesting exercises for balance. ), Lesion(s) (On left side of nose, would like it checked), Extremity Weakness (Right arm weakness from time to time, no other stroke like symptoms ), and Eye Drainage (Requesting eyedrops)    Assessment & Plan   Medicare annual wellness visit, subsequent  Acquired hypothyroidism  -     levothyroxine (SYNTHROID) 100 MCG tablet; Daily fasting in am one hour before breakfast, Disp-90 tablet, R-1Requesting 1 year supplyNormal  -     TSH With Reflex Ft4; Future  Poor balance  -     Ambulatory referral to Physical Therapy    Recommendations for Preventive Services Due: see orders and patient instructions/AVS.  Recommended screening schedule for the next 5-10 years is provided to the patient in written form: see Patient Instructions/AVS.     Return for Medicare Annual Wellness Visit in 1 year. Subjective   The following acute and/or chronic problems were also addressed today:    Patient's complete Health Risk Assessment and screening values have been reviewed and are found in Flowsheets. The following problems were reviewed today and where indicated follow up appointments were made and/or referrals ordered.     Positive Risk Factor Screenings with Interventions:    Fall Risk:  Do you feel unsteady or are you worried about falling? : (!) yes  2 or more falls in past year?: no  Fall with injury in past year?: (!) yes   Fall Risk Interventions:    Skin lesion  skin lesion left nasal ala              General Health and ACP:  General  In general, how would you say your health is?: Very Good  In the past 7 days, have you experienced any of the following: New or Increased Pain, New or Increased Fatigue, Loneliness, Social Isolation, Stress or Anger?: No  Do you get the social and emotional support that you need?: Yes  Do you have a Living Will?: Yes    Advance Directives       Power of  Living Will ACP-Advance Directive ACP-Power of     Not on File Not on File Not on File Not on File        General Health Risk Interventions:  none    Health Habits/Nutrition:  Physical Activity: Inactive    Days of Exercise per Week: 0 days    Minutes of Exercise per Session: 0 min     Have you lost any weight without trying in the past 3 months?: No  Body mass index: 18.79  Have you seen the dentist within the past year?: N/A - wear dentures  Health Habits/Nutrition Interventions:  Inadequate physical activity:  patient agrees to exercise for at least 150 minutes/week    Hearing/Vision:  Do you or your family notice any trouble with your hearing that hasn't been managed with hearing aids?: No  Do you have difficulty driving, watching TV, or doing any of your daily activities because of your eyesight?: (!) Yes  Have you had an eye exam within the past year?: Yes  No results found. Hearing/Vision Interventions:  none            Objective   Vitals:    09/12/22 1418   BP: 102/64   Pulse: 75   SpO2: 95%   Weight: 123 lb 9.6 oz (56.1 kg)   Height: 5' 8\" (1.727 m)      Body mass index is 18.79 kg/m². Skin: warm and dry, no rash or erythema  Head: normocephalic and atraumatic  Eyes: pupils equal, round, and reactive to light, extraocular eye movements intact, conjunctivae normal  ENT: tympanic membrane, external ear and ear canal normal bilaterally, nose without deformity, nasal mucosa and turbinates normal without polyps  Neck: supple and non-tender without mass, no thyromegaly or thyroid nodules, no cervical lymphadenopathy  Pulmonary/Chest: clear to auscultation bilaterally- no wheezes, rales or rhonchi, normal air movement, no respiratory distress  Cardiovascular: normal rate, regular rhythm, normal S1 and S2, no murmurs, rubs, clicks, or gallops, distal pulses intact, no carotid bruits  Abdomen: soft, non-tender, non-distended, normal bowel sounds, no masses or organomegaly  Extremities: no cyanosis, clubbing or edema  Musculoskeletal: normal range of motion, no joint swelling, deformity or tenderness  Neurologic: reflexes normal and symmetric, no cranial nerve deficit, gait, coordination and speech normal       Allergies   Allergen Reactions    Penicillins Itching    Etomidate Nausea And Vomiting     Prior to Visit Medications    Medication Sig Taking?  Authorizing Provider   levothyroxine (SYNTHROID) 100 MCG tablet Daily fasting in am one hour before breakfast Yes Pankaj Jeong MD   allopurinol (ZYLOPRIM) 300 MG tablet Take 1 tablet by mouth daily  Keisha Santos MD   carvedilol (COREG) 12.5 MG tablet TAKE ONE-HALF TABLET BY  MOUTH TWICE DAILY  Gabe Prince Bedolla MD   atorvastatin (LIPITOR) 40 MG tablet TAKE 1 TABLET BY MOUTH  DAILY  Keisha Santos MD   colchicine (COLCRYS) 0.6 MG tablet Take 2 tabs immediately, then 1 tablet 1 hour later. Then one tablet a day for 2 more days. Keisha Santos MD   gabapentin (NEURONTIN) 300 MG capsule Take 1 capsule by mouth 4 times daily for 30 days. Patient taking differently: Take 300 mg by mouth 2 times daily.    Keisha Santos MD   nitrofurantoin, macrocrystal-monohydrate, (MACROBID) 100 MG capsule TAKE 1 CAPSULE BY MOUTH TWICE DAILY  Historical Provider, MD   Clobetasol Propionate (CLOBEX) 0.05 % SHAM Apply 1 Act topically once a week  Keisha Santos MD   folic acid (FOLVITE) 1 MG tablet Take 1 tablet by mouth daily  Jeffrey Carmona MD   Multiple Vitamins-Minerals (HAIR/SKIN/NAILS) TABS Take 1 tablet by mouth daily  Historical Provider, MD   Cholecalciferol (VITAMIN D) 2000 UNITS CAPS capsule Take 2,000 Units by mouth daily  Historical Provider, MD Mcdaniel (Including outside providers/suppliers regularly involved in providing care):   Patient Care Team:  Keisha Santos MD as PCP - General (Internal Medicine)  Keisha Santos MD as PCP - REHABILITATION [facility] Empaneled Provider     Reviewed and updated this visit:  Tobacco  Allergies  Meds  Med Hx  Surg Hx  Soc Hx  Fam Hx      Needs puch bx on right nasal ala  Likely BCC    Refused shingles vaccine

## 2022-09-26 RX ORDER — ATORVASTATIN CALCIUM 40 MG/1
40 TABLET, FILM COATED ORAL DAILY
Qty: 90 TABLET | Refills: 3 | Status: SHIPPED | OUTPATIENT
Start: 2022-09-26

## 2022-10-10 ENCOUNTER — PROCEDURE VISIT (OUTPATIENT)
Dept: INTERNAL MEDICINE CLINIC | Age: 82
End: 2022-10-10

## 2022-10-10 VITALS
WEIGHT: 124 LBS | DIASTOLIC BLOOD PRESSURE: 66 MMHG | BODY MASS INDEX: 18.79 KG/M2 | HEART RATE: 72 BPM | HEIGHT: 68 IN | OXYGEN SATURATION: 97 % | SYSTOLIC BLOOD PRESSURE: 116 MMHG

## 2022-10-10 DIAGNOSIS — K50.10 CROHN'S DISEASE OF LARGE INTESTINE WITHOUT COMPLICATION (HCC): ICD-10-CM

## 2022-10-10 DIAGNOSIS — M35.01 SJOGREN'S SYNDROME WITH KERATOCONJUNCTIVITIS SICCA (HCC): Primary | ICD-10-CM

## 2022-10-10 DIAGNOSIS — J43.1 PANLOBULAR EMPHYSEMA (HCC): ICD-10-CM

## 2022-10-10 DIAGNOSIS — N18.30 STAGE 3 CHRONIC KIDNEY DISEASE, UNSPECIFIED WHETHER STAGE 3A OR 3B CKD (HCC): ICD-10-CM

## 2022-10-10 DIAGNOSIS — N18.31 STAGE 3A CHRONIC KIDNEY DISEASE (HCC): ICD-10-CM

## 2022-11-08 ENCOUNTER — HOSPITAL ENCOUNTER (OUTPATIENT)
Age: 82
Setting detail: SPECIMEN
Discharge: HOME | End: 2022-11-08

## 2022-11-08 DIAGNOSIS — E03.9 ACQUIRED HYPOTHYROIDISM: ICD-10-CM

## 2022-11-09 LAB
THYROXINE, FREE: 1.98 NG/DL (ref 0.93–1.7)
TSH SERPL DL<=0.05 MIU/L-ACNC: 0.24 UIU/ML (ref 0.3–5)

## 2022-11-14 ENCOUNTER — OFFICE VISIT (OUTPATIENT)
Dept: INTERNAL MEDICINE CLINIC | Age: 82
End: 2022-11-14
Payer: MEDICARE

## 2022-11-14 VITALS
OXYGEN SATURATION: 98 % | BODY MASS INDEX: 18.49 KG/M2 | DIASTOLIC BLOOD PRESSURE: 76 MMHG | SYSTOLIC BLOOD PRESSURE: 118 MMHG | HEART RATE: 85 BPM | HEIGHT: 68 IN | WEIGHT: 122 LBS

## 2022-11-14 DIAGNOSIS — J43.1 PANLOBULAR EMPHYSEMA (HCC): ICD-10-CM

## 2022-11-14 DIAGNOSIS — K50.10 CROHN'S DISEASE OF LARGE INTESTINE WITHOUT COMPLICATION (HCC): ICD-10-CM

## 2022-11-14 DIAGNOSIS — M35.01 SJOGREN'S SYNDROME WITH KERATOCONJUNCTIVITIS SICCA (HCC): ICD-10-CM

## 2022-11-14 DIAGNOSIS — N18.31 STAGE 3A CHRONIC KIDNEY DISEASE (HCC): ICD-10-CM

## 2022-11-14 DIAGNOSIS — E03.9 ACQUIRED HYPOTHYROIDISM: Primary | ICD-10-CM

## 2022-11-14 PROCEDURE — G8420 CALC BMI NORM PARAMETERS: HCPCS | Performed by: INTERNAL MEDICINE

## 2022-11-14 PROCEDURE — 1036F TOBACCO NON-USER: CPT | Performed by: INTERNAL MEDICINE

## 2022-11-14 PROCEDURE — 3074F SYST BP LT 130 MM HG: CPT | Performed by: INTERNAL MEDICINE

## 2022-11-14 PROCEDURE — G8399 PT W/DXA RESULTS DOCUMENT: HCPCS | Performed by: INTERNAL MEDICINE

## 2022-11-14 PROCEDURE — 1090F PRES/ABSN URINE INCON ASSESS: CPT | Performed by: INTERNAL MEDICINE

## 2022-11-14 PROCEDURE — G8427 DOCREV CUR MEDS BY ELIG CLIN: HCPCS | Performed by: INTERNAL MEDICINE

## 2022-11-14 PROCEDURE — 3023F SPIROM DOC REV: CPT | Performed by: INTERNAL MEDICINE

## 2022-11-14 PROCEDURE — G8484 FLU IMMUNIZE NO ADMIN: HCPCS | Performed by: INTERNAL MEDICINE

## 2022-11-14 PROCEDURE — 3078F DIAST BP <80 MM HG: CPT | Performed by: INTERNAL MEDICINE

## 2022-11-14 PROCEDURE — 1123F ACP DISCUSS/DSCN MKR DOCD: CPT | Performed by: INTERNAL MEDICINE

## 2022-11-14 PROCEDURE — 99214 OFFICE O/P EST MOD 30 MIN: CPT | Performed by: INTERNAL MEDICINE

## 2022-11-14 RX ORDER — LEVOTHYROXINE SODIUM 0.07 MG/1
TABLET ORAL
Qty: 90 TABLET | Refills: 3 | Status: SHIPPED | OUTPATIENT
Start: 2022-11-14

## 2022-11-14 ASSESSMENT — ENCOUNTER SYMPTOMS
WHEEZING: 0
BLOOD IN STOOL: 0
ABDOMINAL DISTENTION: 0
TROUBLE SWALLOWING: 0
COLOR CHANGE: 0
DIARRHEA: 0
EYE PAIN: 0
COUGH: 0
SHORTNESS OF BREATH: 0
EYE DISCHARGE: 0

## 2022-11-14 NOTE — PROGRESS NOTES
Visit Information    Have you changed or started any medications since your last visit including any over-the-counter medicines, vitamins, or herbal medicines? no   Are you having any side effects from any of your medications? -  no  Have you stopped taking any of your medications? Is so, why? -  no    Have you seen any other physician or provider since your last visit? No  Have you had any other diagnostic tests since your last visit? No  Have you been seen in the emergency room and/or had an admission to a hospital since we last saw you? No  Have you had your routine dental cleaning in the past 6 months? no    Have you activated your [employer] account? If not, what are your barriers?  Yes     Patient Care Team:  Markell Mancera MD as PCP - General (Internal Medicine)  Markell Mancera MD as PCP - [facility]    Medical History Review  Past Medical, Family, and Social History reviewed and does contribute to the patient presenting condition    Health Maintenance   Topic Date Due    DTaP/Tdap/Td vaccine (1 - Tdap) Never done    Shingles vaccine (1 of 2) Never done    COVID-19 Vaccine (5 - Booster for Derrel Klippel series) 07/27/2022    Lipids  01/04/2023    Depression Screen  09/12/2023    Annual Wellness Visit (AWV)  09/13/2023    Flu vaccine  Completed    Pneumococcal 65+ years Vaccine  Completed    DEXA (modify frequency per FRAX score)  Addressed    Hepatitis A vaccine  Aged Out    Hib vaccine  Aged Out    Meningococcal (ACWY) vaccine  Aged Out

## 2022-11-14 NOTE — PROGRESS NOTES
[address] [address]  Dept: [phone]  Dept Fax: [phone]    Suze Cantrell is a 78 y.o. female who presents today for her medical conditions/complaintsas noted below. Suze Cantrell is c/o of   Chief Complaint   Patient presents with    Thyroid Problem     Discuss labs         HPI:     hypothroid  Onset more than 2 years ago  No worsening  Mod severity  Not associated with wt loss   No sweating no heat or cold tolerance  Controlled with synthroid.     HTN  Onset more than 2 years ago  luma mild to mod  Controlled with current po meds  Not associated with headaches or blurry vision  No chest pain          No results found for: LABA1C          ( goal A1Cis < 7)   No results found for: LABMICR  LDL Cholesterol (mg/dL)   Date Value   01/04/2022 101   08/31/2020 109   06/06/2019 101       (goal LDL is <100)   AST (U/L)   Date Value   01/04/2022 27     ALT (U/L)   Date Value   01/04/2022 13     BUN (mg/dL)   Date Value   06/06/2022 17     BP Readings from Last 3 Encounters:   11/14/22 118/76   10/10/22 116/66   09/12/22 102/64          (goal 120/80)    Past Medical History:   Diagnosis Date    Abdominal pain, unspecified site     Acquired cyst of kidney     Acute gouty arthropathy     Allergic rhinitis, cause unspecified     Anxiety state, unspecified     Arthropathy, unspecified, site unspecified     Chronic airway obstruction, not elsewhere classified     Chronic kidney disease, stage III (moderate) (HCC)     Diarrhea     Edema     Esophageal reflux     Essential 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 localized, unspecified site     Other general symptoms(780.99)     Other iatrogenic hypothyroidism Other nonspecific abnormal finding of lung field     Other psoriasis     Pure hypercholesterolemia     Regional enteritis of unspecified site     Senile osteoporosis     Sprain of ankle, unspecified site     Unspecified vitamin D deficiency       Past Surgical History:   Procedure Laterality Date    COLONOSCOPY      DILATION AND CURETTAGE OF UTERUS      SPINE SURGERY N/A 2022    LUMBOSACRAL VERTEBROPLASTY S1 performed by aJzmín Roy MD at [address] [facility] N/A 2019    EGD FOREIGN BODY REMOVAL performed by Jameson Caal MD at [facility]       Family History   Problem Relation Age of Onset    Coronary Art Dis Father        Social History     Tobacco Use    Smoking status: Former     Packs/day: 0.75     Years: 2.00     Pack years: 1.50     Types: Cigarettes     Start date: 10/25/1958     Quit date: 10/25/2018     Years since quittin.0    Smokeless tobacco: Never   Substance Use Topics    Alcohol use: No     Alcohol/week: 0.0 standard drinks      Current Outpatient Medications   Medication Sig Dispense Refill    levothyroxine (SYNTHROID) 75 MCG tablet Daily fasting in am one hour before breakfast 90 tablet 3    atorvastatin (LIPITOR) 40 MG tablet TAKE 1 TABLET BY MOUTH  DAILY 90 tablet 3    aspirin EC 81 MG EC tablet Take 1 tablet by mouth daily 30 tablet 5    allopurinol (ZYLOPRIM) 300 MG tablet Take 1 tablet by mouth daily 90 tablet 3    carvedilol (COREG) 12.5 MG tablet TAKE ONE-HALF TABLET BY  MOUTH TWICE DAILY 90 tablet 3    colchicine (COLCRYS) 0.6 MG tablet Take 2 tabs immediately, then 1 tablet 1 hour later. Then one tablet a day for 2 more days.  10 tablet 11    nitrofurantoin, macrocrystal-monohydrate, (MACROBID) 100 MG capsule TAKE 1 CAPSULE BY MOUTH TWICE DAILY      Clobetasol Propionate (CLOBEX) 0.05 % SHAM Apply 1 Act topically once a week 1 Bottle 0    folic acid (FOLVITE) 1 MG tablet Take 1 tablet by mouth daily 90 tablet 3    Multiple Vitamins-Minerals (HAIR/SKIN/NAILS) TABS Take 1 tablet by mouth daily      Cholecalciferol (VITAMIN D) 2000 UNITS CAPS capsule Take 2,000 Units by mouth daily      gabapentin (NEURONTIN) 300 MG capsule Take 1 capsule by mouth 4 times daily for 30 days. (Patient taking differently: Take 300 mg by mouth 2 times daily. ) 120 capsule 0     Current Facility-Administered Medications   Medication Dose Route Frequency Provider Last Rate Last Admin    methylPREDNISolone acetate (DEPO-MEDROL) injection 80 mg  80 mg IntraMUSCular Once Viann MD Calderon        methylPREDNISolone sodium (SOLU-MEDROL) injection 125 mg  125 mg IntraMUSCular Once Dyke Manati, MD        methylPREDNISolone acetate (DEPO-MEDROL) injection 80 mg  80 mg IntraMUSCular Once Dyke Manati, MD        triamcinolone acetonide (KENALOG-40) injection 60 mg  60 mg IntraMUSCular Once Dyke Manati, MD         Allergies   Allergen Reactions    Penicillins Itching    Etomidate Nausea And Vomiting       Health Maintenance   Topic Date Due    DTaP/Tdap/Td vaccine (1 - Tdap) Never done    Shingles vaccine (1 of 2) Never done    COVID-19 Vaccine (5 - Booster for Moderna series) 07/27/2022    Lipids  01/04/2023    Depression Screen  09/12/2023    Annual Wellness Visit (AWV)  09/13/2023    Flu vaccine  Completed    Pneumococcal 65+ years Vaccine  Completed    DEXA (modify frequency per FRAX score)  Addressed    Hepatitis A vaccine  Aged Out    Hib vaccine  Aged Out    Meningococcal (ACWY) vaccine  Aged Out       Subjective:     Review of Systems   Constitutional:  Negative for appetite change, diaphoresis and fatigue. HENT:  Negative for ear discharge and trouble swallowing. Eyes:  Negative for pain and discharge. Respiratory:  Negative for cough, shortness of breath and wheezing. Cardiovascular:  Negative for chest pain and palpitations. Gastrointestinal:  Negative for abdominal distention, blood in stool and diarrhea.    Endocrine: Negative for polydipsia and polyphagia. Genitourinary:  Negative for difficulty urinating and frequency. Musculoskeletal:  Positive for arthralgias. Negative for gait problem, myalgias and neck pain. Skin:  Negative for color change and rash. Allergic/Immunologic: Negative for environmental allergies and food allergies. Neurological:  Negative for dizziness and headaches. Hematological:  Negative for adenopathy. Does not bruise/bleed easily. Psychiatric/Behavioral:  Negative for behavioral problems and sleep disturbance. Objective:     Physical Exam  Constitutional:       Appearance: She is well-developed. She is not diaphoretic. HENT:      Head: Normocephalic and atraumatic. Eyes:      General:         Right eye: No discharge. Left eye: No discharge. Extraocular Movements:      Right eye: Normal extraocular motion. Left eye: Normal extraocular motion. Conjunctiva/sclera: Conjunctivae normal.      Right eye: Right conjunctiva is not injected. Left eye: Left conjunctiva is not injected. Neck:      Thyroid: No thyroid mass or thyromegaly. Vascular: No JVD. Cardiovascular:      Rate and Rhythm: Normal rate and regular rhythm. Heart sounds: No murmur heard. No friction rub. Pulmonary:      Effort: Pulmonary effort is normal. No tachypnea, bradypnea, accessory muscle usage or respiratory distress. Breath sounds: Normal breath sounds. No wheezing or rales. Abdominal:      General: Bowel sounds are normal. There is no distension. Palpations: Abdomen is soft. Tenderness: There is no abdominal tenderness. There is no rebound. Musculoskeletal:         General: No tenderness. Normal range of motion. Cervical back: Normal range of motion and neck supple. No edema or erythema. Lymphadenopathy:      Head:      Right side of head: No submental or submandibular adenopathy. Left side of head: No submental or submandibular adenopathy.       Cervical: No cervical adenopathy. Skin:     General: Skin is warm. Coloration: Skin is not pale. Findings: No bruising, ecchymosis or rash. Neurological:      Mental Status: She is alert and oriented to person, place, and time. Cranial Nerves: No cranial nerve deficit. Sensory: No sensory deficit. Motor: No atrophy or abnormal muscle tone. Coordination: Coordination normal.   Psychiatric:         Mood and Affect: Mood is not anxious. Affect is not angry. Speech: Speech is not slurred. Behavior: Behavior normal. Behavior is not aggressive. Thought Content: Thought content does not include homicidal ideation. Cognition and Memory: Memory is not impaired. /76   Pulse 85   Ht 5' 8\" (1.727 m)   Wt 122 lb (55.3 kg)   SpO2 98%   BMI 18.55 kg/m²     Assessment:       Diagnosis Orders   1. Acquired hypothyroidism  levothyroxine (SYNTHROID) 75 MCG tablet      2. Panlobular emphysema (HCC)        3. Stage 3a chronic kidney disease ([facility] [address]        4. Crohn's disease of large intestine without complication ([facility]        5. Sjogren's syndrome with keratoconjunctivitis sicca ([facility] [address]                  Plan:      Return in about 3 months (around 2/14/2023). No orders of the defined types were placed in this encounter. Orders Placed This Encounter   Medications    levothyroxine (SYNTHROID) 75 MCG tablet     Sig: Daily fasting in am one hour before breakfast     Dispense:  90 tablet     Refill:  3     Requesting 1 year supply    Oa  Walks with cane  Had shigles vaccine beore  Tsh is oversupresed  Will cut back to 75 mcg     Patient given educational materials - see patient instructions. Discussed use, benefit, and side effects of prescribed medications. All patientquestions answered. Pt voiced understanding. Reviewed health maintenance. Instructedto continue current medications, diet and exercise. Patient agreed with treatmentplan. Follow up as directed.        Please note that this chart was generated using voice recognition Dragon dictation software. Although every effort was made to ensure the accuracy of this automated transcription, some errors in transcription may have occurred.      Electronically signed by Santana Byrne MD on 11/14/2022 at 11:20 AM

## 2022-11-15 DIAGNOSIS — M10.9 ACUTE GOUTY ARTHROPATHY: ICD-10-CM

## 2022-11-16 RX ORDER — ALLOPURINOL 300 MG/1
300 TABLET ORAL DAILY
Qty: 90 TABLET | Refills: 3 | Status: SHIPPED | OUTPATIENT
Start: 2022-11-16

## 2022-12-19 RX ORDER — CARVEDILOL 12.5 MG/1
TABLET ORAL
Qty: 90 TABLET | Refills: 3 | Status: SHIPPED | OUTPATIENT
Start: 2022-12-19

## 2023-03-06 ENCOUNTER — OFFICE VISIT (OUTPATIENT)
Dept: INTERNAL MEDICINE CLINIC | Age: 83
End: 2023-03-06
Payer: MEDICARE

## 2023-03-06 VITALS
SYSTOLIC BLOOD PRESSURE: 108 MMHG | HEIGHT: 68 IN | OXYGEN SATURATION: 96 % | DIASTOLIC BLOOD PRESSURE: 62 MMHG | HEART RATE: 85 BPM | WEIGHT: 122 LBS | BODY MASS INDEX: 18.49 KG/M2

## 2023-03-06 DIAGNOSIS — M79.672 CHRONIC PAIN OF BOTH FEET: ICD-10-CM

## 2023-03-06 DIAGNOSIS — M35.01 SJOGREN'S SYNDROME WITH KERATOCONJUNCTIVITIS SICCA (HCC): ICD-10-CM

## 2023-03-06 DIAGNOSIS — I10 ESSENTIAL HYPERTENSION, BENIGN: Primary | ICD-10-CM

## 2023-03-06 DIAGNOSIS — S32.591A CLOSED FRACTURE OF RIGHT INFERIOR PUBIC RAMUS, INITIAL ENCOUNTER (HCC): ICD-10-CM

## 2023-03-06 DIAGNOSIS — R93.3 ABNORMAL FINDINGS ON DIAGNOSTIC IMAGING OF OTHER PARTS OF DIGESTIVE TRACT: ICD-10-CM

## 2023-03-06 DIAGNOSIS — N18.30 STAGE 3 CHRONIC KIDNEY DISEASE, UNSPECIFIED WHETHER STAGE 3A OR 3B CKD (HCC): ICD-10-CM

## 2023-03-06 DIAGNOSIS — G89.29 CHRONIC PAIN OF BOTH FEET: ICD-10-CM

## 2023-03-06 DIAGNOSIS — N18.31 STAGE 3A CHRONIC KIDNEY DISEASE (HCC): ICD-10-CM

## 2023-03-06 DIAGNOSIS — J43.1 PANLOBULAR EMPHYSEMA (HCC): ICD-10-CM

## 2023-03-06 DIAGNOSIS — H04.123 DRY EYES: ICD-10-CM

## 2023-03-06 DIAGNOSIS — M79.671 CHRONIC PAIN OF BOTH FEET: ICD-10-CM

## 2023-03-06 DIAGNOSIS — K50.10 CROHN'S DISEASE OF LARGE INTESTINE WITHOUT COMPLICATION (HCC): ICD-10-CM

## 2023-03-06 DIAGNOSIS — G62.9 NEUROPATHY: ICD-10-CM

## 2023-03-06 DIAGNOSIS — R39.15 URGENCY OF MICTURITION: ICD-10-CM

## 2023-03-06 PROCEDURE — 3078F DIAST BP <80 MM HG: CPT | Performed by: INTERNAL MEDICINE

## 2023-03-06 PROCEDURE — G8427 DOCREV CUR MEDS BY ELIG CLIN: HCPCS | Performed by: INTERNAL MEDICINE

## 2023-03-06 PROCEDURE — 3074F SYST BP LT 130 MM HG: CPT | Performed by: INTERNAL MEDICINE

## 2023-03-06 PROCEDURE — 3023F SPIROM DOC REV: CPT | Performed by: INTERNAL MEDICINE

## 2023-03-06 PROCEDURE — G8420 CALC BMI NORM PARAMETERS: HCPCS | Performed by: INTERNAL MEDICINE

## 2023-03-06 PROCEDURE — 1036F TOBACCO NON-USER: CPT | Performed by: INTERNAL MEDICINE

## 2023-03-06 PROCEDURE — 1090F PRES/ABSN URINE INCON ASSESS: CPT | Performed by: INTERNAL MEDICINE

## 2023-03-06 PROCEDURE — G8399 PT W/DXA RESULTS DOCUMENT: HCPCS | Performed by: INTERNAL MEDICINE

## 2023-03-06 PROCEDURE — 99214 OFFICE O/P EST MOD 30 MIN: CPT | Performed by: INTERNAL MEDICINE

## 2023-03-06 PROCEDURE — 1123F ACP DISCUSS/DSCN MKR DOCD: CPT | Performed by: INTERNAL MEDICINE

## 2023-03-06 PROCEDURE — G8484 FLU IMMUNIZE NO ADMIN: HCPCS | Performed by: INTERNAL MEDICINE

## 2023-03-06 RX ORDER — OXYBUTYNIN CHLORIDE 10 MG/1
10 TABLET, EXTENDED RELEASE ORAL DAILY
Qty: 30 TABLET | Refills: 3 | Status: SHIPPED | OUTPATIENT
Start: 2023-03-06

## 2023-03-06 SDOH — ECONOMIC STABILITY: HOUSING INSECURITY
IN THE LAST 12 MONTHS, WAS THERE A TIME WHEN YOU DID NOT HAVE A STEADY PLACE TO SLEEP OR SLEPT IN A SHELTER (INCLUDING NOW)?: NO

## 2023-03-06 SDOH — ECONOMIC STABILITY: FOOD INSECURITY: WITHIN THE PAST 12 MONTHS, THE FOOD YOU BOUGHT JUST DIDN'T LAST AND YOU DIDN'T HAVE MONEY TO GET MORE.: NEVER TRUE

## 2023-03-06 SDOH — ECONOMIC STABILITY: INCOME INSECURITY: HOW HARD IS IT FOR YOU TO PAY FOR THE VERY BASICS LIKE FOOD, HOUSING, MEDICAL CARE, AND HEATING?: NOT HARD AT ALL

## 2023-03-06 SDOH — ECONOMIC STABILITY: FOOD INSECURITY: WITHIN THE PAST 12 MONTHS, YOU WORRIED THAT YOUR FOOD WOULD RUN OUT BEFORE YOU GOT MONEY TO BUY MORE.: NEVER TRUE

## 2023-03-06 ASSESSMENT — PATIENT HEALTH QUESTIONNAIRE - PHQ9
SUM OF ALL RESPONSES TO PHQ QUESTIONS 1-9: 0
1. LITTLE INTEREST OR PLEASURE IN DOING THINGS: 0
SUM OF ALL RESPONSES TO PHQ9 QUESTIONS 1 & 2: 0
2. FEELING DOWN, DEPRESSED OR HOPELESS: 0

## 2023-03-06 ASSESSMENT — ENCOUNTER SYMPTOMS
COUGH: 0
EYE PAIN: 0
DIARRHEA: 0
WHEEZING: 0
EYE DISCHARGE: 0
TROUBLE SWALLOWING: 0
COLOR CHANGE: 0
ABDOMINAL DISTENTION: 0
SHORTNESS OF BREATH: 0
BLOOD IN STOOL: 0

## 2023-03-06 NOTE — PROGRESS NOTES
Visit Information    Have you changed or started any medications since your last visit including any over-the-counter medicines, vitamins, or herbal medicines? no   Are you having any side effects from any of your medications? -  no  Have you stopped taking any of your medications? Is so, why? -  no    Have you seen any other physician or provider since your last visit? No  Have you had any other diagnostic tests since your last visit? No  Have you been seen in the emergency room and/or had an admission to a hospital since we last saw you? No  Have you had your routine dental cleaning in the past 6 months? no    Have you activated your [employer] account? If not, what are your barriers?  Yes     Patient Care Team:  Matilda Quinonez MD as PCP - General (Internal Medicine)  Matilda Quinonez MD as PCP - Empaneled Provider    Medical History Review  Past Medical, Family, and Social History reviewed and does contribute to the patient presenting condition    Health Maintenance   Topic Date Due    DTaP/Tdap/Td vaccine (1 - Tdap) Never done    Shingles vaccine (1 of 2) Never done    Lipids  01/04/2023    Depression Screen  09/12/2023    Annual Wellness Visit (AWV)  09/13/2023    Flu vaccine  Completed    Pneumococcal 65+ years Vaccine  Completed    COVID-19 Vaccine  Completed    DEXA (modify frequency per FRAX score)  Addressed    Hepatitis A vaccine  Aged Out    Hib vaccine  Aged Out    Meningococcal (ACWY) vaccine  Aged Out
2,000 Units by mouth daily      gabapentin (NEURONTIN) 300 MG capsule Take 1 capsule by mouth 4 times daily for 30 days. (Patient taking differently: Take 300 mg by mouth 2 times daily. ) 120 capsule 0     Current Facility-Administered Medications   Medication Dose Route Frequency Provider Last Rate Last Admin    methylPREDNISolone acetate (DEPO-MEDROL) injection 80 mg  80 mg IntraMUSCular Once Fide Lara MD        methylPREDNISolone sodium (SOLU-MEDROL) injection 125 mg  125 mg IntraMUSCular Once Chery Chavarria MD        methylPREDNISolone acetate (DEPO-MEDROL) injection 80 mg  80 mg IntraMUSCular Once Chery Chavarria MD        triamcinolone acetonide (KENALOG-40) injection 60 mg  60 mg IntraMUSCular Once Chery Chavarria MD         Allergies   Allergen Reactions    Penicillins Itching    Etomidate Nausea And Vomiting       Health Maintenance   Topic Date Due    DTaP/Tdap/Td vaccine (1 - Tdap) Never done    Shingles vaccine (1 of 2) Never done    Lipids  01/04/2023    Depression Screen  09/12/2023    Annual Wellness Visit (AWV)  09/13/2023    Flu vaccine  Completed    Pneumococcal 65+ years Vaccine  Completed    COVID-19 Vaccine  Completed    DEXA (modify frequency per FRAX score)  Addressed    Hepatitis A vaccine  Aged Out    Hib vaccine  Aged Out    Meningococcal (ACWY) vaccine  Aged Out       Subjective:     Review of Systems   Constitutional:  Negative for appetite change, diaphoresis and fatigue. HENT:  Negative for ear discharge and trouble swallowing. Eyes:  Negative for pain and discharge. Respiratory:  Negative for cough, shortness of breath and wheezing. Cardiovascular:  Negative for chest pain and palpitations. Gastrointestinal:  Negative for abdominal distention, blood in stool and diarrhea. Endocrine: Negative for polydipsia and polyphagia. Genitourinary:  Positive for frequency. Negative for difficulty urinating.    Musculoskeletal:  Negative for gait problem, myalgias and neck

## 2023-03-15 ENCOUNTER — HOSPITAL ENCOUNTER (OUTPATIENT)
Age: 83
Setting detail: SPECIMEN
Discharge: HOME | End: 2023-03-15

## 2023-03-15 DIAGNOSIS — R39.15 URGENCY OF MICTURITION: ICD-10-CM

## 2023-03-15 DIAGNOSIS — M35.01 SJOGREN'S SYNDROME WITH KERATOCONJUNCTIVITIS SICCA (HCC): ICD-10-CM

## 2023-03-15 DIAGNOSIS — R93.3 ABNORMAL FINDINGS ON DIAGNOSTIC IMAGING OF OTHER PARTS OF DIGESTIVE TRACT: ICD-10-CM

## 2023-03-15 LAB
CHOLEST SERPL-MCNC: 163 MG/DL
CHOLESTEROL/HDL RATIO: 3.1
HDLC SERPL-MCNC: 52 MG/DL
LDLC SERPL CALC-MCNC: 87 MG/DL (ref 0–130)
TRIGL SERPL-MCNC: 121 MG/DL
TSH SERPL-ACNC: 7.94 UIU/ML (ref 0.3–5)

## 2023-03-16 LAB — T4 FREE SERPL-MCNC: 1.08 NG/DL (ref 0.93–1.7)

## 2023-03-19 LAB
MICROORGANISM SPEC CULT: ABNORMAL
SPECIMEN DESCRIPTION: ABNORMAL

## 2023-03-20 RX ORDER — CIPROFLOXACIN 500 MG/1
500 TABLET, FILM COATED ORAL 2 TIMES DAILY
Qty: 10 TABLET | Refills: 0 | Status: SHIPPED | OUTPATIENT
Start: 2023-03-20 | End: 2023-03-25

## 2023-04-05 ENCOUNTER — OFFICE VISIT (OUTPATIENT)
Dept: INTERNAL MEDICINE CLINIC | Age: 83
End: 2023-04-05

## 2023-04-05 VITALS
SYSTOLIC BLOOD PRESSURE: 124 MMHG | WEIGHT: 122.4 LBS | OXYGEN SATURATION: 98 % | DIASTOLIC BLOOD PRESSURE: 80 MMHG | BODY MASS INDEX: 18.61 KG/M2 | HEART RATE: 65 BPM

## 2023-04-05 DIAGNOSIS — J43.1 PANLOBULAR EMPHYSEMA (HCC): ICD-10-CM

## 2023-04-05 DIAGNOSIS — E78.00 PURE HYPERCHOLESTEROLEMIA: ICD-10-CM

## 2023-04-05 DIAGNOSIS — H04.129 DRY EYES DUE TO DECREASED TEAR PRODUCTION: Primary | ICD-10-CM

## 2023-04-05 DIAGNOSIS — E03.9 ACQUIRED HYPOTHYROIDISM: ICD-10-CM

## 2023-04-05 DIAGNOSIS — G62.9 NEUROPATHY: ICD-10-CM

## 2023-04-05 DIAGNOSIS — I10 ESSENTIAL HYPERTENSION, BENIGN: ICD-10-CM

## 2023-04-05 RX ORDER — LEVOTHYROXINE SODIUM 0.1 MG/1
TABLET ORAL
Qty: 90 TABLET | Refills: 2 | Status: SHIPPED | OUTPATIENT
Start: 2023-04-05

## 2023-04-05 ASSESSMENT — ENCOUNTER SYMPTOMS
WHEEZING: 0
COUGH: 0
TROUBLE SWALLOWING: 0
BLOOD IN STOOL: 0
ABDOMINAL DISTENTION: 0
DIARRHEA: 0
EYE PAIN: 0
SHORTNESS OF BREATH: 0
COLOR CHANGE: 0
BACK PAIN: 1
EYE DISCHARGE: 0

## 2023-04-05 NOTE — PROGRESS NOTES
Visit Information    Have you changed or started any medications since your last visit including any over-the-counter medicines, vitamins, or herbal medicines? no   Are you having any side effects from any of your medications? -  no  Have you stopped taking any of your medications? Is so, why? -  no    Have you seen any other physician or provider since your last visit? No  Have you had any other diagnostic tests since your last visit? No  Have you been seen in the emergency room and/or had an admission to a hospital since we last saw you? No  Have you had your routine dental cleaning in the past 6 months? no    Have you activated your [employer] account? If not, what are your barriers?  Yes     Patient Care Team:  Parker Harrison MD as PCP - General (Internal Medicine)  Parker Harrison MD as PCP - Empaneled Provider    Medical History Review  Past Medical, Family, and Social History reviewed and does not contribute to the patient presenting condition    Health Maintenance   Topic Date Due    DTaP/Tdap/Td vaccine (1 - Tdap) Never done    Shingles vaccine (1 of 2) Never done    Annual Wellness Visit (AWV)  09/13/2023    Depression Screen  03/06/2024    Lipids  03/15/2024    Flu vaccine  Completed    Pneumococcal 65+ years Vaccine  Completed    COVID-19 Vaccine  Completed    DEXA (modify frequency per FRAX score)  Addressed    Hepatitis A vaccine  Aged Out    Hib vaccine  Aged Out    Meningococcal (ACWY) vaccine  Aged Out
Reviewed health maintenance. Instructedto continue current medications, diet and exercise. Patient agreed with treatmentplan. Follow up as directed. Please note that this chart was generated using voice recognition Dragon dictation software. Although every effort was made to ensure the accuracy of this automated transcription, some errors in transcription may have occurred.      Electronically signed by Lucius Cr MD on 4/5/2023 at 11:33 AM

## 2023-06-19 RX ORDER — ATORVASTATIN CALCIUM 40 MG/1
40 TABLET, FILM COATED ORAL DAILY
Qty: 100 TABLET | Refills: 0 | Status: SHIPPED | OUTPATIENT
Start: 2023-06-19

## 2023-07-12 ENCOUNTER — TELEPHONE (OUTPATIENT)
Dept: INTERNAL MEDICINE CLINIC | Age: 83
End: 2023-07-12

## 2023-08-14 ENCOUNTER — TELEPHONE (OUTPATIENT)
Dept: INTERNAL MEDICINE CLINIC | Age: 83
End: 2023-08-14

## 2023-08-14 DIAGNOSIS — N30.01 ACUTE CYSTITIS WITH HEMATURIA: Primary | ICD-10-CM

## 2023-08-14 RX ORDER — CIPROFLOXACIN 500 MG/1
500 TABLET, FILM COATED ORAL 2 TIMES DAILY
Qty: 10 TABLET | Refills: 0 | Status: SHIPPED | OUTPATIENT
Start: 2023-08-14 | End: 2023-08-19

## 2023-08-15 ENCOUNTER — HOSPITAL ENCOUNTER (OUTPATIENT)
Age: 83
Setting detail: SPECIMEN
Discharge: HOME | End: 2023-08-15

## 2023-08-15 DIAGNOSIS — N30.01 ACUTE CYSTITIS WITH HEMATURIA: ICD-10-CM

## 2023-08-15 LAB
BACTERIA URNS QL MICRO: ABNORMAL
BILIRUB UR QL STRIP: NEGATIVE
CASTS #/AREA URNS LPF: ABNORMAL /LPF (ref 0–8)
CLARITY UR: ABNORMAL
COLOR UR: YELLOW
EPI CELLS #/AREA URNS HPF: ABNORMAL /HPF (ref 0–5)
GLUCOSE UR STRIP-MCNC: NEGATIVE MG/DL
HGB UR QL STRIP.AUTO: ABNORMAL
KETONES UR STRIP-MCNC: NEGATIVE MG/DL
LEUKOCYTE ESTERASE UR QL STRIP: ABNORMAL
NITRITE UR QL STRIP: POSITIVE
PH UR STRIP: 6 [PH] (ref 5–8)
PROT UR STRIP-MCNC: ABNORMAL MG/DL
RBC #/AREA URNS HPF: ABNORMAL /HPF (ref 0–4)
SP GR UR STRIP: 1.01 (ref 1–1.03)
UROBILINOGEN UR STRIP-ACNC: NORMAL EU/DL (ref 0–1)
WBC #/AREA URNS HPF: ABNORMAL /HPF (ref 0–5)

## 2023-08-16 LAB
MICROORGANISM SPEC CULT: ABNORMAL
SPECIMEN DESCRIPTION: ABNORMAL

## 2023-08-23 ENCOUNTER — OFFICE VISIT (OUTPATIENT)
Dept: INTERNAL MEDICINE CLINIC | Age: 83
End: 2023-08-23
Payer: MEDICARE

## 2023-08-23 VITALS
SYSTOLIC BLOOD PRESSURE: 126 MMHG | OXYGEN SATURATION: 96 % | WEIGHT: 118 LBS | DIASTOLIC BLOOD PRESSURE: 78 MMHG | HEART RATE: 61 BPM | BODY MASS INDEX: 17.88 KG/M2 | HEIGHT: 68 IN

## 2023-08-23 DIAGNOSIS — N18.31 STAGE 3A CHRONIC KIDNEY DISEASE (HCC): ICD-10-CM

## 2023-08-23 DIAGNOSIS — M35.01 SJOGREN'S SYNDROME WITH KERATOCONJUNCTIVITIS SICCA (HCC): ICD-10-CM

## 2023-08-23 DIAGNOSIS — H53.143 VISUAL DISCOMFORT OF BOTH EYES: ICD-10-CM

## 2023-08-23 DIAGNOSIS — N39.0 RECURRENT UTI: Primary | ICD-10-CM

## 2023-08-23 DIAGNOSIS — D68.9 COAGULATION DEFECT (HCC): ICD-10-CM

## 2023-08-23 DIAGNOSIS — I10 ESSENTIAL HYPERTENSION, BENIGN: ICD-10-CM

## 2023-08-23 DIAGNOSIS — J43.1 PANLOBULAR EMPHYSEMA (HCC): ICD-10-CM

## 2023-08-23 PROCEDURE — 99214 OFFICE O/P EST MOD 30 MIN: CPT | Performed by: INTERNAL MEDICINE

## 2023-08-23 PROCEDURE — 3074F SYST BP LT 130 MM HG: CPT | Performed by: INTERNAL MEDICINE

## 2023-08-23 PROCEDURE — 1123F ACP DISCUSS/DSCN MKR DOCD: CPT | Performed by: INTERNAL MEDICINE

## 2023-08-23 PROCEDURE — 3078F DIAST BP <80 MM HG: CPT | Performed by: INTERNAL MEDICINE

## 2023-08-23 ASSESSMENT — ENCOUNTER SYMPTOMS
COLOR CHANGE: 0
TROUBLE SWALLOWING: 0
EYE PAIN: 0
DIARRHEA: 0
EYE DISCHARGE: 1
WHEEZING: 0
ABDOMINAL DISTENTION: 0
SHORTNESS OF BREATH: 0
BLOOD IN STOOL: 0
EYE ITCHING: 1
COUGH: 0

## 2023-08-23 NOTE — PROGRESS NOTES
Visit Information    Have you changed or started any medications since your last visit including any over-the-counter medicines, vitamins, or herbal medicines? no   Are you having any side effects from any of your medications? -  no  Have you stopped taking any of your medications? Is so, why? -  no    Have you seen any other physician or provider since your last visit? No  Have you had any other diagnostic tests since your last visit? No  Have you been seen in the emergency room and/or had an admission to a hospital since we last saw you? No  Have you had your routine dental cleaning in the past 6 months? no    Have you activated your [employer] account? If not, what are your barriers?  Yes     Patient Care Team:  Ned Shields MD as PCP - General (Internal Medicine)  Ned Shields MD as PCP - Empaneled Provider    Medical History Review  Past Medical, Family, and Social History reviewed and does contribute to the patient presenting condition    Health Maintenance   Topic Date Due    DTaP/Tdap/Td vaccine (1 - Tdap) Never done    Shingles vaccine (1 of 2) Never done    Flu vaccine (1) 08/01/2023    Annual Wellness Visit (AWV)  09/13/2023    Depression Screen  03/06/2024    Lipids  03/15/2024    Pneumococcal 65+ years Vaccine  Completed    COVID-19 Vaccine  Completed    DEXA (modify frequency per FRAX score)  Addressed    Hepatitis A vaccine  Aged Out    Hib vaccine  Aged Out    Meningococcal (ACWY) vaccine  Aged Out

## 2023-08-23 NOTE — PROGRESS NOTES
[address] [address]  Dept: [phone]  Dept Fax: [phone]    Misael Barrientos is a 78 y.o. female who presents today for her medical conditions/complaintsas noted below.   Misael Barrientos is c/o of   Chief Complaint   Patient presents with    Urinary Tract Infection         HPI:     Eye discharge  Tearing blurry  Recurrent uti  Refused to go to urology at this time        No results found for: LABA1C          ( goal A1Cis < 7)   No components found for: LABMICR  LDL Cholesterol (mg/dL)   Date Value   03/15/2023 87   01/04/2022 101   08/31/2020 109       (goal LDL is <100)   AST (U/L)   Date Value   01/04/2022 27     ALT (U/L)   Date Value   01/04/2022 13     BUN (mg/dL)   Date Value   06/06/2022 17     BP Readings from Last 3 Encounters:   08/23/23 126/78   04/05/23 124/80   03/06/23 108/62          (goal 120/80)    Past Medical History:   Diagnosis Date    Abdominal pain, unspecified site     Acquired cyst of kidney     Acute gouty arthropathy     Allergic rhinitis, cause unspecified     Anxiety state, unspecified     Arthropathy, unspecified, site unspecified     Chronic airway obstruction, not elsewhere classified     Chronic kidney disease, stage III (moderate) (HCC)     Diarrhea     Edema     Esophageal reflux     Essential 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 localized, unspecified site     Other general symptoms(780.99)     Other iatrogenic hypothyroidism     Other nonspecific abnormal finding of lung field     Other psoriasis     Pure hypercholesterolemia     Regional enteritis of unspecified site     Senile osteoporosis     Sprain of ankle, unspecified site     Unspecified vitamin D deficiency

## 2023-08-24 ENCOUNTER — HOSPITAL ENCOUNTER (OUTPATIENT)
Age: 83
Setting detail: SPECIMEN
Discharge: HOME | End: 2023-08-24

## 2023-08-24 DIAGNOSIS — M10.9 ACUTE GOUTY ARTHROPATHY: ICD-10-CM

## 2023-08-24 DIAGNOSIS — N39.0 RECURRENT UTI: ICD-10-CM

## 2023-08-24 LAB
BACTERIA URNS QL MICRO: ABNORMAL
BILIRUB UR QL STRIP: NEGATIVE
CASTS #/AREA URNS LPF: ABNORMAL /LPF (ref 0–8)
CLARITY UR: CLEAR
COLOR UR: YELLOW
EPI CELLS #/AREA URNS HPF: ABNORMAL /HPF (ref 0–5)
GLUCOSE UR STRIP-MCNC: NEGATIVE MG/DL
HGB UR QL STRIP.AUTO: ABNORMAL
KETONES UR STRIP-MCNC: NEGATIVE MG/DL
LEUKOCYTE ESTERASE UR QL STRIP: ABNORMAL
NITRITE UR QL STRIP: NEGATIVE
PH UR STRIP: 7 [PH] (ref 5–8)
PROT UR STRIP-MCNC: NEGATIVE MG/DL
RBC #/AREA URNS HPF: ABNORMAL /HPF (ref 0–4)
SP GR UR STRIP: 1.01 (ref 1–1.03)
UROBILINOGEN UR STRIP-ACNC: NORMAL EU/DL (ref 0–1)
WBC #/AREA URNS HPF: ABNORMAL /HPF (ref 0–5)

## 2023-08-24 RX ORDER — ATORVASTATIN CALCIUM 40 MG/1
40 TABLET, FILM COATED ORAL DAILY
Qty: 100 TABLET | Refills: 2 | Status: SHIPPED | OUTPATIENT
Start: 2023-08-24

## 2023-08-25 RX ORDER — ALLOPURINOL 300 MG/1
300 TABLET ORAL DAILY
Qty: 100 TABLET | Refills: 2 | Status: SHIPPED | OUTPATIENT
Start: 2023-08-25

## 2023-08-27 DIAGNOSIS — N30.00 ACUTE CYSTITIS WITHOUT HEMATURIA: Primary | ICD-10-CM

## 2023-08-27 LAB
MICROORGANISM SPEC CULT: ABNORMAL
SPECIMEN DESCRIPTION: ABNORMAL

## 2023-08-27 RX ORDER — CIPROFLOXACIN 500 MG/1
500 TABLET, FILM COATED ORAL 2 TIMES DAILY
Qty: 10 TABLET | Refills: 0 | Status: SHIPPED | OUTPATIENT
Start: 2023-08-27 | End: 2023-09-01

## 2023-09-07 ENCOUNTER — HOSPITAL ENCOUNTER (OUTPATIENT)
Age: 83
Setting detail: SPECIMEN
Discharge: HOME | End: 2023-09-07

## 2023-09-07 ENCOUNTER — OFFICE VISIT (OUTPATIENT)
Dept: INTERNAL MEDICINE CLINIC | Age: 83
End: 2023-09-07
Payer: MEDICARE

## 2023-09-07 VITALS
HEART RATE: 67 BPM | OXYGEN SATURATION: 96 % | HEIGHT: 68 IN | WEIGHT: 119 LBS | DIASTOLIC BLOOD PRESSURE: 68 MMHG | SYSTOLIC BLOOD PRESSURE: 116 MMHG | BODY MASS INDEX: 18.04 KG/M2

## 2023-09-07 DIAGNOSIS — N18.31 STAGE 3A CHRONIC KIDNEY DISEASE (HCC): ICD-10-CM

## 2023-09-07 DIAGNOSIS — J43.1 PANLOBULAR EMPHYSEMA (HCC): ICD-10-CM

## 2023-09-07 DIAGNOSIS — R19.03 RIGHT LOWER QUADRANT ABDOMINAL MASS: Primary | ICD-10-CM

## 2023-09-07 DIAGNOSIS — N18.30 STAGE 3 CHRONIC KIDNEY DISEASE, UNSPECIFIED WHETHER STAGE 3A OR 3B CKD (HCC): ICD-10-CM

## 2023-09-07 DIAGNOSIS — M35.01 SJOGREN'S SYNDROME WITH KERATOCONJUNCTIVITIS SICCA (HCC): ICD-10-CM

## 2023-09-07 DIAGNOSIS — I10 ESSENTIAL HYPERTENSION, BENIGN: ICD-10-CM

## 2023-09-07 DIAGNOSIS — E03.9 ACQUIRED HYPOTHYROIDISM: ICD-10-CM

## 2023-09-07 LAB
ANION GAP SERPL CALCULATED.3IONS-SCNC: 11 MMOL/L (ref 9–17)
BASOPHILS # BLD: 0.09 K/UL (ref 0–0.2)
BASOPHILS NFR BLD: 1 % (ref 0–2)
BUN SERPL-MCNC: 18 MG/DL (ref 8–23)
CALCIUM SERPL-MCNC: 9.8 MG/DL (ref 8.6–10.4)
CHLORIDE SERPL-SCNC: 101 MMOL/L (ref 98–107)
CO2 SERPL-SCNC: 24 MMOL/L (ref 20–31)
CREAT SERPL-MCNC: 0.9 MG/DL (ref 0.5–0.9)
EOSINOPHIL # BLD: 0.6 K/UL (ref 0–0.44)
EOSINOPHILS RELATIVE PERCENT: 8 % (ref 1–4)
ERYTHROCYTE [DISTWIDTH] IN BLOOD BY AUTOMATED COUNT: 15.3 % (ref 11.8–14.4)
GFR SERPL CREATININE-BSD FRML MDRD: >60 ML/MIN/1.73M2
GLUCOSE SERPL-MCNC: 93 MG/DL (ref 70–99)
HCT VFR BLD AUTO: 34.9 % (ref 36.3–47.1)
HGB BLD-MCNC: 10.1 G/DL (ref 11.9–15.1)
IMM GRANULOCYTES # BLD AUTO: <0.03 K/UL (ref 0–0.3)
IMM GRANULOCYTES NFR BLD: 0 %
LYMPHOCYTES NFR BLD: 1.89 K/UL (ref 1.1–3.7)
LYMPHOCYTES RELATIVE PERCENT: 26 % (ref 24–43)
MCH RBC QN AUTO: 28.1 PG (ref 25.2–33.5)
MCHC RBC AUTO-ENTMCNC: 28.9 G/DL (ref 28.4–34.8)
MCV RBC AUTO: 96.9 FL (ref 82.6–102.9)
MONOCYTES NFR BLD: 0.72 K/UL (ref 0.1–1.2)
MONOCYTES NFR BLD: 10 % (ref 3–12)
NEUTROPHILS NFR BLD: 55 % (ref 36–65)
NEUTS SEG NFR BLD: 3.96 K/UL (ref 1.5–8.1)
NRBC BLD-RTO: 0 PER 100 WBC
PLATELET # BLD AUTO: 173 K/UL (ref 138–453)
PMV BLD AUTO: 11.4 FL (ref 8.1–13.5)
POTASSIUM SERPL-SCNC: 4.8 MMOL/L (ref 3.7–5.3)
RBC # BLD AUTO: 3.6 M/UL (ref 3.95–5.11)
RBC # BLD: ABNORMAL 10*6/UL
SODIUM SERPL-SCNC: 136 MMOL/L (ref 135–144)
WBC OTHER # BLD: 7.3 K/UL (ref 3.5–11.3)

## 2023-09-07 PROCEDURE — 99214 OFFICE O/P EST MOD 30 MIN: CPT | Performed by: INTERNAL MEDICINE

## 2023-09-07 PROCEDURE — 3078F DIAST BP <80 MM HG: CPT | Performed by: INTERNAL MEDICINE

## 2023-09-07 PROCEDURE — 3074F SYST BP LT 130 MM HG: CPT | Performed by: INTERNAL MEDICINE

## 2023-09-07 PROCEDURE — 1123F ACP DISCUSS/DSCN MKR DOCD: CPT | Performed by: INTERNAL MEDICINE

## 2023-09-07 ASSESSMENT — ENCOUNTER SYMPTOMS
EYE DISCHARGE: 0
ABDOMINAL DISTENTION: 0
COUGH: 0
COLOR CHANGE: 0
BLOOD IN STOOL: 0
SHORTNESS OF BREATH: 0
EYE PAIN: 0
DIARRHEA: 0
TROUBLE SWALLOWING: 0
WHEEZING: 0

## 2023-09-07 NOTE — PROGRESS NOTES
[address] [address]  Dept: [phone]  Dept Fax: [phone]    Natalya Ro is a 78 y.o. female who presents today for her medical conditions/complaintsas noted below.   Natalya Ro is c/o of   Chief Complaint   Patient presents with    Mass     Abdominal RLQ, not painful, noticed a few days ago          HPI:     Lump noted in RLQ  Not hard  Nnot painful  No diarrhea  No wt loss        No results found for: LABA1C          ( goal A1Cis < 7)   No components found for: LABMICR  LDL Cholesterol (mg/dL)   Date Value   03/15/2023 87   01/04/2022 101   08/31/2020 109       (goal LDL is <100)   AST (U/L)   Date Value   01/04/2022 27     ALT (U/L)   Date Value   01/04/2022 13     BUN (mg/dL)   Date Value   06/06/2022 17     BP Readings from Last 3 Encounters:   09/07/23 116/68   08/23/23 126/78   04/05/23 124/80          (goal 120/80)    Past Medical History:   Diagnosis Date    Abdominal pain, unspecified site     Acquired cyst of kidney     Acute gouty arthropathy     Allergic rhinitis, cause unspecified     Anxiety state, unspecified     Arthropathy, unspecified, site unspecified     Chronic airway obstruction, not elsewhere classified     Chronic kidney disease, stage III (moderate) (HCC)     Diarrhea     Edema     Esophageal reflux     Essential 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 localized, unspecified site     Other general symptoms(780.99)     Other iatrogenic hypothyroidism     Other nonspecific abnormal finding of lung field     Other psoriasis     Pure hypercholesterolemia     Regional enteritis of unspecified site     Senile osteoporosis     Sprain of ankle, unspecified site     Unspecified

## 2023-09-07 NOTE — PROGRESS NOTES
Visit Information    Have you changed or started any medications since your last visit including any over-the-counter medicines, vitamins, or herbal medicines? no   Are you having any side effects from any of your medications? -  no  Have you stopped taking any of your medications? Is so, why? -  no    Have you seen any other physician or provider since your last visit? No  Have you had any other diagnostic tests since your last visit? No  Have you been seen in the emergency room and/or had an admission to a hospital since we last saw you? No  Have you had your routine dental cleaning in the past 6 months? no    Have you activated your [employer] account? If not, what are your barriers?  Yes     Patient Care Team:  Lorrie Jimenez MD as PCP - General (Internal Medicine)  Lorrie Jimenez MD as PCP - Empaneled Provider    Medical History Review  Past Medical, Family, and Social History reviewed and does contribute to the patient presenting condition    Health Maintenance   Topic Date Due    DTaP/Tdap/Td vaccine (1 - Tdap) Never done    Shingles vaccine (1 of 2) Never done    Flu vaccine (1) 08/01/2023    Annual Wellness Visit (AWV)  09/13/2023    Depression Screen  03/06/2024    Lipids  03/15/2024    Pneumococcal 65+ years Vaccine  Completed    COVID-19 Vaccine  Completed    DEXA (modify frequency per FRAX score)  Addressed    Hepatitis A vaccine  Aged Out    Hib vaccine  Aged Out    Meningococcal (ACWY) vaccine  Aged Out

## 2023-09-08 RX ORDER — LEVOTHYROXINE SODIUM 0.1 MG/1
TABLET ORAL
Qty: 100 TABLET | Refills: 2 | Status: SHIPPED | OUTPATIENT
Start: 2023-09-08

## 2023-09-18 RX ORDER — CARVEDILOL 12.5 MG/1
TABLET ORAL
Qty: 100 TABLET | Refills: 2 | Status: SHIPPED | OUTPATIENT
Start: 2023-09-18

## 2023-09-19 ENCOUNTER — HOSPITAL ENCOUNTER (OUTPATIENT)
Dept: CT IMAGING | Facility: CLINIC | Age: 83
Discharge: HOME | End: 2023-09-21
Payer: MEDICARE

## 2023-09-19 ENCOUNTER — OFFICE VISIT (OUTPATIENT)
Dept: INTERNAL MEDICINE CLINIC | Age: 83
End: 2023-09-19
Payer: MEDICARE

## 2023-09-19 VITALS
HEART RATE: 70 BPM | OXYGEN SATURATION: 96 % | WEIGHT: 119 LBS | BODY MASS INDEX: 18.04 KG/M2 | SYSTOLIC BLOOD PRESSURE: 120 MMHG | HEIGHT: 68 IN | DIASTOLIC BLOOD PRESSURE: 66 MMHG

## 2023-09-19 DIAGNOSIS — N18.30 STAGE 3 CHRONIC KIDNEY DISEASE, UNSPECIFIED WHETHER STAGE 3A OR 3B CKD (HCC): ICD-10-CM

## 2023-09-19 DIAGNOSIS — N18.30 ANEMIA DUE TO STAGE 3 CHRONIC KIDNEY DISEASE, UNSPECIFIED WHETHER STAGE 3A OR 3B CKD (HCC): ICD-10-CM

## 2023-09-19 DIAGNOSIS — D63.1 ANEMIA DUE TO STAGE 3 CHRONIC KIDNEY DISEASE, UNSPECIFIED WHETHER STAGE 3A OR 3B CKD (HCC): ICD-10-CM

## 2023-09-19 DIAGNOSIS — J43.1 PANLOBULAR EMPHYSEMA (HCC): Primary | ICD-10-CM

## 2023-09-19 DIAGNOSIS — Z00.00 MEDICARE ANNUAL WELLNESS VISIT, SUBSEQUENT: ICD-10-CM

## 2023-09-19 DIAGNOSIS — R19.03 RIGHT LOWER QUADRANT ABDOMINAL MASS: ICD-10-CM

## 2023-09-19 PROCEDURE — 6360000004 HC RX CONTRAST MEDICATION: Performed by: INTERNAL MEDICINE

## 2023-09-19 PROCEDURE — 74177 CT ABD & PELVIS W/CONTRAST: CPT

## 2023-09-19 PROCEDURE — G0439 PPPS, SUBSEQ VISIT: HCPCS | Performed by: INTERNAL MEDICINE

## 2023-09-19 PROCEDURE — 3078F DIAST BP <80 MM HG: CPT | Performed by: INTERNAL MEDICINE

## 2023-09-19 PROCEDURE — 3074F SYST BP LT 130 MM HG: CPT | Performed by: INTERNAL MEDICINE

## 2023-09-19 PROCEDURE — 1123F ACP DISCUSS/DSCN MKR DOCD: CPT | Performed by: INTERNAL MEDICINE

## 2023-09-19 PROCEDURE — 2500000003 HC RX 250 WO HCPCS: Performed by: INTERNAL MEDICINE

## 2023-09-19 PROCEDURE — 2580000003 HC RX 258: Performed by: INTERNAL MEDICINE

## 2023-09-19 RX ORDER — SODIUM CHLORIDE 0.9 % (FLUSH) 0.9 %
10 SYRINGE (ML) INJECTION PRN
Status: DISCONTINUED | OUTPATIENT
Start: 2023-09-19 | End: 2023-09-22 | Stop reason: HOSPADM

## 2023-09-19 RX ORDER — 0.9 % SODIUM CHLORIDE 0.9 %
80 INTRAVENOUS SOLUTION INTRAVENOUS ONCE
Status: COMPLETED | OUTPATIENT
Start: 2023-09-19 | End: 2023-09-19

## 2023-09-19 RX ADMIN — SODIUM CHLORIDE, PRESERVATIVE FREE 10 ML: 5 INJECTION INTRAVENOUS at 11:04

## 2023-09-19 RX ADMIN — SODIUM CHLORIDE 80 ML: 9 INJECTION, SOLUTION INTRAVENOUS at 11:03

## 2023-09-19 RX ADMIN — BARIUM SULFATE 450 ML: 20 SUSPENSION ORAL at 09:40

## 2023-09-19 RX ADMIN — IOPAMIDOL 75 ML: 755 INJECTION, SOLUTION INTRAVENOUS at 11:03

## 2023-09-19 ASSESSMENT — PATIENT HEALTH QUESTIONNAIRE - PHQ9
SUM OF ALL RESPONSES TO PHQ QUESTIONS 1-9: 0
SUM OF ALL RESPONSES TO PHQ QUESTIONS 1-9: 0
SUM OF ALL RESPONSES TO PHQ9 QUESTIONS 1 & 2: 0
2. FEELING DOWN, DEPRESSED OR HOPELESS: 0
1. LITTLE INTEREST OR PLEASURE IN DOING THINGS: 0
SUM OF ALL RESPONSES TO PHQ QUESTIONS 1-9: 0
SUM OF ALL RESPONSES TO PHQ QUESTIONS 1-9: 0

## 2023-09-19 ASSESSMENT — LIFESTYLE VARIABLES
HOW MANY STANDARD DRINKS CONTAINING ALCOHOL DO YOU HAVE ON A TYPICAL DAY: PATIENT DOES NOT DRINK
HOW OFTEN DO YOU HAVE A DRINK CONTAINING ALCOHOL: NEVER

## 2023-09-19 NOTE — PROGRESS NOTES
Visit Information    Have you changed or started any medications since your last visit including any over-the-counter medicines, vitamins, or herbal medicines? no   Are you having any side effects from any of your medications? -  no  Have you stopped taking any of your medications? Is so, why? -  no    Have you seen any other physician or provider since your last visit? No  Have you had any other diagnostic tests since your last visit? No  Have you been seen in the emergency room and/or had an admission to a hospital since we last saw you? No  Have you had your routine dental cleaning in the past 6 months? no    Have you activated your [email or web address] account? If not, what are your barriers?  Yes     Patient Care Team:  Belen Cage MD as PCP - General (Internal Medicine)  Belen Cage MD as PCP - Empaneled Provider    Medical History Review  Past Medical, Family, and Social History reviewed and does contribute to the patient presenting condition    Health Maintenance   Topic Date Due    DTaP/Tdap/Td vaccine (1 - Tdap) Never done    Shingles vaccine (1 of 2) Never done    COVID-19 Vaccine (6 - [place] [employer] series) 05/18/2023    Flu vaccine (1) 08/01/2023    Annual Wellness Visit (AWV)  09/13/2023    Depression Screen  03/06/2024    Lipids  03/15/2024    Pneumococcal 65+ years Vaccine  Completed    DEXA (modify frequency per FRAX score)  Addressed    Hepatitis A vaccine  Aged Out    Hepatitis B vaccine  Aged Out    Hib vaccine  Aged Out    Meningococcal (ACWY) vaccine  Aged Out

## 2023-09-27 ENCOUNTER — HOSPITAL ENCOUNTER (OUTPATIENT)
Age: 83
Setting detail: SPECIMEN
Discharge: HOME | End: 2023-09-27

## 2023-09-27 DIAGNOSIS — D63.1 ANEMIA DUE TO STAGE 3 CHRONIC KIDNEY DISEASE, UNSPECIFIED WHETHER STAGE 3A OR 3B CKD (HCC): ICD-10-CM

## 2023-09-27 DIAGNOSIS — N18.30 ANEMIA DUE TO STAGE 3 CHRONIC KIDNEY DISEASE, UNSPECIFIED WHETHER STAGE 3A OR 3B CKD (HCC): ICD-10-CM

## 2023-09-27 LAB
IRON SATN MFR SERPL: 13 % (ref 20–55)
IRON SERPL-MCNC: 42 UG/DL (ref 37–145)
TIBC SERPL-MCNC: 332 UG/DL (ref 250–450)
UNSATURATED IRON BINDING CAPACITY: 290 UG/DL (ref 112–347)

## 2023-09-28 LAB
FOLATE SERPL-MCNC: >20 NG/ML
VIT B12 SERPL-MCNC: 702 PG/ML (ref 232–1245)

## 2023-10-01 LAB
GLIADIN IGA SER IA-ACNC: 4.3 U/ML
GLIADIN IGG SER IA-ACNC: <0.4 U/ML
IGA SERPL-MCNC: 302 MG/DL (ref 70–400)
TTG IGA SER IA-ACNC: 0.9 U/ML

## 2023-10-19 ENCOUNTER — OFFICE VISIT (OUTPATIENT)
Dept: INTERNAL MEDICINE CLINIC | Age: 83
End: 2023-10-19
Payer: MEDICARE

## 2023-10-19 VITALS
BODY MASS INDEX: 17.88 KG/M2 | OXYGEN SATURATION: 98 % | DIASTOLIC BLOOD PRESSURE: 70 MMHG | HEART RATE: 57 BPM | HEIGHT: 68 IN | WEIGHT: 118 LBS | SYSTOLIC BLOOD PRESSURE: 138 MMHG

## 2023-10-19 DIAGNOSIS — E03.9 ACQUIRED HYPOTHYROIDISM: ICD-10-CM

## 2023-10-19 DIAGNOSIS — M35.01 SJOGREN'S SYNDROME WITH KERATOCONJUNCTIVITIS SICCA (HCC): ICD-10-CM

## 2023-10-19 DIAGNOSIS — N18.30 STAGE 3 CHRONIC KIDNEY DISEASE, UNSPECIFIED WHETHER STAGE 3A OR 3B CKD (HCC): ICD-10-CM

## 2023-10-19 DIAGNOSIS — N18.31 STAGE 3A CHRONIC KIDNEY DISEASE (HCC): ICD-10-CM

## 2023-10-19 DIAGNOSIS — J43.1 PANLOBULAR EMPHYSEMA (HCC): Primary | ICD-10-CM

## 2023-10-19 DIAGNOSIS — D64.9 ANEMIA, UNSPECIFIED TYPE: ICD-10-CM

## 2023-10-19 PROCEDURE — 1123F ACP DISCUSS/DSCN MKR DOCD: CPT | Performed by: INTERNAL MEDICINE

## 2023-10-19 PROCEDURE — 3075F SYST BP GE 130 - 139MM HG: CPT | Performed by: INTERNAL MEDICINE

## 2023-10-19 PROCEDURE — 99214 OFFICE O/P EST MOD 30 MIN: CPT | Performed by: INTERNAL MEDICINE

## 2023-10-19 PROCEDURE — 3078F DIAST BP <80 MM HG: CPT | Performed by: INTERNAL MEDICINE

## 2023-10-19 ASSESSMENT — ENCOUNTER SYMPTOMS
DIARRHEA: 0
WHEEZING: 0
ABDOMINAL DISTENTION: 0
BLOOD IN STOOL: 0
SHORTNESS OF BREATH: 0
COUGH: 0
EYE PAIN: 0
TROUBLE SWALLOWING: 0
COLOR CHANGE: 0
EYE DISCHARGE: 0

## 2023-11-28 ENCOUNTER — HOSPITAL ENCOUNTER (OUTPATIENT)
Age: 83
Setting detail: SPECIMEN
Discharge: HOME | End: 2023-11-28

## 2023-11-28 ENCOUNTER — OFFICE VISIT (OUTPATIENT)
Dept: INTERNAL MEDICINE CLINIC | Age: 83
End: 2023-11-28
Payer: MEDICARE

## 2023-11-28 VITALS
OXYGEN SATURATION: 98 % | BODY MASS INDEX: 18.09 KG/M2 | DIASTOLIC BLOOD PRESSURE: 78 MMHG | SYSTOLIC BLOOD PRESSURE: 118 MMHG | WEIGHT: 119 LBS | HEART RATE: 74 BPM

## 2023-11-28 DIAGNOSIS — N30.00 ACUTE CYSTITIS WITHOUT HEMATURIA: Primary | ICD-10-CM

## 2023-11-28 LAB
BACTERIA URINE, POC: ABNORMAL
BILIRUBIN URINE: 1 MG/DL
BLOOD, URINE: POSITIVE
CASTS URINE, POC: ABNORMAL
CLARITY: ABNORMAL
COLOR: YELLOW
CRYSTALS URINE, POC: ABNORMAL
EPI CELLS URINE, POC: ABNORMAL
GLUCOSE URINE: ABNORMAL
KETONES, URINE: NEGATIVE
LEUKOCYTE EST, POC: ABNORMAL
NITRITE, URINE: POSITIVE
PH UA: 6 (ref 4.5–8)
PROTEIN UA: POSITIVE
RBC URINE, POC: ABNORMAL
SPECIFIC GRAVITY UA: 1.02 (ref 1–1.03)
UROBILINOGEN, URINE: ABNORMAL
WBC URINE, POC: ABNORMAL
YEAST URINE, POC: ABNORMAL

## 2023-11-28 PROCEDURE — 81000 URINALYSIS NONAUTO W/SCOPE: CPT | Performed by: INTERNAL MEDICINE

## 2023-11-28 PROCEDURE — 1123F ACP DISCUSS/DSCN MKR DOCD: CPT | Performed by: INTERNAL MEDICINE

## 2023-11-28 PROCEDURE — 3078F DIAST BP <80 MM HG: CPT | Performed by: INTERNAL MEDICINE

## 2023-11-28 PROCEDURE — 99213 OFFICE O/P EST LOW 20 MIN: CPT | Performed by: INTERNAL MEDICINE

## 2023-11-28 PROCEDURE — 3074F SYST BP LT 130 MM HG: CPT | Performed by: INTERNAL MEDICINE

## 2023-11-28 RX ORDER — CIPROFLOXACIN 500 MG/1
500 TABLET, FILM COATED ORAL 2 TIMES DAILY
Qty: 14 TABLET | Refills: 0 | Status: SHIPPED | OUTPATIENT
Start: 2023-11-28 | End: 2023-12-05

## 2023-11-28 RX ORDER — ERYTHROMYCIN 5 MG/G
OINTMENT OPHTHALMIC
COMMUNITY
Start: 2023-11-27

## 2023-11-28 NOTE — PROGRESS NOTES
Visit Information    Have you changed or started any medications since your last visit including any over-the-counter medicines, vitamins, or herbal medicines? no   Are you having any side effects from any of your medications? -  no  Have you stopped taking any of your medications? Is so, why? -  no    Have you seen any other physician or provider since your last visit? No  Have you had any other diagnostic tests since your last visit? No  Have you been seen in the emergency room and/or had an admission to a hospital since we last saw you? No  Have you had your routine dental cleaning in the past 6 months? no    Have you activated your [employer] account? If not, what are your barriers?  Yes     Patient Care Team:  Rocael Hauser MD as PCP - General (Internal Medicine)  Rocael Hauser MD as PCP - Empaneled Provider    Medical History Review  Past Medical, Family, and Social History reviewed and does not contribute to the patient presenting condition    Health Maintenance   Topic Date Due    DTaP/Tdap/Td vaccine (1 - Tdap) Never done    Shingles vaccine (1 of 2) Never done    COVID-19 Vaccine (7 - 2023-24 season) 12/27/2023    Lipids  03/15/2024    Depression Screen  09/19/2024    Annual Wellness Visit (AWV)  09/19/2024    Flu vaccine  Completed    Pneumococcal 65+ years Vaccine  Completed    DEXA (modify frequency per FRAX score)  Addressed    Hepatitis A vaccine  Aged Out    Hepatitis B vaccine  Aged Out    Hib vaccine  Aged Out    Meningococcal (ACWY) vaccine  Aged Out

## 2023-11-28 NOTE — PROGRESS NOTES
[address] [address]  Dept: [phone]  Dept Fax: [phone]    Office Progress/Follow Up Note  Date of patient's visit: 11/28/2023  Patient's Name:  Gerry Dietrich YOB: 1940            Patient Care Team:  Luis Beltre MD as PCP - General (Internal Medicine)  Luis Beltre MD as PCP - Empaneled Provider    REASON FOR VISIT: Routine outpatient follow up/Same day visit/Post hospital/ED visit    HISTORY OF PRESENT ILLNESS:      Chief Complaint   Patient presents with    Urinary Tract Infection     Patient has been having urinary frequency, pain with urination that started a week ago        History was obtained from the patient.  Gerry Dietrich is a 78 y.o. is here for a     Having urinary symptoms pain frequency urgency  Frequent UTIs  No fever chills  UA was done in the clinic positive  Culture sent  Last UTI was 3 months    Patient Active Problem List   Diagnosis    Vitamin D deficiency    Other general symptoms    Abdominal pain    Herpes zoster with other nervous system complications    Herpes zoster    Sprain of ankle    Edema    Acquired cyst of kidney    Myalgia and myositis    COPD (chronic obstructive pulmonary disease) (HCC)    Mitral valve disorder    Allergic rhinitis    Diarrhea    Esophageal reflux    Other psoriasis    Arthropathy    Pure hypercholesterolemia    Anxiety state    Essential hypertension, benign    Chronic kidney disease, stage III (moderate) ([facility])    Regional enteritis ([facility])    Senile osteoporosis    Mononeuritis    Conjunctivitis    Skin lesion of left leg    Skin lesion of right leg    Fibromyalgia    DJD (degenerative joint disease) of knee    Gout attack    Colitis    Esophageal obstruction due to food impaction    Pain of right hip joint    Chronic gout of multiple sites    Neuropathy    Acquired hypothyroidism    Chronic pain of right knee    Pulmonary nodules    Sjogren's syndrome with

## 2023-11-29 DIAGNOSIS — N30.00 ACUTE CYSTITIS WITHOUT HEMATURIA: ICD-10-CM

## 2023-12-01 LAB
MICROORGANISM SPEC CULT: ABNORMAL
SPECIMEN DESCRIPTION: ABNORMAL

## 2024-01-31 ENCOUNTER — HOSPITAL ENCOUNTER (OUTPATIENT)
Age: 84
Setting detail: SPECIMEN
Discharge: HOME | End: 2024-01-31

## 2024-01-31 DIAGNOSIS — E03.9 ACQUIRED HYPOTHYROIDISM: ICD-10-CM

## 2024-01-31 LAB — TSH SERPL DL<=0.05 MIU/L-ACNC: 24.03 UIU/ML (ref 0.3–5)

## 2024-02-01 LAB — T4 FREE SERPL-MCNC: 0.8 NG/DL (ref 0.9–1.7)

## 2024-02-05 ENCOUNTER — OFFICE VISIT (OUTPATIENT)
Dept: INTERNAL MEDICINE CLINIC | Age: 84
End: 2024-02-05
Payer: MEDICARE

## 2024-02-05 VITALS
SYSTOLIC BLOOD PRESSURE: 118 MMHG | HEIGHT: 68 IN | OXYGEN SATURATION: 95 % | BODY MASS INDEX: 17.88 KG/M2 | WEIGHT: 118 LBS | DIASTOLIC BLOOD PRESSURE: 64 MMHG | HEART RATE: 73 BPM

## 2024-02-05 DIAGNOSIS — I10 ESSENTIAL HYPERTENSION, BENIGN: Primary | ICD-10-CM

## 2024-02-05 DIAGNOSIS — N30.00 ACUTE CYSTITIS WITHOUT HEMATURIA: ICD-10-CM

## 2024-02-05 DIAGNOSIS — K50.10 CROHN'S DISEASE OF LARGE INTESTINE WITHOUT COMPLICATION (HCC): ICD-10-CM

## 2024-02-05 DIAGNOSIS — M35.01 SJOGREN'S SYNDROME WITH KERATOCONJUNCTIVITIS SICCA (HCC): ICD-10-CM

## 2024-02-05 DIAGNOSIS — D68.9 COAGULATION DEFECT (HCC): ICD-10-CM

## 2024-02-05 DIAGNOSIS — R39.15 URGENCY OF MICTURITION: ICD-10-CM

## 2024-02-05 DIAGNOSIS — J43.1 PANLOBULAR EMPHYSEMA (HCC): ICD-10-CM

## 2024-02-05 DIAGNOSIS — R10.13 DYSPEPSIA: ICD-10-CM

## 2024-02-05 DIAGNOSIS — N18.30 STAGE 3 CHRONIC KIDNEY DISEASE, UNSPECIFIED WHETHER STAGE 3A OR 3B CKD (HCC): ICD-10-CM

## 2024-02-05 PROCEDURE — 99214 OFFICE O/P EST MOD 30 MIN: CPT | Performed by: INTERNAL MEDICINE

## 2024-02-05 PROCEDURE — 1123F ACP DISCUSS/DSCN MKR DOCD: CPT | Performed by: INTERNAL MEDICINE

## 2024-02-05 PROCEDURE — 3074F SYST BP LT 130 MM HG: CPT | Performed by: INTERNAL MEDICINE

## 2024-02-05 PROCEDURE — 3078F DIAST BP <80 MM HG: CPT | Performed by: INTERNAL MEDICINE

## 2024-02-05 RX ORDER — CIPROFLOXACIN 500 MG/1
500 TABLET, FILM COATED ORAL 2 TIMES DAILY
Qty: 10 TABLET | Refills: 0 | Status: SHIPPED | OUTPATIENT
Start: 2024-02-05 | End: 2024-02-10

## 2024-02-05 RX ORDER — TOLTERODINE 4 MG/1
4 CAPSULE, EXTENDED RELEASE ORAL DAILY
Qty: 30 CAPSULE | Refills: 3 | Status: SHIPPED | OUTPATIENT
Start: 2024-02-05

## 2024-02-05 ASSESSMENT — PATIENT HEALTH QUESTIONNAIRE - PHQ9
SUM OF ALL RESPONSES TO PHQ QUESTIONS 1-9: 0
2. FEELING DOWN, DEPRESSED OR HOPELESS: 0
1. LITTLE INTEREST OR PLEASURE IN DOING THINGS: 0
SUM OF ALL RESPONSES TO PHQ QUESTIONS 1-9: 0
SUM OF ALL RESPONSES TO PHQ9 QUESTIONS 1 & 2: 0

## 2024-02-05 ASSESSMENT — ENCOUNTER SYMPTOMS
BLOOD IN STOOL: 0
ABDOMINAL DISTENTION: 0
COLOR CHANGE: 0
TROUBLE SWALLOWING: 0
COUGH: 0
EYE DISCHARGE: 0
DIARRHEA: 0
SHORTNESS OF BREATH: 0
EYE PAIN: 0
WHEEZING: 0
ABDOMINAL PAIN: 1

## 2024-02-05 NOTE — PROGRESS NOTES
[facility]  [place] [address] [facility] [address]  Dept: [phone]  Dept Fax: [phone]    Yaz Allison is a 83 y.o. female who presents today for her medical conditions/complaintsas noted below.  Yaz Allison is c/o of   Chief Complaint   Patient presents with    Abdominal Cramping     Lower abdomen cramping after meals     Gas    Urinary Frequency    Urinary Retention    Dysuria         HPI:     Abdo pain  With cramps  No diarrhea  Bloating gas  Uti          No results found for: \"LABA1C\"          ( goal A1Cis < 7)   No components found for: \"LABMICR\"  LDL Cholesterol (mg/dL)   Date Value   03/15/2023 87   01/04/2022 101   08/31/2020 109       (goal LDL is <100)   AST (U/L)   Date Value   01/04/2022 27     ALT (U/L)   Date Value   01/04/2022 13     BUN (mg/dL)   Date Value   09/07/2023 18     BP Readings from Last 3 Encounters:   02/05/24 118/64   11/28/23 118/78   10/19/23 138/70          (goal 120/80)    Past Medical History:   Diagnosis Date    Abdominal pain, unspecified site     Acquired cyst of kidney     Acute gouty arthropathy     Allergic rhinitis, cause unspecified     Anxiety state, unspecified     Arthropathy, unspecified, site unspecified     Chronic airway obstruction, not elsewhere classified     Chronic kidney disease, stage III (moderate) (HCC)     Diarrhea     Edema     Esophageal reflux     Essential 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 localized, unspecified site     Other general symptoms(780.99)     Other iatrogenic hypothyroidism     Other nonspecific abnormal finding of lung field     Other psoriasis     Pure hypercholesterolemia     Regional enteritis of unspecified site     Senile osteoporosis

## 2024-02-05 NOTE — PROGRESS NOTES
Visit Information    Have you changed or started any medications since your last visit including any over-the-counter medicines, vitamins, or herbal medicines? no   Are you having any side effects from any of your medications? -  no  Have you stopped taking any of your medications? Is so, why? -  no    Have you seen any other physician or provider since your last visit? No  Have you had any other diagnostic tests since your last visit? No  Have you been seen in the emergency room and/or had an admission to a hospital since we last saw you? No  Have you had your routine dental cleaning in the past 6 months? no    Have you activated your [employer] account? If not, what are your barriers? Yes     Patient Care Team:  Gabe Cintron MD as PCP - General (Internal Medicine)  Gabe Cintron MD as PCP - Empaneled Provider    Medical History Review  Past Medical, Family, and Social History reviewed and does contribute to the patient presenting condition    Health Maintenance   Topic Date Due    DTaP/Tdap/Td vaccine (1 - Tdap) Never done    Shingles vaccine (1 of 2) Never done    Respiratory Syncytial Virus (RSV) Pregnant or age 60 yrs+ (1 - 1-dose 60+ series) Never done    Annual Wellness Visit (Medicare Advantage)  01/01/2024    Lipids  03/15/2024    Depression Screen  09/19/2024    Flu vaccine  Completed    Pneumococcal 65+ years Vaccine  Completed    COVID-19 Vaccine  Completed    DEXA (modify frequency per FRAX score)  Addressed    Hepatitis A vaccine  Aged Out    Hepatitis B vaccine  Aged Out    Hib vaccine  Aged Out    Polio vaccine  Aged Out    Meningococcal (ACWY) vaccine  Aged Out

## 2024-02-27 ENCOUNTER — HOSPITAL ENCOUNTER (OUTPATIENT)
Age: 84
Setting detail: SPECIMEN
Discharge: HOME | End: 2024-02-27

## 2024-02-27 DIAGNOSIS — N30.00 ACUTE CYSTITIS WITHOUT HEMATURIA: ICD-10-CM

## 2024-02-29 DIAGNOSIS — N30.01 ACUTE CYSTITIS WITH HEMATURIA: Primary | ICD-10-CM

## 2024-02-29 LAB
MICROORGANISM SPEC CULT: ABNORMAL
SPECIMEN DESCRIPTION: ABNORMAL

## 2024-02-29 RX ORDER — CIPROFLOXACIN 500 MG/1
500 TABLET, FILM COATED ORAL 2 TIMES DAILY
Qty: 10 TABLET | Refills: 0 | Status: SHIPPED | OUTPATIENT
Start: 2024-02-29 | End: 2024-03-05

## 2024-03-28 ENCOUNTER — OFFICE VISIT (OUTPATIENT)
Dept: UROLOGY | Age: 84
End: 2024-03-28
Payer: MEDICARE

## 2024-03-28 ENCOUNTER — HOSPITAL ENCOUNTER (OUTPATIENT)
Age: 84
Setting detail: SPECIMEN
Discharge: HOME | End: 2024-03-28

## 2024-03-28 VITALS
HEART RATE: 76 BPM | TEMPERATURE: 97.2 F | HEIGHT: 68 IN | SYSTOLIC BLOOD PRESSURE: 111 MMHG | DIASTOLIC BLOOD PRESSURE: 82 MMHG | BODY MASS INDEX: 18.04 KG/M2 | WEIGHT: 119 LBS

## 2024-03-28 DIAGNOSIS — N39.0 RECURRENT UTI: Primary | ICD-10-CM

## 2024-03-28 DIAGNOSIS — N39.0 RECURRENT UTI: ICD-10-CM

## 2024-03-28 DIAGNOSIS — R39.14 FEELING OF INCOMPLETE BLADDER EMPTYING: ICD-10-CM

## 2024-03-28 LAB — POST VOID RESIDUAL (PVR): NORMAL ML

## 2024-03-28 PROCEDURE — 1123F ACP DISCUSS/DSCN MKR DOCD: CPT | Performed by: UROLOGY

## 2024-03-28 PROCEDURE — 3079F DIAST BP 80-89 MM HG: CPT | Performed by: UROLOGY

## 2024-03-28 PROCEDURE — 51798 US URINE CAPACITY MEASURE: CPT | Performed by: UROLOGY

## 2024-03-28 PROCEDURE — 3074F SYST BP LT 130 MM HG: CPT | Performed by: UROLOGY

## 2024-03-28 PROCEDURE — 99204 OFFICE O/P NEW MOD 45 MIN: CPT | Performed by: UROLOGY

## 2024-03-28 ASSESSMENT — ENCOUNTER SYMPTOMS
EYE PAIN: 0
NAUSEA: 0
WHEEZING: 0
SHORTNESS OF BREATH: 0
EYE REDNESS: 0
BACK PAIN: 0
ABDOMINAL PAIN: 0
COUGH: 0
DIARRHEA: 0
CONSTIPATION: 0
VOMITING: 0

## 2024-03-28 NOTE — PROGRESS NOTES
[facility]  [address]  [facility] [address]  Dept: [phone]    [facility] Office Note - New Patient    Patient:  Yaz Allison  YOB: 1940  Date: 3/28/2024    The patient is a 83 y.o. female who presentstoday for evaluation of the following problems:   Chief Complaint   Patient presents with    Other     Acute Cystitis with Hematuria     referred by Gabe Cintron MD.    HPI  Here for UTI. She had a positive culture 2/27/24. She gets a lot of urinary urgency and frequency. Prescribed detrol but has not taken yet.  She denies any gross hematuria or dysuria.  She is uncertain if she empties her bladder entirely. She wears pads for protection. No constipation. She has psoriasis in her genital area.     (Patient's old records have been requested, reviewed and summarized in today's note.)    Summary of old records: N/A    History: N/A    ProceduresToday: N/A    Urinalysis today:  No results found for this visit on 03/28/24.    AUA Symptom Score (3/28/2024):  INCOMPLETE EMPTYING: How often have you had the sensation of not emptying your bladder?: Less than 1 to 5 times  FREQUENCY: How often do you have to urinate less than every two hours?: Less than 1 to 5 times  INTERMITTENCY: How often have you found you stopped and started again several times when you urinated?: Less than 1 to 5 times  URGENCY: How often have you found it difficult to postpone urination?: Less than 1 to 5 times  WEAK STREAM: How often have you had a weak urinary stream?: Less than 1 to 5 times  STRAINING: How often have you had to strain to start  urination?: Less than 1 to 5 times  NOCTURIA: How many times did you typically get up at night to uriniate?: 4 Times  TOTAL I-PSS SCORE:: 10  How would you feel if you were to spend the rest of your life with your urinary condition?: Mixe    Last BUN andcreatinine:  Lab Results   Component Value

## 2024-03-29 LAB
MICROORGANISM SPEC CULT: NORMAL
SPECIMEN DESCRIPTION: NORMAL

## 2024-04-02 ENCOUNTER — HOSPITAL ENCOUNTER (OUTPATIENT)
Dept: NUCLEAR MEDICINE | Age: 84
Discharge: HOME | End: 2024-04-04
Attending: INTERNAL MEDICINE
Payer: MEDICARE

## 2024-04-02 ENCOUNTER — HOSPITAL ENCOUNTER (OUTPATIENT)
Dept: ULTRASOUND IMAGING | Age: 84
Discharge: HOME | End: 2024-04-04
Attending: UROLOGY
Payer: MEDICARE

## 2024-04-02 DIAGNOSIS — R10.13 DYSPEPSIA: ICD-10-CM

## 2024-04-02 DIAGNOSIS — K50.10 CROHN'S DISEASE OF LARGE INTESTINE WITHOUT COMPLICATION (HCC): ICD-10-CM

## 2024-04-02 DIAGNOSIS — N39.0 RECURRENT UTI: ICD-10-CM

## 2024-04-02 PROCEDURE — 76775 US EXAM ABDO BACK WALL LIM: CPT

## 2024-04-02 PROCEDURE — A9537 TC99M MEBROFENIN: HCPCS | Performed by: INTERNAL MEDICINE

## 2024-04-02 PROCEDURE — 2580000003 HC RX 258: Performed by: INTERNAL MEDICINE

## 2024-04-02 PROCEDURE — 3430000000 HC RX DIAGNOSTIC RADIOPHARMACEUTICAL: Performed by: INTERNAL MEDICINE

## 2024-04-02 PROCEDURE — 78226 HEPATOBILIARY SYSTEM IMAGING: CPT

## 2024-04-02 RX ORDER — SODIUM CHLORIDE 0.9 % (FLUSH) 0.9 %
10 SYRINGE (ML) INJECTION PRN
Status: DISCONTINUED | OUTPATIENT
Start: 2024-04-02 | End: 2024-04-05 | Stop reason: HOSPADM

## 2024-04-02 RX ADMIN — SODIUM CHLORIDE, PRESERVATIVE FREE 10 ML: 5 INJECTION INTRAVENOUS at 09:18

## 2024-04-02 RX ADMIN — Medication 6.9 MILLICURIE: at 09:18

## 2024-04-15 ENCOUNTER — OFFICE VISIT (OUTPATIENT)
Dept: INTERNAL MEDICINE CLINIC | Age: 84
End: 2024-04-15
Payer: MEDICARE

## 2024-04-15 ENCOUNTER — HOSPITAL ENCOUNTER (OUTPATIENT)
Age: 84
Setting detail: SPECIMEN
Discharge: HOME | End: 2024-04-15

## 2024-04-15 VITALS
SYSTOLIC BLOOD PRESSURE: 138 MMHG | DIASTOLIC BLOOD PRESSURE: 78 MMHG | HEART RATE: 66 BPM | HEIGHT: 67 IN | WEIGHT: 113 LBS | OXYGEN SATURATION: 97 % | BODY MASS INDEX: 17.74 KG/M2

## 2024-04-15 DIAGNOSIS — R94.31 ABNORMAL ELECTROCARDIOGRAM (ECG) (EKG): ICD-10-CM

## 2024-04-15 DIAGNOSIS — N18.30 STAGE 3 CHRONIC KIDNEY DISEASE, UNSPECIFIED WHETHER STAGE 3A OR 3B CKD (HCC): ICD-10-CM

## 2024-04-15 DIAGNOSIS — E03.9 ACQUIRED HYPOTHYROIDISM: ICD-10-CM

## 2024-04-15 DIAGNOSIS — N18.30 STAGE 3 CHRONIC KIDNEY DISEASE, UNSPECIFIED WHETHER STAGE 3A OR 3B CKD (HCC): Primary | ICD-10-CM

## 2024-04-15 DIAGNOSIS — J30.2 SEASONAL ALLERGIES: ICD-10-CM

## 2024-04-15 DIAGNOSIS — I10 ESSENTIAL HYPERTENSION, BENIGN: ICD-10-CM

## 2024-04-15 DIAGNOSIS — J43.1 PANLOBULAR EMPHYSEMA (HCC): ICD-10-CM

## 2024-04-15 DIAGNOSIS — E55.9 VITAMIN D DEFICIENCY: ICD-10-CM

## 2024-04-15 LAB
BASOPHILS # BLD: 0.08 K/UL (ref 0–0.2)
BASOPHILS NFR BLD: 1 % (ref 0–2)
EOSINOPHIL # BLD: 0.47 K/UL (ref 0–0.44)
EOSINOPHILS RELATIVE PERCENT: 6 % (ref 1–4)
ERYTHROCYTE [DISTWIDTH] IN BLOOD BY AUTOMATED COUNT: 17.4 % (ref 11.8–14.4)
HCT VFR BLD AUTO: 26.1 % (ref 36.3–47.1)
HGB BLD-MCNC: 7.2 G/DL (ref 11.9–15.1)
IMM GRANULOCYTES # BLD AUTO: 0 K/UL (ref 0–0.3)
IMM GRANULOCYTES NFR BLD: 0 %
LYMPHOCYTES NFR BLD: 1.87 K/UL (ref 1.1–3.7)
LYMPHOCYTES RELATIVE PERCENT: 24 % (ref 24–43)
MCH RBC QN AUTO: 22 PG (ref 25.2–33.5)
MCHC RBC AUTO-ENTMCNC: 27.6 G/DL (ref 28.4–34.8)
MCV RBC AUTO: 79.8 FL (ref 82.6–102.9)
MONOCYTES NFR BLD: 0.94 K/UL (ref 0.1–1.2)
MONOCYTES NFR BLD: 12 % (ref 3–12)
MORPHOLOGY: ABNORMAL
NEUTROPHILS NFR BLD: 57 % (ref 36–65)
NEUTS SEG NFR BLD: 4.44 K/UL (ref 1.5–8.1)
NRBC BLD-RTO: 0 PER 100 WBC
PLATELET # BLD AUTO: 218 K/UL (ref 138–453)
PMV BLD AUTO: 10.6 FL (ref 8.1–13.5)
RBC # BLD AUTO: 3.27 M/UL (ref 3.95–5.11)
T4 FREE SERPL-MCNC: 0.7 NG/DL (ref 0.92–1.68)
TSH SERPL DL<=0.05 MIU/L-ACNC: 34.3 UIU/ML (ref 0.27–4.2)
WBC OTHER # BLD: 7.8 K/UL (ref 3.5–11.3)

## 2024-04-15 PROCEDURE — 99214 OFFICE O/P EST MOD 30 MIN: CPT | Performed by: INTERNAL MEDICINE

## 2024-04-15 PROCEDURE — 3078F DIAST BP <80 MM HG: CPT | Performed by: INTERNAL MEDICINE

## 2024-04-15 PROCEDURE — 1123F ACP DISCUSS/DSCN MKR DOCD: CPT | Performed by: INTERNAL MEDICINE

## 2024-04-15 PROCEDURE — 3075F SYST BP GE 130 - 139MM HG: CPT | Performed by: INTERNAL MEDICINE

## 2024-04-15 RX ORDER — LEVOTHYROXINE SODIUM 0.1 MG/1
TABLET ORAL
Qty: 30 TABLET | Refills: 2 | Status: SHIPPED | OUTPATIENT
Start: 2024-04-15

## 2024-04-15 RX ORDER — METHYLPREDNISOLONE 4 MG/1
TABLET ORAL
Qty: 1 KIT | Refills: 0 | Status: SHIPPED | OUTPATIENT
Start: 2024-04-15 | End: 2024-04-21

## 2024-04-15 RX ORDER — GABAPENTIN 300 MG/1
300 CAPSULE ORAL 2 TIMES DAILY
Qty: 120 CAPSULE | Refills: 0 | Status: SHIPPED | OUTPATIENT
Start: 2024-04-15 | End: 2024-06-14

## 2024-04-15 SDOH — ECONOMIC STABILITY: FOOD INSECURITY: WITHIN THE PAST 12 MONTHS, THE FOOD YOU BOUGHT JUST DIDN'T LAST AND YOU DIDN'T HAVE MONEY TO GET MORE.: PATIENT DECLINED

## 2024-04-15 SDOH — ECONOMIC STABILITY: HOUSING INSECURITY
IN THE LAST 12 MONTHS, WAS THERE A TIME WHEN YOU DID NOT HAVE A STEADY PLACE TO SLEEP OR SLEPT IN A SHELTER (INCLUDING NOW)?: PATIENT DECLINED

## 2024-04-15 SDOH — ECONOMIC STABILITY: INCOME INSECURITY: HOW HARD IS IT FOR YOU TO PAY FOR THE VERY BASICS LIKE FOOD, HOUSING, MEDICAL CARE, AND HEATING?: PATIENT DECLINED

## 2024-04-15 SDOH — ECONOMIC STABILITY: FOOD INSECURITY: WITHIN THE PAST 12 MONTHS, YOU WORRIED THAT YOUR FOOD WOULD RUN OUT BEFORE YOU GOT MONEY TO BUY MORE.: PATIENT DECLINED

## 2024-04-15 ASSESSMENT — ENCOUNTER SYMPTOMS
COUGH: 0
ABDOMINAL DISTENTION: 0
SHORTNESS OF BREATH: 0
TROUBLE SWALLOWING: 0
EYE PAIN: 0
EYE DISCHARGE: 0
DIARRHEA: 0
WHEEZING: 0
BLOOD IN STOOL: 0
COLOR CHANGE: 0

## 2024-04-15 NOTE — PROGRESS NOTES
Subjective:      Patient ID: Yaz Allison is a 83 y.o. female.    HPI    Review of Systems    Objective:   Physical Exam    Assessment / Plan:             
Visit Information    Have you changed or started any medications since your last visit including any over-the-counter medicines, vitamins, or herbal medicines? no   Are you having any side effects from any of your medications? -  no  Have you stopped taking any of your medications? Is so, why? -  no    Have you seen any other physician or provider since your last visit? No  Have you had any other diagnostic tests since your last visit? No  Have you been seen in the emergency room and/or had an admission to a hospital since we last saw you? No  Have you had your routine dental cleaning in the past 6 months? no    Have you activated your [employer] account? If not, what are your barriers? Yes     Patient Care Team:  Gabe Cintron MD as PCP - General (Internal Medicine)  Gabe Cintron MD as PCP - Empaneled Provider    Medical History Review  Past Medical, Family, and Social History reviewed and does not contribute to the patient presenting condition    Health Maintenance   Topic Date Due    DTaP/Tdap/Td vaccine (1 - Tdap) Never done    Shingles vaccine (1 of 2) Never done    Respiratory Syncytial Virus (RSV) Pregnant or age 60 yrs+ (1 - 1-dose 60+ series) Never done    Annual Wellness Visit (Medicare Advantage)  01/01/2024    Lipids  03/15/2024    Depression Screen  02/05/2025    Flu vaccine  Completed    Pneumococcal 65+ years Vaccine  Completed    COVID-19 Vaccine  Completed    DEXA (modify frequency per FRAX score)  Addressed    Hepatitis A vaccine  Aged Out    Hepatitis B vaccine  Aged Out    Hib vaccine  Aged Out    Polio vaccine  Aged Out    Meningococcal (ACWY) vaccine  Aged Out       
5/15/2024).    Orders Placed This Encounter   Procedures    TSH With Reflex Ft4     Standing Status:   Future     Standing Expiration Date:   4/15/2025    CBC with Auto Differential     Standing Status:   Future     Standing Expiration Date:   7/14/2024    Celiac Disease Panel     Standing Status:   Future     Standing Expiration Date:   4/15/2025     Orders Placed This Encounter   Medications    levothyroxine (SYNTHROID) 100 MCG tablet     Sig: TAKE 1 TABLET BY MOUTH DAILY IN  THE MORNING ONE HOUR BEFORE  BREAKFAST     Dispense:  30 tablet     Refill:  2     Please send a replace/new response with 100-Day Supply if appropriate to maximize member benefit. Requesting 1 year supply.    gabapentin (NEURONTIN) 300 MG capsule     Sig: Take 1 capsule by mouth 2 times daily for 60 days.     Dispense:  120 capsule     Refill:  0    methylPREDNISolone (MEDROL, MATTHIEU,) 4 MG tablet     Sig: Take by mouth as instructed by packet.     Dispense:  1 kit     Refill:  0    Hypothyroid  Recheck tsh  Will do 30 day supply and change as above with tsh  Shingle pain will do gabapentin  Externaional dyspea  Will do stress test if echo is normal    RUQ pain resolved  HIDA is negative 66% EF on gallbladder    Recheck tsh and see back to adjust in a month  Anemia  May be causeing GOMEZ  Will check cbc  B12 folate iron workup negative  Will rev with repeat hg  And echo  Pt declined screen colon  Celiac workup     Patient given educational materials - see patient instructions.Discussed use, benefit, and side effects of prescribed medications.  All patientquestions answered. Pt voiced understanding. Reviewed health maintenance.  Instructedto continue current medications, diet and exercise.  Patient agreed with treatmentplan. Follow up as directed.       Please note that this chart was generated using voice recognition Dragon dictation software.  Although every effort was made to ensure the accuracy of this automated transcription, some errors in

## 2024-04-16 ENCOUNTER — OFFICE VISIT (OUTPATIENT)
Dept: UROLOGY | Age: 84
End: 2024-04-16
Payer: MEDICARE

## 2024-04-16 ENCOUNTER — HOSPITAL ENCOUNTER (OUTPATIENT)
Age: 84
Setting detail: SPECIMEN
Discharge: HOME | End: 2024-04-16

## 2024-04-16 VITALS
HEART RATE: 68 BPM | SYSTOLIC BLOOD PRESSURE: 110 MMHG | HEIGHT: 67 IN | DIASTOLIC BLOOD PRESSURE: 64 MMHG | BODY MASS INDEX: 17.48 KG/M2 | OXYGEN SATURATION: 95 % | RESPIRATION RATE: 16 BRPM | WEIGHT: 111.4 LBS

## 2024-04-16 DIAGNOSIS — D64.9 ANEMIA, UNSPECIFIED TYPE: Primary | ICD-10-CM

## 2024-04-16 DIAGNOSIS — N39.0 RECURRENT UTI: Primary | ICD-10-CM

## 2024-04-16 DIAGNOSIS — N32.81 OAB (OVERACTIVE BLADDER): ICD-10-CM

## 2024-04-16 PROCEDURE — 99213 OFFICE O/P EST LOW 20 MIN: CPT | Performed by: NURSE PRACTITIONER

## 2024-04-16 PROCEDURE — 1123F ACP DISCUSS/DSCN MKR DOCD: CPT | Performed by: NURSE PRACTITIONER

## 2024-04-16 PROCEDURE — 3078F DIAST BP <80 MM HG: CPT | Performed by: NURSE PRACTITIONER

## 2024-04-16 PROCEDURE — 3074F SYST BP LT 130 MM HG: CPT | Performed by: NURSE PRACTITIONER

## 2024-04-16 ASSESSMENT — ENCOUNTER SYMPTOMS
EYE REDNESS: 0
ABDOMINAL PAIN: 0
EYE PAIN: 0
VOMITING: 0
WHEEZING: 0
BACK PAIN: 0
NAUSEA: 0
SHORTNESS OF BREATH: 0
CONSTIPATION: 0
COUGH: 0

## 2024-04-16 NOTE — PROGRESS NOTES
Review of Systems   Constitutional:  Negative for chills, fatigue and fever.   Eyes:  Negative for pain, redness and visual disturbance.   Respiratory:  Negative for cough, shortness of breath and wheezing.    Cardiovascular:  Negative for chest pain and leg swelling.   Gastrointestinal:  Negative for abdominal pain, constipation, nausea and vomiting.   Genitourinary:  Positive for difficulty urinating. Negative for dysuria, flank pain, frequency, hematuria and urgency.   Musculoskeletal:  Negative for back pain, joint swelling and myalgias.   Skin:  Negative for rash and wound.   Neurological:  Negative for dizziness, tremors and numbness.   Hematological:  Does not bruise/bleed easily.     
hesitancy.   We discussed cysto for recurrent utis and hesitancy, she declines.   Will check culture today.   If positive, treat for 7-10 days, then consider prophylactic atb.  F/u 6 mos or sooner if needed.     Return in about 6 months (around 10/16/2024) for recurrent uti and OAB with PVR.    Prescriptions Ordered:  No orders of the defined types were placed in this encounter.     Orders Placed:  Orders Placed This Encounter   Procedures    Culture, Urine     Standing Status:   Future     Standing Expiration Date:   4/16/2025            CIRO Sanabria CNP    Reviewed and agree with the ROS entered by the MA.

## 2024-04-17 ENCOUNTER — TELEPHONE (OUTPATIENT)
Dept: GASTROENTEROLOGY | Age: 84
End: 2024-04-17

## 2024-04-17 DIAGNOSIS — N39.0 RECURRENT UTI: ICD-10-CM

## 2024-04-17 LAB
GLIADIN IGA SER IA-ACNC: 6.6 U/ML
GLIADIN IGG SER IA-ACNC: <0.4 U/ML
IGA SERPL-MCNC: 299 MG/DL (ref 70–400)
TTG IGA SER IA-ACNC: 0.8 U/ML

## 2024-04-17 NOTE — TELEPHONE ENCOUNTER
Writer called and lvm for patient to call and make and appt from her referral. It is to anyone she would like to have in the office.

## 2024-04-18 LAB
MICROORGANISM SPEC CULT: ABNORMAL
MICROORGANISM SPEC CULT: ABNORMAL
SPECIMEN DESCRIPTION: ABNORMAL

## 2024-04-18 NOTE — TELEPHONE ENCOUNTER
2nd attempt: writer attempted to LMOV however phone rang busy signal-writer reached out to another point of contact on pt's chart-LMOV asking for patient to contact our office.   3rd attempt: sent letter

## 2024-04-19 ENCOUNTER — TELEPHONE (OUTPATIENT)
Dept: UROLOGY | Age: 84
End: 2024-04-19

## 2024-04-19 RX ORDER — NITROFURANTOIN 25; 75 MG/1; MG/1
100 CAPSULE ORAL 2 TIMES DAILY
Qty: 14 CAPSULE | Refills: 0 | Status: SHIPPED | OUTPATIENT
Start: 2024-04-19 | End: 2024-04-26

## 2024-04-23 ENCOUNTER — HOSPITAL ENCOUNTER (OUTPATIENT)
Age: 84
Discharge: HOME | End: 2024-04-25
Attending: INTERNAL MEDICINE
Payer: MEDICARE

## 2024-04-23 DIAGNOSIS — R94.31 ABNORMAL ELECTROCARDIOGRAM (ECG) (EKG): ICD-10-CM

## 2024-04-23 DIAGNOSIS — I10 ESSENTIAL HYPERTENSION, BENIGN: ICD-10-CM

## 2024-04-23 DIAGNOSIS — E03.9 ACQUIRED HYPOTHYROIDISM: ICD-10-CM

## 2024-04-23 DIAGNOSIS — N18.30 STAGE 3 CHRONIC KIDNEY DISEASE, UNSPECIFIED WHETHER STAGE 3A OR 3B CKD (HCC): ICD-10-CM

## 2024-04-23 PROCEDURE — 93306 TTE W/DOPPLER COMPLETE: CPT

## 2024-04-26 LAB
ECHO AO ROOT DIAM: 2.2 CM
ECHO AV AREA PEAK VELOCITY: 2 CM2
ECHO AV AREA VTI: 1.8 CM2
ECHO AV MEAN GRADIENT: 5 MMHG
ECHO AV MEAN VELOCITY: 1.1 M/S
ECHO AV PEAK GRADIENT: 10 MMHG
ECHO AV PEAK VELOCITY: 1.6 M/S
ECHO AV VELOCITY RATIO: 0.56
ECHO AV VTI: 35.3 CM
ECHO EST RA PRESSURE: 8 MMHG
ECHO LA DIAMETER: 3.8 CM
ECHO LA TO AORTIC ROOT RATIO: 1.73
ECHO LV E' LATERAL VELOCITY: 6 CM/S
ECHO LV E' SEPTAL VELOCITY: 5 CM/S
ECHO LV FRACTIONAL SHORTENING: 34 % (ref 28–44)
ECHO LV INTERNAL DIMENSION DIASTOLIC: 4.4 CM (ref 3.9–5.3)
ECHO LV INTERNAL DIMENSION SYSTOLIC: 2.9 CM
ECHO LV IVSD: 0.8 CM (ref 0.6–0.9)
ECHO LV MASS 2D: 109.4 G (ref 67–162)
ECHO LV POSTERIOR WALL DIASTOLIC: 0.8 CM (ref 0.6–0.9)
ECHO LV RELATIVE WALL THICKNESS RATIO: 0.36
ECHO LVOT AREA: 3.5 CM2
ECHO LVOT AV VTI INDEX: 0.52
ECHO LVOT DIAM: 2.1 CM
ECHO LVOT MEAN GRADIENT: 2 MMHG
ECHO LVOT PEAK GRADIENT: 3 MMHG
ECHO LVOT PEAK VELOCITY: 0.9 M/S
ECHO LVOT SV: 64 ML
ECHO LVOT VTI: 18.5 CM
ECHO MV A VELOCITY: 1.1 M/S
ECHO MV AREA VTI: 1.5 CM2
ECHO MV E DECELERATION TIME (DT): 198 MS
ECHO MV E VELOCITY: 1.01 M/S
ECHO MV E/A RATIO: 0.92
ECHO MV E/E' LATERAL: 16.83
ECHO MV E/E' RATIO (AVERAGED): 18.52
ECHO MV LVOT VTI INDEX: 2.25
ECHO MV MAX VELOCITY: 1 M/S
ECHO MV MEAN GRADIENT: 2 MMHG
ECHO MV MEAN VELOCITY: 0.6 M/S
ECHO MV PEAK GRADIENT: 4 MMHG
ECHO MV VTI: 41.7 CM
ECHO RA AREA 4C: 17.7 CM2
ECHO RIGHT VENTRICULAR SYSTOLIC PRESSURE (RVSP): 45 MMHG
ECHO RV TAPSE: 1.9 CM (ref 1.7–?)
ECHO TV REGURGITANT MAX VELOCITY: 3.04 M/S
ECHO TV REGURGITANT PEAK GRADIENT: 37 MMHG

## 2024-05-02 ENCOUNTER — HOSPITAL ENCOUNTER (INPATIENT)
Age: 84
LOS: 2 days | Discharge: HOME | DRG: 378 | End: 2024-05-04
Attending: EMERGENCY MEDICINE | Admitting: INTERNAL MEDICINE
Payer: MEDICARE

## 2024-05-02 ENCOUNTER — APPOINTMENT (OUTPATIENT)
Dept: GENERAL RADIOLOGY | Age: 84
DRG: 378 | End: 2024-05-02
Payer: MEDICARE

## 2024-05-02 DIAGNOSIS — D64.9 ANEMIA, UNSPECIFIED TYPE: ICD-10-CM

## 2024-05-02 DIAGNOSIS — K92.2 GASTROINTESTINAL HEMORRHAGE, UNSPECIFIED GASTROINTESTINAL HEMORRHAGE TYPE: Primary | ICD-10-CM

## 2024-05-02 DIAGNOSIS — N30.00 ACUTE CYSTITIS WITHOUT HEMATURIA: ICD-10-CM

## 2024-05-02 DIAGNOSIS — D64.9 SYMPTOMATIC ANEMIA: ICD-10-CM

## 2024-05-02 LAB
ANION GAP SERPL CALCULATED.3IONS-SCNC: 14 MMOL/L (ref 9–17)
BACTERIA URNS QL MICRO: ABNORMAL
BASOPHILS # BLD: 0.15 K/UL (ref 0–0.2)
BASOPHILS NFR BLD: 2 % (ref 0–2)
BILIRUB UR QL STRIP: NEGATIVE
BUN SERPL-MCNC: 24 MG/DL (ref 8–23)
CALCIUM SERPL-MCNC: 10.2 MG/DL (ref 8.6–10.4)
CASTS #/AREA URNS LPF: ABNORMAL /LPF
CASTS #/AREA URNS LPF: ABNORMAL /LPF
CHLORIDE SERPL-SCNC: 100 MMOL/L (ref 98–107)
CLARITY UR: ABNORMAL
CO2 SERPL-SCNC: 23 MMOL/L (ref 20–31)
COLOR UR: YELLOW
CREAT SERPL-MCNC: 1.2 MG/DL (ref 0.5–0.9)
DATE, STOOL #1: ABNORMAL
EOSINOPHIL # BLD: 0.38 K/UL (ref 0–0.4)
EOSINOPHILS RELATIVE PERCENT: 5 % (ref 0–4)
EPI CELLS #/AREA URNS HPF: ABNORMAL /HPF
ERYTHROCYTE [DISTWIDTH] IN BLOOD BY AUTOMATED COUNT: 18.9 % (ref 11.5–14.9)
GFR, ESTIMATED: 44 ML/MIN/1.73M2
GLUCOSE SERPL-MCNC: 108 MG/DL (ref 70–99)
GLUCOSE UR STRIP-MCNC: NEGATIVE MG/DL
HCT VFR BLD AUTO: 24.6 % (ref 36–46)
HEMOCCULT SP1 STL QL: POSITIVE
HGB BLD-MCNC: 7.4 G/DL (ref 12–16)
HGB UR QL STRIP.AUTO: ABNORMAL
KETONES UR STRIP-MCNC: ABNORMAL MG/DL
LEUKOCYTE ESTERASE UR QL STRIP: ABNORMAL
LYMPHOCYTES NFR BLD: 1.65 K/UL (ref 1–4.8)
LYMPHOCYTES RELATIVE PERCENT: 22 % (ref 24–44)
MCH RBC QN AUTO: 21.8 PG (ref 26–34)
MCHC RBC AUTO-ENTMCNC: 30.1 G/DL (ref 31–37)
MCV RBC AUTO: 72.5 FL (ref 80–100)
MONOCYTES NFR BLD: 0.68 K/UL (ref 0.1–1.3)
MONOCYTES NFR BLD: 9 % (ref 1–7)
MORPHOLOGY: ABNORMAL
MORPHOLOGY: ABNORMAL
NEUTROPHILS NFR BLD: 62 % (ref 36–66)
NEUTS SEG NFR BLD: 4.64 K/UL (ref 1.3–9.1)
NITRITE UR QL STRIP: POSITIVE
PH UR STRIP: 6 [PH] (ref 5–8)
PLATELET # BLD AUTO: 272 K/UL (ref 150–450)
PMV BLD AUTO: 7.5 FL (ref 6–12)
POTASSIUM SERPL-SCNC: 3.9 MMOL/L (ref 3.7–5.3)
PROT UR STRIP-MCNC: ABNORMAL MG/DL
RBC # BLD AUTO: 3.39 M/UL (ref 4–5.2)
RBC #/AREA URNS HPF: ABNORMAL /HPF
SODIUM SERPL-SCNC: 137 MMOL/L (ref 135–144)
SP GR UR STRIP: 1.02 (ref 1–1.03)
TIME, STOOL #1: 1630
TROPONIN I SERPL HS-MCNC: 17 NG/L (ref 0–14)
TROPONIN I SERPL HS-MCNC: 20 NG/L (ref 0–14)
TSH SERPL DL<=0.05 MIU/L-ACNC: 3.19 UIU/ML (ref 0.3–5)
UROBILINOGEN UR STRIP-ACNC: NORMAL EU/DL (ref 0–1)
WBC #/AREA URNS HPF: ABNORMAL /HPF
WBC OTHER # BLD: 7.5 K/UL (ref 3.5–11)

## 2024-05-02 PROCEDURE — 99285 EMERGENCY DEPT VISIT HI MDM: CPT

## 2024-05-02 PROCEDURE — 99223 1ST HOSP IP/OBS HIGH 75: CPT | Performed by: INTERNAL MEDICINE

## 2024-05-02 PROCEDURE — 30233N1 TRANSFUSION OF NONAUTOLOGOUS RED BLOOD CELLS INTO PERIPHERAL VEIN, PERCUTANEOUS APPROACH: ICD-10-PCS | Performed by: INTERNAL MEDICINE

## 2024-05-02 PROCEDURE — 84443 ASSAY THYROID STIM HORMONE: CPT

## 2024-05-02 PROCEDURE — A4216 STERILE WATER/SALINE, 10 ML: HCPCS | Performed by: EMERGENCY MEDICINE

## 2024-05-02 PROCEDURE — 2580000003 HC RX 258: Performed by: INTERNAL MEDICINE

## 2024-05-02 PROCEDURE — 86900 BLOOD TYPING SEROLOGIC ABO: CPT

## 2024-05-02 PROCEDURE — G0378 HOSPITAL OBSERVATION PER HR: HCPCS

## 2024-05-02 PROCEDURE — 96365 THER/PROPH/DIAG IV INF INIT: CPT

## 2024-05-02 PROCEDURE — 2580000003 HC RX 258: Performed by: EMERGENCY MEDICINE

## 2024-05-02 PROCEDURE — 96376 TX/PRO/DX INJ SAME DRUG ADON: CPT

## 2024-05-02 PROCEDURE — 93005 ELECTROCARDIOGRAM TRACING: CPT | Performed by: EMERGENCY MEDICINE

## 2024-05-02 PROCEDURE — 82272 OCCULT BLD FECES 1-3 TESTS: CPT

## 2024-05-02 PROCEDURE — 71046 X-RAY EXAM CHEST 2 VIEWS: CPT

## 2024-05-02 PROCEDURE — 87077 CULTURE AEROBIC IDENTIFY: CPT

## 2024-05-02 PROCEDURE — 96375 TX/PRO/DX INJ NEW DRUG ADDON: CPT

## 2024-05-02 PROCEDURE — 6370000000 HC RX 637 (ALT 250 FOR IP): Performed by: INTERNAL MEDICINE

## 2024-05-02 PROCEDURE — 84484 ASSAY OF TROPONIN QUANT: CPT

## 2024-05-02 PROCEDURE — 36415 COLL VENOUS BLD VENIPUNCTURE: CPT

## 2024-05-02 PROCEDURE — P9016 RBC LEUKOCYTES REDUCED: HCPCS

## 2024-05-02 PROCEDURE — 80048 BASIC METABOLIC PNL TOTAL CA: CPT

## 2024-05-02 PROCEDURE — 81001 URINALYSIS AUTO W/SCOPE: CPT

## 2024-05-02 PROCEDURE — A4216 STERILE WATER/SALINE, 10 ML: HCPCS | Performed by: INTERNAL MEDICINE

## 2024-05-02 PROCEDURE — 87086 URINE CULTURE/COLONY COUNT: CPT

## 2024-05-02 PROCEDURE — 87186 SC STD MICRODIL/AGAR DIL: CPT

## 2024-05-02 PROCEDURE — C9113 INJ PANTOPRAZOLE SODIUM, VIA: HCPCS | Performed by: INTERNAL MEDICINE

## 2024-05-02 PROCEDURE — 2060000000 HC ICU INTERMEDIATE R&B

## 2024-05-02 PROCEDURE — 96374 THER/PROPH/DIAG INJ IV PUSH: CPT

## 2024-05-02 PROCEDURE — C9113 INJ PANTOPRAZOLE SODIUM, VIA: HCPCS | Performed by: EMERGENCY MEDICINE

## 2024-05-02 PROCEDURE — 85025 COMPLETE CBC W/AUTO DIFF WBC: CPT

## 2024-05-02 PROCEDURE — 86850 RBC ANTIBODY SCREEN: CPT

## 2024-05-02 PROCEDURE — 6360000002 HC RX W HCPCS: Performed by: INTERNAL MEDICINE

## 2024-05-02 PROCEDURE — 36430 TRANSFUSION BLD/BLD COMPNT: CPT

## 2024-05-02 PROCEDURE — 86920 COMPATIBILITY TEST SPIN: CPT

## 2024-05-02 PROCEDURE — 86901 BLOOD TYPING SEROLOGIC RH(D): CPT

## 2024-05-02 PROCEDURE — 6360000002 HC RX W HCPCS: Performed by: EMERGENCY MEDICINE

## 2024-05-02 RX ORDER — CARVEDILOL 12.5 MG/1
12.5 TABLET ORAL 2 TIMES DAILY WITH MEALS
Status: DISCONTINUED | OUTPATIENT
Start: 2024-05-03 | End: 2024-05-03

## 2024-05-02 RX ORDER — ALLOPURINOL 300 MG/1
300 TABLET ORAL DAILY
Status: DISCONTINUED | OUTPATIENT
Start: 2024-05-03 | End: 2024-05-04 | Stop reason: HOSPADM

## 2024-05-02 RX ORDER — GABAPENTIN 300 MG/1
300 CAPSULE ORAL 2 TIMES DAILY
Status: DISCONTINUED | OUTPATIENT
Start: 2024-05-02 | End: 2024-05-04 | Stop reason: HOSPADM

## 2024-05-02 RX ORDER — SODIUM CHLORIDE 9 MG/ML
INJECTION, SOLUTION INTRAVENOUS CONTINUOUS
Status: DISCONTINUED | OUTPATIENT
Start: 2024-05-02 | End: 2024-05-04 | Stop reason: HOSPADM

## 2024-05-02 RX ORDER — ACETAMINOPHEN 650 MG/1
650 SUPPOSITORY RECTAL EVERY 6 HOURS PRN
Status: DISCONTINUED | OUTPATIENT
Start: 2024-05-02 | End: 2024-05-04 | Stop reason: HOSPADM

## 2024-05-02 RX ORDER — SODIUM CHLORIDE 9 MG/ML
INJECTION, SOLUTION INTRAVENOUS PRN
Status: COMPLETED | OUTPATIENT
Start: 2024-05-02 | End: 2024-05-02

## 2024-05-02 RX ORDER — ACETAMINOPHEN 325 MG/1
650 TABLET ORAL EVERY 6 HOURS PRN
Status: DISCONTINUED | OUTPATIENT
Start: 2024-05-02 | End: 2024-05-04 | Stop reason: HOSPADM

## 2024-05-02 RX ORDER — FOLIC ACID 1 MG/1
1 TABLET ORAL DAILY
Status: DISCONTINUED | OUTPATIENT
Start: 2024-05-03 | End: 2024-05-04 | Stop reason: HOSPADM

## 2024-05-02 RX ORDER — ONDANSETRON 2 MG/ML
4 INJECTION INTRAMUSCULAR; INTRAVENOUS EVERY 6 HOURS PRN
Status: DISCONTINUED | OUTPATIENT
Start: 2024-05-02 | End: 2024-05-04 | Stop reason: HOSPADM

## 2024-05-02 RX ORDER — SODIUM CHLORIDE 0.9 % (FLUSH) 0.9 %
5-40 SYRINGE (ML) INJECTION EVERY 12 HOURS SCHEDULED
Status: DISCONTINUED | OUTPATIENT
Start: 2024-05-02 | End: 2024-05-04 | Stop reason: HOSPADM

## 2024-05-02 RX ORDER — SODIUM CHLORIDE 9 MG/ML
INJECTION, SOLUTION INTRAVENOUS PRN
Status: DISCONTINUED | OUTPATIENT
Start: 2024-05-02 | End: 2024-05-04 | Stop reason: HOSPADM

## 2024-05-02 RX ORDER — ONDANSETRON 4 MG/1
4 TABLET, ORALLY DISINTEGRATING ORAL EVERY 8 HOURS PRN
Status: DISCONTINUED | OUTPATIENT
Start: 2024-05-02 | End: 2024-05-04 | Stop reason: HOSPADM

## 2024-05-02 RX ORDER — LEVOTHYROXINE SODIUM 0.1 MG/1
100 TABLET ORAL DAILY
Status: DISCONTINUED | OUTPATIENT
Start: 2024-05-03 | End: 2024-05-04 | Stop reason: HOSPADM

## 2024-05-02 RX ORDER — POLYETHYLENE GLYCOL 3350 17 G/17G
17 POWDER, FOR SOLUTION ORAL DAILY PRN
Status: DISCONTINUED | OUTPATIENT
Start: 2024-05-02 | End: 2024-05-04 | Stop reason: HOSPADM

## 2024-05-02 RX ORDER — ATORVASTATIN CALCIUM 40 MG/1
40 TABLET, FILM COATED ORAL DAILY
Status: DISCONTINUED | OUTPATIENT
Start: 2024-05-03 | End: 2024-05-04 | Stop reason: HOSPADM

## 2024-05-02 RX ORDER — SODIUM CHLORIDE 0.9 % (FLUSH) 0.9 %
5-40 SYRINGE (ML) INJECTION PRN
Status: DISCONTINUED | OUTPATIENT
Start: 2024-05-02 | End: 2024-05-04 | Stop reason: HOSPADM

## 2024-05-02 RX ADMIN — GABAPENTIN 300 MG: 300 CAPSULE ORAL at 22:55

## 2024-05-02 RX ADMIN — SODIUM CHLORIDE, PRESERVATIVE FREE 10 ML: 5 INJECTION INTRAVENOUS at 23:04

## 2024-05-02 RX ADMIN — PANTOPRAZOLE SODIUM 40 MG: 40 INJECTION, POWDER, FOR SOLUTION INTRAVENOUS at 22:55

## 2024-05-02 RX ADMIN — CEFTRIAXONE SODIUM 1000 MG: 1 INJECTION, POWDER, FOR SOLUTION INTRAMUSCULAR; INTRAVENOUS at 18:22

## 2024-05-02 RX ADMIN — SODIUM CHLORIDE: 9 INJECTION, SOLUTION INTRAVENOUS at 23:30

## 2024-05-02 RX ADMIN — SODIUM CHLORIDE: 9 INJECTION, SOLUTION INTRAVENOUS at 18:08

## 2024-05-02 RX ADMIN — PANTOPRAZOLE SODIUM 40 MG: 40 INJECTION, POWDER, FOR SOLUTION INTRAVENOUS at 17:12

## 2024-05-02 ASSESSMENT — ENCOUNTER SYMPTOMS
NAUSEA: 0
SHORTNESS OF BREATH: 1
ABDOMINAL PAIN: 0
VOMITING: 0
COUGH: 0

## 2024-05-02 ASSESSMENT — HEART SCORE: ECG: NORMAL

## 2024-05-02 ASSESSMENT — PAIN - FUNCTIONAL ASSESSMENT: PAIN_FUNCTIONAL_ASSESSMENT: NONE - DENIES PAIN

## 2024-05-02 NOTE — PROGRESS NOTES
Pharmacy Medication History Note      List of current medications patient is taking is complete.     Source of information: dispense report, [facility]AS    Changes made to medication list:  Medications flagged for removal (include reason, ex. noncompliance):  Clobetasol - last filled in 2015    Medications removed (include reason, ex. therapy complete or physician discontinued):  Erythromycin ophthalmic - course complete   Methylprednisolone - course complete    Medications added/doses adjusted:  None     Other notes (ex. Recent course of antibiotics, Coumadin dosing):  Per OARRS, last fill of gabapentin was on 4/17/24 with quantity 120 for 60 days.     Denies use of other OTC or herbal medications.      Allergies clarified    Medication list provided to the patient: no  Medication education provided to the patient: none    Prior to Admission Medications   Prescriptions Last Dose Informant Patient Reported? Taking?   Cholecalciferol (VITAMIN D) 2000 UNITS CAPS capsule   Yes No   Sig: Take 2,000 Units by mouth daily   Clobetasol Propionate (CLOBEX) 0.05 % SHAM Not Taking  No No   Sig: Apply 1 Act topically once a week   Patient not taking: Reported on 5/2/2024   Multiple Vitamins-Minerals (HAIR/SKIN/NAILS) TABS   Yes No   Sig: Take 1 tablet by mouth daily   allopurinol (ZYLOPRIM) 300 MG tablet   No No   Sig: TAKE 1 TABLET BY MOUTH DAILY   aspirin EC 81 MG EC tablet   No No   Sig: Take 1 tablet by mouth daily   atorvastatin (LIPITOR) 40 MG tablet   No No   Sig: TAKE 1 TABLET BY MOUTH DAILY   carvedilol (COREG) 12.5 MG tablet   No No   Sig: TAKE ONE-HALF TABLET BY MOUTH  TWICE DAILY   colchicine (COLCRYS) 0.6 MG tablet Not Taking  No No   Sig: Take 2 tabs immediately, then 1 tablet 1 hour later.  Then one tablet a day for 2 more days.   Patient not taking: Reported on 5/2/2024          folic acid (FOLVITE) 1 MG tablet   No No   Sig: Take 1 tablet by mouth daily   gabapentin (NEURONTIN) 300 MG capsule   No No   Sig: Take 1  capsule by mouth 2 times daily for 60 days.   levothyroxine (SYNTHROID) 100 MCG tablet   No No   Sig: TAKE 1 TABLET BY MOUTH DAILY IN  THE MORNING ONE HOUR BEFORE  BREAKFAST      Facility-Administered Medications Last Administration Doses Remaining                             Electronically signed by Wnedy Salazar RPH on 5/2/2024 at 6:11 PM

## 2024-05-02 NOTE — CONSENT
Informed Consent for Blood Component Transfusion Note    I have discussed with the patient the rationale for blood component transfusion; its benefits in treating or preventing fatigue, organ damage, or death; and its risk which includes mild transfusion reactions, rare risk of blood borne infection, or more serious but rare reactions. I have discussed the alternatives to transfusion, including the risk and consequences of not receiving transfusion. The patient had an opportunity to ask questions and had agreed to proceed with transfusion of blood components.    Electronically signed by Promise Romo DO on 5/2/24 at 4:35 PM EDT

## 2024-05-02 NOTE — ED NOTES
The following labs labeled with pt sticker and tubed to lab:     [] Urine Sample  [] Stool Sample   [x] Occult Sample

## 2024-05-02 NOTE — ED PROVIDER NOTES
[facility] ED  Emergency Department Encounter  Emergency Medicine Resident     Pt Name:Yaz Allison  MRN: [id number]  Birthdate 1940  Date of evaluation: 5/2/24  PCP:  Gabe Cintron MD  Note Started: 3:27 PM EDT      CHIEF COMPLAINT       Chief Complaint   Patient presents with    Shortness of Breath    Dizziness       HISTORY OF PRESENT ILLNESS  (Location/Symptom, Timing/Onset, Context/Setting, Quality, Duration, Modifying Factors, Severity.)      Yaz Allison is a 83 y.o. female who presents with shortness of breath, generalized fatigue, lightheaded feeling.  Patient has had the symptoms intermittently over the last few weeks.  States its gotten worse recently.  Patient states she was recently found to be anemic.  Patient additionally has a history of hypothyroidism and is taking levothyroxine.  Patient states they have been adjusting her medications.  Patient was recently found to be anemic with a hemoglobin of 7.2.  Patient has history of recurrent urinary tract infections and just finished a course of antibiotics for urinary tract infection.  States the symptoms have resolved.  Patient denies any history of pulmonary disease or cardiac disease.  Patient has a history of hypertension, dyslipidemia and hypothyroidism.    PAST MEDICAL / SURGICAL / SOCIAL / FAMILY HISTORY      has a past medical history of Abdominal pain, unspecified site, Acquired cyst of kidney, Acute gouty arthropathy, Allergic rhinitis, cause unspecified, Anxiety state, unspecified, Arthropathy, unspecified, site unspecified, Chronic airway obstruction, not elsewhere classified, Chronic kidney disease, stage III (moderate) ([facility]), Diarrhea, Edema, Esophageal reflux, Essential hypertension, benign, Herpes zoster with other nervous system complications(053.19), Herpes zoster without mention of complication, 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), Mononeuritis of unspecified site, Myalgia and myositis, unspecified, Osteoarthrosis, unspecified  days     Minutes of Exercise per Session: 0 min   Stress: Not on file   Social Connections: Not on file   Intimate Partner Violence: Not on file   Housing Stability: Unknown (4/15/2024)    Housing Stability Vital Sign     Unable to Pay for Housing in the Last Year: Not on file     Number of Places Lived in the Last Year: Not on file     Unstable Housing in the Last Year: Patient declined       Family History   Problem Relation Age of Onset    Coronary Art Dis Father        Allergies:  Penicillins, Seasonal, and Etomidate    Home Medications:  Prior to Admission medications    Medication Sig Start Date End Date Taking? Authorizing Provider   levothyroxine (SYNTHROID) 100 MCG tablet TAKE 1 TABLET BY MOUTH DAILY IN  THE MORNING ONE HOUR BEFORE  BREAKFAST 4/15/24   Gabe Cintron MD   gabapentin (NEURONTIN) 300 MG capsule Take 1 capsule by mouth 2 times daily for 60 days. 4/15/24 6/14/24  Gabe Cintron MD   carvedilol (COREG) 12.5 MG tablet TAKE ONE-HALF TABLET BY MOUTH  TWICE DAILY 9/18/23   Gabe Cintron MD   allopurinol (ZYLOPRIM) 300 MG tablet TAKE 1 TABLET BY MOUTH DAILY 8/25/23   Gabe Cintron MD   atorvastatin (LIPITOR) 40 MG tablet TAKE 1 TABLET BY MOUTH DAILY 8/24/23   Gabe Cintron MD   aspirin EC 81 MG EC tablet Take 1 tablet by mouth daily 9/12/22   Gabe Cintron MD   colchicine (COLCRYS) 0.6 MG tablet Take 2 tabs immediately, then 1 tablet 1 hour later.  Then one tablet a day for 2 more days.  Patient not taking: Reported on 5/2/2024 6/7/21   Gabe Cintron MD   Clobetasol Propionate (CLOBEX) 0.05 % SHAM Apply 1 Act topically once a week  Patient not taking: Reported on 5/2/2024 2/26/20   Gabe Cintron MD   folic acid (FOLVITE) 1 MG tablet Take 1 tablet by mouth daily 8/8/17   Edgar Sanchez MD   Multiple Vitamins-Minerals (HAIR/SKIN/NAILS) TABS Take 1 tablet by mouth daily    ProviderAudi MD   Cholecalciferol (VITAMIN D) 2000 UNITS CAPS capsule Take 2,000

## 2024-05-02 NOTE — H&P
IN-PATIENT SERVICE  [facility] Internal Medicine    CONSULTATION / HISTORY AND PHYSICAL EXAMINATION            Date:   5/2/2024  Patient name:  Yaz Allison  Date of admission:  5/2/2024  3:26 PM  MRN:   [id number]  Account:  [id number]  YOB: 1940  PCP:    Gabe Cintron MD  Room:   09/09  Code Status:    Full Code    Physician Requesting Consult: Darius Gerard MD    Reason for Consult:  medical management    Chief Complaint:     Chief Complaint   Patient presents with    Shortness of Breath    Dizziness       History Obtained From:     Patient medical record nursing staff    History of Present Illness:   Patient, has past medical history of multiple medical problems include hypertension, COPD, chronic kidney disease stage III, patient, told me that she is having progressively getting worsening shortness of breath, dizziness, symptoms are going on for last 1 month and not getting better  Symptoms started with flulike symptoms  Patient, headache echocardiogram for evaluation of the shortness of breath on 4/26  She had her hemoglobin tested and it turned out to be 7.2, of note her hemoglobin before that was running around 10, patient was referred to GI for workup of anemia  Patient felt bad and she came to emergency room for her evaluation  In ER, her hemoglobin was 7.4, her stool for occult blood was positive, EKG and drops of stable, patient chest x-ray was not suggestive of acute changes  Patient getting admitted for anemia workup  She got 1 unit of packed cell transfusion in emergency room for symptomatic anemia  Started on Protonix  Of note patient had EGD done back in 2019 which was negative  She was diagnosed with colitis in the past and has been evaluated in [place] [facility]  Denying black-colored stool, blood in stool  Past Medical History:     Past Medical History:   Diagnosis Date    Abdominal pain, unspecified site     Acquired cyst of kidney     Acute gouty  swelling/edema   ALLERGIC/IMMUNOLOGIC:  negative for urticaria , itching  ENDOCRINE:  negative increase in drinking, increase in urination, hot or cold intolerance  MUSCULOSKELETAL:  negative joint pains, muscle aches, swelling of joints  NEUROLOGICAL: Positive for dizziness  BEHAVIOR/PSYCH:      Physical Exam:     BP (!) 179/80   Pulse 66   Temp 98.9 °F (37.2 °C) (Oral)   Resp 15   Ht 1.702 m (5' 7\")   Wt 51.7 kg (114 lb)   SpO2 100%   BMI 17.85 kg/m²   Temp (24hrs), Av.5 °F (36.9 °C), Min:98.1 °F (36.7 °C), Max:98.9 °F (37.2 °C)    No results for input(s): \"POCGLU\" in the last 72 hours.  No intake or output data in the 24 hours ending 24    General Appearance:  alert, well appearing, and in no acute distress  Mental status: oriented to person, place, and time with normal affect  Head:  normocephalic, atraumatic.  Eye: Pallor present  Ear: normal external ear, no discharge, hearing intact  Nose:  no drainage noted  Mouth: mucous membranes moist  Neck: supple, no carotid bruits, thyroid not palpable  Lungs: Bilateral equal air entry, clear to ausculation, no wheezing, rales or rhonchi, normal effort  Cardiovascular: normal rate, regular rhythm, no murmur, gallop, rub.  Abdomen: Soft, nontender, nondistended, normal bowel sounds, no hepatomegaly or splenomegaly  Neurologic: There are no new focal motor or sensory deficits, normal muscle tone and bulk, no abnormal sensation, normal speech, cranial nerves II through XII grossly intact  Skin: No gross lesions, rashes, bruising or bleeding on exposed skin area  Extremities:  peripheral pulses palpable, no pedal edema or calf pain with palpation  Psych:     Investigations:      Laboratory Testing:  Recent Results (from the past 24 hour(s))   CBC with Auto Differential    Collection Time: 24  4:00 PM   Result Value Ref Range    WBC 7.5 3.5 - 11.0 k/uL    RBC 3.39 (L) 4.0 - 5.2 m/uL    Hemoglobin 7.4 (L) 12.0 - 16.0 g/dL    Hematocrit 24.6 (L) 36

## 2024-05-02 NOTE — ED PROVIDER NOTES
[facility] ED  eMERGENCY dEPARTMENT eNCOUnter   Attending Attestation     Pt Name: Yaz Allison  MRN: [id number]  Birthdate 1940  Date of evaluation: 24       Yaz Allison is a 83 y.o. female who presents with Shortness of Breath and Dizziness      History:   83-year-old female presenting to the ER complaining of shortness of breath and dizziness.    Exam: Vitals:   Vitals:    24 1943 24   BP: (!) 167/83 (!) 158/76 (!) 160/91 (!) 162/83   Pulse: 60 66 66 66   Resp: 15 13 16 18   Temp:       TempSrc:       SpO2: 99% 98% 99% 99%   Weight:       Height:         Cardiac workup in the ER did not display positive signs of acute cardiac ischemia.  EKG was reviewed and interpreted by myself, the ED physician.  Patient with symptomatic anemia.  Patient was provided with a unit of RBC in the ER.  Patient was consented first.  The patient did show signs of a urinary tract infection was started on IV antibiotics in the ER.  The patient did test positive for stool occult blood.  Gastroenterology was consulted.  Patient was admitted after speaking with the admitting team.  Patient understands and agrees with the plan.      History: 0  EC  Patient Age: 1  Risk Factors: 1  Troponin: 1  Heart Score Total: 4        I performed a history and physical examination of the patient and discussed management with the resident. I reviewed the resident’s note and agree with the documented findings and plan of care. Any areas of disagreement are noted on the chart. I was personally present for the key portions of any procedures. I have documented in the chart those procedures where I was not present during the key portions. I have personally reviewed all images and agree with the resident's interpretation. I have reviewed the emergency nurses triage note. I agree with the chief complaint, past medical history, past surgical history, allergies, medications, social and family history as  documented unless otherwise noted below. Documentation of the HPI, Physical Exam and Medical Decision Making performed by medical students or scribes is based on my personal performance of the HPI, PE and MDM. I personally evaluated and examined the patient in conjunction with the APC and agree with the assessment, treatment plan, and disposition of the patient as recorded by the APC. Additional findings are as noted.    Rajesh Granados DO  Attending Emergency  Physician              Rajesh Granados DO  05/02/24 3437

## 2024-05-02 NOTE — ED TRIAGE NOTES
Mode of arrival (squad #, walk in, police, etc) : walk-in        Chief complaint(s): SOB, dizziness        Arrival Note (brief scenario, treatment PTA, etc).: patient states she just does not feel right. Afraid she will pass out. Fatigue. Has recently been treated for a UTI.         C= \"Have you ever felt that you should Cut down on your drinking?\"  No  A= \"Have people Annoyed you by criticizing your drinking?\"  No  G= \"Have you ever felt bad or Guilty about your drinking?\"  No  E= \"Have you ever had a drink as an Eye-opener first thing in the morning to steady your nerves or to help a hangover?\"  No      Deferred []      Reason for deferring: N/A    *If yes to two or more: probable alcohol abuse.*

## 2024-05-03 ENCOUNTER — ANESTHESIA EVENT (OUTPATIENT)
Dept: ENDOSCOPY | Age: 84
End: 2024-05-03
Payer: MEDICARE

## 2024-05-03 ENCOUNTER — ANESTHESIA (OUTPATIENT)
Dept: ENDOSCOPY | Age: 84
End: 2024-05-03
Payer: MEDICARE

## 2024-05-03 PROBLEM — D64.9 SYMPTOMATIC ANEMIA: Status: ACTIVE | Noted: 2024-05-03

## 2024-05-03 LAB
ANION GAP SERPL CALCULATED.3IONS-SCNC: 10 MMOL/L (ref 9–17)
BASOPHILS # BLD: 0.13 K/UL (ref 0–0.2)
BASOPHILS NFR BLD: 2 % (ref 0–2)
BUN SERPL-MCNC: 22 MG/DL (ref 8–23)
CALCIUM SERPL-MCNC: 9.5 MG/DL (ref 8.6–10.4)
CHLORIDE SERPL-SCNC: 105 MMOL/L (ref 98–107)
CO2 SERPL-SCNC: 24 MMOL/L (ref 20–31)
CREAT SERPL-MCNC: 1.1 MG/DL (ref 0.5–0.9)
EOSINOPHIL # BLD: 0.52 K/UL (ref 0–0.4)
EOSINOPHILS RELATIVE PERCENT: 8 % (ref 0–4)
ERYTHROCYTE [DISTWIDTH] IN BLOOD BY AUTOMATED COUNT: 18.7 % (ref 11.5–14.9)
GFR, ESTIMATED: 49 ML/MIN/1.73M2
GLUCOSE SERPL-MCNC: 80 MG/DL (ref 70–99)
HCT VFR BLD AUTO: 25 % (ref 36–46)
HCT VFR BLD AUTO: 25.6 % (ref 36–46)
HCT VFR BLD AUTO: 26 % (ref 36–46)
HCT VFR BLD AUTO: 29.3 % (ref 36–46)
HGB BLD-MCNC: 7.8 G/DL (ref 12–16)
HGB BLD-MCNC: 7.9 G/DL (ref 12–16)
HGB BLD-MCNC: 8 G/DL (ref 12–16)
HGB BLD-MCNC: 9.1 G/DL (ref 12–16)
LYMPHOCYTES NFR BLD: 1.95 K/UL (ref 1–4.8)
LYMPHOCYTES RELATIVE PERCENT: 30 % (ref 24–44)
MCH RBC QN AUTO: 23 PG (ref 26–34)
MCHC RBC AUTO-ENTMCNC: 30.7 G/DL (ref 31–37)
MCV RBC AUTO: 74.7 FL (ref 80–100)
MONOCYTES NFR BLD: 0.85 K/UL (ref 0.1–1.3)
MONOCYTES NFR BLD: 13 % (ref 1–7)
MORPHOLOGY: ABNORMAL
NEUTROPHILS NFR BLD: 47 % (ref 36–66)
NEUTS SEG NFR BLD: 3.05 K/UL (ref 1.3–9.1)
PLATELET # BLD AUTO: 222 K/UL (ref 150–450)
PMV BLD AUTO: 7.9 FL (ref 6–12)
POTASSIUM SERPL-SCNC: 3.8 MMOL/L (ref 3.7–5.3)
RBC # BLD AUTO: 3.47 M/UL (ref 4–5.2)
SODIUM SERPL-SCNC: 139 MMOL/L (ref 135–144)
WBC OTHER # BLD: 6.5 K/UL (ref 3.5–11)

## 2024-05-03 PROCEDURE — 0DB98ZX EXCISION OF DUODENUM, VIA NATURAL OR ARTIFICIAL OPENING ENDOSCOPIC, DIAGNOSTIC: ICD-10-PCS | Performed by: INTERNAL MEDICINE

## 2024-05-03 PROCEDURE — A4216 STERILE WATER/SALINE, 10 ML: HCPCS | Performed by: INTERNAL MEDICINE

## 2024-05-03 PROCEDURE — C9113 INJ PANTOPRAZOLE SODIUM, VIA: HCPCS | Performed by: INTERNAL MEDICINE

## 2024-05-03 PROCEDURE — 36415 COLL VENOUS BLD VENIPUNCTURE: CPT

## 2024-05-03 PROCEDURE — 85014 HEMATOCRIT: CPT

## 2024-05-03 PROCEDURE — 43239 EGD BIOPSY SINGLE/MULTIPLE: CPT | Performed by: INTERNAL MEDICINE

## 2024-05-03 PROCEDURE — 6360000002 HC RX W HCPCS: Performed by: NURSE ANESTHETIST, CERTIFIED REGISTERED

## 2024-05-03 PROCEDURE — 2580000003 HC RX 258: Performed by: ANESTHESIOLOGY

## 2024-05-03 PROCEDURE — 88305 TISSUE EXAM BY PATHOLOGIST: CPT

## 2024-05-03 PROCEDURE — APPNB60 APP NON BILLABLE TIME 46-60 MINS: Performed by: NURSE PRACTITIONER

## 2024-05-03 PROCEDURE — 1200000000 HC SEMI PRIVATE

## 2024-05-03 PROCEDURE — 2500000003 HC RX 250 WO HCPCS: Performed by: NURSE ANESTHETIST, CERTIFIED REGISTERED

## 2024-05-03 PROCEDURE — 7100000001 HC PACU RECOVERY - ADDTL 15 MIN: Performed by: INTERNAL MEDICINE

## 2024-05-03 PROCEDURE — 3700000000 HC ANESTHESIA ATTENDED CARE: Performed by: INTERNAL MEDICINE

## 2024-05-03 PROCEDURE — 80048 BASIC METABOLIC PNL TOTAL CA: CPT

## 2024-05-03 PROCEDURE — 85018 HEMOGLOBIN: CPT

## 2024-05-03 PROCEDURE — 3609012400 HC EGD TRANSORAL BIOPSY SINGLE/MULTIPLE: Performed by: INTERNAL MEDICINE

## 2024-05-03 PROCEDURE — 2580000003 HC RX 258: Performed by: INTERNAL MEDICINE

## 2024-05-03 PROCEDURE — 99222 1ST HOSP IP/OBS MODERATE 55: CPT | Performed by: INTERNAL MEDICINE

## 2024-05-03 PROCEDURE — 85025 COMPLETE CBC W/AUTO DIFF WBC: CPT

## 2024-05-03 PROCEDURE — G0378 HOSPITAL OBSERVATION PER HR: HCPCS

## 2024-05-03 PROCEDURE — 2709999900 HC NON-CHARGEABLE SUPPLY: Performed by: INTERNAL MEDICINE

## 2024-05-03 PROCEDURE — 6360000002 HC RX W HCPCS: Performed by: INTERNAL MEDICINE

## 2024-05-03 PROCEDURE — 7100000000 HC PACU RECOVERY - FIRST 15 MIN: Performed by: INTERNAL MEDICINE

## 2024-05-03 PROCEDURE — 0DB68ZX EXCISION OF STOMACH, VIA NATURAL OR ARTIFICIAL OPENING ENDOSCOPIC, DIAGNOSTIC: ICD-10-PCS | Performed by: INTERNAL MEDICINE

## 2024-05-03 PROCEDURE — 99232 SBSQ HOSP IP/OBS MODERATE 35: CPT | Performed by: INTERNAL MEDICINE

## 2024-05-03 RX ORDER — SODIUM CHLORIDE 9 MG/ML
INJECTION, SOLUTION INTRAVENOUS CONTINUOUS
Status: DISCONTINUED | OUTPATIENT
Start: 2024-05-03 | End: 2024-05-03 | Stop reason: HOSPADM

## 2024-05-03 RX ORDER — CARVEDILOL 3.12 MG/1
3.12 TABLET ORAL 2 TIMES DAILY WITH MEALS
Status: DISCONTINUED | OUTPATIENT
Start: 2024-05-03 | End: 2024-05-04 | Stop reason: HOSPADM

## 2024-05-03 RX ORDER — PROPOFOL 10 MG/ML
INJECTION, EMULSION INTRAVENOUS CONTINUOUS PRN
Status: DISCONTINUED | OUTPATIENT
Start: 2024-05-03 | End: 2024-05-03 | Stop reason: SDUPTHER

## 2024-05-03 RX ORDER — LIDOCAINE HYDROCHLORIDE 20 MG/ML
INJECTION, SOLUTION INFILTRATION; PERINEURAL PRN
Status: DISCONTINUED | OUTPATIENT
Start: 2024-05-03 | End: 2024-05-03 | Stop reason: SDUPTHER

## 2024-05-03 RX ADMIN — PROPOFOL 50 MCG/KG/MIN: 10 INJECTION, EMULSION INTRAVENOUS at 17:01

## 2024-05-03 RX ADMIN — SODIUM CHLORIDE: 9 INJECTION, SOLUTION INTRAVENOUS at 16:21

## 2024-05-03 RX ADMIN — PANTOPRAZOLE SODIUM 40 MG: 40 INJECTION, POWDER, FOR SOLUTION INTRAVENOUS at 11:18

## 2024-05-03 RX ADMIN — SODIUM CHLORIDE, PRESERVATIVE FREE 10 ML: 5 INJECTION INTRAVENOUS at 07:51

## 2024-05-03 RX ADMIN — LIDOCAINE HYDROCHLORIDE 20 MG: 20 INJECTION, SOLUTION INFILTRATION; PERINEURAL at 17:01

## 2024-05-03 RX ADMIN — SODIUM CHLORIDE: 9 INJECTION, SOLUTION INTRAVENOUS at 13:32

## 2024-05-03 ASSESSMENT — PAIN SCALES - GENERAL: PAINLEVEL_OUTOF10: 0

## 2024-05-03 ASSESSMENT — PAIN - FUNCTIONAL ASSESSMENT
PAIN_FUNCTIONAL_ASSESSMENT: NONE - DENIES PAIN
PAIN_FUNCTIONAL_ASSESSMENT: 0-10

## 2024-05-03 NOTE — ANESTHESIA POSTPROCEDURE EVALUATION
Department of Anesthesiology  Postprocedure Note    Patient: Yaz Allison  MRN: [id number]  YOB: 1940  Date of evaluation: 5/3/2024    Procedure Summary       Date: 05/03/24 Room / Location: [place] [address] / [facility]    Anesthesia Start: 1657 Anesthesia Stop: 1711    Procedure: ESOPHAGOGASTRODUODENOSCOPY BIOPSY (Esophagus) Diagnosis:       Anemia, unspecified type      (Anemia, unspecified type [D64.9])    Surgeons: Page Mejia MD Responsible Provider: Lisa Dueñas MD    Anesthesia Type: MAC ASA Status: 3            Anesthesia Type: MAC    Gonzalez Phase I: Gonzalez Score: 10    Gonzalez Phase II:      Anesthesia Post Evaluation    Comments: POST- ANESTHESIA EVALUATION       Pt Name: Yaz Allison  MRN: [id number]  YOB: 1940  Date of evaluation: 5/3/2024  Time:  5:35 PM      BP (!) 159/67   Pulse 75   Temp 98.2 °F (36.8 °C)   Resp 18   Ht 1.702 m (5' 7\")   Wt 51.7 kg (114 lb)   SpO2 100%   BMI 17.85 kg/m²      Consciousness Level  Awake  Cardiopulmonary Status  Stable  Pain Adequately Treated YES  Nausea / Vomiting  NO  Adequate Hydration  YES  Anesthesia Related Complications NONE      Electronically signed by Lisa Dueñas MD on 5/3/2024 at 5:35 PM               No notable events documented.

## 2024-05-03 NOTE — PROGRESS NOTES
IN-PATIENT SERVICE  [facility] Internal Medicine    CONSULTATION / HISTORY AND PHYSICAL EXAMINATION            Date:   5/3/2024  Patient name:  Yaz Allison  Date of admission:  5/2/2024  3:26 PM  MRN:   [id number]  Account:  [id number]  YOB: 1940  PCP:    Gabe Cintron MD  Room:   [address] [place] [address]  Code Status:    Full Code    Physician Requesting Consult: Darius Gerard MD    Reason for Consult:  medical management    Chief Complaint:     Chief Complaint   Patient presents with    Shortness of Breath    Dizziness       History Obtained From:     Patient medical record nursing staff    History of Present Illness:   Patient, has past medical history of multiple medical problems include hypertension, COPD, chronic kidney disease stage III, patient, told me that she is having progressively getting worsening shortness of breath, dizziness, symptoms are going on for last 1 month and not getting better  Symptoms started with flulike symptoms  Patient, headache echocardiogram for evaluation of the shortness of breath on 4/26  She had her hemoglobin tested and it turned out to be 7.2, of note her hemoglobin before that was running around 10, patient was referred to GI for workup of anemia  Patient felt bad and she came to emergency room for her evaluation  In ER, her hemoglobin was 7.4, her stool for occult blood was positive, EKG and drops of stable, patient chest x-ray was not suggestive of acute changes  Patient getting admitted for anemia workup  She got 1 unit of packed cell transfusion in emergency room for symptomatic anemia  Started on Protonix  Of note patient had EGD done back in 2019 which was negative  She was diagnosed with colitis in the past and has been evaluated in [facility]  Denying black-colored stool, blood in stool  Past Medical History:     Past Medical History:   Diagnosis Date    Abdominal pain, unspecified site     Acquired cyst of kidney     Acute

## 2024-05-03 NOTE — CONSULTS
Gastroenterology Consult Note    Patient:   Yaz Allison   Admit date:  5/2/2024  Facility:   [facility]  Referring/PCP: Gabe Cintron MD  Date:     5/3/2024  Consultant:   CIRO Oneill NP, Partha Veloz MD    Subjective:     This 83 y.o. female was admitted 5/2/2024 with a diagnosis of \"GI bleed [K92.2]  Acute cystitis without hematuria [N30.00]  Symptomatic anemia [D64.9]  Gastrointestinal hemorrhage, unspecified gastrointestinal hemorrhage type [K92.2]\" and is seen in consultation regarding   Chief Complaint   Patient presents with    Shortness of Breath    Dizziness     83/F w/ pmhx of CKD, fibromyalgia, HTN, COPD presents to ED for progressive shortness of breath, dizziness over the past 1 month.  Patient states her symptoms started 1 month ago after having flu-like symptoms.  On admission hgb 7.4.  Stool was positive for occult blood.  Patient denies any melena, hematochezia.  Reports hx of collagenous colitis in the past (greater than 20 years ago).  Patient denies any diarrhea, abdominal pain, nausea, vomiting, fevers, chills.  Has intermittent constipation.  Last BM was 2 days ago.  Patient got 1 unit of PRBCs in the ED for symptomatic anemia.  Hgb this am is 8.0.    Denies any NSAID use.  No AC.  Takes 81 mg aspirin daily.  No hx of ulcer disease.    Endoscopy records  EGD 2019 for foreign body removal.   Remote hx of colonoscopy - no records found    Past Medical History:  Past Medical History:   Diagnosis Date    Abdominal pain, unspecified site     Acquired cyst of kidney     Acute gouty arthropathy     Allergic rhinitis, cause unspecified     Anxiety state, unspecified     Arthropathy, unspecified, site unspecified     Chronic airway obstruction, not elsewhere classified     Chronic kidney disease, stage III (moderate) (HCC)     Diarrhea     Edema     Esophageal reflux     Essential hypertension, benign     Herpes zoster with other nervous system  and orders as documented by the above health care provider.  I have made necessary changes as deemed appropriate directly in the note.     More than 50% of the time was spent taking care of this patient in addition to the nurse practitioner time.  That also included history taking follow-up physical examination and review of system.    Electronically signed by Page Mejia MD

## 2024-05-03 NOTE — OP NOTE
EGD report    Esophagogastroduodenoscopy (EGD) Procedure Note    Procedure:  EGD with biopsy    Procedure Date: 5/3/2024    Indications:  Anemia, FOBT +ve    Sedation: MAC    Attending Physician:  Dr. Page Mejia MD    Assistant: None    Procedure Details:    Informed consent was obtained for the procedure, including sedation. Risks of infection, perforation, hemorrhage, adverse drug reaction, and aspiration were discussed. The patient was placed in the left lateral decubitus position. The patient was monitored continuously with ECG tracing, pulse oximetry, blood pressure monitoring, and direct observation.      The gastroscope was inserted into the mouth and advanced under direct vision to second portion of the duodenum.  A careful inspection was made as the gastroscope was withdrawn, including a retroflexed view of the proximal stomach; findings and interventions are described below. Appropriate photodocumentation was obtained.    Findings:  Retropharyngeal area was grossly normal appearing     Esophagus: normal                          Esophagogastric markings: Diaphragmatic hiatus-39 cm; GE junction-39 cm     Stomach:    Fundus: normal    Body: normal    Antrum: Few scattered erosions noted.  Biopsies obtained     Duodenum:     Bulb: Few patchy erythema noted    First part: Normal    Second Part: Normal  Biopsies obtained to rule out celiac disease    Complications:  None           Estimated blood loss: Minimal    Disposition: Home           Condition: Stable    Specimen Removed: Stomach, duodenum    Impression:    Mild erosive gastropathy, biopsies obtained to rule out H. pylori.  Mild erythematous duodenopathy.  Biopsies obtained to rule out celiac disease    Recommendations:  Follow pathology results.  Protonix 40 mg once daily.  If the anemia is still a concern from GI source, consider colonoscopy.  This can be arranged as outpatient if the patient's hemoglobin is stable.  Resume regular diet    Attending

## 2024-05-03 NOTE — ANESTHESIA PRE PROCEDURE
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 localized, unspecified site     Other general symptoms(780.99)     Other iatrogenic hypothyroidism     Other nonspecific abnormal finding of lung field     Other psoriasis     Pure hypercholesterolemia     Regional enteritis of unspecified site     Senile osteoporosis     Sprain of ankle, unspecified site     Unspecified vitamin D deficiency        Past Surgical History:        Procedure Laterality Date    COLONOSCOPY      DILATION AND CURETTAGE OF UTERUS      SPINE SURGERY N/A 2022    LUMBOSACRAL VERTEBROPLASTY S1 performed by Ryan Engle MD at [facility] OR    UPPER GASTROINTESTINAL ENDOSCOPY N/A 2019    EGD FOREIGN BODY REMOVAL performed by Ben Ruiz MD at [facility] OR       Social History:    Social History     Tobacco Use    Smoking status: Former     Current packs/day: 0.00     Average packs/day: 0.8 packs/day for 60.0 years (45.0 ttl pk-yrs)     Types: Cigarettes     Start date: 10/25/1958     Quit date: 10/25/2018     Years since quittin.5    Smokeless tobacco: Never   Substance Use Topics    Alcohol use: No     Alcohol/week: 0.0 standard drinks of alcohol                                Counseling given: Not Answered      Vital Signs (Current):   Vitals:    24 0053 24 0410 24 0730 24 1230   BP: (!) 147/75 (!) 142/57 (!) 152/76 132/70   Pulse: 64 57 52 55   Resp: 18 18 18 18   Temp: 98.2 °F (36.8 °C) 99 °F (37.2 °C) 98 °F (36.7 °C) 98 °F (36.7 °C)   TempSrc: Oral Oral Oral Oral   SpO2: 99% 95% 98% 98%   Weight:       Height:                                                  BP Readings from Last 3 Encounters:   24 132/70   24 110/64   04/15/24 138/78       NPO

## 2024-05-03 NOTE — PROGRESS NOTES
05/03/24 1548   Encounter Summary   Encounter Overview/Reason Volunteer Encounter   Service Provided For Patient   Referral/Consult From Rounding   Last Encounter  05/03/24   Complexity of Encounter Low   Spiritual/Emotional needs   Type Spiritual Support   Assessment/Intervention/Outcome   Intervention Prayer (assurance of)/Louisa   Plan and Referrals   Plan/Referrals Provided reading/devotional materials  (martin provided)

## 2024-05-03 NOTE — PLAN OF CARE
Problem: Discharge Planning  Goal: Discharge to home or other facility with appropriate resources  Outcome: Progressing  Note: Dc home potentially tomorrow     Problem: Safety - Adult  Goal: Free from fall injury  Outcome: Progressing     Problem: ABCDS Injury Assessment  Goal: Absence of physical injury  Outcome: Progressing     Problem: Pain  Goal: Verbalizes/displays adequate comfort level or baseline comfort level  Outcome: Progressing

## 2024-05-03 NOTE — CARE COORDINATION
Case Management Assessment  Initial Evaluation    Date/Time of Evaluation: 5/3/2024 1:25 PM  Assessment Completed by: Yani Baez RN    If patient is discharged prior to next notation, then this note serves as note for discharge by case management.    Patient Name: Yaz Allison                   YOB: 1940  Diagnosis: GI bleed [K92.2]  Acute cystitis without hematuria [N30.00]  Symptomatic anemia [D64.9]  Gastrointestinal hemorrhage, unspecified gastrointestinal hemorrhage type [K92.2]                   Date / Time: 5/2/2024  3:26 PM    Patient Admission Status: Inpatient   Readmission Risk (Low < 19, Mod (19-27), High > 27): Readmission Risk Score: 12.7    Current PCP: Gabe Cintron MD  PCP verified by CM? Yes    Chart Reviewed: Yes      History Provided by: Patient  Patient Orientation: Alert and Oriented    Patient Cognition: Alert    Hospitalization in the last 30 days (Readmission):  No    If yes, Readmission Assessment in CM Navigator will be completed.    Advance Directives:      Code Status: Full Code   Patient's Primary Decision Maker is: Legal Next of Kin    Primary Decision Maker: Cori Allison Child - [phone]    Primary Decision Maker: Ashley Allison - [phone]    Primary Decision Maker: Levi Allison - [phone]    Primary Decision Maker: Venice Allison    Discharge Planning:    Patient lives with: Children (daughters) Type of Home: House  Primary Care Giver: Self  Patient Support Systems include: Children   Current Financial resources: Medicare  Current community resources: None  Current services prior to admission: Durable Medical Equipment            Current DME: Bedside Commode, Cane, Walker            Type of Home Care services:  None    ADLS  Prior functional level: Independent in ADLs/IADLs, Assistance with the following:, Housework, Cooking, Shopping  Current functional level: Independent in ADLs/IADLs, Assistance with the following:, Cooking,  deficits and Medication managment    Additional Case Management Notes: 5/3/2024 [facility] MEDICARE from home w/ daughters; DME cane, walker, BSC, W/C; [employer] [place] past; IV rocephin-UTI, GI consult-hgb 8.0 (1 unit PRBCs given), HR 40s, home no needs [place] 13%; will follow    The Plan for Transition of Care is related to the following treatment goals of GI bleed [K92.2]  Acute cystitis without hematuria [N30.00]  Symptomatic anemia [D64.9]  Gastrointestinal hemorrhage, unspecified gastrointestinal hemorrhage type [K92.2]    IF APPLICABLE: The Patient and/or patient representative Yaz and her family were provided with a choice of provider and agrees with the discharge plan. Freedom of choice list with basic dialogue that supports the patient's individualized plan of care/goals and shares the quality data associated with the providers was provided to: Patient         The Patient and/or Patient Representative Agree with the Discharge Plan? Yes    Yani Baez RN  Case Management Department  Ph: [phone] Fax: [phone]

## 2024-05-03 NOTE — ED NOTES
TRANSFER - OUT REPORT:    Verbal report given to NICOLETTE Gonzalez on Yaz Allison  being transferred to PCU 2116 for routine progression of patient care       Report consisted of patient's Situation, Background, Assessment and   Recommendations(SBAR).     Information from the following report(s) ED Encounter Summary, ED SBAR, MAR, Recent Results, and Event Log was reviewed with the receiving nurse.    [place] Fall Assessment:    Presents to emergency department  because of falls (Syncope, seizure, or loss of consciousness): No  Age > 70: Yes  Altered Mental Status, Intoxication with alcohol or substance confusion (Disorientation, impaired judgment, poor safety awaremess, or inability to follow instructions): No  Impaired Mobility: Ambulates or transfers with assistive devices or assistance; Unable to ambulate or transer.: Yes  Nursing Judgement: Yes          Lines:   Peripheral IV 05/02/24 Posterior;Right Hand (Active)   Site Assessment Clean, dry & intact 05/02/24 0116        Opportunity for questions and clarification was provided.      Patient transported with:  Tech

## 2024-05-03 NOTE — ACP (ADVANCE CARE PLANNING)
Advance Care Planning     Advance Care Planning Activator (Inpatient)  Conversation Note      Date of ACP Conversation: 5/3/2024     Conversation Conducted with: Patient with Decision Making Capacity    ACP Activator: Yani Baez RN      Health Care Decision Maker:     Current Designated Health Care Decision Maker:     Primary Decision Maker: Cori Allison Child - [phone]    Primary Decision Maker: Ashley Allison - Child - [phone]    Primary Decision Maker: Levi Allison - [phone]    Primary Decision Maker: Venice Allison  Click here to complete Healthcare Decision Makers including   Today we documented Decision Maker(s) consistent with Legal Next of Kin hierarchy.    Care Preferences    Ventilation:  \"If you were in your present state of health and suddenly became very ill and were unable to breathe on your own, what would your preference be about the use of a ventilator (breathing machine) if it were available to you?\"      Would the patient desire the use of ventilator (breathing machine)?: yes    \"If your health worsens and it becomes clear that your chance of recovery is unlikely, what would your preference be about the use of a ventilator (breathing machine) if it were available to you?\"     Would the patient desire the use of ventilator (breathing machine)?: No      Resuscitation  \"CPR works best to restart the heart when there is a sudden event, like a heart attack, in someone who is otherwise healthy. Unfortunately, CPR does not typically restart the heart for people who have serious health conditions or who are very sick.\"    \"In the event your heart stopped as a result of an underlying serious health condition, would you want attempts to be made to restart your heart (answer \"yes\" for attempt to resuscitate) or would you prefer a natural death (answer \"no\" for do not attempt to resuscitate)?\" yes       [] Yes   [] No   Educated Patient / Decision Maker regarding differences

## 2024-05-03 NOTE — PROGRESS NOTES
05/03/24 1555   Encounter Summary   Encounter Overview/Reason  Encounter   Service Provided For Patient   Referral/Consult From Rounding   Last Encounter  05/03/24   Complexity of Encounter Low   Spiritual/Emotional needs   Type Spiritual Support   Rituals, Rites and Sacraments   Type Sacrament of Sick

## 2024-05-04 VITALS
HEART RATE: 67 BPM | TEMPERATURE: 98.8 F | WEIGHT: 114 LBS | SYSTOLIC BLOOD PRESSURE: 146 MMHG | RESPIRATION RATE: 16 BRPM | HEIGHT: 67 IN | OXYGEN SATURATION: 97 % | DIASTOLIC BLOOD PRESSURE: 70 MMHG | BODY MASS INDEX: 17.89 KG/M2

## 2024-05-04 LAB
EKG ATRIAL RATE: 70 BPM
EKG P AXIS: 41 DEGREES
EKG P-R INTERVAL: 186 MS
EKG Q-T INTERVAL: 400 MS
EKG QRS DURATION: 82 MS
EKG QTC CALCULATION (BAZETT): 432 MS
EKG R AXIS: 0 DEGREES
EKG T AXIS: 72 DEGREES
EKG VENTRICULAR RATE: 70 BPM
HCT VFR BLD AUTO: 25.4 % (ref 36–46)
HGB BLD-MCNC: 7.8 G/DL (ref 12–16)
HGB BLD-MCNC: 7.8 G/DL (ref 12–16)
HGB BLD-MCNC: 7.9 G/DL (ref 12–16)
MICROORGANISM SPEC CULT: ABNORMAL
SPECIMEN DESCRIPTION: ABNORMAL

## 2024-05-04 PROCEDURE — APPSS30 APP SPLIT SHARED TIME 16-30 MINUTES: Performed by: NURSE PRACTITIONER

## 2024-05-04 PROCEDURE — 6370000000 HC RX 637 (ALT 250 FOR IP): Performed by: INTERNAL MEDICINE

## 2024-05-04 PROCEDURE — 99231 SBSQ HOSP IP/OBS SF/LOW 25: CPT | Performed by: INTERNAL MEDICINE

## 2024-05-04 PROCEDURE — 99239 HOSP IP/OBS DSCHRG MGMT >30: CPT | Performed by: INTERNAL MEDICINE

## 2024-05-04 PROCEDURE — C9113 INJ PANTOPRAZOLE SODIUM, VIA: HCPCS | Performed by: INTERNAL MEDICINE

## 2024-05-04 PROCEDURE — A4216 STERILE WATER/SALINE, 10 ML: HCPCS | Performed by: INTERNAL MEDICINE

## 2024-05-04 PROCEDURE — 85018 HEMOGLOBIN: CPT

## 2024-05-04 PROCEDURE — 87086 URINE CULTURE/COLONY COUNT: CPT

## 2024-05-04 PROCEDURE — 6360000002 HC RX W HCPCS: Performed by: INTERNAL MEDICINE

## 2024-05-04 PROCEDURE — 93010 ELECTROCARDIOGRAM REPORT: CPT | Performed by: INTERNAL MEDICINE

## 2024-05-04 PROCEDURE — G0378 HOSPITAL OBSERVATION PER HR: HCPCS

## 2024-05-04 PROCEDURE — 36415 COLL VENOUS BLD VENIPUNCTURE: CPT

## 2024-05-04 PROCEDURE — 85014 HEMATOCRIT: CPT

## 2024-05-04 PROCEDURE — 2580000003 HC RX 258: Performed by: INTERNAL MEDICINE

## 2024-05-04 RX ORDER — PANTOPRAZOLE SODIUM 40 MG/1
40 TABLET, DELAYED RELEASE ORAL
Qty: 90 TABLET | Refills: 1 | Status: SHIPPED | OUTPATIENT
Start: 2024-05-04

## 2024-05-04 RX ORDER — CIPROFLOXACIN 500 MG/1
500 TABLET, FILM COATED ORAL 2 TIMES DAILY
Qty: 10 TABLET | Refills: 0 | Status: SHIPPED | OUTPATIENT
Start: 2024-05-04 | End: 2024-05-09

## 2024-05-04 RX ADMIN — CARVEDILOL 3.12 MG: 3.12 TABLET, FILM COATED ORAL at 10:21

## 2024-05-04 RX ADMIN — GABAPENTIN 300 MG: 300 CAPSULE ORAL at 00:43

## 2024-05-04 RX ADMIN — SODIUM CHLORIDE, PRESERVATIVE FREE 10 ML: 5 INJECTION INTRAVENOUS at 10:26

## 2024-05-04 RX ADMIN — LEVOTHYROXINE SODIUM 100 MCG: 0.1 TABLET ORAL at 06:20

## 2024-05-04 RX ADMIN — ATORVASTATIN CALCIUM 40 MG: 40 TABLET, FILM COATED ORAL at 00:43

## 2024-05-04 RX ADMIN — ALLOPURINOL 300 MG: 300 TABLET ORAL at 10:20

## 2024-05-04 RX ADMIN — GABAPENTIN 300 MG: 300 CAPSULE ORAL at 10:20

## 2024-05-04 RX ADMIN — FOLIC ACID 1 MG: 1 TABLET ORAL at 10:20

## 2024-05-04 RX ADMIN — PANTOPRAZOLE SODIUM 40 MG: 40 INJECTION, POWDER, FOR SOLUTION INTRAVENOUS at 00:43

## 2024-05-04 RX ADMIN — SODIUM CHLORIDE: 9 INJECTION, SOLUTION INTRAVENOUS at 00:36

## 2024-05-04 RX ADMIN — PANTOPRAZOLE SODIUM 40 MG: 40 INJECTION, POWDER, FOR SOLUTION INTRAVENOUS at 10:35

## 2024-05-04 RX ADMIN — ATORVASTATIN CALCIUM 40 MG: 40 TABLET, FILM COATED ORAL at 10:21

## 2024-05-04 ASSESSMENT — PAIN SCALES - GENERAL: PAINLEVEL_OUTOF10: 0

## 2024-05-04 NOTE — CARE COORDINATION
ONGOING DISCHARGE PLAN:    Patient is alert and oriented x4.    Spoke with patient regarding discharge plan and patient confirms that plan is still home denies needs.    POD #1 EGD mild erosive gastropathy, duodenopathy biopsies taken  Hgb 7.9  Protonix daily    IV rocephin UTI    Will continue to follow for additional discharge needs.    If patient is discharged prior to next notation, then this note serves as note for discharge by case management.    Electronically signed by Yani Baez RN on 5/4/2024 at 11:27 AM

## 2024-05-04 NOTE — DISCHARGE INSTR - DIET

## 2024-05-04 NOTE — PLAN OF CARE
Problem: Discharge Planning  Goal: Discharge to home or other facility with appropriate resources  5/4/2024 0245 by Gwendolyn Palmer RN  Outcome: Progressing  Flowsheets (Taken 5/4/2024 0245)  Discharge to home or other facility with appropriate resources: Identify barriers to discharge with patient and caregiver     Problem: Safety - Adult  Goal: Free from fall injury  5/4/2024 0245 by Gwendolyn Palmer, RN  Outcome: Progressing  Flowsheets (Taken 5/4/2024 0245)  Free From Fall Injury: Instruct family/caregiver on patient safety     Problem: ABCDS Injury Assessment  Goal: Absence of physical injury  5/4/2024 0245 by Gwendolyn Palmer RN  Outcome: Progressing  Flowsheets (Taken 5/4/2024 0245)  Absence of Physical Injury: Implement safety measures based on patient assessment     Problem: Pain  Goal: Verbalizes/displays adequate comfort level or baseline comfort level  5/4/2024 0245 by Gwendolyn Palmer RN  Outcome: Progressing  Flowsheets (Taken 5/4/2024 0245)  Verbalizes/displays adequate comfort level or baseline comfort level:   Encourage patient to monitor pain and request assistance   Assess pain using appropriate pain scale

## 2024-05-04 NOTE — PROGRESS NOTES
[facility]    Gastroenterology Daily Progress Note      Patient:   Yaz Allison   :    1940   Facility:   [facility]  Date:     2024  Consultant:   CIRO Armstrong - CNP, CNP      SUBJECTIVE  83 y.o. female admitted 2024 with GI bleed [K92.2]  Acute cystitis without hematuria [N30.00]  Symptomatic anemia [D64.9]  Gastrointestinal hemorrhage, unspecified gastrointestinal hemorrhage type [K92.2] and seen for anemia. The pt was seen and examined. She is s/p egd yesterday that is discussed below. No c/o abdominal pain tolerating a diet. Hgb 7.9 had normal appearing bm. .        OBJECTIVE  Scheduled Meds:   carvedilol  3.125 mg Oral BID WC    sodium chloride flush  5-40 mL IntraVENous 2 times per day    allopurinol  300 mg Oral Daily    atorvastatin  40 mg Oral Daily    folic acid  1 mg Oral Daily    gabapentin  300 mg Oral BID    levothyroxine  100 mcg Oral Daily    pantoprazole (PROTONIX) 40 mg in sodium chloride (PF) 0.9 % 10 mL injection  40 mg IntraVENous Q12H    cefTRIAXone (ROCEPHIN) IV  1,000 mg IntraVENous Q24H       Vital Signs:  BP (!) 161/72   Pulse 66   Temp 98.5 °F (36.9 °C) (Oral)   Resp 16   Ht 1.702 m (5' 7\")   Wt 51.7 kg (114 lb)   SpO2 97%   BMI 17.85 kg/m²    Review of Systems - History obtained from chart review and the patient  General ROS: negative  Respiratory ROS: no cough, shortness of breath, or wheezing  Cardiovascular ROS: no chest pain or dyspnea on exertion  Gastrointestinal ROS: no abdominal pain, change in bowel habits, or black or bloody stools   Physical Exam:     General Appearance: alert and oriented to person, place and time, well-developed and well-nourished, in no acute distress  Skin: warm and dry, no rash or erythema  Head: normocephalic and atraumatic  Eyes: pupils equal, round, and reactive to light, extraocular eye movements intact, conjunctivae normal  ENT: hearing grossly normal bilaterally  Neck: neck supple and non  tender without mass, no thyromegaly or thyroid nodules, no cervical lymphadenopathy   Pulmonary/Chest: clear to auscultation bilaterally- no wheezes, rales or rhonchi, normal air movement, no respiratory distress  Cardiovascular: normal rate, regular rhythm, normal S1 and S2, no murmurs, rubs, clicks or gallops, distal pulses intact, no carotid bruits  Abdomen: soft, non-tender, non-distended, normal bowel sounds, no masses or organomegaly  Extremities: no cyanosis, clubbing or edema  Musculoskeletal: normal range of motion, no joint swelling, deformity or tenderness  Neurologic: no cranial nerve deficit and muscle strength normal    Lab and Imaging Review     CBC  Recent Labs     05/02/24  1600 05/03/24  0201 05/03/24  0623 05/03/24  1228 05/03/24  1835 05/04/24  0030 05/04/24  0654   WBC 7.5  --  6.5  --   --   --   --    HGB 7.4*   < > 8.0*   < > 9.1* 7.8* 7.9*   HCT 24.6*   < > 26.0*   < > 29.3* 25.4* 25.4*   MCV 72.5*  --  74.7*  --   --   --   --      --  222  --   --   --   --     < > = values in this interval not displayed.       BMP  Recent Labs     05/02/24  1600 05/03/24  0623    139   K 3.9 3.8    105   CO2 23 24   BUN 24* 22   CREATININE 1.2* 1.1*   GLUCOSE 108* 80   CALCIUM 10.2 9.5     Egd dr olmedo 5/3/24  Findings:  Retropharyngeal area was grossly normal appearing     Esophagus: normal                          Esophagogastric markings: Diaphragmatic hiatus-39 cm; GE junction-39 cm     Stomach:    Fundus: normal    Body: normal    Antrum: Few scattered erosions noted.  Biopsies obtained     Duodenum:     Bulb: Few patchy erythema noted    First part: Normal    Second Part: Normal  Biopsies obtained to rule out celiac disease    ASSESSMENT/plan  Anemia with positive fobt s/p egd yesterday showing scattered antral erosions and patchy areas in the duodenum  F/u bx results  Ppi daily  Pt does not want colonoscopy as inpatient, consider as outpt    2.uti mgt per primary service      This

## 2024-05-04 NOTE — PLAN OF CARE
Problem: Discharge Planning  Goal: Discharge to home or other facility with appropriate resources  5/4/2024 1305 by Karol Naylor, RN  Outcome: Adequate for Discharge     Problem: Safety - Adult  Goal: Free from fall injury  5/4/2024 1305 by Karol Naylor RN  Outcome: Adequate for Discharge     Problem: ABCDS Injury Assessment  Goal: Absence of physical injury  5/4/2024 1305 by Karol Naylor RN  Outcome: Adequate for Discharge     Problem: Pain  Goal: Verbalizes/displays adequate comfort level or baseline comfort level  5/4/2024 1305 by Karol Naylor, RN  Outcome: Adequate for Discharge

## 2024-05-04 NOTE — DISCHARGE SUMMARY
IN-PATIENT SERVICE   [facility]    Discharge Summary     Patient ID: Yaz Allison  :  1940   MRN: [id number]     ACCOUNT:  [id number]   Patient's PCP: Gabe Cintron MD  Admit Date: 2024   Discharge Date: 2024    Length of Stay: 2  Code Status:  Full Code  Admitting Physician: Darius Gerard MD  Discharge Physician: Gabe Cintron MD     Active Discharge Diagnoses:     Primary Problem  GI bleed      Hospital Problems  Active Hospital Problems    Diagnosis Date Noted    Anemia [D64.9] 2024    GI bleed [K92.2] 2024       Admission Condition:  fair     Discharged Condition: fair    Hospital Stay:     Hospital Course:  Yaz Allison is a 83 y.o. female who was admitted for the management of GI bleed , presented to ER with Shortness of Breath and Dizziness    Admitted with symptomatic anemia, likely due to GI bleed, stool for occult blood is positive, patient started on IV Protonix twice a day, gentle hydration, H&H every 6, GI consult, patient had echo done on , results still pending  SCD for DVT prophylaxis  Hypertension, resuming home dose of Coreg  Hypothyroidism on Synthroid  Patient has frequent UTIs, UA is positive again, 1 dose of Rocephin given in emergency room will continue  Patient also had chronic kidney disease outpatient CBC was ordered an echo was done for dyspnea of exertion patient has declined a screening colonoscopy in the past and also in the recent visit  Echo is ordered which is pending  Patient had a EGD done which showed antral gastritis pantoprazole oral stop aspirin patient declined to get an inpatient colonoscopy advised to follow-up outpatient  Outpatient referral for GI made and also for heme-onc to rule out bone marrow disorder  Celiac workp pending  Pt wants to be discharged for out pt followup  E. coli UTI noted present on admission discharged on oral Cipro    Significant therapeutic interventions:     Significant  Diagnostic Studies:   Labs / Micro:        Radiology:    XR CHEST (2 VW)    Result Date: 5/2/2024  EXAMINATION: TWO XRAY VIEWS OF THE CHEST 5/2/2024 4:09 pm COMPARISON: CT on 02/17/2021, radiograph on 06/14/2019 HISTORY: Acute shortness of breath. FINDINGS: Normal cardiomediastinal silhouette.  Lungs remain hyperinflated.  Stable left basilar opacity which may represent scarring.  New right basilar opacity.  No pleural effusion or pneumothorax.  Osteopenia.     New right basilar opacity may represent atelectasis/scarring although pneumonia cannot be excluded.         Consultations:    Consults:     Final Specialist Recommendations/Findings:   IP CONSULT TO HOSPITALIST  IP CONSULT TO GI      The patient was seen and examined on day of discharge and this discharge summary is in conjunction with any daily progress note from day of discharge.    Discharge plan:     Disposition: Home    Physician Follow Up:   Partha Veloz MD  [id number] [id number] [phone]    Follow up in 1 week(s)      Mane Britton MD  [id number] [id number] [phone]    Follow up in 1 week(s)  anemia workup       Requiring Further Evaluation/Follow Up POST HOSPITALIZATION/Incidental Findings:    Diet: cardiac diet    Activity: As tolerated    Instructions to Patient:     Discharge Medications:      Medication List        START taking these medications      ciprofloxacin 500 MG tablet  Commonly known as: Cipro  Take 1 tablet by mouth 2 times daily for 5 days     pantoprazole 40 MG tablet  Commonly known as: PROTONIX  Take 1 tablet by mouth every morning (before breakfast)            CONTINUE taking these medications      allopurinol 300 MG tablet  Commonly known as: ZYLOPRIM  TAKE 1 TABLET BY MOUTH DAILY     atorvastatin 40 MG tablet  Commonly known as: LIPITOR  TAKE 1 TABLET BY MOUTH DAILY     carvedilol 12.5 MG tablet  Commonly known as: COREG  TAKE ONE-HALF TABLET BY MOUTH  TWICE DAILY

## 2024-05-05 LAB
ABO/RH: NORMAL
ANTIBODY SCREEN: NEGATIVE
ARM BAND NUMBER: NORMAL
BLOOD BANK BLOOD PRODUCT EXPIRATION DATE: NORMAL
BLOOD BANK DISPENSE STATUS: NORMAL
BLOOD BANK ISBT PRODUCT BLOOD TYPE: 5100
BLOOD BANK PRODUCT CODE: NORMAL
BLOOD BANK SAMPLE EXPIRATION: NORMAL
BLOOD BANK UNIT TYPE AND RH: NORMAL
BPU ID: NORMAL
COMPONENT: NORMAL
CROSSMATCH RESULT: NORMAL
MICROORGANISM SPEC CULT: NO GROWTH
SPECIMEN DESCRIPTION: NORMAL
TRANSFUSION STATUS: NORMAL
UNIT DIVISION: 0
UNIT ISSUE DATE/TIME: NORMAL

## 2024-05-06 ENCOUNTER — CARE COORDINATION (OUTPATIENT)
Dept: CASE MANAGEMENT | Age: 84
End: 2024-05-06

## 2024-05-06 NOTE — CARE COORDINATION
Care Transitions Outreach Attempt #1    Call within 2 business days of discharge: Yes   Attempt #1 to reach patient for transitions of care follow up. Unable to reach patient. Left message requesting call back.    Patient: Yaz Allison Patient : 1940 MRN: [id number]    Last Discharge Facility       Date Complaint Diagnosis Description Type Department Provider    24 Shortness of Breath; Dizziness Gastrointestinal hemorrhage, unspecified gastrointestinal hemorrhage type ... ED to Hosp-Admission (Discharged) (ADMITTED) Darius Dougherty MD; Rajesh Granados...              Was this an external facility discharge? No Discharge Facility Name: [facility]    Noted following upcoming appointments from discharge chart review:   [facility] follow up appointment(s):   Future Appointments   Date Time Provider Department Center   2024 10:30 AM Gabe Cintron MD [facility]   10/10/2024 10:40 AM Nathalia Pedro APRN - CNP [facility]     Chloe Baca RN BSN [facility]  Care Transitions Nurse  [phone]   [email or web address]

## 2024-05-07 ENCOUNTER — CARE COORDINATION (OUTPATIENT)
Dept: CASE MANAGEMENT | Age: 84
End: 2024-05-07

## 2024-05-07 NOTE — CARE COORDINATION
Care Transitions Outreach Attempt #2    Call within 2 business days of discharge: Yes   Attempt #2 to reach patient for transitions of care follow up. Unable to reach patient. Left message requesting call back. CTN sign off if no return call received.    Patient: Yaz Allison Patient : 1940 MRN: [id number]    Last Discharge Facility       Date Complaint Diagnosis Description Type Department Provider    24 Shortness of Breath; Dizziness Gastrointestinal hemorrhage, unspecified gastrointestinal hemorrhage type ... ED to Hosp-Admission (Discharged) (ADMITTED) Darius Dougherty MD; Rajesh Granados...              Was this an external facility discharge? No     Noted following upcoming appointments from discharge chart review:   [facility] follow up appointment(s):   Future Appointments   Date Time Provider Department Center   2024 10:30 AM Gabe Cintron MD [facility]   10/10/2024 10:40 AM Nathalia Pedro APRN - CNP [facility]     Chloe Baca RN BSN [facility]  Care Transitions Nurse  [phone]   [email or web address]

## 2024-05-08 LAB — SURGICAL PATHOLOGY REPORT: NORMAL

## 2024-05-10 RX ORDER — ATORVASTATIN CALCIUM 40 MG/1
40 TABLET, FILM COATED ORAL DAILY
Qty: 100 TABLET | Refills: 2 | Status: SHIPPED | OUTPATIENT
Start: 2024-05-10

## 2024-05-13 LAB
ECHO AO ROOT DIAM: 2.2 CM
ECHO AV AREA PEAK VELOCITY: 2 CM2
ECHO AV AREA VTI: 1.8 CM2
ECHO AV MEAN GRADIENT: 5 MMHG
ECHO AV MEAN VELOCITY: 1.1 M/S
ECHO AV PEAK GRADIENT: 10 MMHG
ECHO AV PEAK VELOCITY: 1.6 M/S
ECHO AV VELOCITY RATIO: 0.56
ECHO AV VTI: 35.3 CM
ECHO EST RA PRESSURE: 8 MMHG
ECHO LA DIAMETER: 3.8 CM
ECHO LA TO AORTIC ROOT RATIO: 1.73
ECHO LV E' LATERAL VELOCITY: 6 CM/S
ECHO LV E' SEPTAL VELOCITY: 5 CM/S
ECHO LV FRACTIONAL SHORTENING: 34 % (ref 28–44)
ECHO LV INTERNAL DIMENSION DIASTOLIC: 4.4 CM (ref 3.9–5.3)
ECHO LV INTERNAL DIMENSION SYSTOLIC: 2.9 CM
ECHO LV IVSD: 0.8 CM (ref 0.6–0.9)
ECHO LV MASS 2D: 109.4 G (ref 67–162)
ECHO LV POSTERIOR WALL DIASTOLIC: 0.8 CM (ref 0.6–0.9)
ECHO LV RELATIVE WALL THICKNESS RATIO: 0.36
ECHO LVOT AREA: 3.5 CM2
ECHO LVOT AV VTI INDEX: 0.52
ECHO LVOT DIAM: 2.1 CM
ECHO LVOT MEAN GRADIENT: 2 MMHG
ECHO LVOT PEAK GRADIENT: 3 MMHG
ECHO LVOT PEAK VELOCITY: 0.9 M/S
ECHO LVOT SV: 64 ML
ECHO LVOT VTI: 18.5 CM
ECHO MV A VELOCITY: 1.1 M/S
ECHO MV AREA VTI: 1.5 CM2
ECHO MV E DECELERATION TIME (DT): 198 MS
ECHO MV E VELOCITY: 1.01 M/S
ECHO MV E/A RATIO: 0.92
ECHO MV E/E' LATERAL: 16.83
ECHO MV E/E' RATIO (AVERAGED): 18.52
ECHO MV E/E' SEPTAL: 20.2
ECHO MV LVOT VTI INDEX: 2.25
ECHO MV MAX VELOCITY: 1 M/S
ECHO MV MEAN GRADIENT: 2 MMHG
ECHO MV MEAN VELOCITY: 0.6 M/S
ECHO MV PEAK GRADIENT: 4 MMHG
ECHO MV VTI: 41.7 CM
ECHO RA AREA 4C: 17.7 CM2
ECHO RIGHT VENTRICULAR SYSTOLIC PRESSURE (RVSP): 45 MMHG
ECHO RV TAPSE: 1.9 CM (ref 1.7–?)
ECHO TV REGURGITANT MAX VELOCITY: 3.04 M/S
ECHO TV REGURGITANT PEAK GRADIENT: 37 MMHG

## 2024-05-28 RX ORDER — GABAPENTIN 300 MG/1
300 CAPSULE ORAL 2 TIMES DAILY
Qty: 120 CAPSULE | Refills: 3 | Status: SHIPPED | OUTPATIENT
Start: 2024-05-28 | End: 2024-11-24

## 2024-05-29 ENCOUNTER — TELEPHONE (OUTPATIENT)
Dept: INTERNAL MEDICINE CLINIC | Age: 84
End: 2024-05-29

## 2024-05-29 ENCOUNTER — OFFICE VISIT (OUTPATIENT)
Dept: INTERNAL MEDICINE CLINIC | Age: 84
End: 2024-05-29

## 2024-05-29 ENCOUNTER — HOSPITAL ENCOUNTER (OUTPATIENT)
Age: 84
Setting detail: SPECIMEN
Discharge: HOME | End: 2024-05-29

## 2024-05-29 VITALS
HEIGHT: 67 IN | HEART RATE: 70 BPM | DIASTOLIC BLOOD PRESSURE: 78 MMHG | BODY MASS INDEX: 18.02 KG/M2 | OXYGEN SATURATION: 97 % | SYSTOLIC BLOOD PRESSURE: 136 MMHG | WEIGHT: 114.8 LBS

## 2024-05-29 DIAGNOSIS — D64.9 ANEMIA, UNSPECIFIED TYPE: Primary | ICD-10-CM

## 2024-05-29 DIAGNOSIS — Z09 HOSPITAL DISCHARGE FOLLOW-UP: ICD-10-CM

## 2024-05-29 DIAGNOSIS — N30.00 ACUTE CYSTITIS WITHOUT HEMATURIA: ICD-10-CM

## 2024-05-29 DIAGNOSIS — I27.20 PULMONARY HTN (HCC): ICD-10-CM

## 2024-05-29 DIAGNOSIS — K92.2 GASTROINTESTINAL HEMORRHAGE, UNSPECIFIED GASTROINTESTINAL HEMORRHAGE TYPE: ICD-10-CM

## 2024-05-29 DIAGNOSIS — Z13.220 SCREENING FOR HYPERLIPIDEMIA: ICD-10-CM

## 2024-05-29 DIAGNOSIS — N18.30 STAGE 3 CHRONIC KIDNEY DISEASE, UNSPECIFIED WHETHER STAGE 3A OR 3B CKD (HCC): ICD-10-CM

## 2024-05-29 DIAGNOSIS — J43.1 PANLOBULAR EMPHYSEMA (HCC): ICD-10-CM

## 2024-05-29 DIAGNOSIS — N30.00 ACUTE CYSTITIS WITHOUT HEMATURIA: Primary | ICD-10-CM

## 2024-05-29 DIAGNOSIS — D68.9 COAGULATION DEFECT (HCC): ICD-10-CM

## 2024-05-29 RX ORDER — CIPROFLOXACIN 500 MG/1
500 TABLET, FILM COATED ORAL 2 TIMES DAILY
Qty: 10 TABLET | Refills: 0 | Status: SHIPPED | OUTPATIENT
Start: 2024-05-29 | End: 2024-06-03

## 2024-05-29 ASSESSMENT — PATIENT HEALTH QUESTIONNAIRE - PHQ9
1. LITTLE INTEREST OR PLEASURE IN DOING THINGS: NOT AT ALL
2. FEELING DOWN, DEPRESSED OR HOPELESS: NOT AT ALL
SUM OF ALL RESPONSES TO PHQ QUESTIONS 1-9: 0
SUM OF ALL RESPONSES TO PHQ9 QUESTIONS 1 & 2: 0
SUM OF ALL RESPONSES TO PHQ QUESTIONS 1-9: 0

## 2024-05-29 ASSESSMENT — ENCOUNTER SYMPTOMS
DIARRHEA: 0
TROUBLE SWALLOWING: 0
BLOOD IN STOOL: 0
ABDOMINAL DISTENTION: 0
COLOR CHANGE: 0
EYE PAIN: 0
COUGH: 0
WHEEZING: 0
EYE DISCHARGE: 0
SHORTNESS OF BREATH: 0

## 2024-05-29 NOTE — TELEPHONE ENCOUNTER
Patient is in need of a new referral for Dr. Britton as they referred her to him in the hospital and they need a referral. Referral pending, please add DX and sign.

## 2024-05-29 NOTE — PROGRESS NOTES
Visit Information    Have you changed or started any medications since your last visit including any over-the-counter medicines, vitamins, or herbal medicines? no   Are you having any side effects from any of your medications? -  no  Have you stopped taking any of your medications? Is so, why? -  no    Have you seen any other physician or provider since your last visit? Yes - Records Obtained  Have you had any other diagnostic tests since your last visit? Yes - Records Obtained  Have you been seen in the emergency room and/or had an admission to a hospital since we last saw you? Yes - Records Obtained  Have you had your routine dental cleaning in the past 6 months? no    Have you activated your [employer] account? If not, what are your barriers? Yes     Patient Care Team:  Gabe Cintron MD as PCP - General (Internal Medicine)  [facility]    Medical History Review  Past Medical, Family, and Social History reviewed and does contribute to the patient presenting condition    Health Maintenance   Topic Date Due    DTaP/Tdap/Td vaccine (1 - Tdap) Never done    Shingles vaccine (1 of 2) Never done    Respiratory Syncytial Virus (RSV) Pregnant or age 60 yrs+ (1 - 1-dose 60+ series) Never done    Annual Wellness Visit (Medicare Advantage)  01/01/2024    Lipids  03/15/2024    Depression Screen  02/05/2025    Flu vaccine  Completed    Pneumococcal 65+ years Vaccine  Completed    COVID-19 Vaccine  Completed    DEXA (modify frequency per FRAX score)  Addressed    Hepatitis A vaccine  Aged Out    Hepatitis B vaccine  Aged Out    Hib vaccine  Aged Out    Polio vaccine  Aged Out    Meningococcal (ACWY) vaccine  Aged Out     
content does not include homicidal ideation.         Cognition and Memory: Memory is not impaired.       /78 (Site: Left Upper Arm, Position: Sitting, Cuff Size: Medium Adult)   Pulse 70   Ht 1.702 m (5' 7\")   Wt 52.1 kg (114 lb 12.8 oz)   SpO2 97%   BMI 17.98 kg/m²     Assessment:       Diagnosis Orders   1. Acute cystitis without hematuria  Culture, Urine      2. Panlobular emphysema (HCC)        3. Stage 3 chronic kidney disease, unspecified whether stage 3a or 3b CKD (HCC)        4. Coagulation defect (HCC)        5. Screening for hyperlipidemia  Lipid Panel      6. Gastrointestinal hemorrhage, unspecified gastrointestinal hemorrhage type  Partha Camara MD, Gastroenterology, [place]      7. Hospital discharge follow-up  [facility] DISCHARGE MEDS RECONCILED W/ CURRENT OUTPATIENT MED LIST      8. Pulmonary HTN (HCC)                  Plan:   Assessment & Plan   Return in about 2 months (around 7/29/2024).    Orders Placed This Encounter   Procedures    Culture, Urine     Standing Status:   Future     Standing Expiration Date:   5/29/2025    Lipid Panel     Standing Status:   Future     Standing Expiration Date:   8/29/2024     Order Specific Question:   Is Patient Fasting?/# of Hours     Answer:   No    Partha Camara MD, Gastroenterology, [place]     Referral Priority:   Routine     Referral Type:   Eval and Treat     Referral Reason:   Specialty Services Required     Referred to Provider:   Partha Veloz MD     Requested Specialty:   Gastroenterology     Number of Visits Requested:   1    SC DISCHARGE MEDS RECONCILED W/ CURRENT OUTPATIENT MED LIST     Orders Placed This Encounter   Medications    ciprofloxacin (CIPRO) 500 MG tablet     Sig: Take 1 tablet by mouth 2 times daily for 5 days     Dispense:  10 tablet     Refill:  0      Admitted with symptomatic anemia, likely due to GI bleed, stool for occult blood is positive, patient started on IV Protonix twice a day, gentle hydration, H&H every 6, GI

## 2024-05-31 LAB
MICROORGANISM SPEC CULT: ABNORMAL
SERVICE CMNT-IMP: ABNORMAL
SPECIMEN DESCRIPTION: ABNORMAL

## 2024-05-31 RX ORDER — DOXYCYCLINE HYCLATE 100 MG/1
100 CAPSULE ORAL 2 TIMES DAILY
Qty: 20 CAPSULE | Refills: 0 | Status: SHIPPED | OUTPATIENT
Start: 2024-05-31 | End: 2024-06-10

## 2024-05-31 RX ORDER — NITROFURANTOIN 25; 75 MG/1; MG/1
100 CAPSULE ORAL 2 TIMES DAILY
Qty: 20 CAPSULE | Refills: 0 | Status: SHIPPED | OUTPATIENT
Start: 2024-05-31 | End: 2024-06-10

## 2024-06-01 DIAGNOSIS — M10.9 ACUTE GOUTY ARTHROPATHY: ICD-10-CM

## 2024-06-03 RX ORDER — ALLOPURINOL 300 MG/1
300 TABLET ORAL DAILY
Qty: 100 TABLET | Refills: 2 | Status: SHIPPED | OUTPATIENT
Start: 2024-06-03

## 2024-06-13 ENCOUNTER — TELEPHONE (OUTPATIENT)
Dept: INTERNAL MEDICINE CLINIC | Age: 84
End: 2024-06-13

## 2024-06-13 NOTE — TELEPHONE ENCOUNTER
Patient called about her back pain. No open appointment. [facility]

## 2024-06-14 ENCOUNTER — HOSPITAL ENCOUNTER (INPATIENT)
Age: 84
LOS: 2 days | Discharge: HOME | DRG: 690 | End: 2024-06-16
Attending: EMERGENCY MEDICINE | Admitting: INTERNAL MEDICINE
Payer: MEDICARE

## 2024-06-14 DIAGNOSIS — E86.0 DEHYDRATION: ICD-10-CM

## 2024-06-14 DIAGNOSIS — N30.00 ACUTE CYSTITIS WITHOUT HEMATURIA: Primary | ICD-10-CM

## 2024-06-14 DIAGNOSIS — Z78.9 FAILURE OF OUTPATIENT TREATMENT: ICD-10-CM

## 2024-06-14 LAB
ALBUMIN SERPL-MCNC: 3.7 G/DL (ref 3.5–5.2)
ALP SERPL-CCNC: 107 U/L (ref 35–104)
ALT SERPL-CCNC: 9 U/L (ref 5–33)
ANION GAP SERPL CALCULATED.3IONS-SCNC: 11 MMOL/L (ref 9–17)
AST SERPL-CCNC: 22 U/L
BACTERIA URNS QL MICRO: ABNORMAL
BASOPHILS # BLD: 0.13 K/UL (ref 0–0.2)
BASOPHILS NFR BLD: 2 % (ref 0–2)
BILIRUB SERPL-MCNC: 0.3 MG/DL (ref 0.3–1.2)
BILIRUB UR QL STRIP: NEGATIVE
BUN SERPL-MCNC: 29 MG/DL (ref 8–23)
CALCIUM SERPL-MCNC: 10.1 MG/DL (ref 8.6–10.4)
CASTS #/AREA URNS LPF: ABNORMAL /LPF
CHLORIDE SERPL-SCNC: 102 MMOL/L (ref 98–107)
CLARITY UR: CLEAR
CO2 SERPL-SCNC: 24 MMOL/L (ref 20–31)
COLOR UR: YELLOW
CREAT SERPL-MCNC: 1.3 MG/DL (ref 0.5–0.9)
EOSINOPHIL # BLD: 0.33 K/UL (ref 0–0.4)
EOSINOPHILS RELATIVE PERCENT: 5 % (ref 0–4)
EPI CELLS #/AREA URNS HPF: ABNORMAL /HPF
ERYTHROCYTE [DISTWIDTH] IN BLOOD BY AUTOMATED COUNT: 22.4 % (ref 11.5–14.9)
GFR, ESTIMATED: 40 ML/MIN/1.73M2
GLUCOSE SERPL-MCNC: 174 MG/DL (ref 70–99)
GLUCOSE UR STRIP-MCNC: NEGATIVE MG/DL
HCT VFR BLD AUTO: 29.3 % (ref 36–46)
HGB BLD-MCNC: 8.7 G/DL (ref 12–16)
HGB UR QL STRIP.AUTO: ABNORMAL
KETONES UR STRIP-MCNC: NEGATIVE MG/DL
LEUKOCYTE ESTERASE UR QL STRIP: ABNORMAL
LYMPHOCYTES NFR BLD: 1.5 K/UL (ref 1–4.8)
LYMPHOCYTES RELATIVE PERCENT: 23 % (ref 24–44)
MAGNESIUM SERPL-MCNC: 2.3 MG/DL (ref 1.6–2.6)
MCH RBC QN AUTO: 22.9 PG (ref 26–34)
MCHC RBC AUTO-ENTMCNC: 29.8 G/DL (ref 31–37)
MCV RBC AUTO: 77 FL (ref 80–100)
MONOCYTES NFR BLD: 0.39 K/UL (ref 0.1–1.3)
MONOCYTES NFR BLD: 6 % (ref 1–7)
MORPHOLOGY: ABNORMAL
MORPHOLOGY: ABNORMAL
NEUTROPHILS NFR BLD: 64 % (ref 36–66)
NEUTS SEG NFR BLD: 4.15 K/UL (ref 1.3–9.1)
NITRITE UR QL STRIP: NEGATIVE
PH UR STRIP: 5.5 [PH] (ref 5–8)
PLATELET # BLD AUTO: 197 K/UL (ref 150–450)
PMV BLD AUTO: 7.7 FL (ref 6–12)
POTASSIUM SERPL-SCNC: 4.4 MMOL/L (ref 3.7–5.3)
PROT SERPL-MCNC: 6.6 G/DL (ref 6.4–8.3)
PROT UR STRIP-MCNC: ABNORMAL MG/DL
RBC # BLD AUTO: 3.8 M/UL (ref 4–5.2)
RBC #/AREA URNS HPF: ABNORMAL /HPF
SODIUM SERPL-SCNC: 137 MMOL/L (ref 135–144)
SP GR UR STRIP: 1.01 (ref 1–1.03)
TROPONIN I SERPL HS-MCNC: 17 NG/L (ref 0–14)
TROPONIN I SERPL HS-MCNC: 22 NG/L (ref 0–14)
UROBILINOGEN UR STRIP-ACNC: NORMAL EU/DL (ref 0–1)
WBC #/AREA URNS HPF: ABNORMAL /HPF
WBC OTHER # BLD: 6.5 K/UL (ref 3.5–11)

## 2024-06-14 PROCEDURE — 80053 COMPREHEN METABOLIC PANEL: CPT

## 2024-06-14 PROCEDURE — 84484 ASSAY OF TROPONIN QUANT: CPT

## 2024-06-14 PROCEDURE — 93005 ELECTROCARDIOGRAM TRACING: CPT | Performed by: EMERGENCY MEDICINE

## 2024-06-14 PROCEDURE — 6370000000 HC RX 637 (ALT 250 FOR IP)

## 2024-06-14 PROCEDURE — 2580000003 HC RX 258

## 2024-06-14 PROCEDURE — 36415 COLL VENOUS BLD VENIPUNCTURE: CPT

## 2024-06-14 PROCEDURE — 83735 ASSAY OF MAGNESIUM: CPT

## 2024-06-14 PROCEDURE — 87077 CULTURE AEROBIC IDENTIFY: CPT

## 2024-06-14 PROCEDURE — 81001 URINALYSIS AUTO W/SCOPE: CPT

## 2024-06-14 PROCEDURE — 85025 COMPLETE CBC W/AUTO DIFF WBC: CPT

## 2024-06-14 PROCEDURE — 1200000000 HC SEMI PRIVATE

## 2024-06-14 PROCEDURE — 6360000002 HC RX W HCPCS: Performed by: EMERGENCY MEDICINE

## 2024-06-14 PROCEDURE — 99223 1ST HOSP IP/OBS HIGH 75: CPT | Performed by: INTERNAL MEDICINE

## 2024-06-14 PROCEDURE — 87186 SC STD MICRODIL/AGAR DIL: CPT

## 2024-06-14 PROCEDURE — 96360 HYDRATION IV INFUSION INIT: CPT

## 2024-06-14 PROCEDURE — 99285 EMERGENCY DEPT VISIT HI MDM: CPT

## 2024-06-14 PROCEDURE — 2580000003 HC RX 258: Performed by: EMERGENCY MEDICINE

## 2024-06-14 PROCEDURE — 87086 URINE CULTURE/COLONY COUNT: CPT

## 2024-06-14 PROCEDURE — 6360000002 HC RX W HCPCS

## 2024-06-14 RX ORDER — HEPARIN SODIUM 5000 [USP'U]/ML
5000 INJECTION, SOLUTION INTRAVENOUS; SUBCUTANEOUS EVERY 8 HOURS SCHEDULED
Status: DISCONTINUED | OUTPATIENT
Start: 2024-06-14 | End: 2024-06-16 | Stop reason: HOSPADM

## 2024-06-14 RX ORDER — ATORVASTATIN CALCIUM 40 MG/1
40 TABLET, FILM COATED ORAL DAILY
Status: DISCONTINUED | OUTPATIENT
Start: 2024-06-14 | End: 2024-06-16 | Stop reason: HOSPADM

## 2024-06-14 RX ORDER — SODIUM CHLORIDE 9 MG/ML
INJECTION, SOLUTION INTRAVENOUS PRN
Status: DISCONTINUED | OUTPATIENT
Start: 2024-06-14 | End: 2024-06-16 | Stop reason: HOSPADM

## 2024-06-14 RX ORDER — ZINC OXIDE 20 %
OINTMENT (GRAM) TOPICAL 4 TIMES DAILY PRN
Status: DISCONTINUED | OUTPATIENT
Start: 2024-06-14 | End: 2024-06-16 | Stop reason: HOSPADM

## 2024-06-14 RX ORDER — SODIUM CHLORIDE 9 MG/ML
INJECTION, SOLUTION INTRAVENOUS CONTINUOUS
Status: DISCONTINUED | OUTPATIENT
Start: 2024-06-14 | End: 2024-06-16 | Stop reason: HOSPADM

## 2024-06-14 RX ORDER — SODIUM CHLORIDE 0.9 % (FLUSH) 0.9 %
5-40 SYRINGE (ML) INJECTION EVERY 12 HOURS SCHEDULED
Status: DISCONTINUED | OUTPATIENT
Start: 2024-06-14 | End: 2024-06-16 | Stop reason: HOSPADM

## 2024-06-14 RX ORDER — ONDANSETRON 2 MG/ML
4 INJECTION INTRAMUSCULAR; INTRAVENOUS EVERY 6 HOURS PRN
Status: DISCONTINUED | OUTPATIENT
Start: 2024-06-14 | End: 2024-06-16 | Stop reason: HOSPADM

## 2024-06-14 RX ORDER — CARVEDILOL 12.5 MG/1
6.25 TABLET ORAL 2 TIMES DAILY WITH MEALS
Status: DISCONTINUED | OUTPATIENT
Start: 2024-06-14 | End: 2024-06-16 | Stop reason: HOSPADM

## 2024-06-14 RX ORDER — PANTOPRAZOLE SODIUM 40 MG/1
40 TABLET, DELAYED RELEASE ORAL
Status: DISCONTINUED | OUTPATIENT
Start: 2024-06-15 | End: 2024-06-16 | Stop reason: HOSPADM

## 2024-06-14 RX ORDER — LEVOTHYROXINE SODIUM 0.1 MG/1
100 TABLET ORAL DAILY
Status: DISCONTINUED | OUTPATIENT
Start: 2024-06-14 | End: 2024-06-16 | Stop reason: HOSPADM

## 2024-06-14 RX ORDER — ALLOPURINOL 300 MG/1
300 TABLET ORAL DAILY
Status: DISCONTINUED | OUTPATIENT
Start: 2024-06-14 | End: 2024-06-16 | Stop reason: HOSPADM

## 2024-06-14 RX ORDER — 0.9 % SODIUM CHLORIDE 0.9 %
1000 INTRAVENOUS SOLUTION INTRAVENOUS ONCE
Status: COMPLETED | OUTPATIENT
Start: 2024-06-14 | End: 2024-06-14

## 2024-06-14 RX ORDER — ACETAMINOPHEN 325 MG/1
650 TABLET ORAL EVERY 6 HOURS PRN
Status: DISCONTINUED | OUTPATIENT
Start: 2024-06-14 | End: 2024-06-16 | Stop reason: HOSPADM

## 2024-06-14 RX ORDER — ACETAMINOPHEN 650 MG/1
650 SUPPOSITORY RECTAL EVERY 6 HOURS PRN
Status: DISCONTINUED | OUTPATIENT
Start: 2024-06-14 | End: 2024-06-16 | Stop reason: HOSPADM

## 2024-06-14 RX ORDER — POLYETHYLENE GLYCOL 3350 17 G/17G
17 POWDER, FOR SOLUTION ORAL DAILY PRN
Status: DISCONTINUED | OUTPATIENT
Start: 2024-06-14 | End: 2024-06-16 | Stop reason: HOSPADM

## 2024-06-14 RX ORDER — SODIUM CHLORIDE 0.9 % (FLUSH) 0.9 %
5-40 SYRINGE (ML) INJECTION PRN
Status: DISCONTINUED | OUTPATIENT
Start: 2024-06-14 | End: 2024-06-16 | Stop reason: HOSPADM

## 2024-06-14 RX ORDER — GABAPENTIN 300 MG/1
300 CAPSULE ORAL 2 TIMES DAILY
Status: DISCONTINUED | OUTPATIENT
Start: 2024-06-14 | End: 2024-06-16 | Stop reason: HOSPADM

## 2024-06-14 RX ORDER — ONDANSETRON 4 MG/1
4 TABLET, ORALLY DISINTEGRATING ORAL EVERY 8 HOURS PRN
Status: DISCONTINUED | OUTPATIENT
Start: 2024-06-14 | End: 2024-06-16 | Stop reason: HOSPADM

## 2024-06-14 RX ADMIN — SODIUM CHLORIDE 1000 ML: 9 INJECTION, SOLUTION INTRAVENOUS at 13:49

## 2024-06-14 RX ADMIN — HEPARIN SODIUM 5000 UNITS: 5000 INJECTION INTRAVENOUS; SUBCUTANEOUS at 16:35

## 2024-06-14 RX ADMIN — GABAPENTIN 300 MG: 300 CAPSULE ORAL at 21:38

## 2024-06-14 RX ADMIN — CEFTRIAXONE SODIUM 1000 MG: 1 INJECTION, POWDER, FOR SOLUTION INTRAMUSCULAR; INTRAVENOUS at 14:41

## 2024-06-14 RX ADMIN — LEVOTHYROXINE SODIUM 100 MCG: 0.1 TABLET ORAL at 16:35

## 2024-06-14 RX ADMIN — SODIUM CHLORIDE: 9 INJECTION, SOLUTION INTRAVENOUS at 21:47

## 2024-06-14 RX ADMIN — ATORVASTATIN CALCIUM 40 MG: 40 TABLET, FILM COATED ORAL at 16:34

## 2024-06-14 RX ADMIN — SODIUM CHLORIDE: 9 INJECTION, SOLUTION INTRAVENOUS at 14:30

## 2024-06-14 RX ADMIN — HEPARIN SODIUM 5000 UNITS: 5000 INJECTION INTRAVENOUS; SUBCUTANEOUS at 21:37

## 2024-06-14 RX ADMIN — CARVEDILOL 6.25 MG: 12.5 TABLET, FILM COATED ORAL at 16:35

## 2024-06-14 RX ADMIN — SODIUM CHLORIDE: 9 INJECTION, SOLUTION INTRAVENOUS at 14:40

## 2024-06-14 RX ADMIN — ALLOPURINOL 300 MG: 300 TABLET ORAL at 16:34

## 2024-06-14 ASSESSMENT — PAIN - FUNCTIONAL ASSESSMENT: PAIN_FUNCTIONAL_ASSESSMENT: NONE - DENIES PAIN

## 2024-06-14 NOTE — ED NOTES
Report given to NICOLETTE Barahona from [employer].   Report method by phone   The following was reviewed with receiving RN:   Current vital signs:  /63   Pulse 81   Temp 98 °F (36.7 °C)   Resp 18   Ht 1.702 m (5' 7\")   Wt 51.7 kg (114 lb)   SpO2 100%   BMI 17.85 kg/m²                MEWS Score: 1     Any medication or safety alerts were reviewed. Any pending diagnostics and notifications were also reviewed, as well as any safety concerns or issues, abnormal labs, abnormal imaging, and abnormal assessment findings. Questions were answered.

## 2024-06-14 NOTE — ED PROVIDER NOTES
EMERGENCY DEPARTMENT ENCOUNTER    Pt Name: Yaz Allison  MRN: [id number]  Birthdate 1940  Date of evaluation: 6/14/24  CHIEF COMPLAINT       Chief Complaint   Patient presents with    Fatigue     Known UTI, feels like she might be dehydrated  Can't get into her provider until mid July  Felt like this recently and required a blood transfusion     HISTORY OF PRESENT ILLNESS   HPI  Feels very dehydrated weak fatigue generalized weakness.  History of severe dehydration, UTI, blood transfusions in the past.  She just finished antibiotics for UTI, denies any lower abdominal pain at this time.  She has a hard time keeping up with water at home.  No falls traumas or injuries.  She ambulates with a cane.  Denies any rashes.  Denies any back pain or abdominal pain.  No chest pain.      REVIEW OF SYSTEMS     Review of Systems   All other systems reviewed and are negative.    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

## 2024-06-14 NOTE — H&P
1.702 m (5' 7\")   Wt 51.7 kg (114 lb)   SpO2 100%   BMI 17.85 kg/m²   Temp (24hrs), Av °F (36.7 °C), Min:98 °F (36.7 °C), Max:98 °F (36.7 °C)    No results for input(s): \"POCGLU\" in the last 72 hours.  No intake or output data in the 24 hours ending 24 1551    Physical Exam  Exam conducted with a chaperone present.   Constitutional:       General: She is not in acute distress.     Appearance: Normal appearance.   HENT:      Mouth/Throat:      Mouth: Mucous membranes are dry.      Comments: Cracked lips  Cardiovascular:      Rate and Rhythm: Normal rate and regular rhythm.      Heart sounds: Normal heart sounds. No murmur heard.  Pulmonary:      Effort: Pulmonary effort is normal. No respiratory distress.      Breath sounds: Normal breath sounds.   Abdominal:      General: Abdomen is flat. Bowel sounds are normal. There is no distension.      Palpations: Abdomen is soft.      Tenderness: There is no abdominal tenderness.   Genitourinary:     Comments: Diaper rash (suprapubic) extended  Musculoskeletal:      Right lower leg: No edema.      Left lower leg: No edema.   Skin:     Capillary Refill: Capillary refill takes less than 2 seconds.   Neurological:      General: No focal deficit present.      Mental Status: She is alert and oriented to person, place, and time.         Investigations:     Laboratory Testing:  Recent Results (from the past 24 hour(s))   CBC with Auto Differential    Collection Time: 24  1:40 PM   Result Value Ref Range    WBC 6.5 3.5 - 11.0 k/uL    RBC 3.80 (L) 4.0 - 5.2 m/uL    Hemoglobin 8.7 (L) 12.0 - 16.0 g/dL    Hematocrit 29.3 (L) 36 - 46 %    MCV 77.0 (L) 80 - 100 fL    MCH 22.9 (L) 26 - 34 pg    MCHC 29.8 (L) 31 - 37 g/dL    RDW 22.4 (H) 11.5 - 14.9 %    Platelets 197 150 - 450 k/uL    MPV 7.7 6.0 - 12.0 fL    Neutrophils % 64 36 - 66 %    Lymphocytes % 23 (L) 24 - 44 %    Monocytes % 6 1 - 7 %    Eosinophils % 5 (H) 0 - 4 %    Basophils % 2 0 - 2 %    Neutrophils

## 2024-06-15 PROBLEM — Z87.19 HISTORY OF COLITIS: Status: ACTIVE | Noted: 2024-06-15

## 2024-06-15 LAB
ANION GAP SERPL CALCULATED.3IONS-SCNC: 8 MMOL/L (ref 9–17)
BASOPHILS # BLD: 0.11 K/UL (ref 0–0.2)
BASOPHILS NFR BLD: 2 % (ref 0–2)
BUN SERPL-MCNC: 20 MG/DL (ref 8–23)
CALCIUM SERPL-MCNC: 8.6 MG/DL (ref 8.6–10.4)
CHLORIDE SERPL-SCNC: 111 MMOL/L (ref 98–107)
CO2 SERPL-SCNC: 22 MMOL/L (ref 20–31)
CREAT SERPL-MCNC: 0.8 MG/DL (ref 0.5–0.9)
EOSINOPHIL # BLD: 0.5 K/UL (ref 0–0.4)
EOSINOPHILS RELATIVE PERCENT: 9 % (ref 0–4)
ERYTHROCYTE [DISTWIDTH] IN BLOOD BY AUTOMATED COUNT: 22.8 % (ref 11.5–14.9)
GFR, ESTIMATED: 72 ML/MIN/1.73M2
GLUCOSE SERPL-MCNC: 84 MG/DL (ref 70–99)
HCT VFR BLD AUTO: 24.4 % (ref 36–46)
HGB BLD-MCNC: 7.5 G/DL (ref 12–16)
LYMPHOCYTES NFR BLD: 1.96 K/UL (ref 1–4.8)
LYMPHOCYTES RELATIVE PERCENT: 35 % (ref 24–44)
MCH RBC QN AUTO: 23.7 PG (ref 26–34)
MCHC RBC AUTO-ENTMCNC: 30.6 G/DL (ref 31–37)
MCV RBC AUTO: 77.4 FL (ref 80–100)
MONOCYTES NFR BLD: 0.5 K/UL (ref 0.1–1.3)
MONOCYTES NFR BLD: 9 % (ref 1–7)
MORPHOLOGY: ABNORMAL
NEUTROPHILS NFR BLD: 45 % (ref 36–66)
NEUTS SEG NFR BLD: 2.53 K/UL (ref 1.3–9.1)
PLATELET # BLD AUTO: 150 K/UL (ref 150–450)
PMV BLD AUTO: 8 FL (ref 6–12)
POTASSIUM SERPL-SCNC: 4.3 MMOL/L (ref 3.7–5.3)
RBC # BLD AUTO: 3.15 M/UL (ref 4–5.2)
SODIUM SERPL-SCNC: 141 MMOL/L (ref 135–144)
WBC OTHER # BLD: 5.6 K/UL (ref 3.5–11)

## 2024-06-15 PROCEDURE — 1200000000 HC SEMI PRIVATE

## 2024-06-15 PROCEDURE — 85025 COMPLETE CBC W/AUTO DIFF WBC: CPT

## 2024-06-15 PROCEDURE — 99222 1ST HOSP IP/OBS MODERATE 55: CPT | Performed by: SPECIALIST

## 2024-06-15 PROCEDURE — 36415 COLL VENOUS BLD VENIPUNCTURE: CPT

## 2024-06-15 PROCEDURE — 6360000002 HC RX W HCPCS: Performed by: INTERNAL MEDICINE

## 2024-06-15 PROCEDURE — 6360000002 HC RX W HCPCS

## 2024-06-15 PROCEDURE — 99232 SBSQ HOSP IP/OBS MODERATE 35: CPT | Performed by: INTERNAL MEDICINE

## 2024-06-15 PROCEDURE — 51798 US URINE CAPACITY MEASURE: CPT

## 2024-06-15 PROCEDURE — 99222 1ST HOSP IP/OBS MODERATE 55: CPT | Performed by: INTERNAL MEDICINE

## 2024-06-15 PROCEDURE — 97161 PT EVAL LOW COMPLEX 20 MIN: CPT

## 2024-06-15 PROCEDURE — 6370000000 HC RX 637 (ALT 250 FOR IP): Performed by: INTERNAL MEDICINE

## 2024-06-15 PROCEDURE — 2580000003 HC RX 258: Performed by: INTERNAL MEDICINE

## 2024-06-15 PROCEDURE — 80048 BASIC METABOLIC PNL TOTAL CA: CPT

## 2024-06-15 PROCEDURE — 6370000000 HC RX 637 (ALT 250 FOR IP)

## 2024-06-15 PROCEDURE — 2580000003 HC RX 258

## 2024-06-15 RX ORDER — LEVOFLOXACIN 500 MG/1
500 TABLET, FILM COATED ORAL DAILY
Status: COMPLETED | OUTPATIENT
Start: 2024-06-15 | End: 2024-06-15

## 2024-06-15 RX ORDER — LEVOFLOXACIN 500 MG/1
250 TABLET, FILM COATED ORAL DAILY
Status: DISCONTINUED | OUTPATIENT
Start: 2024-06-16 | End: 2024-06-16 | Stop reason: HOSPADM

## 2024-06-15 RX ADMIN — MICONAZOLE NITRATE: 2 CREAM TOPICAL at 20:52

## 2024-06-15 RX ADMIN — ATORVASTATIN CALCIUM 40 MG: 40 TABLET, FILM COATED ORAL at 07:18

## 2024-06-15 RX ADMIN — GABAPENTIN 300 MG: 300 CAPSULE ORAL at 07:18

## 2024-06-15 RX ADMIN — POLYETHYLENE GLYCOL 3350 17 G: 17 POWDER, FOR SOLUTION ORAL at 10:53

## 2024-06-15 RX ADMIN — GABAPENTIN 300 MG: 300 CAPSULE ORAL at 20:52

## 2024-06-15 RX ADMIN — PANTOPRAZOLE SODIUM 40 MG: 40 TABLET, DELAYED RELEASE ORAL at 05:32

## 2024-06-15 RX ADMIN — SODIUM CHLORIDE 300 MG: 9 INJECTION, SOLUTION INTRAVENOUS at 12:06

## 2024-06-15 RX ADMIN — MICONAZOLE NITRATE: 2 CREAM TOPICAL at 07:45

## 2024-06-15 RX ADMIN — HEPARIN SODIUM 5000 UNITS: 5000 INJECTION INTRAVENOUS; SUBCUTANEOUS at 15:44

## 2024-06-15 RX ADMIN — HEPARIN SODIUM 5000 UNITS: 5000 INJECTION INTRAVENOUS; SUBCUTANEOUS at 05:32

## 2024-06-15 RX ADMIN — LEVOFLOXACIN 500 MG: 500 TABLET, FILM COATED ORAL at 10:50

## 2024-06-15 RX ADMIN — CARVEDILOL 6.25 MG: 12.5 TABLET, FILM COATED ORAL at 07:18

## 2024-06-15 RX ADMIN — SODIUM CHLORIDE: 9 INJECTION, SOLUTION INTRAVENOUS at 05:31

## 2024-06-15 RX ADMIN — ACETAMINOPHEN 650 MG: 325 TABLET ORAL at 20:52

## 2024-06-15 RX ADMIN — HEPARIN SODIUM 5000 UNITS: 5000 INJECTION INTRAVENOUS; SUBCUTANEOUS at 21:36

## 2024-06-15 RX ADMIN — ALLOPURINOL 300 MG: 300 TABLET ORAL at 07:18

## 2024-06-15 RX ADMIN — LEVOTHYROXINE SODIUM 100 MCG: 0.1 TABLET ORAL at 05:32

## 2024-06-15 RX ADMIN — CARVEDILOL 6.25 MG: 12.5 TABLET, FILM COATED ORAL at 17:03

## 2024-06-15 ASSESSMENT — PAIN DESCRIPTION - ORIENTATION: ORIENTATION: LOWER

## 2024-06-15 ASSESSMENT — PAIN DESCRIPTION - FREQUENCY: FREQUENCY: INTERMITTENT

## 2024-06-15 ASSESSMENT — PAIN DESCRIPTION - ONSET: ONSET: ON-GOING

## 2024-06-15 ASSESSMENT — PAIN DESCRIPTION - LOCATION: LOCATION: ABDOMEN

## 2024-06-15 ASSESSMENT — PAIN SCALES - GENERAL
PAINLEVEL_OUTOF10: 3
PAINLEVEL_OUTOF10: 1

## 2024-06-15 ASSESSMENT — PAIN - FUNCTIONAL ASSESSMENT: PAIN_FUNCTIONAL_ASSESSMENT: PREVENTS OR INTERFERES SOME ACTIVE ACTIVITIES AND ADLS

## 2024-06-15 ASSESSMENT — PAIN DESCRIPTION - DESCRIPTORS: DESCRIPTORS: PRESSURE

## 2024-06-15 ASSESSMENT — PAIN DESCRIPTION - PAIN TYPE: TYPE: ACUTE PAIN

## 2024-06-15 NOTE — CARE COORDINATION
Case Management Assessment  Initial Evaluation    Date/Time of Evaluation: 6/15/2024 8:50 AM  Assessment Completed by: Yani Baez RN    If patient is discharged prior to next notation, then this note serves as note for discharge by case management.    Patient Name: Yaz Allison                   YOB: 1940  Diagnosis: Dehydration [E86.0]  Acute cystitis without hematuria [N30.00]  Failure of outpatient treatment [Z78.9]                   Date / Time: 6/14/2024  1:25 PM    Patient Admission Status: Inpatient   Readmission Risk (Low < 19, Mod (19-27), High > 27): Readmission Risk Score: 16.2    Current PCP: Gabe Cintron MD  PCP verified by CM? Yes    Chart Reviewed: Yes      History Provided by: Patient  Patient Orientation: Alert and Oriented    Patient Cognition: Alert    Hospitalization in the last 30 days (Readmission):  No    If yes, Readmission Assessment in CM Navigator will be completed.    Advance Directives:      Code Status: Full Code   Patient's Primary Decision Maker is: Legal Next of Kin    Primary Decision Maker: Cori Allison Dallas James - [phone]    Primary Decision Maker: EstefanyAshley Child - [phone]    Primary Decision Maker: MikecarlottaLevi - [phone]    Primary Decision Maker: [place] Allison    Discharge Planning:    Patient lives with: Children Type of Home: House  Primary Care Giver: Self  Patient Support Systems include: Children   Current Financial resources: Medicare  Current community resources:    Current services prior to admission: Durable Medical Equipment            Current DME: Cane, Wheelchair, Walker, Shower Chair, Bedside Commode            Type of Home Care services:  None    ADLS  Prior functional level: Independent in ADLs/IADLs  Current functional level: Independent in ADLs/IADLs    PT AM-PAC:   /24  OT AM-PAC:   /24    Family can provide assistance at DC: Yes  Would you like Case Management to discuss the discharge plan with any other

## 2024-06-15 NOTE — PROGRESS NOTES
Physical Therapy  Facility/Department: [facility] MED SURG  Physical Therapy Initial Assessment    Name: Yaz Allison  : 1940  MRN: [id number]  Date of Service: 6/15/2024    Discharge Recommendations:  Home independently   PT Equipment Recommendations  Equipment Needed: No      Patient Diagnosis(es): The primary encounter diagnosis was Acute cystitis without hematuria. Diagnoses of Dehydration and Failure of outpatient treatment were also pertinent to this visit.  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  Past Surgical History:  has a past surgical history that includes Dilation and curettage of uterus; Colonoscopy; Upper gastrointestinal endoscopy (N/A, 2019); Spine surgery (N/A, 2022); and Upper gastrointestinal endoscopy (N/A, 5/3/2024).    Assessment   Assessment: Patient demonstrated independence with all functional needs.  Requires PT Follow-Up: No  Activity Tolerance  Activity Tolerance: Patient tolerated evaluation without incident     Plan   Physical Therapy

## 2024-06-15 NOTE — CONSULTS
[facility] Urology  Varghese Whitman MD FACS    Urology Consultation    Patient:  Yaz Allison  MRN: [id number]  YOB: 1940  Consult requested from Gabe Cintron MD  for purpose of evaluation of recurrent UTI's .    CHIEF COMPLAINT:  feeling poorly    HISTORY OF PRESENT ILLNESS:   The patient is a 83 y.o. female who presents with:  Recurrent UTI's:  Patient is here today for Recurrent UTI's which started 6 month(s) ago.   The last infection was  now.  The patient has had many infections the past year.  Urine C&S results: 5/29/24 Enterococcus  Current Rx for recurrent UTI's: Ceftriaxone  Lower urinary tract symptoms: frequency, nocturia, 3-4 times per night, and urine incontinence:  urge     Patient's old records, notes and chart reviewed and summarized above.    Past Medical History:    Past Medical History:   Diagnosis Date    Abdominal pain, unspecified site     Acquired cyst of kidney     Acute gouty arthropathy     Allergic rhinitis, cause unspecified     Anxiety state, unspecified     Arthropathy, unspecified, site unspecified     Chronic airway obstruction, not elsewhere classified     Chronic kidney disease, stage III (moderate) (HCC)     Diarrhea     Edema     Esophageal reflux     Essential 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 localized, unspecified site     Other general symptoms(780.99)     Other iatrogenic hypothyroidism     Other nonspecific abnormal finding of lung field     Other psoriasis     Pure hypercholesterolemia     Regional enteritis of unspecified site     Senile osteoporosis     Sprain of ankle, unspecified site     Unspecified vitamin D deficiency        Past Surgical History:    Past Surgical History: 
Problems    Diagnosis Date Noted    Acute cystitis without hematuria [N30.00] 06/05/2022     Priority: Medium         IMPRESSION:   Recurrent UTI/cystitis  Urine grew Klebsiella, sensitive to penicillin  KILLIAN, resolved  Recurrent anemia, severely microcytic, likely secondary to iron deficiency anemia  Recurrent episode of GI bleeding and previous history of colitis    RECOMMENDATIONS:  Agree with current treatment and antibiotics  I appreciate primary care and urology input  Patient will be offered IV iron while in-house.  Her iron studies are consistent of iron deficiency secondary to chronic blood loss  The patient is intolerant to oral iron so we will offer her IV iron while in-house  The patient will definitely need GI workup especially colonoscopy with her previous history of colitis and negative recent EGD with ongoing GI bleeding      Discussed with patient and Nurse.      Thank you for asking us to see this patient.    Beatris Sanford MD  Hematologist/Medical Oncologist  Cell: [phone]

## 2024-06-15 NOTE — ACP (ADVANCE CARE PLANNING)
Advance Care Planning     Advance Care Planning Activator (Inpatient)  Conversation Note      Date of ACP Conversation: 6/15/2024     Conversation Conducted with: Patient with Decision Making Capacity    ACP Activator: Yani Baez RN        Health Care Decision Maker:     Current Designated Health Care Decision Maker:     Primary Decision Maker: Cori Allison - Child - [phone]    Primary Decision Maker: Ashley Allison - Child - [phone]    Primary Decision Maker: Levi Allison - Child - [phone]    Primary Decision Maker: Venice Allison - Child    Today we documented Decision Maker(s) consistent with Legal Next of Kin hierarchy.    Care Preferences    Ventilation:  \"If you were in your present state of health and suddenly became very ill and were unable to breathe on your own, what would your preference be about the use of a ventilator (breathing machine) if it were available to you?\"      Would the patient desire the use of ventilator (breathing machine)?: yes    \"If your health worsens and it becomes clear that your chance of recovery is unlikely, what would your preference be about the use of a ventilator (breathing machine) if it were available to you?\"     Would the patient desire the use of ventilator (breathing machine)?: No      Resuscitation  \"CPR works best to restart the heart when there is a sudden event, like a heart attack, in someone who is otherwise healthy. Unfortunately, CPR does not typically restart the heart for people who have serious health conditions or who are very sick.\"    \"In the event your heart stopped as a result of an underlying serious health condition, would you want attempts to be made to restart your heart (answer \"yes\" for attempt to resuscitate) or would you prefer a natural death (answer \"no\" for do not attempt to resuscitate)?\" yes       [] Yes   [] No   Educated Patient / Decision Maker regarding differences between Advance Directives and portable DNR orders.    Length

## 2024-06-15 NOTE — PROGRESS NOTES
[facility] Internal Medicine  Ramesh Espinoza MD; Gareth Quintanilla MD; Gabe Cintron MD; MD Rebeca Salazar MD; Nelsy Weller MD    [facility] Internal Medicine   IN-PATIENT SERVICE   [facility]    HISTORY AND PHYSICAL EXAMINATION            Date:   6/15/2024  Patient name:  Yaz Allison  Date of admission:  6/14/2024  1:25 PM  MRN:   [id number]  Account:  [id number]  YOB: 1940  PCP:    Gabe Cintron MD  Room:   2039/2039-01  Code Status:    Full Code    Chief Complaint:     Chief Complaint   Patient presents with    Fatigue     Known UTI, feels like she might be dehydrated  Can't get into her provider until mid July  Felt like this recently and required a blood transfusion   Recurrent uti    History Obtained From:     Pt medical record and nursing staff    History of Present Illness:     Yaz Allison is a 83 y.o. Non- / non  female who presents with Fatigue (Known UTI, feels like she might be dehydrated/Can't get into her provider until mid July/Felt like this recently and required a blood transfusion)   and is admitted to the hospital for the management of Acute cystitis without hematuria.    3-year-old female with PMH of hypertension, hypothyroidism, CKD stage IIIb, gout, neuropathy, and recent GI bleed presented to the ED today because of feeling weak, fatigue, and dehydrated.   Patient has recurrent UTI.  In the past 6 months, she had 4 UTI encounters.   Patient was recently admitted on 5/4/2024 because of symptomatic anemia secondary to GI bleed.  Patient had EGD and will follow-up with GI in July for outpatient colonoscopy. Patient states that she always feels weak because of anemia.   Patient states that she had an outpatient visit with her PCP on 5/29 and patient continued to have dysuria and frequency.  UA was positive and was prescribed Cipro, however, sensitivity came back intermediate for Cipro and was given

## 2024-06-15 NOTE — PLAN OF CARE
Problem: Safety - Adult  Goal: Free from fall injury  6/15/2024 0058 by Mariela Abad, RN  Outcome: Progressing     Problem: Discharge Planning  Goal: Discharge to home or other facility with appropriate resources  6/15/2024 0058 by Mariela Abad, RN  Outcome: Progressing  Flowsheets (Taken 6/14/2024 2004)  Discharge to home or other facility with appropriate resources:   Identify barriers to discharge with patient and caregiver   Arrange for needed discharge resources and transportation as appropriate   Identify discharge learning needs (meds, wound care, etc)     Problem: Pain  Goal: Verbalizes/displays adequate comfort level or baseline comfort level  Outcome: Progressing     Problem: Skin/Tissue Integrity  Goal: Absence of new skin breakdown  Description: 1.  Monitor for areas of redness and/or skin breakdown  2.  Assess vascular access sites hourly  3.  Every 4-6 hours minimum:  Change oxygen saturation probe site  4.  Every 4-6 hours:  If on nasal continuous positive airway pressure, respiratory therapy assess nares and determine need for appliance change or resting period.  Outcome: Progressing     Problem: Chronic Conditions and Co-morbidities  Goal: Patient's chronic conditions and co-morbidity symptoms are monitored and maintained or improved  Outcome: Progressing

## 2024-06-16 VITALS
WEIGHT: 114 LBS | RESPIRATION RATE: 18 BRPM | DIASTOLIC BLOOD PRESSURE: 106 MMHG | HEART RATE: 64 BPM | HEIGHT: 67 IN | BODY MASS INDEX: 17.89 KG/M2 | OXYGEN SATURATION: 98 % | SYSTOLIC BLOOD PRESSURE: 127 MMHG | TEMPERATURE: 97.9 F

## 2024-06-16 LAB
ANION GAP SERPL CALCULATED.3IONS-SCNC: 7 MMOL/L (ref 9–17)
BASOPHILS # BLD: 0.09 K/UL (ref 0–0.2)
BASOPHILS NFR BLD: 2 % (ref 0–2)
BUN SERPL-MCNC: 11 MG/DL (ref 8–23)
CALCIUM SERPL-MCNC: 8.1 MG/DL (ref 8.6–10.4)
CHLORIDE SERPL-SCNC: 113 MMOL/L (ref 98–107)
CO2 SERPL-SCNC: 22 MMOL/L (ref 20–31)
CREAT SERPL-MCNC: 0.8 MG/DL (ref 0.5–0.9)
EOSINOPHIL # BLD: 0.41 K/UL (ref 0–0.4)
EOSINOPHILS RELATIVE PERCENT: 9 % (ref 0–4)
ERYTHROCYTE [DISTWIDTH] IN BLOOD BY AUTOMATED COUNT: 22.2 % (ref 11.5–14.9)
GFR, ESTIMATED: 72 ML/MIN/1.73M2
GLUCOSE SERPL-MCNC: 85 MG/DL (ref 70–99)
HCT VFR BLD AUTO: 22.6 % (ref 36–46)
HGB BLD-MCNC: 7.1 G/DL (ref 12–16)
LYMPHOCYTES NFR BLD: 1.33 K/UL (ref 1–4.8)
LYMPHOCYTES RELATIVE PERCENT: 29 % (ref 24–44)
MCH RBC QN AUTO: 24.4 PG (ref 26–34)
MCHC RBC AUTO-ENTMCNC: 31.4 G/DL (ref 31–37)
MCV RBC AUTO: 77.6 FL (ref 80–100)
MICROORGANISM SPEC CULT: ABNORMAL
MONOCYTES NFR BLD: 0.55 K/UL (ref 0.1–1.3)
MONOCYTES NFR BLD: 12 % (ref 1–7)
MORPHOLOGY: ABNORMAL
NEUTROPHILS NFR BLD: 48 % (ref 36–66)
NEUTS SEG NFR BLD: 2.22 K/UL (ref 1.3–9.1)
PLATELET # BLD AUTO: 135 K/UL (ref 150–450)
PMV BLD AUTO: 7.5 FL (ref 6–12)
POTASSIUM SERPL-SCNC: 3.8 MMOL/L (ref 3.7–5.3)
RBC # BLD AUTO: 2.92 M/UL (ref 4–5.2)
SODIUM SERPL-SCNC: 142 MMOL/L (ref 135–144)
SPECIMEN DESCRIPTION: ABNORMAL
WBC OTHER # BLD: 4.6 K/UL (ref 3.5–11)

## 2024-06-16 PROCEDURE — 85025 COMPLETE CBC W/AUTO DIFF WBC: CPT

## 2024-06-16 PROCEDURE — 6360000002 HC RX W HCPCS: Performed by: INTERNAL MEDICINE

## 2024-06-16 PROCEDURE — 36415 COLL VENOUS BLD VENIPUNCTURE: CPT

## 2024-06-16 PROCEDURE — 2580000003 HC RX 258

## 2024-06-16 PROCEDURE — 6370000000 HC RX 637 (ALT 250 FOR IP): Performed by: INTERNAL MEDICINE

## 2024-06-16 PROCEDURE — 6360000002 HC RX W HCPCS

## 2024-06-16 PROCEDURE — 6370000000 HC RX 637 (ALT 250 FOR IP)

## 2024-06-16 PROCEDURE — 80048 BASIC METABOLIC PNL TOTAL CA: CPT

## 2024-06-16 PROCEDURE — 2580000003 HC RX 258: Performed by: INTERNAL MEDICINE

## 2024-06-16 PROCEDURE — 99239 HOSP IP/OBS DSCHRG MGMT >30: CPT | Performed by: INTERNAL MEDICINE

## 2024-06-16 PROCEDURE — 99232 SBSQ HOSP IP/OBS MODERATE 35: CPT | Performed by: INTERNAL MEDICINE

## 2024-06-16 RX ORDER — NYSTATIN 100000 [USP'U]/G
POWDER TOPICAL
Qty: 60 G | Refills: 2 | Status: SHIPPED | OUTPATIENT
Start: 2024-06-16

## 2024-06-16 RX ORDER — CARVEDILOL 6.25 MG/1
6.25 TABLET ORAL 2 TIMES DAILY WITH MEALS
Qty: 60 TABLET | Refills: 3 | Status: SHIPPED | OUTPATIENT
Start: 2024-06-16

## 2024-06-16 RX ORDER — ESTRADIOL 0.1 MG/G
1 CREAM VAGINAL DAILY
Qty: 42.5 G | Refills: 3 | Status: SHIPPED | OUTPATIENT
Start: 2024-06-16

## 2024-06-16 RX ORDER — LEVOFLOXACIN 250 MG/1
250 TABLET, FILM COATED ORAL DAILY
Qty: 3 TABLET | Refills: 0 | Status: SHIPPED | OUTPATIENT
Start: 2024-06-17 | End: 2024-06-20

## 2024-06-16 RX ORDER — NITROFURANTOIN 25; 75 MG/1; MG/1
100 CAPSULE ORAL DAILY
Qty: 90 CAPSULE | Refills: 0 | Status: SHIPPED | OUTPATIENT
Start: 2024-06-16 | End: 2024-09-14

## 2024-06-16 RX ADMIN — ALLOPURINOL 300 MG: 300 TABLET ORAL at 08:30

## 2024-06-16 RX ADMIN — CARVEDILOL 6.25 MG: 12.5 TABLET, FILM COATED ORAL at 08:30

## 2024-06-16 RX ADMIN — MICONAZOLE NITRATE: 2 CREAM TOPICAL at 08:31

## 2024-06-16 RX ADMIN — SODIUM CHLORIDE: 9 INJECTION, SOLUTION INTRAVENOUS at 00:03

## 2024-06-16 RX ADMIN — LEVOFLOXACIN 250 MG: 500 TABLET, FILM COATED ORAL at 08:30

## 2024-06-16 RX ADMIN — HEPARIN SODIUM 5000 UNITS: 5000 INJECTION INTRAVENOUS; SUBCUTANEOUS at 06:08

## 2024-06-16 RX ADMIN — SODIUM CHLORIDE 300 MG: 9 INJECTION, SOLUTION INTRAVENOUS at 10:41

## 2024-06-16 RX ADMIN — GABAPENTIN 300 MG: 300 CAPSULE ORAL at 08:30

## 2024-06-16 RX ADMIN — ATORVASTATIN CALCIUM 40 MG: 40 TABLET, FILM COATED ORAL at 08:30

## 2024-06-16 RX ADMIN — SODIUM CHLORIDE, PRESERVATIVE FREE 10 ML: 5 INJECTION INTRAVENOUS at 08:30

## 2024-06-16 RX ADMIN — SODIUM CHLORIDE: 9 INJECTION, SOLUTION INTRAVENOUS at 06:07

## 2024-06-16 RX ADMIN — PANTOPRAZOLE SODIUM 40 MG: 40 TABLET, DELAYED RELEASE ORAL at 06:08

## 2024-06-16 RX ADMIN — LEVOTHYROXINE SODIUM 100 MCG: 0.1 TABLET ORAL at 06:08

## 2024-06-16 NOTE — DISCHARGE SUMMARY
IN-PATIENT SERVICE   [facility] Internal Medicine    Discharge Summary     Patient ID: Yaz Allison  :  1940   MRN: [id number]     ACCOUNT:  [id number]   Patient's PCP: Gabe Cintron MD  Admit Date: 2024   Discharge Date: 2024    Length of Stay: 2  Code Status:  Full Code  Admitting Physician: Alyce Hernandez MD  Discharge Physician: Gabe Cintron MD     Active Discharge Diagnoses:     Primary Problem  Acute cystitis without hematuria      Hospital Problems  Active Hospital Problems    Diagnosis Date Noted    Acute cystitis without hematuria [N30.00] 2022     Priority: Medium    History of colitis [Z87.19] 06/15/2024       Admission Condition:  fair     Discharged Condition: fair    Hospital Stay:     Hospital Course:  Yaz Allison is a 83 y.o. female who was admitted for the management of Acute cystitis without hematuria , presented to ER with Fatigue (Known UTI, feels like she might be dehydrated/Can't get into her provider until mid July/Felt like this recently and required a blood transfusion)  Patient seen and examined history of hypothyroidism hyperlipidemia history of chronic anemia following with hematology also has recurrent UTI recently treated for Klebsiella  Patient was seen in office few days ago where she complained of dysuria urine cultures was requested  Patient did not get around to do the labs  Presenting in emergency room for frequent falls feeling tired and dehydrated UA was suggestive of UTI  Antibiotic changed from Rocephin to Levaquin for recent Klebsiella and Enterococcus  Ventricle infection was sensitive to ampicillin and Levaquin patient is allergic to penicillin  Urology input noted and appreciated plan for 3 to 6 months of Macrobid after sensitivities reported for chronic suppressive  Bladder scan to rule out postvoid residual  No plan for another cystoscopy as per previous cystoscopy was negative  Urology also recommends Estrace vaginal cream for

## 2024-06-16 NOTE — CARE COORDINATION
ONGOING DISCHARGE PLAN:    Patient is alert and oriented x4.    Spoke with patient regarding discharge plan and patient confirms that plan is still home.     +UTI culture pending  Oral levaquin x1    Hemoc consult-Hgb 7.1  IV venofer    PT/OT    Will continue to follow for additional discharge needs.    If patient is discharged prior to next notation, then this note serves as note for discharge by case management.    Electronically signed by Yani Baez RN on 6/16/2024 at 7:54 AM

## 2024-06-16 NOTE — PLAN OF CARE
Problem: Safety - Adult  Goal: Free from fall injury  6/15/2024 2228 by Mariela Abad RN  Outcome: Progressing     Problem: Discharge Planning  Goal: Discharge to home or other facility with appropriate resources  6/15/2024 2228 by Mariela Abad RN  Outcome: Progressing  Flowsheets (Taken 6/15/2024 2215)  Discharge to home or other facility with appropriate resources:   Identify barriers to discharge with patient and caregiver   Arrange for needed discharge resources and transportation as appropriate   Identify discharge learning needs (meds, wound care, etc)     Problem: Pain  Goal: Verbalizes/displays adequate comfort level or baseline comfort level  6/15/2024 2228 by Mariela Abad RN  Outcome: Progressing     Problem: Skin/Tissue Integrity  Goal: Absence of new skin breakdown  Description: 1.  Monitor for areas of redness and/or skin breakdown  2.  Assess vascular access sites hourly  3.  Every 4-6 hours minimum:  Change oxygen saturation probe site  4.  Every 4-6 hours:  If on nasal continuous positive airway pressure, respiratory therapy assess nares and determine need for appliance change or resting period.  6/15/2024 2228 by Mariela Abad RN  Outcome: Progressing     Problem: Chronic Conditions and Co-morbidities  Goal: Patient's chronic conditions and co-morbidity symptoms are monitored and maintained or improved  6/15/2024 2228 by Mariela Abad RN  Outcome: Progressing  Flowsheets (Taken 6/15/2024 2215)  Care Plan - Patient's Chronic Conditions and Co-Morbidity Symptoms are Monitored and Maintained or Improved:   Monitor and assess patient's chronic conditions and comorbid symptoms for stability, deterioration, or improvement   Collaborate with multidisciplinary team to address chronic and comorbid conditions and prevent exacerbation or deterioration   Update acute care plan with appropriate goals if chronic or comorbid symptoms are exacerbated and prevent overall improvement and

## 2024-06-16 NOTE — PLAN OF CARE
Problem: Chronic Conditions and Co-morbidities  Goal: Patient's chronic conditions and co-morbidity symptoms are monitored and maintained or improved  6/16/2024 0939 by Brooklyn Payne, RN  Outcome: Adequate for Discharge  6/15/2024 2228 by Mariela Abad, RN  Outcome: Progressing  Flowsheets (Taken 6/15/2024 2215)  Care Plan - Patient's Chronic Conditions and Co-Morbidity Symptoms are Monitored and Maintained or Improved:   Monitor and assess patient's chronic conditions and comorbid symptoms for stability, deterioration, or improvement   Collaborate with multidisciplinary team to address chronic and comorbid conditions and prevent exacerbation or deterioration   Update acute care plan with appropriate goals if chronic or comorbid symptoms are exacerbated and prevent overall improvement and discharge

## 2024-06-16 NOTE — PROGRESS NOTES
Today's Date: 6/16/2024  Patient Name: Yaz Allison  Date of admission: 6/14/2024  1:25 PM  Patient's age: 83 y.o., 1940  Admission Dx: Dehydration [E86.0]  Acute cystitis without hematuria [N30.00]  Failure of outpatient treatment [Z78.9]    Reason for Consult: management recommendations  Requesting Physician: Alyce Hernandez MD    CHIEF COMPLAINT:  recurrent anemia , cystitis     Interval history  Patient is seen and evaluated.  She is feeling much better.  She received a dose of iron last night and she is getting the second dose today.  She denies any active bleeding.  The plan is likely to proceed with colonoscopy as an outpatient  HISTORY OF PRESENT ILLNESS:      The patient is a 83 y.o.   female who is admitted to the hospital for recurrent anemia and recurrent UTI.  The patient was here in the hospital recently with what seems to be GI bleeding.  She developed severe anemia.  EGD was essentially negative but she did have a colonoscopy.  That was planned as an outpatient and she has an appointment in the next few weeks.  However, the patient developed recurrent urinary tract infection/cystitis.  She grew Klebsiella and upon admission, she was switched to Levaquin.  We are consulted to see her because of recurrent anemia.  Her stool for occult blood was positive.  The patient remembers multiple episodes of anemia and she had an episode of GI bleeding secondary to colitis.  She went to [facility] who recommended conservative management.  She did not have a follow-up with GI for multiple years   Patient is seen and evaluated.  She denies any active bleeding.  She is very tired.  She has palpitation with minimal activity    Past Medical History:   has a past medical history of Abdominal pain, unspecified site, Acquired cyst of kidney, Acute gouty arthropathy, Allergic rhinitis, cause unspecified, Anxiety state, unspecified, Arthropathy, unspecified, site unspecified, Chronic airway obstruction, not

## 2024-06-17 ENCOUNTER — TELEPHONE (OUTPATIENT)
Dept: INTERNAL MEDICINE CLINIC | Age: 84
End: 2024-06-17

## 2024-06-17 ENCOUNTER — CARE COORDINATION (OUTPATIENT)
Dept: CARE COORDINATION | Age: 84
End: 2024-06-17

## 2024-06-17 LAB
EKG ATRIAL RATE: 67 BPM
EKG P AXIS: 57 DEGREES
EKG P-R INTERVAL: 216 MS
EKG Q-T INTERVAL: 402 MS
EKG QRS DURATION: 80 MS
EKG QTC CALCULATION (BAZETT): 424 MS
EKG R AXIS: -179 DEGREES
EKG T AXIS: 117 DEGREES
EKG VENTRICULAR RATE: 67 BPM

## 2024-06-17 PROCEDURE — 93010 ELECTROCARDIOGRAM REPORT: CPT | Performed by: INTERNAL MEDICINE

## 2024-06-17 NOTE — CARE COORDINATION
Care Transitions Note  Initial Call - Call within 2 business days of discharge: Yes    Patient [place]:  [facility] [place] [place] [id number]    LPN Care Coordinator contacted the patient by telephone to perform post hospital discharge assessment, verified name and  as identifiers. Provided introduction to self, and explanation of the LPN Care Coordinator role.     Patient: Yaz Allison    Patient : 1940   MRN: [id number]    Reason for Admission: Acute cystitis without hematuria   Discharge Date: 24  RURS: Readmission Risk Score: 17.2      Last Discharge Facility       Date Complaint Diagnosis Description Type Department Provider    24 Fatigue Acute cystitis without hematuria ... ED to Hosp-Admission (Discharged) (ADMITTED) [facility] Gabe Regalado MD; Geovanny Monroe...            Was this an external facility discharge? No    Additional needs identified to be addressed with provider   Need HFU no appointments via book              Method of communication with provider: chart routing.    Patients top risk factors for readmission: medical condition-Acute cystitis with hematuria     Interventions to address risk factors:   Education: stay hydrated     Care Summary Note: Writer spoke to Yaz muller for initial transitional care call. She reports that she was really tired from the hospital so she slept well last night and is not feeling as tired today. Has been taking things easy. She does not have hematuria or dysuria  fever/chILLS n/v/d today. She has been taking things easy around the house. Appetite fine b/b fine. No blood in stool. Denies HA dizziness or issues with breathing. Pt has no concerns today no falls since coming home from hospital. Has cane if she needs it. Lives with daughter's they provide transportation. Pt can still drive but does not. Has a son who lives across the street. No HFU via book it will send a message. Pt has f/u appointments Dr. Britton -anemia  24

## 2024-06-17 NOTE — CARE COORDINATION
Pt left writer a VM message stating she able to speak to [facility] scripts all at the pharmacy they just need to be picked up.  Brooklyn Holloway LPN   Care Transitions Nurse  Cell

## 2024-06-17 NOTE — TELEPHONE ENCOUNTER
Patient was discharged from [facility] on 6/16/24. Patient reports she was advised by Lali Sprague to be seen by him in the office in a couple of weeks. No openings available.     Please advise if you would like to overbook for HFU?

## 2024-06-17 NOTE — TELEPHONE ENCOUNTER
estradiol (ESTRACE VAGINAL) 0.1 MG/GM vaginal cream Place 1 g vaginally daily Dispense: 42.5 g, Refills: 3 ordered -- 06/16/2024 -- Gabe Cintron MD         Summary: Place 1 g vaginally daily, Disp-42.5 g, R-3 Normal          Pharmacies called ot clarify directions  , usually is daily for 7 days then tapers, please advise

## 2024-06-24 ENCOUNTER — CARE COORDINATION (OUTPATIENT)
Dept: CARE COORDINATION | Age: 84
End: 2024-06-24

## 2024-06-24 NOTE — CARE COORDINATION
Care Transitions Note    Follow Up Call     Attempted to reach patient for transitions of care follow up.  Unable to reach patient.      Outreach Attempts: # 1   HIPAA compliant voicemail left for patient.     Care Summary Note: Writer left HIPAA compliant VM requesting a return call. Noted pt declined HFU appointment .    Follow Up Appointment:   Future Appointments         Provider Specialty Dept Phone    7/1/2024 10:00 AM Mane Britton MD Oncology [phone]    7/3/2024 9:30 AM Partha Veloz MD Gastroenterology [phone]    7/9/2024 1:15 PM Gabe Cintron MD Internal Medicine [phone]    7/25/2024 10:00 AM Gabe Cintron MD Internal Medicine [phone]    10/10/2024 10:40 AM Nathalia Pedro APRN - [facility] Urology [phone]            Plan for follow-up on next business day.  based on severity of symptoms and risk factors. Plan for next call: symptom management-hematuria dysuria finish Levaquin?    Brooklyn Shipley LPN

## 2024-06-25 ENCOUNTER — CARE COORDINATION (OUTPATIENT)
Dept: CARE COORDINATION | Age: 84
End: 2024-06-25

## 2024-06-25 NOTE — CARE COORDINATION
Care Transitions Note    Follow Up Call     Attempted to reach patient for transitions of care follow up.  Unable to reach patient.      Outreach Attempts: # 2   HIPAA compliant voicemail left for patient.     Patient closed (unable to reach patient) from the Care Transitions program on 6/25/24 .  Patient/family  unable to reach pt  .      Handoff:   Patient was not referred to the ACM team due to unable to contact patient.      Care Summary Note: Left HIPAA compliant VM requesting a return call. Will resolve episode if no return call.     Assessments:  Care Transitions Subsequent and Final Call    Subsequent and Final Calls  Care Transitions Interventions  Other Interventions:              Upcoming Appointments:    Future Appointments         Provider Specialty Dept Phone    7/1/2024 10:00 AM Mane Britton MD Oncology [phone]    7/3/2024 9:30 AM Partha Veloz MD Gastroenterology [phone]    7/9/2024 1:15 PM Gabe Cintron MD Internal Medicine [phone]    7/25/2024 10:00 AM Gabe Cintron MD Internal Medicine [phone]    10/10/2024 10:40 AM Nathalia Pedro APRN - [facility] Urology [phone]            Brooklyn Shipley LPN

## 2024-07-03 ENCOUNTER — OFFICE VISIT (OUTPATIENT)
Dept: GASTROENTEROLOGY | Age: 84
End: 2024-07-03
Payer: MEDICARE

## 2024-07-03 VITALS
SYSTOLIC BLOOD PRESSURE: 118 MMHG | WEIGHT: 114 LBS | BODY MASS INDEX: 17.89 KG/M2 | HEART RATE: 71 BPM | DIASTOLIC BLOOD PRESSURE: 62 MMHG | HEIGHT: 67 IN

## 2024-07-03 DIAGNOSIS — K29.01 GASTROINTESTINAL HEMORRHAGE ASSOCIATED WITH ACUTE GASTRITIS: Primary | ICD-10-CM

## 2024-07-03 PROCEDURE — 3074F SYST BP LT 130 MM HG: CPT | Performed by: INTERNAL MEDICINE

## 2024-07-03 PROCEDURE — 99213 OFFICE O/P EST LOW 20 MIN: CPT | Performed by: INTERNAL MEDICINE

## 2024-07-03 PROCEDURE — 1123F ACP DISCUSS/DSCN MKR DOCD: CPT | Performed by: INTERNAL MEDICINE

## 2024-07-03 PROCEDURE — 3078F DIAST BP <80 MM HG: CPT | Performed by: INTERNAL MEDICINE

## 2024-07-03 NOTE — PROGRESS NOTES
Pulmonary:      Effort: Pulmonary effort is normal. No respiratory distress.      Breath sounds: Normal breath sounds. No stridor. No wheezing or rales.   Abdominal:      General: Abdomen is flat. There is no distension.      Palpations: Abdomen is soft. There is no mass.      Tenderness: There is no abdominal tenderness.   Musculoskeletal:      Cervical back: Neck supple.   Neurological:      Mental Status: She is alert and oriented to person, place, and time.           IMPRESSION: Ms. Allison is a 83 y.o. female with h/o- UGI bleed S/P EGD which showed gastritis and duodenitis. She is on PPI and is doing well.    Plan- Continue on Protonix 40 mg daily  Avoid NSAID's      Medication where reviewed, side effects from the GI medication were reviewed with the patient  We did refill the GI medications            Diet/life style/natural hx /complication of the dx were all explained in details   Past medical, past surgical, social history, psychiatric history, medications or allergies, all reviewed and  updated    Thank you for allowing me to participate in the care of Ms. Allison. For any further questions please do not hesitate to contact me.    I have reviewed and agree with the ROS entered by the MA/RN.           Partha Veloz MD, [facility]enter[facility]  O: #[phone]

## 2024-07-09 ENCOUNTER — OFFICE VISIT (OUTPATIENT)
Dept: INTERNAL MEDICINE CLINIC | Age: 84
End: 2024-07-09
Payer: MEDICARE

## 2024-07-09 VITALS
WEIGHT: 109.4 LBS | BODY MASS INDEX: 17.17 KG/M2 | HEART RATE: 65 BPM | OXYGEN SATURATION: 91 % | HEIGHT: 67 IN | SYSTOLIC BLOOD PRESSURE: 112 MMHG | DIASTOLIC BLOOD PRESSURE: 62 MMHG

## 2024-07-09 DIAGNOSIS — K27.9 PUD (PEPTIC ULCER DISEASE): Primary | ICD-10-CM

## 2024-07-09 DIAGNOSIS — I27.20 PULMONARY HTN (HCC): ICD-10-CM

## 2024-07-09 DIAGNOSIS — J43.1 PANLOBULAR EMPHYSEMA (HCC): ICD-10-CM

## 2024-07-09 DIAGNOSIS — N18.31 STAGE 3A CHRONIC KIDNEY DISEASE (HCC): ICD-10-CM

## 2024-07-09 DIAGNOSIS — Z13.220 SCREENING FOR HYPERLIPIDEMIA: ICD-10-CM

## 2024-07-09 PROCEDURE — 3078F DIAST BP <80 MM HG: CPT | Performed by: INTERNAL MEDICINE

## 2024-07-09 PROCEDURE — 1123F ACP DISCUSS/DSCN MKR DOCD: CPT | Performed by: INTERNAL MEDICINE

## 2024-07-09 PROCEDURE — 3074F SYST BP LT 130 MM HG: CPT | Performed by: INTERNAL MEDICINE

## 2024-07-09 PROCEDURE — 99214 OFFICE O/P EST MOD 30 MIN: CPT | Performed by: INTERNAL MEDICINE

## 2024-07-09 ASSESSMENT — PATIENT HEALTH QUESTIONNAIRE - PHQ9
SUM OF ALL RESPONSES TO PHQ QUESTIONS 1-9: 0
SUM OF ALL RESPONSES TO PHQ QUESTIONS 1-9: 0
1. LITTLE INTEREST OR PLEASURE IN DOING THINGS: NOT AT ALL
2. FEELING DOWN, DEPRESSED OR HOPELESS: NOT AT ALL
SUM OF ALL RESPONSES TO PHQ9 QUESTIONS 1 & 2: 0
SUM OF ALL RESPONSES TO PHQ QUESTIONS 1-9: 0
SUM OF ALL RESPONSES TO PHQ QUESTIONS 1-9: 0

## 2024-07-09 ASSESSMENT — ENCOUNTER SYMPTOMS
BACK PAIN: 1
SHORTNESS OF BREATH: 0
BLOOD IN STOOL: 0
DIARRHEA: 0
COUGH: 0
EYE PAIN: 0
EYE DISCHARGE: 0
WHEEZING: 0
COLOR CHANGE: 0
TROUBLE SWALLOWING: 0
ABDOMINAL DISTENTION: 0

## 2024-07-09 NOTE — PROGRESS NOTES
Visit Information    Have you changed or started any medications since your last visit including any over-the-counter medicines, vitamins, or herbal medicines? no   Are you having any side effects from any of your medications? -  no  Have you stopped taking any of your medications? Is so, why? -  no    Have you seen any other physician or provider since your last visit? Yes - Records Obtained  Have you had any other diagnostic tests since your last visit? Yes - Records Obtained  Have you been seen in the emergency room and/or had an admission to a hospital since we last saw you? Yes - Records Obtained  Have you had your routine dental cleaning in the past 6 months? no    Have you activated your [employer] account? If not, what are your barriers? Yes     Patient Care Team:  Gabe Cintron MD as PCP - General (Internal Medicine)  [facility]    Medical History Review  Past Medical, Family, and Social History reviewed and does contribute to the patient presenting condition    Health Maintenance   Topic Date Due    DTaP/Tdap/Td vaccine (1 - Tdap) Never done    Shingles vaccine (1 of 2) Never done    Respiratory Syncytial Virus (RSV) Pregnant or age 60 yrs+ (1 - 1-dose 60+ series) Never done    Annual Wellness Visit (Medicare Advantage)  01/01/2024    Lipids  03/15/2024    Flu vaccine (1) 08/01/2024    Depression Screen  05/29/2025    Pneumococcal 65+ years Vaccine  Completed    COVID-19 Vaccine  Completed    DEXA (modify frequency per FRAX score)  Addressed    Hepatitis A vaccine  Aged Out    Hepatitis B vaccine  Aged Out    Hib vaccine  Aged Out    Polio vaccine  Aged Out    Meningococcal (ACWY) vaccine  Aged Out     
patientquestions answered. Pt voiced understanding. Reviewed health maintenance.  Instructedto continue current medications, diet and exercise.  Patient agreed with treatmentplan. Follow up as directed.       Please note that this chart was generated using voice recognition Dragon dictation software.  Although every effort was made to ensure the accuracy of this automated transcription, some errors in transcription may have occurred.     Electronically signed by Gabe Cintron MD on 7/9/2024 at 1:33 PM

## 2024-07-10 ENCOUNTER — CARE COORDINATION (OUTPATIENT)
Dept: CARE COORDINATION | Age: 84
End: 2024-07-10

## 2024-07-24 ENCOUNTER — INITIAL CONSULT (OUTPATIENT)
Dept: ONCOLOGY | Age: 84
End: 2024-07-24
Payer: MEDICARE

## 2024-07-24 ENCOUNTER — TELEPHONE (OUTPATIENT)
Dept: ONCOLOGY | Age: 84
End: 2024-07-24

## 2024-07-24 ENCOUNTER — HOSPITAL ENCOUNTER (OUTPATIENT)
Age: 84
Discharge: HOME | End: 2024-07-24
Payer: MEDICARE

## 2024-07-24 VITALS
WEIGHT: 111.2 LBS | TEMPERATURE: 97.8 F | DIASTOLIC BLOOD PRESSURE: 83 MMHG | BODY MASS INDEX: 17.42 KG/M2 | RESPIRATION RATE: 18 BRPM | OXYGEN SATURATION: 97 % | HEART RATE: 67 BPM | SYSTOLIC BLOOD PRESSURE: 147 MMHG

## 2024-07-24 DIAGNOSIS — D50.9 IRON DEFICIENCY ANEMIA, UNSPECIFIED IRON DEFICIENCY ANEMIA TYPE: ICD-10-CM

## 2024-07-24 DIAGNOSIS — D64.9 ANEMIA, UNSPECIFIED TYPE: ICD-10-CM

## 2024-07-24 DIAGNOSIS — D69.6 THROMBOCYTOPENIA (HCC): ICD-10-CM

## 2024-07-24 DIAGNOSIS — D64.9 ANEMIA, UNSPECIFIED TYPE: Primary | ICD-10-CM

## 2024-07-24 LAB
BASOPHILS # BLD: 0.13 K/UL (ref 0–0.2)
BASOPHILS NFR BLD: 2 % (ref 0–2)
EOSINOPHIL # BLD: 0.38 K/UL (ref 0–0.4)
EOSINOPHILS RELATIVE PERCENT: 6 % (ref 1–4)
ERYTHROCYTE [DISTWIDTH] IN BLOOD BY AUTOMATED COUNT: 22.5 % (ref 12.5–15.4)
HCT VFR BLD AUTO: 28 % (ref 36–46)
HGB BLD-MCNC: 8.9 G/DL (ref 12–16)
IMM RETICS NFR: 15.6 % (ref 2.7–18.3)
LYMPHOCYTES NFR BLD: 1.47 K/UL (ref 1–4.8)
LYMPHOCYTES RELATIVE PERCENT: 23 % (ref 24–44)
MCH RBC QN AUTO: 26.5 PG (ref 26–34)
MCHC RBC AUTO-ENTMCNC: 31.8 G/DL (ref 31–37)
MCV RBC AUTO: 83.5 FL (ref 80–100)
MONOCYTES NFR BLD: 0.64 K/UL (ref 0.1–1.2)
MONOCYTES NFR BLD: 10 % (ref 2–11)
MORPHOLOGY: ABNORMAL
NEUTROPHILS NFR BLD: 59 % (ref 36–66)
NEUTS SEG NFR BLD: 3.78 K/UL (ref 1.8–7.7)
PLATELET # BLD AUTO: 179 K/UL (ref 140–450)
PMV BLD AUTO: 8 FL (ref 6–12)
RBC # BLD AUTO: 3.36 M/UL (ref 4–5.2)
RETIC HEMOGLOBIN: 26.7 PG (ref 28.2–35.7)
RETICS # AUTO: 0.04 M/UL (ref 0.03–0.08)
RETICS/RBC NFR AUTO: 1.1 % (ref 0.5–1.9)
WBC OTHER # BLD: 6.4 K/UL (ref 3.5–11)

## 2024-07-24 PROCEDURE — 82728 ASSAY OF FERRITIN: CPT

## 2024-07-24 PROCEDURE — 84155 ASSAY OF PROTEIN SERUM: CPT

## 2024-07-24 PROCEDURE — 99214 OFFICE O/P EST MOD 30 MIN: CPT | Performed by: INTERNAL MEDICINE

## 2024-07-24 PROCEDURE — 86334 IMMUNOFIX E-PHORESIS SERUM: CPT

## 2024-07-24 PROCEDURE — 99202 OFFICE O/P NEW SF 15 MIN: CPT | Performed by: INTERNAL MEDICINE

## 2024-07-24 PROCEDURE — 1123F ACP DISCUSS/DSCN MKR DOCD: CPT | Performed by: INTERNAL MEDICINE

## 2024-07-24 PROCEDURE — 83550 IRON BINDING TEST: CPT

## 2024-07-24 PROCEDURE — 36415 COLL VENOUS BLD VENIPUNCTURE: CPT

## 2024-07-24 PROCEDURE — 83521 IG LIGHT CHAINS FREE EACH: CPT

## 2024-07-24 PROCEDURE — 83540 ASSAY OF IRON: CPT

## 2024-07-24 PROCEDURE — 84165 PROTEIN E-PHORESIS SERUM: CPT

## 2024-07-24 PROCEDURE — 85025 COMPLETE CBC W/AUTO DIFF WBC: CPT

## 2024-07-24 PROCEDURE — 85045 AUTOMATED RETICULOCYTE COUNT: CPT

## 2024-07-24 NOTE — TELEPHONE ENCOUNTER
Instructions   from Dr. Mane Britton MD    Labs now      Rv tentatively in 3 weeks ( may change based on lab results)     Patient sent to register for labs.  Patient scheduled for 3 week f/u 08/16/2024 at 11:30 am.

## 2024-07-24 NOTE — PROGRESS NOTES
MG tablet Take 1 tablet by mouth every morning (before breakfast) 90 tablet 1    levothyroxine (SYNTHROID) 100 MCG tablet TAKE 1 TABLET BY MOUTH DAILY IN  THE MORNING ONE HOUR BEFORE  BREAKFAST 30 tablet 2    folic acid (FOLVITE) 1 MG tablet Take 1 tablet by mouth daily 90 tablet 3    Multiple Vitamins-Minerals (HAIR/SKIN/NAILS) TABS Take 1 tablet by mouth daily      Cholecalciferol (VITAMIN D) 2000 UNITS CAPS capsule Take 2,000 Units by mouth daily      estradiol (ESTRACE VAGINAL) 0.1 MG/GM vaginal cream Place 1 g vaginally daily (Patient not taking: Reported on 7/24/2024) 42.5 g 3     No current facility-administered medications for this visit.       Allergies:   Penicillins, Seasonal, and Etomidate    Review of Systems:    Constitutional: No fever or chills. No night sweats, no weight loss   Eyes: No eye discharge, double vision, or eye pain   HEENT: negative for sore mouth, sore throat, hoarseness and voice change   Respiratory: negative for cough , sputum, dyspnea, wheezing, hemoptysis, chest pain   Cardiovascular: negative for chest pain, dyspnea, palpitations, orthopnea, PND   Gastrointestinal: negative for nausea, vomiting, diarrhea, constipation, abdominal pain, Dysphagia, hematemesis and hematochezia   Genitourinary: negative for frequency, dysuria, nocturia, urinary incontinence, and hematuria   Integument: negative for rash, skin lesions, bruises.   Hematologic/Lymphatic: negative for easy bruising, bleeding, lymphadenopathy, or petechiae   Endocrine: negative for heat or cold intolerance,weight changes, change in bowel habits and hair loss   Musculoskeletal: negative for myalgias, arthralgias, pain, joint swelling,and bone pain   Neurological: negative for headaches, dizziness, seizures, weakness, numbness    Physical Exam:  Vitals: BP (!) 147/83   Pulse 67   Temp 97.8 °F (36.6 °C) (Temporal)   Resp 18   Wt 50.4 kg (111 lb 3.2 oz)   SpO2 97%   BMI 17.42 kg/m²   General appearance - well appearing,

## 2024-07-25 LAB
ALBUMIN PERCENT: 55 % (ref 45–65)
ALBUMIN SERPL-MCNC: 3.9 G/DL (ref 3.2–5.2)
ALPHA 2 PERCENT: 11 % (ref 6–13)
ALPHA1 GLOB SERPL ELPH-MCNC: 0.4 G/DL (ref 0.1–0.4)
ALPHA1 GLOB SERPL ELPH-MCNC: 6 % (ref 3–6)
ALPHA2 GLOB SERPL ELPH-MCNC: 0.8 G/DL (ref 0.5–0.9)
B-GLOBULIN SERPL ELPH-MCNC: 0.8 G/DL (ref 0.5–1.1)
B-GLOBULIN SERPL ELPH-MCNC: 11 % (ref 11–19)
FERRITIN SERPL-MCNC: 58 NG/ML (ref 13–150)
FREE KAPPA/LAMBDA RATIO: 2.13 (ref 0.22–1.74)
GAMMA GLOB SERPL ELPH-MCNC: 1.2 G/DL (ref 0.5–1.5)
GAMMA GLOBULIN %: 17 % (ref 9–20)
IRON SATN MFR SERPL: 8 % (ref 20–55)
IRON SERPL-MCNC: 21 UG/DL (ref 37–145)
ITYP INTERPRETATION: ABNORMAL
KAPPA LC FREE SER-MCNC: 107 MG/L
LAMBDA LC FREE SERPL-MCNC: 50.3 MG/L (ref 4.2–27.7)
PATH REV BLD -IMP: NORMAL
PATH REV: ABNORMAL
PATHOLOGIST: ABNORMAL
PROT PATTERN SERPL ELPH-IMP: ABNORMAL
PROT SERPL-MCNC: 7 G/DL (ref 6.6–8.7)
SURGICAL PATHOLOGY REPORT: NORMAL
TIBC SERPL-MCNC: 261 UG/DL (ref 250–450)
TOTAL PROT. SUM,%: 100 % (ref 98–102)
TOTAL PROT. SUM: 7.1 G/DL (ref 6.3–8.2)
UNSATURATED IRON BINDING CAPACITY: 240 UG/DL (ref 112–347)

## 2024-08-05 ENCOUNTER — HOSPITAL ENCOUNTER (OUTPATIENT)
Age: 84
Setting detail: SPECIMEN
Discharge: HOME | End: 2024-08-05

## 2024-08-05 DIAGNOSIS — Z13.220 SCREENING FOR HYPERLIPIDEMIA: ICD-10-CM

## 2024-08-05 DIAGNOSIS — K27.9 PUD (PEPTIC ULCER DISEASE): ICD-10-CM

## 2024-08-05 LAB
BASOPHILS # BLD: 0.1 K/UL (ref 0–0.2)
BASOPHILS NFR BLD: 1 % (ref 0–2)
CHOLEST SERPL-MCNC: 139 MG/DL (ref 0–199)
CHOLESTEROL/HDL RATIO: 3
EOSINOPHIL # BLD: 0.47 K/UL (ref 0–0.44)
EOSINOPHILS RELATIVE PERCENT: 7 % (ref 1–4)
ERYTHROCYTE [DISTWIDTH] IN BLOOD BY AUTOMATED COUNT: 18.5 % (ref 11.8–14.4)
HCT VFR BLD AUTO: 31 % (ref 36.3–47.1)
HDLC SERPL-MCNC: 45 MG/DL
HGB BLD-MCNC: 9.3 G/DL (ref 11.9–15.1)
IMM GRANULOCYTES # BLD AUTO: <0.03 K/UL (ref 0–0.3)
IMM GRANULOCYTES NFR BLD: 0 %
LDLC SERPL CALC-MCNC: 72 MG/DL (ref 0–100)
LYMPHOCYTES NFR BLD: 1.81 K/UL (ref 1.1–3.7)
LYMPHOCYTES RELATIVE PERCENT: 25 % (ref 24–43)
MCH RBC QN AUTO: 26.3 PG (ref 25.2–33.5)
MCHC RBC AUTO-ENTMCNC: 30 G/DL (ref 28.4–34.8)
MCV RBC AUTO: 87.8 FL (ref 82.6–102.9)
MONOCYTES NFR BLD: 0.8 K/UL (ref 0.1–1.2)
MONOCYTES NFR BLD: 11 % (ref 3–12)
NEUTROPHILS NFR BLD: 56 % (ref 36–65)
NEUTS SEG NFR BLD: 4.02 K/UL (ref 1.5–8.1)
NRBC BLD-RTO: 0 PER 100 WBC
PLATELET # BLD AUTO: 181 K/UL (ref 138–453)
PMV BLD AUTO: 10.8 FL (ref 8.1–13.5)
RBC # BLD AUTO: 3.53 M/UL (ref 3.95–5.11)
RBC # BLD: ABNORMAL 10*6/UL
TRIGL SERPL-MCNC: 111 MG/DL
VLDLC SERPL CALC-MCNC: 22 MG/DL
WBC OTHER # BLD: 7.2 K/UL (ref 3.5–11.3)

## 2024-08-11 PROBLEM — D50.9 IRON DEFICIENCY ANEMIA: Status: ACTIVE | Noted: 2024-08-11

## 2024-08-11 RX ORDER — EPINEPHRINE 1 MG/ML
0.3 INJECTION, SOLUTION, CONCENTRATE INTRAVENOUS PRN
OUTPATIENT
Start: 2024-08-11

## 2024-08-11 RX ORDER — SODIUM CHLORIDE 9 MG/ML
5-250 INJECTION, SOLUTION INTRAVENOUS PRN
OUTPATIENT
Start: 2024-08-11

## 2024-08-11 RX ORDER — ALBUTEROL SULFATE 90 UG/1
4 AEROSOL, METERED RESPIRATORY (INHALATION) PRN
OUTPATIENT
Start: 2024-08-11

## 2024-08-11 RX ORDER — ONDANSETRON 2 MG/ML
8 INJECTION INTRAMUSCULAR; INTRAVENOUS
OUTPATIENT
Start: 2024-08-11

## 2024-08-11 RX ORDER — FAMOTIDINE 10 MG/ML
20 INJECTION, SOLUTION INTRAVENOUS
OUTPATIENT
Start: 2024-08-11

## 2024-08-11 RX ORDER — SODIUM CHLORIDE 9 MG/ML
INJECTION, SOLUTION INTRAVENOUS CONTINUOUS
OUTPATIENT
Start: 2024-08-11

## 2024-08-11 RX ORDER — SODIUM CHLORIDE 0.9 % (FLUSH) 0.9 %
5-40 SYRINGE (ML) INJECTION PRN
OUTPATIENT
Start: 2024-08-11

## 2024-08-11 RX ORDER — HEPARIN SODIUM (PORCINE) LOCK FLUSH IV SOLN 100 UNIT/ML 100 UNIT/ML
500 SOLUTION INTRAVENOUS PRN
OUTPATIENT
Start: 2024-08-11

## 2024-08-11 RX ORDER — ACETAMINOPHEN 325 MG/1
650 TABLET ORAL
OUTPATIENT
Start: 2024-08-11

## 2024-08-11 RX ORDER — DIPHENHYDRAMINE HYDROCHLORIDE 50 MG/ML
50 INJECTION INTRAMUSCULAR; INTRAVENOUS
OUTPATIENT
Start: 2024-08-11

## 2024-08-12 ENCOUNTER — TELEPHONE (OUTPATIENT)
Dept: ONCOLOGY | Age: 84
End: 2024-08-12

## 2024-08-12 ENCOUNTER — CLINICAL DOCUMENTATION (OUTPATIENT)
Facility: HOSPITAL | Age: 84
End: 2024-08-12

## 2024-08-12 NOTE — PROGRESS NOTES
Patient Assistance                   Additional notes: Reviewed chart and no assistance is available for this treatment at this time.  Patient ahs $3,839.82 left on her OOP MAx for 2024.  I will place on a pending assistance wait list.

## 2024-08-12 NOTE — TELEPHONE ENCOUNTER
Left message for patient to have lab work done as her revisit is already scheduled for 8/16/24. Also left in the message is IV Iron was ordered and once we receive the precert we will call to schedule.

## 2024-08-12 NOTE — TELEPHONE ENCOUNTER
----- Message from Dr. Mane Britton MD sent at 8/11/2024  5:32 PM EDT -----  Please schedule patient for IV iron.    Lab workup for return visit also ordered.  Will need to be drawn 1 week before return visit

## 2024-08-13 ENCOUNTER — HOSPITAL ENCOUNTER (OUTPATIENT)
Age: 84
Setting detail: SPECIMEN
Discharge: HOME | End: 2024-08-13

## 2024-08-13 DIAGNOSIS — D64.9 ANEMIA, UNSPECIFIED TYPE: ICD-10-CM

## 2024-08-13 DIAGNOSIS — D69.6 THROMBOCYTOPENIA (HCC): ICD-10-CM

## 2024-08-13 DIAGNOSIS — D50.9 IRON DEFICIENCY ANEMIA, UNSPECIFIED IRON DEFICIENCY ANEMIA TYPE: ICD-10-CM

## 2024-08-13 LAB
BASOPHILS # BLD: 0.09 K/UL (ref 0–0.2)
BASOPHILS NFR BLD: 2 % (ref 0–2)
EOSINOPHIL # BLD: 0.53 K/UL (ref 0–0.44)
EOSINOPHILS RELATIVE PERCENT: 10 % (ref 1–4)
ERYTHROCYTE [DISTWIDTH] IN BLOOD BY AUTOMATED COUNT: 18.1 % (ref 11.8–14.4)
FERRITIN SERPL-MCNC: 28 NG/ML (ref 13–150)
HCT VFR BLD AUTO: 30.3 % (ref 36.3–47.1)
HGB BLD-MCNC: 9.1 G/DL (ref 11.9–15.1)
IMM GRANULOCYTES # BLD AUTO: <0.03 K/UL (ref 0–0.3)
IMM GRANULOCYTES NFR BLD: 0 %
IRON SATN MFR SERPL: 10 % (ref 20–55)
IRON SERPL-MCNC: 28 UG/DL (ref 37–145)
LYMPHOCYTES NFR BLD: 1.3 K/UL (ref 1.1–3.7)
LYMPHOCYTES RELATIVE PERCENT: 23 % (ref 24–43)
MCH RBC QN AUTO: 26.4 PG (ref 25.2–33.5)
MCHC RBC AUTO-ENTMCNC: 30 G/DL (ref 28.4–34.8)
MCV RBC AUTO: 87.8 FL (ref 82.6–102.9)
MONOCYTES NFR BLD: 0.61 K/UL (ref 0.1–1.2)
MONOCYTES NFR BLD: 11 % (ref 3–12)
NEUTROPHILS NFR BLD: 54 % (ref 36–65)
NEUTS SEG NFR BLD: 3.01 K/UL (ref 1.5–8.1)
NRBC BLD-RTO: 0 PER 100 WBC
PLATELET # BLD AUTO: 190 K/UL (ref 138–453)
PMV BLD AUTO: 10.6 FL (ref 8.1–13.5)
RBC # BLD AUTO: 3.45 M/UL (ref 3.95–5.11)
RBC # BLD: ABNORMAL 10*6/UL
TIBC SERPL-MCNC: 290 UG/DL (ref 250–450)
UNSATURATED IRON BINDING CAPACITY: 262 UG/DL (ref 112–347)
WBC OTHER # BLD: 5.6 K/UL (ref 3.5–11.3)

## 2024-08-15 ENCOUNTER — CARE COORDINATION (OUTPATIENT)
Dept: INTERNAL MEDICINE CLINIC | Age: 84
End: 2024-08-15

## 2024-08-15 ENCOUNTER — HOSPITAL ENCOUNTER (OUTPATIENT)
Dept: INFUSION THERAPY | Age: 84
Discharge: HOME | End: 2024-08-15
Payer: MEDICARE

## 2024-08-15 VITALS — HEART RATE: 77 BPM | SYSTOLIC BLOOD PRESSURE: 157 MMHG | RESPIRATION RATE: 16 BRPM | DIASTOLIC BLOOD PRESSURE: 55 MMHG

## 2024-08-15 DIAGNOSIS — D50.9 IRON DEFICIENCY ANEMIA, UNSPECIFIED IRON DEFICIENCY ANEMIA TYPE: Primary | ICD-10-CM

## 2024-08-15 PROCEDURE — 96365 THER/PROPH/DIAG IV INF INIT: CPT

## 2024-08-15 PROCEDURE — 6360000002 HC RX W HCPCS: Performed by: INTERNAL MEDICINE

## 2024-08-15 PROCEDURE — 2580000003 HC RX 258: Performed by: INTERNAL MEDICINE

## 2024-08-15 RX ORDER — HEPARIN 100 UNIT/ML
500 SYRINGE INTRAVENOUS PRN
Status: CANCELLED | OUTPATIENT
Start: 2024-08-22

## 2024-08-15 RX ORDER — SODIUM CHLORIDE 9 MG/ML
5-250 INJECTION, SOLUTION INTRAVENOUS PRN
Status: CANCELLED | OUTPATIENT
Start: 2024-08-22

## 2024-08-15 RX ORDER — FAMOTIDINE 10 MG/ML
20 INJECTION, SOLUTION INTRAVENOUS
Status: CANCELLED | OUTPATIENT
Start: 2024-08-22

## 2024-08-15 RX ORDER — SODIUM CHLORIDE 9 MG/ML
5-250 INJECTION, SOLUTION INTRAVENOUS PRN
Status: DISCONTINUED | OUTPATIENT
Start: 2024-08-15 | End: 2024-08-16 | Stop reason: HOSPADM

## 2024-08-15 RX ORDER — ALBUTEROL SULFATE 90 UG/1
4 AEROSOL, METERED RESPIRATORY (INHALATION) PRN
Status: CANCELLED | OUTPATIENT
Start: 2024-08-22

## 2024-08-15 RX ORDER — DIPHENHYDRAMINE HYDROCHLORIDE 50 MG/ML
50 INJECTION INTRAMUSCULAR; INTRAVENOUS
Status: CANCELLED | OUTPATIENT
Start: 2024-08-22

## 2024-08-15 RX ORDER — EPINEPHRINE 1 MG/ML
0.3 INJECTION, SOLUTION, CONCENTRATE INTRAVENOUS PRN
Status: CANCELLED | OUTPATIENT
Start: 2024-08-22

## 2024-08-15 RX ORDER — ONDANSETRON 2 MG/ML
8 INJECTION INTRAMUSCULAR; INTRAVENOUS
Status: CANCELLED | OUTPATIENT
Start: 2024-08-22

## 2024-08-15 RX ORDER — SODIUM CHLORIDE 0.9 % (FLUSH) 0.9 %
5-40 SYRINGE (ML) INJECTION PRN
Status: CANCELLED | OUTPATIENT
Start: 2024-08-22

## 2024-08-15 RX ORDER — ACETAMINOPHEN 325 MG/1
650 TABLET ORAL
Status: CANCELLED | OUTPATIENT
Start: 2024-08-22

## 2024-08-15 RX ORDER — SODIUM CHLORIDE 9 MG/ML
INJECTION, SOLUTION INTRAVENOUS CONTINUOUS
Status: CANCELLED | OUTPATIENT
Start: 2024-08-22

## 2024-08-15 RX ADMIN — FERUMOXYTOL 510 MG: 510 INJECTION INTRAVENOUS at 09:40

## 2024-08-15 RX ADMIN — SODIUM CHLORIDE 25 ML/HR: 9 INJECTION, SOLUTION INTRAVENOUS at 09:39

## 2024-08-15 NOTE — PROGRESS NOTES
Pt here for Feraheme 1/2.  Infusion completed without incident.  Pt stayed for 30 minute observation period.  Pt to return 8/22/24 for Feraheme 2/2.  Pt discharged.

## 2024-08-15 NOTE — CARE COORDINATION
Ambulatory Care Coordination Note     8/15/2024 3:01 PM     Patient Current Location:  Home: [id number]  [place] [id number]     This patient was received as a referral from Ambulatory Care Manager .    ACM contacted the patient by telephone. Verified name and  with patient as identifiers. Provided introduction to self, and explanation of the ACM role.   Patient accepted care management services at this time.          ACM: LORA WORTHY RN     Challenges to be reviewed by the provider   Additional needs identified to be addressed with provider No  none               Method of communication with provider: none.    Care Summary Note: Patient reports that she is doing well at this time. She stated she had an infusion today and has another one scheduled.   She denied any needs for assist with transportation. Stated she has all of her medications at this time.   Agreed to future calls to check on her.     Offered patient enrollment in the Remote Patient Monitoring (RPM) program for in-home monitoring: Patient is not eligible for RPM program because: patient does not have qualifying diagnosis.     Assessments Completed:   General Assessment    Do you have any symptoms that are causing concern?: No          Medications Reviewed:   Patient denies any changes with medications and reports taking all medications as prescribed.    Advance Care Planning:   Not on file; education provided     Care Planning:   Education Documentation  General medication information, taught by Lora Worthy, RN at 8/15/2024  3:00 PM.  Learner: Patient  Readiness: Acceptance  Method: Explanation  Response: Verbalizes Understanding    Educate reporting changes in condition, taught by Lora Worthy, RN at 8/15/2024  3:00 PM.  Learner: Patient  Readiness: Acceptance  Method: Explanation  Response: Verbalizes Understanding    Educate Patient on When to Call for Symptoms, taught by Lora Worthy RN at 8/15/2024  3:00 PM.  Learner:

## 2024-08-22 ENCOUNTER — HOSPITAL ENCOUNTER (OUTPATIENT)
Dept: INFUSION THERAPY | Age: 84
Discharge: HOME | End: 2024-08-22
Payer: MEDICARE

## 2024-08-22 VITALS — DIASTOLIC BLOOD PRESSURE: 64 MMHG | RESPIRATION RATE: 16 BRPM | SYSTOLIC BLOOD PRESSURE: 128 MMHG | HEART RATE: 57 BPM

## 2024-08-22 DIAGNOSIS — D50.9 IRON DEFICIENCY ANEMIA, UNSPECIFIED IRON DEFICIENCY ANEMIA TYPE: Primary | ICD-10-CM

## 2024-08-22 PROCEDURE — 96365 THER/PROPH/DIAG IV INF INIT: CPT

## 2024-08-22 PROCEDURE — 6360000002 HC RX W HCPCS: Performed by: INTERNAL MEDICINE

## 2024-08-22 PROCEDURE — 2580000003 HC RX 258: Performed by: INTERNAL MEDICINE

## 2024-08-22 RX ORDER — ACETAMINOPHEN 325 MG/1
650 TABLET ORAL
OUTPATIENT
Start: 2024-08-29

## 2024-08-22 RX ORDER — ONDANSETRON 2 MG/ML
8 INJECTION INTRAMUSCULAR; INTRAVENOUS
OUTPATIENT
Start: 2024-08-29

## 2024-08-22 RX ORDER — ALBUTEROL SULFATE 90 UG/1
4 AEROSOL, METERED RESPIRATORY (INHALATION) PRN
OUTPATIENT
Start: 2024-08-29

## 2024-08-22 RX ORDER — SODIUM CHLORIDE 0.9 % (FLUSH) 0.9 %
5-40 SYRINGE (ML) INJECTION PRN
OUTPATIENT
Start: 2024-08-29

## 2024-08-22 RX ORDER — SODIUM CHLORIDE 9 MG/ML
INJECTION, SOLUTION INTRAVENOUS CONTINUOUS
OUTPATIENT
Start: 2024-08-29

## 2024-08-22 RX ORDER — FAMOTIDINE 10 MG/ML
20 INJECTION, SOLUTION INTRAVENOUS
OUTPATIENT
Start: 2024-08-29

## 2024-08-22 RX ORDER — EPINEPHRINE 1 MG/ML
0.3 INJECTION, SOLUTION, CONCENTRATE INTRAVENOUS PRN
OUTPATIENT
Start: 2024-08-29

## 2024-08-22 RX ORDER — DIPHENHYDRAMINE HYDROCHLORIDE 50 MG/ML
50 INJECTION INTRAMUSCULAR; INTRAVENOUS
OUTPATIENT
Start: 2024-08-29

## 2024-08-22 RX ORDER — HEPARIN 100 UNIT/ML
500 SYRINGE INTRAVENOUS PRN
OUTPATIENT
Start: 2024-08-29

## 2024-08-22 RX ORDER — SODIUM CHLORIDE 9 MG/ML
5-250 INJECTION, SOLUTION INTRAVENOUS PRN
Status: DISCONTINUED | OUTPATIENT
Start: 2024-08-22 | End: 2024-08-23 | Stop reason: HOSPADM

## 2024-08-22 RX ORDER — SODIUM CHLORIDE 9 MG/ML
5-250 INJECTION, SOLUTION INTRAVENOUS PRN
Status: CANCELLED | OUTPATIENT
Start: 2024-08-29

## 2024-08-22 RX ORDER — SODIUM CHLORIDE 9 MG/ML
5-250 INJECTION, SOLUTION INTRAVENOUS PRN
OUTPATIENT
Start: 2024-08-29

## 2024-08-22 RX ADMIN — SODIUM CHLORIDE 250 ML/HR: 9 INJECTION, SOLUTION INTRAVENOUS at 11:34

## 2024-08-22 RX ADMIN — FERUMOXYTOL 510 MG: 510 INJECTION INTRAVENOUS at 11:35

## 2024-08-22 NOTE — PROGRESS NOTES
Pt here for 2 of 2 Feraheme infusion.   Infusion complete without incident.  Pt d/c'd in stable condition.   Returns 9-13-24 for MD f/u.

## 2024-08-23 ENCOUNTER — CARE COORDINATION (OUTPATIENT)
Dept: CARE COORDINATION | Age: 84
End: 2024-08-23

## 2024-08-23 NOTE — CARE COORDINATION
Ambulatory Care Coordination Note     8/23/2024 2:58 PM     ACM outreach attempt by this ACM today to perform care management follow up . ACM was unable to reach the patient by telephone today; left voice message requesting a return phone call to this ACM.     ACM: LORA WORTHY RN         PCP/Specialist follow up:   Future Appointments         Provider Specialty Dept Phone    9/13/2024 11:30 AM Mane Britton MD Oncology [phone]    10/10/2024 11:00 AM Nathalia Pedro APRN - [facility] Urology [phone]    10/16/2024 11:00 AM Gabe Cintron MD Internal Medicine [phone]    1/3/2025 10:30 AM Partha Veloz MD Gastroenterology [phone]            Follow Up:   Plan for next ACM outreach in approximately 1 week to complete:  - disease specific assessments  - medication review  - advance care planning  - goal progression  - education .

## 2024-08-30 ENCOUNTER — CARE COORDINATION (OUTPATIENT)
Dept: CARE COORDINATION | Age: 84
End: 2024-08-30

## 2024-09-03 ENCOUNTER — CARE COORDINATION (OUTPATIENT)
Dept: CARE COORDINATION | Age: 84
End: 2024-09-03

## 2024-09-03 NOTE — CARE COORDINATION
Ambulatory Care Coordination Note     9/3/2024 1:24 PM     ACM outreach attempt by this ACM today to perform care management follow up . ACM was unable to reach the patient by telephone today; left voice message requesting a return phone call to this ACM.     ACM: LORA WORTHY RN       PCP/Specialist follow up:   Future Appointments         Provider Specialty Dept Phone    9/13/2024 11:30 AM Mane Britton MD Oncology [phone]    10/10/2024 11:00 AM Nathalia Pedro APRN - [facility] Urology [phone]    10/16/2024 11:00 AM Gabe Cintron MD Internal Medicine [phone]    1/3/2025 10:30 AM Partha Veloz MD Gastroenterology [phone]            Follow Up:   Plan for next ACM outreach in approximately 1 week to complete:  - disease specific assessments  - SDOH assessments  - medication review  - advance care planning  - goal progression  - education .

## 2024-09-09 ENCOUNTER — HOSPITAL ENCOUNTER (OUTPATIENT)
Age: 84
Setting detail: SPECIMEN
Discharge: HOME | End: 2024-09-09

## 2024-09-09 DIAGNOSIS — D64.9 ANEMIA, UNSPECIFIED TYPE: ICD-10-CM

## 2024-09-09 DIAGNOSIS — D50.9 IRON DEFICIENCY ANEMIA, UNSPECIFIED IRON DEFICIENCY ANEMIA TYPE: Primary | ICD-10-CM

## 2024-09-09 DIAGNOSIS — D50.9 IRON DEFICIENCY ANEMIA, UNSPECIFIED IRON DEFICIENCY ANEMIA TYPE: ICD-10-CM

## 2024-09-09 LAB
ERYTHROCYTE [DISTWIDTH] IN BLOOD BY AUTOMATED COUNT: 19.9 % (ref 11.8–14.4)
FERRITIN SERPL-MCNC: 834 NG/ML (ref 13–150)
HCT VFR BLD AUTO: 29 % (ref 36.3–47.1)
HGB BLD-MCNC: 8.9 G/DL (ref 11.9–15.1)
IRON SATN MFR SERPL: 22 % (ref 20–55)
IRON SERPL-MCNC: 42 UG/DL (ref 37–145)
MCH RBC QN AUTO: 28.3 PG (ref 25.2–33.5)
MCHC RBC AUTO-ENTMCNC: 30.7 G/DL (ref 28.4–34.8)
MCV RBC AUTO: 92.1 FL (ref 82.6–102.9)
NRBC BLD-RTO: 0 PER 100 WBC
PLATELET # BLD AUTO: 140 K/UL (ref 138–453)
PMV BLD AUTO: 11.1 FL (ref 8.1–13.5)
RBC # BLD AUTO: 3.15 M/UL (ref 3.95–5.11)
TIBC SERPL-MCNC: 192 UG/DL (ref 250–450)
UNSATURATED IRON BINDING CAPACITY: 150 UG/DL (ref 112–347)
WBC OTHER # BLD: 6.4 K/UL (ref 3.5–11.3)

## 2024-09-10 ENCOUNTER — CARE COORDINATION (OUTPATIENT)
Dept: CARE COORDINATION | Age: 84
End: 2024-09-10

## 2024-09-12 DIAGNOSIS — E03.9 ACQUIRED HYPOTHYROIDISM: ICD-10-CM

## 2024-09-13 ENCOUNTER — OFFICE VISIT (OUTPATIENT)
Dept: ONCOLOGY | Age: 84
End: 2024-09-13
Payer: MEDICARE

## 2024-09-13 VITALS
TEMPERATURE: 97 F | BODY MASS INDEX: 16.41 KG/M2 | WEIGHT: 104.8 LBS | RESPIRATION RATE: 18 BRPM | SYSTOLIC BLOOD PRESSURE: 172 MMHG | HEART RATE: 60 BPM | OXYGEN SATURATION: 97 % | DIASTOLIC BLOOD PRESSURE: 82 MMHG

## 2024-09-13 DIAGNOSIS — D69.6 THROMBOCYTOPENIA (HCC): ICD-10-CM

## 2024-09-13 DIAGNOSIS — D64.9 ANEMIA, UNSPECIFIED TYPE: Primary | ICD-10-CM

## 2024-09-13 PROCEDURE — 99214 OFFICE O/P EST MOD 30 MIN: CPT | Performed by: INTERNAL MEDICINE

## 2024-09-13 PROCEDURE — 1123F ACP DISCUSS/DSCN MKR DOCD: CPT | Performed by: INTERNAL MEDICINE

## 2024-09-13 RX ORDER — LEVOTHYROXINE SODIUM 100 UG/1
TABLET ORAL
Qty: 90 TABLET | Refills: 3 | Status: SHIPPED | OUTPATIENT
Start: 2024-09-13

## 2024-09-17 ENCOUNTER — CARE COORDINATION (OUTPATIENT)
Dept: CARE COORDINATION | Age: 84
End: 2024-09-17

## 2024-09-24 ENCOUNTER — CARE COORDINATION (OUTPATIENT)
Dept: CARE COORDINATION | Age: 84
End: 2024-09-24

## 2024-10-21 ENCOUNTER — OFFICE VISIT (OUTPATIENT)
Dept: UROLOGY | Age: 84
End: 2024-10-21
Payer: MEDICARE

## 2024-10-21 VITALS
TEMPERATURE: 97.6 F | SYSTOLIC BLOOD PRESSURE: 134 MMHG | HEIGHT: 67 IN | OXYGEN SATURATION: 98 % | WEIGHT: 104 LBS | HEART RATE: 70 BPM | BODY MASS INDEX: 16.32 KG/M2 | DIASTOLIC BLOOD PRESSURE: 84 MMHG

## 2024-10-21 DIAGNOSIS — N39.0 RECURRENT UTI: ICD-10-CM

## 2024-10-21 DIAGNOSIS — N32.81 OAB (OVERACTIVE BLADDER): Primary | ICD-10-CM

## 2024-10-21 DIAGNOSIS — R39.14 FEELING OF INCOMPLETE BLADDER EMPTYING: ICD-10-CM

## 2024-10-21 PROCEDURE — 99214 OFFICE O/P EST MOD 30 MIN: CPT | Performed by: NURSE PRACTITIONER

## 2024-10-21 PROCEDURE — 51798 US URINE CAPACITY MEASURE: CPT | Performed by: NURSE PRACTITIONER

## 2024-10-21 PROCEDURE — 1123F ACP DISCUSS/DSCN MKR DOCD: CPT | Performed by: NURSE PRACTITIONER

## 2024-10-21 RX ORDER — ESTRADIOL 0.1 MG/G
1 CREAM VAGINAL SEE ADMIN INSTRUCTIONS
Qty: 42.5 G | Refills: 3 | Status: SHIPPED | OUTPATIENT
Start: 2024-10-21

## 2024-10-21 ASSESSMENT — ENCOUNTER SYMPTOMS
EYE PAIN: 0
BACK PAIN: 0
ALLERGIC/IMMUNOLOGIC NEGATIVE: 1
NAUSEA: 0
SHORTNESS OF BREATH: 0
ABDOMINAL PAIN: 0
EYES NEGATIVE: 1
VOMITING: 0
EYE REDNESS: 0
RESPIRATORY NEGATIVE: 1
WHEEZING: 0
GASTROINTESTINAL NEGATIVE: 1
COUGH: 0

## 2024-10-21 NOTE — PROGRESS NOTES
[facility] PHYSICIANS [facility] UROLOGY [facility]  [address]  [facility] [address]  Dept: [phone]    [facility] Urology Office Note - Established    Patient:  Yaz Allison  YOB: 1940  Date: 10/21/2024    The patient is a 84 y.o. female whopresents today for evaluation of the following problems:   Chief Complaint   Patient presents with    Frequent/Recurrent UTI     6m for recurrent uti and OAB with PVR       HPI  Patient is an 83 yo female who is presenting for f/u routine follow up.   History of recurrent utis and voiding dysfunction.   She has urinary frequency & urgency.   She also reports urinary hesitancy.  She was previously prescribed detrol, but that was stopped d/t SE and hesitancy.  She manages her urinary symptoms conservatively and wears a pad for protection.   She continues to struggle with recurrent utis.   5/2/2024: Klebseilla   5/29/2024: enterococcus   6/14/2024: proteus mirabilis  When she was admitted to hospital 6/2024 for uti issues, she was started on macrobid for prophylaxis, and estrace cream but she did not start that. She is still interested in treatment for recurrent utis.       Summary of old records:   4/16/2024 :Patient is an 83-year-old female with a history of overactive bladder and recurrent UTIs presenting for follow-up.  In the past that she was prescribed Detrol for her overactive bladder symptoms of urinary frequency and urgency.  Patient states that she stopped taking the medication, she cannot recall which side effects she experienced though.  She does not wish to proceed with any overactive bladder medications today.  She does wear a pad for protection.  Her last positive culture was 2/27/2024: Klebsiella.  She had a renal ultrasound which was negative.  She denies any hematuria, dysuria, flank pain or fevers or chills today.  She does have some pressure and she has some hesitation with voiding.  Her PVR was low

## 2024-10-21 NOTE — PROGRESS NOTES
Review of Systems   Constitutional: Negative.  Negative for activity change, chills and fever.   HENT: Negative.     Eyes: Negative.  Negative for pain, redness and visual disturbance.   Respiratory: Negative.  Negative for cough, shortness of breath and wheezing.    Cardiovascular: Negative.  Negative for chest pain and leg swelling.   Gastrointestinal: Negative.  Negative for abdominal pain, nausea and vomiting.   Endocrine: Negative.    Genitourinary: Negative.  Negative for difficulty urinating, dysuria, enuresis, flank pain, frequency, hematuria and urgency.   Musculoskeletal: Negative.  Negative for back pain, joint swelling and myalgias.   Skin: Negative.  Negative for rash and wound.   Allergic/Immunologic: Negative.    Neurological: Negative.  Negative for dizziness, tremors and numbness.   Hematological: Negative.  Does not bruise/bleed easily.   Psychiatric/Behavioral: Negative.       Indication: OAB  Diagnosis: OAB      Patient presents to the office for PVR. PVR obtained per office provider protocol. Patient voided prior, PVR 145ml. Patient tolerated procedure with no complications. Further instructions provided to patient for follow up care.

## 2024-10-21 NOTE — PATIENT INSTRUCTIONS
Stop macrobid (this was a daily antibiotic to prevent UTIs).   Take keflex 250mg by mouth once daily (preventative antibiotic).   Fill vaginal estrace cream   -Apply pea-sized amount to vaginal opening once daily for 2 weeks, after two weeks switch to twice weekly.        Conservative measures for urinary symptoms:  Void every 2 hours during the day and immediately before going to bed.   Double void each time you urinate if you are not emptying well.   Avoid constipation and diarrhea as these can effect urination and urinary tract infections.   Try to drink most of your fluids in the AM and taper off to sips only after 5 PM.   Avoid caffeine, alcohol, artificial preservatives, and especially artificial sweeteners (especially Sweet & Low, Equal and Splenda). Items labeled \"Diet and Light\" may have significant amounts of artificial ingredients.   * if you are diabetic try using Nectresse, Stevia or Truvia instead)   Elevate your feet at the end of the day. Do this for approximately 30 minutes and at least 1 hour prior   to sleep. Make sure to keep your feet above the level of your heart (like on the arm of the sofa)   and not just in a recliner as the goal is to have gravity help the fluids in your legs drain to the   kidneys. This will help you to empty more fully before bed and urinate less at night.

## 2024-10-24 ENCOUNTER — OFFICE VISIT (OUTPATIENT)
Dept: INTERNAL MEDICINE CLINIC | Age: 84
End: 2024-10-24

## 2024-10-24 VITALS
WEIGHT: 105.6 LBS | HEART RATE: 65 BPM | OXYGEN SATURATION: 96 % | BODY MASS INDEX: 16.57 KG/M2 | DIASTOLIC BLOOD PRESSURE: 66 MMHG | HEIGHT: 67 IN | SYSTOLIC BLOOD PRESSURE: 112 MMHG

## 2024-10-24 DIAGNOSIS — Z00.00 MEDICARE ANNUAL WELLNESS VISIT, SUBSEQUENT: Primary | ICD-10-CM

## 2024-10-24 RX ORDER — GABAPENTIN 300 MG/1
300 CAPSULE ORAL 2 TIMES DAILY
Qty: 180 CAPSULE | Refills: 1 | Status: SHIPPED | OUTPATIENT
Start: 2024-10-24 | End: 2025-01-22

## 2024-10-24 SDOH — ECONOMIC STABILITY: FOOD INSECURITY: WITHIN THE PAST 12 MONTHS, YOU WORRIED THAT YOUR FOOD WOULD RUN OUT BEFORE YOU GOT MONEY TO BUY MORE.: NEVER TRUE

## 2024-10-24 SDOH — ECONOMIC STABILITY: FOOD INSECURITY: WITHIN THE PAST 12 MONTHS, THE FOOD YOU BOUGHT JUST DIDN'T LAST AND YOU DIDN'T HAVE MONEY TO GET MORE.: NEVER TRUE

## 2024-10-24 SDOH — ECONOMIC STABILITY: INCOME INSECURITY: HOW HARD IS IT FOR YOU TO PAY FOR THE VERY BASICS LIKE FOOD, HOUSING, MEDICAL CARE, AND HEATING?: NOT HARD AT ALL

## 2024-10-24 ASSESSMENT — PATIENT HEALTH QUESTIONNAIRE - PHQ9
SUM OF ALL RESPONSES TO PHQ QUESTIONS 1-9: 0
SUM OF ALL RESPONSES TO PHQ9 QUESTIONS 1 & 2: 0
SUM OF ALL RESPONSES TO PHQ QUESTIONS 1-9: 0
1. LITTLE INTEREST OR PLEASURE IN DOING THINGS: NOT AT ALL
SUM OF ALL RESPONSES TO PHQ QUESTIONS 1-9: 0
SUM OF ALL RESPONSES TO PHQ QUESTIONS 1-9: 0
2. FEELING DOWN, DEPRESSED OR HOPELESS: NOT AT ALL

## 2024-10-24 NOTE — PROGRESS NOTES
Medicare Annual Wellness Visit    Yaz Allison is here for Medicare AWV    Assessment & Plan   Medicare annual wellness visit, subsequent  Anemia  With seeing dr weiss  And dr lorena daniel  Hg  8  Advised to get flu covid and RSV vaccines  Pt declied physical therapy for lower ext strength buildup and lower back for axial stability    Recommendations for Preventive Services Due: see orders and patient instructions/AVS.  Recommended screening schedule for the next 5-10 years is provided to the patient in written form: see Patient Instructions/AVS.   Return in about 3 months (around 1/24/2025).  No orders of the defined types were placed in this encounter.    Orders Placed This Encounter   Medications    gabapentin (NEURONTIN) 300 MG capsule     Sig: Take 1 capsule by mouth 2 times daily for 90 days.     Dispense:  180 capsule     Refill:  1     Requesting 1 year supply       Return in about 3 months (around 1/24/2025).     Subjective       Patient's complete Health Risk Assessment and screening values have been reviewed and are found in Flowsheets. The following problems were reviewed today and where indicated follow up appointments were made and/or referrals ordered.    Positive Risk Factor Screenings with Interventions:    Fall Risk:  Do you feel unsteady or are you worried about falling? : no  2 or more falls in past year?: (!) yes  Fall with injury in past year?: no     Interventions:    Reviewed medications, home hazards, visual acuity, and co-morbidities that can increase risk for falls             Inactivity:  On average, how many days per week do you engage in moderate to strenuous exercise (like a brisk walk)?: 0 days (!) Abnormal  On average, how many minutes do you engage in exercise at this level?: 0 min  Interventions:  Patient comments: will increse activity     Abnormal BMI (underweight):  Body mass index is 16.54 kg/m². (!) Abnormal  Interventions:  Patient comments: pt cliasm eating better

## 2024-10-24 NOTE — PROGRESS NOTES
Visit Information    Have you changed or started any medications since your last visit including any over-the-counter medicines, vitamins, or herbal medicines? no   Are you having any side effects from any of your medications? -  no  Have you stopped taking any of your medications? Is so, why? -  no    Have you seen any other physician or provider since your last visit? Yes - Records Obtained  Have you had any other diagnostic tests since your last visit? Yes - Records Obtained  Have you been seen in the emergency room and/or had an admission to a hospital since we last saw you? Yes - Records Obtained  Have you had your routine dental cleaning in the past 6 months? no    Have you activated your [employer] account? If not, what are your barriers? Yes     Patient Care Team:  Gabe Cintron MD as PCP - General (Internal Medicine)  [facility]    Medical History Review  Past Medical, Family, and Social History reviewed and does contribute to the patient presenting condition    Health Maintenance   Topic Date Due    DTaP/Tdap/Td vaccine (1 - Tdap) Never done    Shingles vaccine (1 of 2) Never done    Respiratory Syncytial Virus (RSV) Pregnant or age 60 yrs+ (1 - 1-dose 60+ series) Never done    Annual Wellness Visit (Medicare Advantage)  01/01/2024    Flu vaccine (1) 08/01/2024    COVID-19 Vaccine (7 - 2023-24 season) 09/01/2024    Depression Screen  07/09/2025    Lipids  08/05/2025    Pneumococcal 65+ years Vaccine  Completed    Hepatitis A vaccine  Aged Out    Hepatitis B vaccine  Aged Out    Hib vaccine  Aged Out    Polio vaccine  Aged Out    Meningococcal (ACWY) vaccine  Aged Out    DEXA (modify frequency per FRAX score)  Discontinued

## 2024-10-29 ENCOUNTER — TELEPHONE (OUTPATIENT)
Dept: INTERNAL MEDICINE CLINIC | Age: 84
End: 2024-10-29

## 2024-10-29 NOTE — TELEPHONE ENCOUNTER
Left message asking pt to call office. Pt has an appt on 2/3/25 that needs to be reschedule due to a change in providers schedule.

## 2024-11-05 ENCOUNTER — HOSPITAL ENCOUNTER (OUTPATIENT)
Age: 84
Discharge: HOME | End: 2024-11-05
Payer: MEDICARE

## 2024-11-05 DIAGNOSIS — D69.6 THROMBOCYTOPENIA (HCC): ICD-10-CM

## 2024-11-05 DIAGNOSIS — D50.9 IRON DEFICIENCY ANEMIA, UNSPECIFIED IRON DEFICIENCY ANEMIA TYPE: ICD-10-CM

## 2024-11-05 DIAGNOSIS — D64.9 ANEMIA, UNSPECIFIED TYPE: ICD-10-CM

## 2024-11-05 LAB
ERYTHROCYTE [DISTWIDTH] IN BLOOD BY AUTOMATED COUNT: 18 % (ref 12.5–15.4)
FERRITIN SERPL-MCNC: 421 NG/ML
FOLATE SERPL-MCNC: 31.5 NG/ML (ref 4.8–24.2)
HCT VFR BLD AUTO: 29.1 % (ref 36–46)
HGB BLD-MCNC: 9.4 G/DL (ref 12–16)
IRON SATN MFR SERPL: 16 % (ref 20–55)
IRON SERPL-MCNC: 33 UG/DL (ref 37–145)
MCH RBC QN AUTO: 29.7 PG (ref 26–34)
MCHC RBC AUTO-ENTMCNC: 32.4 G/DL (ref 31–37)
MCV RBC AUTO: 91.5 FL (ref 80–100)
PLATELET # BLD AUTO: 163 K/UL (ref 140–450)
PMV BLD AUTO: 7.5 FL (ref 6–12)
RBC # BLD AUTO: 3.17 M/UL (ref 4–5.2)
TEST NAME: NORMAL
TIBC SERPL-MCNC: 204 UG/DL (ref 250–450)
UNSATURATED IRON BINDING CAPACITY: 171 UG/DL (ref 112–347)
VIT B12 SERPL-MCNC: 450 PG/ML (ref 232–1245)
WBC OTHER # BLD: 6.7 K/UL (ref 3.5–11)

## 2024-11-05 PROCEDURE — 83540 ASSAY OF IRON: CPT

## 2024-11-05 PROCEDURE — 85027 COMPLETE CBC AUTOMATED: CPT

## 2024-11-05 PROCEDURE — 82728 ASSAY OF FERRITIN: CPT

## 2024-11-05 PROCEDURE — 36415 COLL VENOUS BLD VENIPUNCTURE: CPT

## 2024-11-05 PROCEDURE — 82607 VITAMIN B-12: CPT

## 2024-11-05 PROCEDURE — 83550 IRON BINDING TEST: CPT

## 2024-11-05 PROCEDURE — 82746 ASSAY OF FOLIC ACID SERUM: CPT

## 2024-11-11 ENCOUNTER — TELEPHONE (OUTPATIENT)
Dept: INFUSION THERAPY | Age: 84
End: 2024-11-11

## 2024-11-15 ENCOUNTER — OFFICE VISIT (OUTPATIENT)
Dept: ONCOLOGY | Age: 84
End: 2024-11-15
Payer: MEDICARE

## 2024-11-15 ENCOUNTER — TELEPHONE (OUTPATIENT)
Dept: ONCOLOGY | Age: 84
End: 2024-11-15

## 2024-11-15 VITALS
BODY MASS INDEX: 16.6 KG/M2 | HEART RATE: 70 BPM | SYSTOLIC BLOOD PRESSURE: 122 MMHG | WEIGHT: 106 LBS | TEMPERATURE: 96.9 F | RESPIRATION RATE: 18 BRPM | OXYGEN SATURATION: 99 % | DIASTOLIC BLOOD PRESSURE: 74 MMHG

## 2024-11-15 DIAGNOSIS — E61.1 IRON DEFICIENCY: ICD-10-CM

## 2024-11-15 DIAGNOSIS — D64.9 ANEMIA, UNSPECIFIED TYPE: Primary | ICD-10-CM

## 2024-11-15 PROCEDURE — 99211 OFF/OP EST MAY X REQ PHY/QHP: CPT | Performed by: INTERNAL MEDICINE

## 2024-11-15 RX ORDER — ACETAMINOPHEN 325 MG/1
650 TABLET ORAL
OUTPATIENT
Start: 2024-11-15

## 2024-11-15 RX ORDER — EPINEPHRINE 1 MG/ML
0.3 INJECTION, SOLUTION, CONCENTRATE INTRAVENOUS PRN
OUTPATIENT
Start: 2024-11-15

## 2024-11-15 RX ORDER — SODIUM CHLORIDE 9 MG/ML
INJECTION, SOLUTION INTRAVENOUS CONTINUOUS
OUTPATIENT
Start: 2024-11-15

## 2024-11-15 RX ORDER — SODIUM CHLORIDE 9 MG/ML
5-250 INJECTION, SOLUTION INTRAVENOUS PRN
OUTPATIENT
Start: 2024-11-15

## 2024-11-15 RX ORDER — ALBUTEROL SULFATE 90 UG/1
4 INHALANT RESPIRATORY (INHALATION) PRN
OUTPATIENT
Start: 2024-11-15

## 2024-11-15 RX ORDER — SODIUM CHLORIDE 0.9 % (FLUSH) 0.9 %
5-40 SYRINGE (ML) INJECTION PRN
OUTPATIENT
Start: 2024-11-15

## 2024-11-15 RX ORDER — HEPARIN SODIUM (PORCINE) LOCK FLUSH IV SOLN 100 UNIT/ML 100 UNIT/ML
500 SOLUTION INTRAVENOUS PRN
OUTPATIENT
Start: 2024-11-15

## 2024-11-15 RX ORDER — FAMOTIDINE 10 MG/ML
20 INJECTION, SOLUTION INTRAVENOUS
OUTPATIENT
Start: 2024-11-15

## 2024-11-15 RX ORDER — ONDANSETRON 2 MG/ML
8 INJECTION INTRAMUSCULAR; INTRAVENOUS
OUTPATIENT
Start: 2024-11-15

## 2024-11-15 RX ORDER — DIPHENHYDRAMINE HYDROCHLORIDE 50 MG/ML
50 INJECTION INTRAMUSCULAR; INTRAVENOUS
OUTPATIENT
Start: 2024-11-15

## 2024-11-15 NOTE — PROGRESS NOTES
Stomach, biopsy:     -Mild chronic inflammation.        Salvador Mendoza M.D.   **Electronically Signed Out**         rdd/5/7/2024                    Impression:  Microcytic anemia  Gastritis and duodenitis per EGD  Positive stool occult blood test    Plan:  I had a detailed discussion with the patient and personally went over results of lab work-up imaging studies and other relevant clinical data.  Patient is intolerant to oral iron.  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  Will give 1 infusion Feraheme 550 mg IV x 1  NGS results pending we will follow-up on the final report once available  Primary bone marrow disorder cannot be ruled out.  Patient not interested in bone marrow biopsy.  EGD done in-house did not show any obvious source of bleeding however patient was stool occult blood positive.  Patient has appointment coming up with GI team  Return to clinic in 3 months    IVETTE SEQUEIRA MD    This note is created with the assistance of a speech recognition program.  While intending to generate a document that actually reflects the content of the visit, the document can still have some errors including those of syntax and sound a like substitutions which may escape proof reading.  It such instances, actual meaning can be extrapolated by contextual diversion.

## 2024-11-15 NOTE — TELEPHONE ENCOUNTER
Instructions   from Dr. Mane Britton MD    Feraheme 550 mg iv x 1   Rv in 3 months with labs       RV scheduled 2/19/24 at 10:45am   PT. Will get labs done prior to appt.  Will schedule Feraheme once approved.

## 2024-12-11 ENCOUNTER — HOSPITAL ENCOUNTER (OUTPATIENT)
Dept: INFUSION THERAPY | Age: 84
Discharge: HOME | End: 2024-12-11
Payer: MEDICARE

## 2024-12-11 VITALS — HEART RATE: 62 BPM | SYSTOLIC BLOOD PRESSURE: 161 MMHG | RESPIRATION RATE: 16 BRPM | DIASTOLIC BLOOD PRESSURE: 82 MMHG

## 2024-12-11 DIAGNOSIS — E61.1 IRON DEFICIENCY: Primary | ICD-10-CM

## 2024-12-11 PROCEDURE — 96365 THER/PROPH/DIAG IV INF INIT: CPT

## 2024-12-11 PROCEDURE — 6360000002 HC RX W HCPCS: Performed by: INTERNAL MEDICINE

## 2024-12-11 PROCEDURE — 2580000003 HC RX 258: Performed by: INTERNAL MEDICINE

## 2024-12-11 RX ORDER — SODIUM CHLORIDE 9 MG/ML
5-250 INJECTION, SOLUTION INTRAVENOUS PRN
OUTPATIENT
Start: 2024-12-18

## 2024-12-11 RX ORDER — ACETAMINOPHEN 325 MG/1
650 TABLET ORAL
OUTPATIENT
Start: 2024-12-18

## 2024-12-11 RX ORDER — PANTOPRAZOLE SODIUM 40 MG/1
40 TABLET, DELAYED RELEASE ORAL
Qty: 90 TABLET | Refills: 1 | Status: SHIPPED | OUTPATIENT
Start: 2024-12-11

## 2024-12-11 RX ORDER — HYDROCORTISONE SODIUM SUCCINATE 100 MG/2ML
100 INJECTION INTRAMUSCULAR; INTRAVENOUS
OUTPATIENT
Start: 2024-12-18

## 2024-12-11 RX ORDER — DIPHENHYDRAMINE HYDROCHLORIDE 50 MG/ML
50 INJECTION INTRAMUSCULAR; INTRAVENOUS
OUTPATIENT
Start: 2024-12-18

## 2024-12-11 RX ORDER — EPINEPHRINE 1 MG/ML
0.3 INJECTION, SOLUTION, CONCENTRATE INTRAVENOUS PRN
OUTPATIENT
Start: 2024-12-18

## 2024-12-11 RX ORDER — SODIUM CHLORIDE 9 MG/ML
INJECTION, SOLUTION INTRAVENOUS CONTINUOUS
OUTPATIENT
Start: 2024-12-18

## 2024-12-11 RX ORDER — SODIUM CHLORIDE 0.9 % (FLUSH) 0.9 %
5-40 SYRINGE (ML) INJECTION PRN
OUTPATIENT
Start: 2024-12-18

## 2024-12-11 RX ORDER — FAMOTIDINE 10 MG/ML
20 INJECTION, SOLUTION INTRAVENOUS
OUTPATIENT
Start: 2024-12-18

## 2024-12-11 RX ORDER — SODIUM CHLORIDE 9 MG/ML
5-250 INJECTION, SOLUTION INTRAVENOUS PRN
Status: DISCONTINUED | OUTPATIENT
Start: 2024-12-11 | End: 2024-12-12 | Stop reason: HOSPADM

## 2024-12-11 RX ORDER — ONDANSETRON 2 MG/ML
8 INJECTION INTRAMUSCULAR; INTRAVENOUS
OUTPATIENT
Start: 2024-12-18

## 2024-12-11 RX ORDER — HEPARIN 100 UNIT/ML
500 SYRINGE INTRAVENOUS PRN
OUTPATIENT
Start: 2024-12-18

## 2024-12-11 RX ORDER — ALBUTEROL SULFATE 90 UG/1
4 INHALANT RESPIRATORY (INHALATION) PRN
OUTPATIENT
Start: 2024-12-18

## 2024-12-11 RX ADMIN — SODIUM CHLORIDE 250 ML/HR: 9 INJECTION, SOLUTION INTRAVENOUS at 13:08

## 2024-12-11 RX ADMIN — FERUMOXYTOL 510 MG: 510 INJECTION INTRAVENOUS at 13:09

## 2024-12-11 NOTE — PROGRESS NOTES
Pt here for Feraheme infusion.   Infusion complete without incident.  Pt d/c'd in stable condition.   Returns 2-19-25 for MD f/u.

## 2025-01-02 RX ORDER — GABAPENTIN 300 MG/1
300 CAPSULE ORAL 2 TIMES DAILY
Qty: 180 CAPSULE | Refills: 1 | Status: SHIPPED | OUTPATIENT
Start: 2025-01-02 | End: 2025-04-02

## 2025-01-22 RX ORDER — ATORVASTATIN CALCIUM 40 MG/1
40 TABLET, FILM COATED ORAL DAILY
Qty: 100 TABLET | Refills: 2 | Status: SHIPPED | OUTPATIENT
Start: 2025-01-22

## 2025-01-31 ENCOUNTER — HOSPITAL ENCOUNTER (INPATIENT)
Age: 85
LOS: 6 days | Discharge: NURSING, REHAB OR LONG-TERM CARE | DRG: 481 | End: 2025-02-06
Attending: STUDENT IN AN ORGANIZED HEALTH CARE EDUCATION/TRAINING PROGRAM | Admitting: ORTHOPAEDIC SURGERY
Payer: MEDICARE

## 2025-01-31 ENCOUNTER — APPOINTMENT (OUTPATIENT)
Dept: CT IMAGING | Age: 85
DRG: 481 | End: 2025-01-31
Payer: MEDICARE

## 2025-01-31 ENCOUNTER — APPOINTMENT (OUTPATIENT)
Dept: GENERAL RADIOLOGY | Age: 85
DRG: 481 | End: 2025-01-31
Payer: MEDICARE

## 2025-01-31 ENCOUNTER — ANESTHESIA EVENT (OUTPATIENT)
Dept: OPERATING ROOM | Age: 85
End: 2025-01-31
Payer: MEDICARE
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
## 2025-01-31 DIAGNOSIS — S72.001A CLOSED FRACTURE OF RIGHT HIP, INITIAL ENCOUNTER (HCC): Primary | ICD-10-CM

## 2025-01-31 DIAGNOSIS — E03.9 ACQUIRED HYPOTHYROIDISM: ICD-10-CM

## 2025-01-31 PROBLEM — Z01.810 PREOP CARDIOVASCULAR EXAM: Status: ACTIVE | Noted: 2025-01-31

## 2025-01-31 LAB
ANION GAP SERPL CALCULATED.3IONS-SCNC: 8 MMOL/L (ref 9–16)
BACTERIA URNS QL MICRO: ABNORMAL
BASOPHILS # BLD: 0.1 K/UL (ref 0–0.2)
BASOPHILS NFR BLD: 1 % (ref 0–2)
BILIRUB UR QL STRIP: NEGATIVE
BUN SERPL-MCNC: 15 MG/DL (ref 8–23)
CALCIUM SERPL-MCNC: 9.4 MG/DL (ref 8.6–10.4)
CASTS #/AREA URNS LPF: ABNORMAL /LPF
CHLORIDE SERPL-SCNC: 99 MMOL/L (ref 98–107)
CLARITY UR: CLEAR
CO2 SERPL-SCNC: 25 MMOL/L (ref 20–31)
COLOR UR: YELLOW
CREAT SERPL-MCNC: 0.8 MG/DL (ref 0.7–1.2)
EOSINOPHIL # BLD: 0.3 K/UL (ref 0–0.4)
EOSINOPHILS RELATIVE PERCENT: 4 % (ref 0–4)
EPI CELLS #/AREA URNS HPF: ABNORMAL /HPF
ERYTHROCYTE [DISTWIDTH] IN BLOOD BY AUTOMATED COUNT: 15.1 % (ref 11.5–14.9)
GFR, ESTIMATED: 72 ML/MIN/1.73M2
GLUCOSE SERPL-MCNC: 117 MG/DL (ref 74–99)
GLUCOSE UR STRIP-MCNC: NEGATIVE MG/DL
HCT VFR BLD AUTO: 25.8 % (ref 36–46)
HGB BLD-MCNC: 8.5 G/DL (ref 12–16)
HGB UR QL STRIP.AUTO: ABNORMAL
INR PPP: 1.1
KETONES UR STRIP-MCNC: NEGATIVE MG/DL
LEUKOCYTE ESTERASE UR QL STRIP: NEGATIVE
LYMPHOCYTES NFR BLD: 1.3 K/UL (ref 1–4.8)
LYMPHOCYTES RELATIVE PERCENT: 17 % (ref 24–44)
MAGNESIUM SERPL-MCNC: 1.9 MG/DL (ref 1.6–2.4)
MCH RBC QN AUTO: 30.4 PG (ref 26–34)
MCHC RBC AUTO-ENTMCNC: 32.9 G/DL (ref 31–37)
MCV RBC AUTO: 92.4 FL (ref 80–100)
MONOCYTES NFR BLD: 0.5 K/UL (ref 0.1–1.3)
MONOCYTES NFR BLD: 7 % (ref 1–7)
NEUTROPHILS NFR BLD: 71 % (ref 36–66)
NEUTS SEG NFR BLD: 5.2 K/UL (ref 1.3–9.1)
NITRITE UR QL STRIP: NEGATIVE
PH UR STRIP: 7.5 [PH] (ref 5–8)
PLATELET # BLD AUTO: 181 K/UL (ref 150–450)
PMV BLD AUTO: 7.6 FL (ref 6–12)
POTASSIUM SERPL-SCNC: 3.8 MMOL/L (ref 3.7–5.3)
PROT UR STRIP-MCNC: ABNORMAL MG/DL
PROTHROMBIN TIME: 15.5 SEC (ref 11.8–14.6)
RBC # BLD AUTO: 2.79 M/UL (ref 4–5.2)
RBC #/AREA URNS HPF: ABNORMAL /HPF
SODIUM SERPL-SCNC: 132 MMOL/L (ref 136–145)
SP GR UR STRIP: 1.01 (ref 1–1.03)
T4 FREE SERPL-MCNC: 1.3 NG/DL (ref 0.9–1.7)
TROPONIN I SERPL HS-MCNC: 19 NG/L (ref 0–14)
TSH SERPL DL<=0.05 MIU/L-ACNC: 26.4 UIU/ML (ref 0.27–4.2)
UROBILINOGEN UR STRIP-ACNC: NORMAL EU/DL (ref 0–1)
WBC #/AREA URNS HPF: ABNORMAL /HPF
WBC OTHER # BLD: 7.3 K/UL (ref 3.5–11)

## 2025-01-31 PROCEDURE — 99285 EMERGENCY DEPT VISIT HI MDM: CPT

## 2025-01-31 PROCEDURE — 2580000003 HC RX 258

## 2025-01-31 PROCEDURE — 83735 ASSAY OF MAGNESIUM: CPT

## 2025-01-31 PROCEDURE — 80048 BASIC METABOLIC PNL TOTAL CA: CPT

## 2025-01-31 PROCEDURE — 99222 1ST HOSP IP/OBS MODERATE 55: CPT

## 2025-01-31 PROCEDURE — 84439 ASSAY OF FREE THYROXINE: CPT

## 2025-01-31 PROCEDURE — 2580000003 HC RX 258: Performed by: INTERNAL MEDICINE

## 2025-01-31 PROCEDURE — 72125 CT NECK SPINE W/O DYE: CPT

## 2025-01-31 PROCEDURE — 84484 ASSAY OF TROPONIN QUANT: CPT

## 2025-01-31 PROCEDURE — 71045 X-RAY EXAM CHEST 1 VIEW: CPT

## 2025-01-31 PROCEDURE — 36415 COLL VENOUS BLD VENIPUNCTURE: CPT

## 2025-01-31 PROCEDURE — 85610 PROTHROMBIN TIME: CPT

## 2025-01-31 PROCEDURE — 1200000000 HC SEMI PRIVATE

## 2025-01-31 PROCEDURE — 85025 COMPLETE CBC W/AUTO DIFF WBC: CPT

## 2025-01-31 PROCEDURE — 73502 X-RAY EXAM HIP UNI 2-3 VIEWS: CPT

## 2025-01-31 PROCEDURE — 93005 ELECTROCARDIOGRAM TRACING: CPT | Performed by: STUDENT IN AN ORGANIZED HEALTH CARE EDUCATION/TRAINING PROGRAM

## 2025-01-31 PROCEDURE — 84443 ASSAY THYROID STIM HORMONE: CPT

## 2025-01-31 PROCEDURE — 99222 1ST HOSP IP/OBS MODERATE 55: CPT | Performed by: INTERNAL MEDICINE

## 2025-01-31 PROCEDURE — 70450 CT HEAD/BRAIN W/O DYE: CPT

## 2025-01-31 PROCEDURE — 6370000000 HC RX 637 (ALT 250 FOR IP): Performed by: INTERNAL MEDICINE

## 2025-01-31 PROCEDURE — 81001 URINALYSIS AUTO W/SCOPE: CPT

## 2025-01-31 RX ORDER — 0.9 % SODIUM CHLORIDE 0.9 %
500 INTRAVENOUS SOLUTION INTRAVENOUS ONCE
Status: COMPLETED | OUTPATIENT
Start: 2025-01-31 | End: 2025-01-31

## 2025-01-31 RX ORDER — ALLOPURINOL 300 MG/1
300 TABLET ORAL DAILY
Status: DISCONTINUED | OUTPATIENT
Start: 2025-01-31 | End: 2025-02-06 | Stop reason: HOSPADM

## 2025-01-31 RX ORDER — PANTOPRAZOLE SODIUM 40 MG/1
40 TABLET, DELAYED RELEASE ORAL
Status: DISCONTINUED | OUTPATIENT
Start: 2025-02-01 | End: 2025-02-06 | Stop reason: HOSPADM

## 2025-01-31 RX ORDER — ATORVASTATIN CALCIUM 40 MG/1
40 TABLET, FILM COATED ORAL DAILY
Status: DISCONTINUED | OUTPATIENT
Start: 2025-01-31 | End: 2025-02-06 | Stop reason: HOSPADM

## 2025-01-31 RX ORDER — GABAPENTIN 300 MG/1
300 CAPSULE ORAL 2 TIMES DAILY
Status: DISCONTINUED | OUTPATIENT
Start: 2025-01-31 | End: 2025-02-06 | Stop reason: HOSPADM

## 2025-01-31 RX ORDER — LEVOTHYROXINE SODIUM 100 UG/1
100 TABLET ORAL DAILY
Status: DISCONTINUED | OUTPATIENT
Start: 2025-01-31 | End: 2025-01-31

## 2025-01-31 RX ORDER — LEVOTHYROXINE SODIUM 125 UG/1
125 TABLET ORAL DAILY
Status: DISCONTINUED | OUTPATIENT
Start: 2025-02-01 | End: 2025-02-06 | Stop reason: HOSPADM

## 2025-01-31 RX ORDER — OXYCODONE HYDROCHLORIDE 5 MG/1
5 TABLET ORAL EVERY 4 HOURS PRN
Status: DISCONTINUED | OUTPATIENT
Start: 2025-01-31 | End: 2025-02-01

## 2025-01-31 RX ORDER — CARVEDILOL 6.25 MG/1
6.25 TABLET ORAL 2 TIMES DAILY WITH MEALS
Status: DISCONTINUED | OUTPATIENT
Start: 2025-01-31 | End: 2025-02-06 | Stop reason: HOSPADM

## 2025-01-31 RX ADMIN — SODIUM CHLORIDE 500 ML: 9 INJECTION, SOLUTION INTRAVENOUS at 10:13

## 2025-01-31 RX ADMIN — OXYCODONE HYDROCHLORIDE 5 MG: 5 TABLET ORAL at 12:42

## 2025-01-31 RX ADMIN — ATORVASTATIN CALCIUM 40 MG: 40 TABLET, FILM COATED ORAL at 10:58

## 2025-01-31 RX ADMIN — SODIUM CHLORIDE 500 ML: 9 INJECTION, SOLUTION INTRAVENOUS at 07:44

## 2025-01-31 RX ADMIN — CEPHALEXIN 250 MG: 250 CAPSULE ORAL at 10:56

## 2025-01-31 RX ADMIN — LEVOTHYROXINE SODIUM 100 MCG: 0.1 TABLET ORAL at 10:58

## 2025-01-31 RX ADMIN — ALLOPURINOL 300 MG: 300 TABLET ORAL at 10:58

## 2025-01-31 RX ADMIN — GABAPENTIN 300 MG: 300 CAPSULE ORAL at 10:58

## 2025-01-31 RX ADMIN — GABAPENTIN 300 MG: 300 CAPSULE ORAL at 20:10

## 2025-01-31 RX ADMIN — OXYCODONE HYDROCHLORIDE 5 MG: 5 TABLET ORAL at 22:42

## 2025-01-31 ASSESSMENT — PAIN - FUNCTIONAL ASSESSMENT: PAIN_FUNCTIONAL_ASSESSMENT: 0-10

## 2025-01-31 ASSESSMENT — PAIN DESCRIPTION - LOCATION
LOCATION: LEG
LOCATION: HIP
LOCATION: HIP

## 2025-01-31 ASSESSMENT — PAIN SCALES - GENERAL
PAINLEVEL_OUTOF10: 7
PAINLEVEL_OUTOF10: 8
PAINLEVEL_OUTOF10: 7

## 2025-01-31 ASSESSMENT — PAIN DESCRIPTION - ORIENTATION
ORIENTATION: RIGHT
ORIENTATION: RIGHT

## 2025-01-31 ASSESSMENT — ENCOUNTER SYMPTOMS: SHORTNESS OF BREATH: 0

## 2025-01-31 ASSESSMENT — PAIN DESCRIPTION - DESCRIPTORS: DESCRIPTORS: ACHING;DISCOMFORT

## 2025-01-31 ASSESSMENT — PAIN SCALES - WONG BAKER: WONGBAKER_NUMERICALRESPONSE: NO HURT

## 2025-01-31 NOTE — ANESTHESIA PRE PROCEDURE
angina    ECG reviewed  Rhythm: regular  Rate: normal  Echocardiogram reviewed    Cleared by cardiology     Beta Blocker:  Dose within 24 Hrs      ROS comment: Sinus rhythm with 1st degree A-V block  Left ventricular hypertrophy with repolarization abnormality ( [place] product )  Cannot rule out Septal infarct , age undetermined  Abnormal ECG       Neuro/Psych:   (+) neuromuscular disease:, psychiatric history:depression/anxiety              ROS comment: Fibromyalgia  H/o Delirium GI/Hepatic/Renal:   (+) GERD: well controlled, renal disease (Recurrent UTI): CRI         ROS comment: H/o GI Bleed.   Endo/Other:    (+) hypothyroidism, blood dyscrasia: anemia, arthritis: OA..                  ROS comment: Gout  Sjogren's   Right femur fracture Abdominal:             Vascular: negative vascular ROS.         Other Findings:         Anesthesia Plan      general     ASA 3       Induction: intravenous.    MIPS: Postoperative opioids intended and Prophylactic antiemetics administered.  Anesthetic plan and risks discussed with patient.                      Cate Herrmann MD   1/31/2025

## 2025-01-31 NOTE — PLAN OF CARE
Problem: Discharge Planning  Goal: Discharge to home or other facility with appropriate resources  Outcome: Progressing  Flowsheets (Taken 1/31/2025 1625)  Discharge to home or other facility with appropriate resources:   Identify barriers to discharge with patient and caregiver   Arrange for needed discharge resources and transportation as appropriate     Problem: Pain  Goal: Verbalizes/displays adequate comfort level or baseline comfort level  Outcome: Progressing  Flowsheets (Taken 1/31/2025 1625)  Verbalizes/displays adequate comfort level or baseline comfort level:   Encourage patient to monitor pain and request assistance   Assess pain using appropriate pain scale     Problem: Skin/Tissue Integrity  Goal: Skin integrity remains intact  Description: 1.  Monitor for areas of redness and/or skin breakdown  2.  Assess vascular access sites hourly  3.  Every 4-6 hours minimum:  Change oxygen saturation probe site  4.  Every 4-6 hours:  If on nasal continuous positive airway pressure, respiratory therapy assess nares and determine need for appliance change or resting period  Outcome: Progressing  Flowsheets (Taken 1/31/2025 1625)  Skin Integrity Remains Intact: Monitor for areas of redness and/or skin breakdown     Problem: Safety - Adult  Goal: Free from fall injury  Outcome: Progressing  Flowsheets (Taken 1/31/2025 1625)  Free From Fall Injury: Instruct family/caregiver on patient safety     Problem: ABCDS Injury Assessment  Goal: Absence of physical injury  Outcome: Progressing  Flowsheets (Taken 1/31/2025 1625)  Absence of Physical Injury: Implement safety measures based on patient assessment

## 2025-01-31 NOTE — ED NOTES
Report given to NICOLETTE Roberson from [facility].   Report method by phone   The following was reviewed with receiving RN:   Current vital signs:  BP (!) 104/45   Pulse 57   Temp 97.5 °F (36.4 °C)   Resp 15   Ht 1.702 m (5' 7\")   Wt 49.9 kg (110 lb)   SpO2 99%   BMI 17.23 kg/m²                MEWS Score: 1     Any medication or safety alerts were reviewed. Any pending diagnostics and notifications were also reviewed, as well as any safety concerns or issues, abnormal labs, abnormal imaging, and abnormal assessment findings. Questions were answered.

## 2025-01-31 NOTE — ED TRIAGE NOTES
Mode of arrival (squad #, walk in, police, etc) : [facility]        Chief complaint(s): Fall, hip pain        Arrival Note (brief scenario, treatment PTA, etc).: Pt c/o a fall tonight when getting up to go to the bathroom. Pt states she fell on her right side and is complaining of right sided hip pain. Shortening and rotation of right leg noted at this time. Pt denies LOC, chest pain, and shortness of breath at this time. Pt A&Ox4.        C= \"Have you ever felt that you should Cut down on your drinking?\"  No  A= \"Have people Annoyed you by criticizing your drinking?\"  No  G= \"Have you ever felt bad or Guilty about your drinking?\"  No  E= \"Have you ever had a drink as an Eye-opener first thing in the morning to steady your nerves or to help a hangover?\"  No      Deferred []      Reason for deferring: N/A    *If yes to two or more: probable alcohol abuse.*

## 2025-01-31 NOTE — ED NOTES
Patient complains of pain on her right hip and writer perfect served Dr. Drew for some pain medication.

## 2025-01-31 NOTE — H&P
gastrointestinal endoscopy (N/A, 6/14/2019); Spine surgery (N/A, 6/6/2022); and Upper gastrointestinal endoscopy (N/A, 5/3/2024).    Current Medications  No current facility-administered medications for this encounter.     Allergies  Allergies have been reviewed.  Yaz is allergic to penicillins, seasonal, and etomidate.    Social History  Yaz  reports that she quit smoking about 6 years ago. Her smoking use included cigarettes. She started smoking about 66 years ago. She has a 45 pack-year smoking history. She has never used smokeless tobacco. She reports that she does not drink alcohol and does not use drugs.    Family History  Yaz's family history includes Coronary Art Dis in her father.    Review of Systems   History obtained from the patient.   REVIEW OF SYSTEMS:   Constitution: negative for fever, chills, weight loss or malaise   Musculoskeletal: As noted in the HPI   Neurologic: As noted in the HPI    Physical Exam  BP (!) 88/54   Pulse 70   Temp 97.5 °F (36.4 °C) (Oral)   Resp 16   Ht 1.702 m (5' 7\")   Wt 49.9 kg (110 lb)   SpO2 99%   BMI 17.23 kg/m²    General Appearance: alert, well appearing, and in no distress  Mental Status: alert, oriented to person, place, and time  Evaluation the patient's right hip and lower extremity demonstrate intact skin without warmth, erythema or ecchymosis.  1+ pedal pulse bilaterally.  She is able to actively plantarflex/dorsiflex the ankle as well as move all toes.  Leg is externally rotated with some shortening present.  Sensation is intact to light touch in all dermatomes.  Positive logroll.    Imaging Studies  1 view x-ray of the pelvis and 2 view x-ray of the right hip obtained on 1/31/2025 were independently reviewed demonstrating a comminuted intertrochanteric fracture of the proximal femur with some shortening.  No significant degenerative changes within the hip joint.    Diagnostics and Labs  Relevant diagnostic, laboratory and radiological studies have been  reviewed in the Electronic Medical Record.    Assessment and Plan  Yaz Allison is a 84 y.o. old female who presented to the hospital after a fall earlier this morning.  I did discuss with the patient and her family member today about the condition of her hip.  She does have an intertrochanteric hip fracture.  We had a discussion about treatment options both conservative and surgical.  To facilitate the healing of the fracture, early ambulation, pain control I would recommend surgical intervention by way of an intramedullary nail stabilization.  We had a discussion about the expected intraoperative and postoperative outcomes and had a discussion about risks and benefits of the procedure with risks including but not limited to DVT/PE, risks of anesthesia, wound healing complications, risk of stroke, nonunion, delayed union, blood loss requiring transfusion, temporary or permanent nerve damage.  Patient and her family member demonstrated good understanding of our conversation and all questions were appropriately answered.  They did elect to proceed with surgical intervention and pending medical clearance we will proceed with surgery.  Plan for surgical intervention on the morning of 2/1/2025 pending clearances.              This note is created with the assistance of a speech recognition program.  While intending to generate adocument that actually reflects the content of the visit, the document can still have some errors including those of syntax and sound a like substitutions which may escape proof reading.  It such instances, actual meaningcan be extrapolated by contextual diversion.

## 2025-01-31 NOTE — ED PROVIDER NOTES
EMERGENCY DEPARTMENT ENCOUNTER    Pt Name: Yaz Allison  MRN: [id number]  Birthdate 1940  Date of evaluation: 1/31/25  CHIEF COMPLAINT       Chief Complaint   Patient presents with    Fall    Hip Pain     Right     HISTORY OF PRESENT ILLNESS   This is a 84-year-old presenting with a fall    Was getting up to go to the bathroom try to use her walker but it went out from her and she fell onto her right hip    No loss of consciousness.  No chest pain or pressure no abdominal pain    Complaining of mainly pain in the right hip                  REVIEW OF SYSTEMS     Review of Systems   Constitutional:  Negative for chills and fever.   HENT:  Negative for rhinorrhea and sore throat.    Eyes:  Negative for discharge and redness.   Respiratory:  Negative for shortness of breath.    Cardiovascular:  Negative for chest pain.   Gastrointestinal:  Negative for abdominal pain, diarrhea, nausea and vomiting.   Genitourinary:  Negative for dysuria, frequency and urgency.   Musculoskeletal:  Negative for arthralgias and myalgias.        Right hip pain   Skin:  Negative for rash.   Neurological:  Negative for weakness and numbness.   Psychiatric/Behavioral:  Negative for suicidal ideas.    All other systems reviewed and are negative.    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)     Mononeuritis of unspecified site     Myalgia and myositis, unspecified     Osteoarthrosis,  fracture    Admitted to orthopedic with medical consultation      PROCEDURES:    EKG 12 Lead    Date/Time: 1/31/2025 5:41 AM    Performed by: Jarad Kelly MD  Authorized by: Jarad Kelly MD    ECG interpreted by ED Physician in the absence of a cardiologist: yes    Interpretation:     Interpretation: normal    Rate:     ECG rate:  64    ECG rate assessment: normal    Rhythm:     Rhythm: sinus rhythm    Ectopy:     Ectopy: none    QRS:     QRS axis:  Normal    QRS intervals:  Normal    QRS conduction: normal    ST segments:     ST segments:  Normal  T waves:     T waves: normal          DATA FOR LAB AND RADIOLOGY TESTS ORDERED BELOW ARE REVIEWED BY THE ED CLINICIAN:    RADIOLOGY: All x-rays, CT, MRI, and formal ultrasound images (except ED bedside ultrasound) are read by the radiologist, see reports below, unless otherwise noted in MDM or here.  Reports below are reviewed by myself.  CT Head W/O Contrast   Final Result   No acute intracranial abnormality.         CT CSpine W/O Contrast   Final Result   No acute fracture or malalignment of the cervical spine.      Multilevel degenerative change with bilateral bony foraminal narrowing at   C4-5.         XR CHEST PORTABLE   Final Result   Chronic pulmonary change without acute cardiopulmonary process.         XR HIP 2-3 VW W PELVIS RIGHT   Final Result   Comminuted intertrochanteric fracture of the proximal right femur.             LABS: Lab orders shown below, the results are reviewed by myself, and all abnormals are listed below.  Labs Reviewed   BASIC METABOLIC PANEL - Abnormal; Notable for the following components:       Result Value    Sodium 132 (*)     Anion Gap 8 (*)     Glucose 117 (*)     All other components within normal limits   CBC WITH AUTO DIFFERENTIAL - Abnormal; Notable for the following components:    RBC 2.79 (*)     Hemoglobin 8.5 (*)     Hematocrit 25.8 (*)     RDW 15.1 (*)     Neutrophils % 71 (*)     Lymphocytes % 17 (*)     All other

## 2025-01-31 NOTE — ED NOTES
Report given to NICOLETTE Blackwood from ED.   Report method in person   The following was reviewed with receiving RN:   Current vital signs:  BP (!) 156/72   Pulse 67   Temp 97.5 °F (36.4 °C) (Oral)   Resp 15   Ht 1.702 m (5' 7\")   Wt 49.9 kg (110 lb)   SpO2 99%   BMI 17.23 kg/m²                MEWS Score: 1     Any medication or safety alerts were reviewed. Any pending diagnostics and notifications were also reviewed, as well as any safety concerns or issues, abnormal labs, abnormal imaging, and abnormal assessment findings. Questions were answered.

## 2025-02-01 ENCOUNTER — APPOINTMENT (OUTPATIENT)
Dept: GENERAL RADIOLOGY | Age: 85
DRG: 481 | End: 2025-02-01
Attending: ORTHOPAEDIC SURGERY
Payer: MEDICARE

## 2025-02-01 ENCOUNTER — ANESTHESIA (OUTPATIENT)
Dept: OPERATING ROOM | Age: 85
End: 2025-02-01
Payer: MEDICARE

## 2025-02-01 PROCEDURE — 99233 SBSQ HOSP IP/OBS HIGH 50: CPT

## 2025-02-01 PROCEDURE — 3600000003 HC SURGERY LEVEL 3 BASE: Performed by: ORTHOPAEDIC SURGERY

## 2025-02-01 PROCEDURE — 2580000003 HC RX 258: Performed by: ANESTHESIOLOGY

## 2025-02-01 PROCEDURE — 0QS606Z REPOSITION RIGHT UPPER FEMUR WITH INTRAMEDULLARY INTERNAL FIXATION DEVICE, OPEN APPROACH: ICD-10-PCS | Performed by: ORTHOPAEDIC SURGERY

## 2025-02-01 PROCEDURE — 27245 TREAT THIGH FRACTURE: CPT

## 2025-02-01 PROCEDURE — 1200000000 HC SEMI PRIVATE

## 2025-02-01 PROCEDURE — 6360000002 HC RX W HCPCS: Performed by: ANESTHESIOLOGY

## 2025-02-01 PROCEDURE — 27245 TREAT THIGH FRACTURE: CPT | Performed by: ORTHOPAEDIC SURGERY

## 2025-02-01 PROCEDURE — 99232 SBSQ HOSP IP/OBS MODERATE 35: CPT | Performed by: INTERNAL MEDICINE

## 2025-02-01 PROCEDURE — 3700000000 HC ANESTHESIA ATTENDED CARE: Performed by: ORTHOPAEDIC SURGERY

## 2025-02-01 PROCEDURE — 7100000000 HC PACU RECOVERY - FIRST 15 MIN: Performed by: ORTHOPAEDIC SURGERY

## 2025-02-01 PROCEDURE — 2709999900 HC NON-CHARGEABLE SUPPLY: Performed by: ORTHOPAEDIC SURGERY

## 2025-02-01 PROCEDURE — C1769 GUIDE WIRE: HCPCS | Performed by: ORTHOPAEDIC SURGERY

## 2025-02-01 PROCEDURE — 2720000010 HC SURG SUPPLY STERILE: Performed by: ORTHOPAEDIC SURGERY

## 2025-02-01 PROCEDURE — 3700000001 HC ADD 15 MINUTES (ANESTHESIA): Performed by: ORTHOPAEDIC SURGERY

## 2025-02-01 PROCEDURE — 3600000013 HC SURGERY LEVEL 3 ADDTL 15MIN: Performed by: ORTHOPAEDIC SURGERY

## 2025-02-01 PROCEDURE — 6370000000 HC RX 637 (ALT 250 FOR IP): Performed by: ORTHOPAEDIC SURGERY

## 2025-02-01 PROCEDURE — 7100000001 HC PACU RECOVERY - ADDTL 15 MIN: Performed by: ORTHOPAEDIC SURGERY

## 2025-02-01 PROCEDURE — 2500000003 HC RX 250 WO HCPCS: Performed by: ANESTHESIOLOGY

## 2025-02-01 PROCEDURE — C1713 ANCHOR/SCREW BN/BN,TIS/BN: HCPCS | Performed by: ORTHOPAEDIC SURGERY

## 2025-02-01 PROCEDURE — 2500000003 HC RX 250 WO HCPCS: Performed by: ORTHOPAEDIC SURGERY

## 2025-02-01 PROCEDURE — 2580000003 HC RX 258: Performed by: ORTHOPAEDIC SURGERY

## 2025-02-01 PROCEDURE — 0QS806Z REPOSITION RIGHT FEMORAL SHAFT WITH INTRAMEDULLARY INTERNAL FIXATION DEVICE, OPEN APPROACH: ICD-10-PCS | Performed by: ORTHOPAEDIC SURGERY

## 2025-02-01 DEVICE — IMPLANTABLE DEVICE: Type: IMPLANTABLE DEVICE | Site: HIP | Status: FUNCTIONAL

## 2025-02-01 DEVICE — NAIL IM L170MM DIA10MM NK 125DEG SHT PROX FEM GRN TI-15MO: Type: IMPLANTABLE DEVICE | Site: HIP | Status: FUNCTIONAL

## 2025-02-01 DEVICE — SCREW BNE L34MM DIA5MM NONSTERILE TIB LT GRN TI ST CANN LOK: Type: IMPLANTABLE DEVICE | Site: HIP | Status: FUNCTIONAL

## 2025-02-01 RX ORDER — LIDOCAINE HYDROCHLORIDE 10 MG/ML
INJECTION, SOLUTION EPIDURAL; INFILTRATION; INTRACAUDAL; PERINEURAL
Status: DISCONTINUED | OUTPATIENT
Start: 2025-02-01 | End: 2025-02-01 | Stop reason: SDUPTHER

## 2025-02-01 RX ORDER — SUCCINYLCHOLINE/SOD CL,ISO/PF 200MG/10ML
SYRINGE (ML) INTRAVENOUS
Status: DISCONTINUED | OUTPATIENT
Start: 2025-02-01 | End: 2025-02-01 | Stop reason: SDUPTHER

## 2025-02-01 RX ORDER — DEXAMETHASONE SODIUM PHOSPHATE 4 MG/ML
INJECTION, SOLUTION INTRA-ARTICULAR; INTRALESIONAL; INTRAMUSCULAR; INTRAVENOUS; SOFT TISSUE
Status: DISCONTINUED | OUTPATIENT
Start: 2025-02-01 | End: 2025-02-01 | Stop reason: SDUPTHER

## 2025-02-01 RX ORDER — HYDROCODONE BITARTRATE AND ACETAMINOPHEN 5; 325 MG/1; MG/1
1 TABLET ORAL EVERY 4 HOURS PRN
Status: DISCONTINUED | OUTPATIENT
Start: 2025-02-01 | End: 2025-02-06 | Stop reason: HOSPADM

## 2025-02-01 RX ORDER — METOCLOPRAMIDE HYDROCHLORIDE 5 MG/ML
10 INJECTION INTRAMUSCULAR; INTRAVENOUS
Status: ACTIVE | OUTPATIENT
Start: 2025-02-01 | End: 2025-02-02

## 2025-02-01 RX ORDER — PHENYLEPHRINE HYDROCHLORIDE 10 MG/ML
INJECTION INTRAVENOUS
Status: DISCONTINUED | OUTPATIENT
Start: 2025-02-01 | End: 2025-02-01 | Stop reason: SDUPTHER

## 2025-02-01 RX ORDER — NALOXONE HYDROCHLORIDE 0.4 MG/ML
INJECTION, SOLUTION INTRAMUSCULAR; INTRAVENOUS; SUBCUTANEOUS PRN
Status: DISCONTINUED | OUTPATIENT
Start: 2025-02-01 | End: 2025-02-06 | Stop reason: HOSPADM

## 2025-02-01 RX ORDER — ONDANSETRON 2 MG/ML
4 INJECTION INTRAMUSCULAR; INTRAVENOUS
Status: ACTIVE | OUTPATIENT
Start: 2025-02-01 | End: 2025-02-02

## 2025-02-01 RX ORDER — MORPHINE SULFATE 2 MG/ML
2 INJECTION, SOLUTION INTRAMUSCULAR; INTRAVENOUS EVERY 4 HOURS PRN
Status: DISCONTINUED | OUTPATIENT
Start: 2025-02-01 | End: 2025-02-06 | Stop reason: HOSPADM

## 2025-02-01 RX ORDER — SODIUM CHLORIDE, SODIUM LACTATE, POTASSIUM CHLORIDE, CALCIUM CHLORIDE 600; 310; 30; 20 MG/100ML; MG/100ML; MG/100ML; MG/100ML
INJECTION, SOLUTION INTRAVENOUS
Status: DISCONTINUED | OUTPATIENT
Start: 2025-02-01 | End: 2025-02-01 | Stop reason: SDUPTHER

## 2025-02-01 RX ORDER — ENOXAPARIN SODIUM 100 MG/ML
30 INJECTION SUBCUTANEOUS DAILY
Status: DISCONTINUED | OUTPATIENT
Start: 2025-02-02 | End: 2025-02-06 | Stop reason: HOSPADM

## 2025-02-01 RX ORDER — SODIUM CHLORIDE 0.9 % (FLUSH) 0.9 %
5-40 SYRINGE (ML) INJECTION PRN
Status: DISCONTINUED | OUTPATIENT
Start: 2025-02-01 | End: 2025-02-06 | Stop reason: HOSPADM

## 2025-02-01 RX ORDER — DIPHENHYDRAMINE HYDROCHLORIDE 50 MG/ML
12.5 INJECTION INTRAMUSCULAR; INTRAVENOUS
Status: ACTIVE | OUTPATIENT
Start: 2025-02-01 | End: 2025-02-02

## 2025-02-01 RX ORDER — SODIUM CHLORIDE 9 MG/ML
INJECTION, SOLUTION INTRAVENOUS CONTINUOUS
Status: DISCONTINUED | OUTPATIENT
Start: 2025-02-01 | End: 2025-02-04

## 2025-02-01 RX ORDER — SODIUM CHLORIDE 0.9 % (FLUSH) 0.9 %
5-40 SYRINGE (ML) INJECTION EVERY 12 HOURS SCHEDULED
Status: DISCONTINUED | OUTPATIENT
Start: 2025-02-01 | End: 2025-02-06 | Stop reason: HOSPADM

## 2025-02-01 RX ORDER — EPHEDRINE SULFATE/0.9% NACL/PF 25 MG/5 ML
SYRINGE (ML) INTRAVENOUS
Status: DISCONTINUED | OUTPATIENT
Start: 2025-02-01 | End: 2025-02-01 | Stop reason: SDUPTHER

## 2025-02-01 RX ORDER — FENTANYL CITRATE 50 UG/ML
INJECTION, SOLUTION INTRAMUSCULAR; INTRAVENOUS
Status: DISCONTINUED | OUTPATIENT
Start: 2025-02-01 | End: 2025-02-01 | Stop reason: SDUPTHER

## 2025-02-01 RX ORDER — SODIUM CHLORIDE 9 MG/ML
INJECTION, SOLUTION INTRAVENOUS PRN
Status: DISCONTINUED | OUTPATIENT
Start: 2025-02-01 | End: 2025-02-06 | Stop reason: HOSPADM

## 2025-02-01 RX ORDER — BUPIVACAINE HYDROCHLORIDE AND EPINEPHRINE 5; 5 MG/ML; UG/ML
INJECTION, SOLUTION EPIDURAL; INTRACAUDAL; PERINEURAL PRN
Status: DISCONTINUED | OUTPATIENT
Start: 2025-02-01 | End: 2025-02-01 | Stop reason: ALTCHOICE

## 2025-02-01 RX ORDER — LABETALOL HYDROCHLORIDE 5 MG/ML
10 INJECTION, SOLUTION INTRAVENOUS
Status: DISCONTINUED | OUTPATIENT
Start: 2025-02-01 | End: 2025-02-06 | Stop reason: HOSPADM

## 2025-02-01 RX ORDER — CEFAZOLIN SODIUM 1 G/3ML
INJECTION, POWDER, FOR SOLUTION INTRAMUSCULAR; INTRAVENOUS
Status: DISCONTINUED | OUTPATIENT
Start: 2025-02-01 | End: 2025-02-01 | Stop reason: SDUPTHER

## 2025-02-01 RX ORDER — HYDRALAZINE HYDROCHLORIDE 20 MG/ML
10 INJECTION INTRAMUSCULAR; INTRAVENOUS
Status: DISCONTINUED | OUTPATIENT
Start: 2025-02-01 | End: 2025-02-06 | Stop reason: HOSPADM

## 2025-02-01 RX ORDER — ONDANSETRON 2 MG/ML
INJECTION INTRAMUSCULAR; INTRAVENOUS
Status: DISCONTINUED | OUTPATIENT
Start: 2025-02-01 | End: 2025-02-01 | Stop reason: SDUPTHER

## 2025-02-01 RX ORDER — PROPOFOL 10 MG/ML
INJECTION, EMULSION INTRAVENOUS
Status: DISCONTINUED | OUTPATIENT
Start: 2025-02-01 | End: 2025-02-01 | Stop reason: SDUPTHER

## 2025-02-01 RX ORDER — FENTANYL CITRATE 0.05 MG/ML
25 INJECTION, SOLUTION INTRAMUSCULAR; INTRAVENOUS EVERY 5 MIN PRN
Status: DISCONTINUED | OUTPATIENT
Start: 2025-02-01 | End: 2025-02-06 | Stop reason: HOSPADM

## 2025-02-01 RX ADMIN — SODIUM CHLORIDE, POTASSIUM CHLORIDE, SODIUM LACTATE AND CALCIUM CHLORIDE: 600; 310; 30; 20 INJECTION, SOLUTION INTRAVENOUS at 07:41

## 2025-02-01 RX ADMIN — FENTANYL CITRATE 25 MCG: 50 INJECTION INTRAMUSCULAR; INTRAVENOUS at 08:39

## 2025-02-01 RX ADMIN — Medication 50 MG: at 07:44

## 2025-02-01 RX ADMIN — LIDOCAINE HYDROCHLORIDE 40 MG: 10 INJECTION, SOLUTION EPIDURAL; INFILTRATION; INTRACAUDAL; PERINEURAL at 07:44

## 2025-02-01 RX ADMIN — PROPOFOL 80 MG: 10 INJECTION, EMULSION INTRAVENOUS at 07:44

## 2025-02-01 RX ADMIN — PHENYLEPHRINE HYDROCHLORIDE 50 MCG: 10 INJECTION INTRAVENOUS at 08:31

## 2025-02-01 RX ADMIN — PHENYLEPHRINE HYDROCHLORIDE 50 MCG: 10 INJECTION INTRAVENOUS at 08:21

## 2025-02-01 RX ADMIN — PHENYLEPHRINE HYDROCHLORIDE 50 MCG: 10 INJECTION INTRAVENOUS at 08:14

## 2025-02-01 RX ADMIN — CEFAZOLIN 2 G: 1 INJECTION, POWDER, FOR SOLUTION INTRAMUSCULAR; INTRAVENOUS at 07:56

## 2025-02-01 RX ADMIN — PHENYLEPHRINE HYDROCHLORIDE 100 MCG: 10 INJECTION INTRAVENOUS at 08:06

## 2025-02-01 RX ADMIN — EPHEDRINE SULFATE 10 MG: 5 INJECTION INTRAVENOUS at 07:48

## 2025-02-01 RX ADMIN — GABAPENTIN 300 MG: 300 CAPSULE ORAL at 20:20

## 2025-02-01 RX ADMIN — SODIUM CHLORIDE: 9 INJECTION, SOLUTION INTRAVENOUS at 10:29

## 2025-02-01 RX ADMIN — EPHEDRINE SULFATE 5 MG: 5 INJECTION INTRAVENOUS at 07:55

## 2025-02-01 RX ADMIN — FENTANYL CITRATE 50 MCG: 50 INJECTION INTRAMUSCULAR; INTRAVENOUS at 07:44

## 2025-02-01 RX ADMIN — DEXAMETHASONE SODIUM PHOSPHATE 4 MG: 4 INJECTION, SOLUTION INTRA-ARTICULAR; INTRALESIONAL; INTRAMUSCULAR; INTRAVENOUS; SOFT TISSUE at 08:03

## 2025-02-01 RX ADMIN — FENTANYL CITRATE 25 MCG: 50 INJECTION INTRAMUSCULAR; INTRAVENOUS at 08:49

## 2025-02-01 RX ADMIN — ONDANSETRON 4 MG: 2 INJECTION, SOLUTION INTRAMUSCULAR; INTRAVENOUS at 08:31

## 2025-02-01 RX ADMIN — EPHEDRINE SULFATE 10 MG: 5 INJECTION INTRAVENOUS at 07:52

## 2025-02-01 ASSESSMENT — PAIN - FUNCTIONAL ASSESSMENT: PAIN_FUNCTIONAL_ASSESSMENT: NONE - DENIES PAIN

## 2025-02-01 ASSESSMENT — PAIN SCALES - GENERAL
PAINLEVEL_OUTOF10: 0
PAINLEVEL_OUTOF10: 3
PAINLEVEL_OUTOF10: 0

## 2025-02-01 NOTE — PLAN OF CARE
Problem: Discharge Planning  Goal: Discharge to home or other facility with appropriate resources  2/1/2025 0300 by Katia Fang, RN  Outcome: Progressing  Flowsheets (Taken 1/31/2025 2030)  Discharge to home or other facility with appropriate resources:   Identify barriers to discharge with patient and caregiver   Arrange for needed discharge resources and transportation as appropriate   Identify discharge learning needs (meds, wound care, etc)   Refer to discharge planning if patient needs post-hospital services based on physician order or complex needs related to functional status, cognitive ability or social support system     Problem: Pain  Goal: Verbalizes/displays adequate comfort level or baseline comfort level  2/1/2025 0300 by Katia Fang RN  Outcome: Progressing  Flowsheets (Taken 2/1/2025 0300)  Verbalizes/displays adequate comfort level or baseline comfort level:   Encourage patient to monitor pain and request assistance   Assess pain using appropriate pain scale   Administer analgesics based on type and severity of pain and evaluate response     Problem: Skin/Tissue Integrity  Goal: Skin integrity remains intact  Description: 1.  Monitor for areas of redness and/or skin breakdown  2.  Assess vascular access sites hourly  3.  Every 4-6 hours minimum:  Change oxygen saturation probe site  4.  Every 4-6 hours:  If on nasal continuous positive airway pressure, respiratory therapy assess nares and determine need for appliance change or resting period  2/1/2025 0300 by Katia Fang, RN  Outcome: Progressing  Flowsheets  Taken 2/1/2025 0300  Skin Integrity Remains Intact:   Monitor for areas of redness and/or skin breakdown   Assess vascular access sites hourly  Taken 1/31/2025 2030  Skin Integrity Remains Intact:   Monitor for areas of redness and/or skin breakdown   Assess vascular access sites hourly     Problem: Safety - Adult  Goal: Free from fall injury  2/1/2025 0300 by Katia Fang,

## 2025-02-01 NOTE — OP NOTE
OPERATIVE REPORT    Date of Surgery: 2/1/2025     Pre-operative Diagnosis: Right femur intertrochanteric fracture (S72.141)    Post-operative Diagnosis: Right femur intertrochanteric fracture (S72.141)    Procedure: Right femur intertrochanteric fracture surgical stabilization with intramedullary nail (73326)    Surgeon(s): Pedro Luis De Oliveira MD    Assistant(s): No resident present. Nayan Kwok PA-C, medically necessary for patient positioning, surgical site exposure, wound closure, and efficient completion of case.      Anesthesia: General    Fluids: See anesthesia record    Urine output: See anesthesia record    Estimated blood loss: 50 mL    Findings: See note below    Specimen: None    Tourniquet time: Not applicable    Implants: Synthes 10 x 170 mm TFNa with 90 mm helical blade and 5 x 34 mm screw    Surgical Indications:  Yaz Allison is a 84 y.o. old female who presented with a closed right femur intertrochanteric fracture. Following a discussion with the patient regarding both non-operative and operative treatment options, she consented to proceed with surgical stabilization of his fracture using an intramedullary nail. she came to this decision after demonstrating an understanding of our discussion regarding details of the procedure, risks and benefits, expected outcome, and postoperative course.    Operative technique:     Following appropriate identification of the patient and her operative extremity, consent was reviewed with the patient and her operative extremity was signed. she was wheeled to the operating room where she finished a course of pre-operative antibiotic prophylaxis by way of 2 G IV ancef. The anesthesia service administered a general anesthetic and airway secured. All bony prominences were appropriately padded and the patient was secured to the operative fracture table in a supine position position. The patient's operative extremity was placed in the fracture traction boot assembled on the  targeting guide a 5 x 34 mm distal interlocking screw was inserted through the distal end of the nail.  Intraoperative fluoroscopy was used throughout the case to confirm appropriate positioning of the nail as well as screws.  Satisfied with the reduction and position of the implants, the patient's incisions were thoroughly irrigated using copious amounts of sterile saline.  Layered closure of the incisions were then performed using 0 Vicryl for deep tissue, 2-0 Vicryl for subcutaneous tissue closure, and 3-0 prolene on skin.  Sterile dressings were applied using steri strips, Xeroform gauze, 4 x 4 gauze, and Tegaderm.  The patient was awoken, transferred to her bed, and wheeled to recovery in stable condition.    Complications: None    Post-operative Condition: Stable

## 2025-02-01 NOTE — ANESTHESIA POSTPROCEDURE EVALUATION
Department of Anesthesiology  Postprocedure Note    Patient: Yaz Allison  MRN: [id number]  YOB: 1940  Date of evaluation: 2/1/2025    Procedure Summary       Date: 02/01/25 Room / Location: [address]    Anesthesia Start: 0741 Anesthesia Stop: 0903    Procedure: FEMUR INTRAMEDULLARY NAIL STEFANO INSERTION ANTEGRADE  RIGHT (Right: Hip) Diagnosis:       Closed fracture of right hip, initial encounter ([facility])      (Closed fracture of right hip, initial encounter ([facility]) [S72.001A])    Surgeons: Pedro Luis De Oliveira MD Responsible Provider: Cate Herrmann MD    Anesthesia Type: general ASA Status: 3            Anesthesia Type: No value filed.    Gonzalez Phase I: Gonzalez Score: 7    Gonzalez Phase II:      Anesthesia Post Evaluation    Comments: POST- ANESTHESIA EVALUATION       Pt Name: Yaz Allison  MRN: [id number]  YOB: 1940  Date of evaluation: 2/1/2025  Time:  10:37 AM      BP (!) 95/57   Pulse 78   Temp 97.8 °F (36.6 °C) (Oral)   Resp 16   Ht 1.702 m (5' 7\")   Wt 49.9 kg (110 lb)   SpO2 98%   BMI 17.23 kg/m²      Consciousness Level  Awake  Cardiopulmonary Status  Stable  Pain Adequately Treated YES  Nausea / Vomiting  NO  Adequate Hydration  YES  Anesthesia Related Complications NONE      Electronically signed by Cate Herrmann MD on 2/1/2025 at 10:37 AM      No notable events documented.

## 2025-02-01 NOTE — ACP (ADVANCE CARE PLANNING)
Advance Care Planning     Advance Care Planning Activator (Inpatient)  Conversation Note      Date of ACP Conversation: 2/1/2025     Conversation Conducted with: Patient with Decision Making Capacity    ACP Activator: Angelic Mclaughlin        Health Care Decision Maker:     Current Designated Health Care Decision Maker:     Primary Decision Maker: Cori Allison - Child - [phone]    Primary Decision Maker: Ashley Allison - Child - [phone]    Primary Decision Maker: Levi Allison - Child - [phone]    Primary Decision Maker: EstefanyVenice Ramirez  Click here to complete Healthcare Decision Makers including section of the Healthcare Decision Maker Relationship (ie \"Primary\")      Care Preferences    Ventilation:  \"If you were in your present state of health and suddenly became very ill and were unable to breathe on your own, what would your preference be about the use of a ventilator (breathing machine) if it were available to you?\"      Would the patient desire the use of ventilator (breathing machine)?: yes    \"If your health worsens and it becomes clear that your chance of recovery is unlikely, what would your preference be about the use of a ventilator (breathing machine) if it were available to you?\"     Would the patient desire the use of ventilator (breathing machine)?: Yes      Resuscitation  \"CPR works best to restart the heart when there is a sudden event, like a heart attack, in someone who is otherwise healthy. Unfortunately, CPR does not typically restart the heart for people who have serious health conditions or who are very sick.\"    \"In the event your heart stopped as a result of an underlying serious health condition, would you want attempts to be made to restart your heart (answer \"yes\" for attempt to resuscitate) or would you prefer a natural death (answer \"no\" for do not attempt to resuscitate)?\" yes       [] Yes   [] No   Educated Patient / Decision Maker regarding differences between Advance

## 2025-02-01 NOTE — PLAN OF CARE
Problem: Discharge Planning  Goal: Discharge to home or other facility with appropriate resources  Outcome: Progressing     Problem: Pain  Goal: Verbalizes/displays adequate comfort level or baseline comfort level  Outcome: Progressing     Problem: Skin/Tissue Integrity  Goal: Skin integrity remains intact  Description: 1.  Monitor for areas of redness and/or skin breakdown  2.  Assess vascular access sites hourly  3.  Every 4-6 hours minimum:  Change oxygen saturation probe site  4.  Every 4-6 hours:  If on nasal continuous positive airway pressure, respiratory therapy assess nares and determine need for appliance change or resting period  Outcome: Progressing

## 2025-02-01 NOTE — PROGRESS NOTES
[facility]   IN-PATIENT SERVICE   [facility]    Progress Note             Date:   2/1/2025  Patient name:  Yaz Allison  Date of admission:  1/31/2025  4:24 AM  MRN:   [id number]  Account:  [id number]  YOB: 1940  PCP:    Gabe Cintron MD  Room:   2071/2071-01  Code Status:    Full Code    Chief Complaint:     Chief Complaint   Patient presents with    Fall    Hip Pain     Right       History Obtained From:     patient    History of Present Illness:     The patient is a 84 y.o.   /  female who presents with Fall and Hip Pain (Right)   and she is admitted to the hospital for the management of      Patient is 84-year-old female with past medical history of hypothyroidism, hypertension, hyperlipidemia , recurrent UTIs presented to the ER with fall.  Patient states that she was getting up to go to the bathroom and her cane and she fell down  No dizziness lightheadedness, no loss of consciousness, does not that she hit her head   In the ER, found to right femur fracture, head CT neck  Patient was seen and examined on the floor denies any chest pain shortness of breath.  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

## 2025-02-01 NOTE — PROGRESS NOTES
Cardiology Progress Note                     Date:   2/1/2025  Patient name: Yaz Allison  Date of admission:  1/31/2025  4:24 AM  MRN:   [id number]  YOB: 1940  PCP: Gabe Cintron MD    Reason for Admission:  fall, hip fracture     Subjective:       Patient is now status post open reduction internal fixation of hip fracture with right femur intertrochanteric fracture surgical stabilization with intramedullary nail.    No major perioperative complications.  She denies any chest pain or dyspnea.  Blood pressure borderline low but stable.  Remains in sinus rhythm.    Scheduled Meds:   sodium chloride flush  5-40 mL IntraVENous 2 times per day    [START ON 2/2/2025] enoxaparin Sodium  30 mg SubCUTAneous Daily    allopurinol  300 mg Oral Daily    atorvastatin  40 mg Oral Daily    [Held by provider] carvedilol  6.25 mg Oral BID WC    gabapentin  300 mg Oral BID    pantoprazole  40 mg Oral QAM AC    cephALEXin  250 mg Oral Daily    levothyroxine  125 mcg Oral Daily       Continuous Infusions:   sodium chloride      sodium chloride 75 mL/hr at 02/01/25 1029       Labs:     CBC:   Recent Labs     01/31/25  0534   WBC 7.3   HGB 8.5*        BMP:    Recent Labs     01/31/25  0534   *   K 3.8   CL 99   CO2 25   BUN 15   CREATININE 0.8   GLUCOSE 117*     Hepatic: No results for input(s): \"AST\", \"ALT\", \"BILITOT\", \"ALKPHOS\" in the last 72 hours.    Invalid input(s): \"ALB\"  Troponin: No results for input(s): \"TROPONINI\" in the last 72 hours.  BNP: No results for input(s): \"BNP\" in the last 72 hours.  Lipids: No results for input(s): \"CHOL\", \"HDL\" in the last 72 hours.    Invalid input(s): \"LDLCALCU\"  INR:   Recent Labs     01/31/25  0534   INR 1.1         Objective:     Vitals: BP (!) 95/57   Pulse 78   Temp 97.8 °F (36.6 °C) (Oral)   Resp 16   Ht 1.702 m (5' 7\")   Wt 49.9 kg (110 lb)   SpO2 98%   BMI 17.23 kg/m²     General appearance: awake, alert, in no apparent respiratory
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
## 2025-02-02 PROBLEM — D46.9 MDS (MYELODYSPLASTIC SYNDROME) (HCC): Status: ACTIVE | Noted: 2025-02-02

## 2025-02-02 PROBLEM — D64.9 ACUTE ANEMIA: Status: ACTIVE | Noted: 2025-02-02

## 2025-02-02 LAB
ANION GAP SERPL CALCULATED.3IONS-SCNC: 6 MMOL/L (ref 9–16)
BUN SERPL-MCNC: 20 MG/DL (ref 8–23)
CALCIUM SERPL-MCNC: 8.4 MG/DL (ref 8.6–10.4)
CHLORIDE SERPL-SCNC: 101 MMOL/L (ref 98–107)
CO2 SERPL-SCNC: 24 MMOL/L (ref 20–31)
CREAT SERPL-MCNC: 1 MG/DL (ref 0.7–1.2)
ERYTHROCYTE [DISTWIDTH] IN BLOOD BY AUTOMATED COUNT: 15.5 % (ref 11.5–14.9)
FERRITIN SERPL-MCNC: 640 NG/ML
GFR, ESTIMATED: 55 ML/MIN/1.73M2
GLUCOSE SERPL-MCNC: 108 MG/DL (ref 74–99)
HCT VFR BLD AUTO: 19.3 % (ref 36–46)
HCT VFR BLD AUTO: 22.4 % (ref 36–46)
HGB BLD-MCNC: 6.1 G/DL (ref 12–16)
HGB BLD-MCNC: 7.3 G/DL (ref 12–16)
IRON SATN MFR SERPL: 9 % (ref 20–55)
IRON SERPL-MCNC: 11 UG/DL (ref 37–145)
MCH RBC QN AUTO: 30.4 PG (ref 26–34)
MCHC RBC AUTO-ENTMCNC: 31.8 G/DL (ref 31–37)
MCV RBC AUTO: 95.8 FL (ref 80–100)
PLATELET # BLD AUTO: 180 K/UL (ref 150–450)
PMV BLD AUTO: 7.5 FL (ref 6–12)
POTASSIUM SERPL-SCNC: 4.4 MMOL/L (ref 3.7–5.3)
RBC # BLD AUTO: 2.02 M/UL (ref 4–5.2)
SODIUM SERPL-SCNC: 131 MMOL/L (ref 136–145)
TIBC SERPL-MCNC: 129 UG/DL (ref 250–450)
UNSATURATED IRON BINDING CAPACITY: 118 UG/DL (ref 112–347)
WBC OTHER # BLD: 13.2 K/UL (ref 3.5–11)

## 2025-02-02 PROCEDURE — 99223 1ST HOSP IP/OBS HIGH 75: CPT | Performed by: INTERNAL MEDICINE

## 2025-02-02 PROCEDURE — 97162 PT EVAL MOD COMPLEX 30 MIN: CPT

## 2025-02-02 PROCEDURE — 6370000000 HC RX 637 (ALT 250 FOR IP): Performed by: ORTHOPAEDIC SURGERY

## 2025-02-02 PROCEDURE — 6370000000 HC RX 637 (ALT 250 FOR IP)

## 2025-02-02 PROCEDURE — 86901 BLOOD TYPING SEROLOGIC RH(D): CPT

## 2025-02-02 PROCEDURE — 83550 IRON BINDING TEST: CPT

## 2025-02-02 PROCEDURE — 36415 COLL VENOUS BLD VENIPUNCTURE: CPT

## 2025-02-02 PROCEDURE — 6360000002 HC RX W HCPCS: Performed by: ORTHOPAEDIC SURGERY

## 2025-02-02 PROCEDURE — 85027 COMPLETE CBC AUTOMATED: CPT

## 2025-02-02 PROCEDURE — 86900 BLOOD TYPING SEROLOGIC ABO: CPT

## 2025-02-02 PROCEDURE — 1200000000 HC SEMI PRIVATE

## 2025-02-02 PROCEDURE — P9016 RBC LEUKOCYTES REDUCED: HCPCS

## 2025-02-02 PROCEDURE — 82728 ASSAY OF FERRITIN: CPT

## 2025-02-02 PROCEDURE — 85014 HEMATOCRIT: CPT

## 2025-02-02 PROCEDURE — 80048 BASIC METABOLIC PNL TOTAL CA: CPT

## 2025-02-02 PROCEDURE — 86920 COMPATIBILITY TEST SPIN: CPT

## 2025-02-02 PROCEDURE — 83540 ASSAY OF IRON: CPT

## 2025-02-02 PROCEDURE — 2580000003 HC RX 258

## 2025-02-02 PROCEDURE — 97530 THERAPEUTIC ACTIVITIES: CPT

## 2025-02-02 PROCEDURE — 30233N1 TRANSFUSION OF NONAUTOLOGOUS RED BLOOD CELLS INTO PERIPHERAL VEIN, PERCUTANEOUS APPROACH: ICD-10-PCS | Performed by: ORTHOPAEDIC SURGERY

## 2025-02-02 PROCEDURE — 97116 GAIT TRAINING THERAPY: CPT

## 2025-02-02 PROCEDURE — 99233 SBSQ HOSP IP/OBS HIGH 50: CPT

## 2025-02-02 PROCEDURE — 86850 RBC ANTIBODY SCREEN: CPT

## 2025-02-02 PROCEDURE — 36430 TRANSFUSION BLD/BLD COMPNT: CPT

## 2025-02-02 PROCEDURE — 85018 HEMOGLOBIN: CPT

## 2025-02-02 PROCEDURE — 97166 OT EVAL MOD COMPLEX 45 MIN: CPT

## 2025-02-02 RX ORDER — SENNOSIDES A AND B 8.6 MG/1
1 TABLET, FILM COATED ORAL NIGHTLY
Status: DISCONTINUED | OUTPATIENT
Start: 2025-02-02 | End: 2025-02-06 | Stop reason: HOSPADM

## 2025-02-02 RX ORDER — SODIUM CHLORIDE 9 MG/ML
INJECTION, SOLUTION INTRAVENOUS PRN
Status: DISCONTINUED | OUTPATIENT
Start: 2025-02-02 | End: 2025-02-06 | Stop reason: HOSPADM

## 2025-02-02 RX ADMIN — GABAPENTIN 300 MG: 300 CAPSULE ORAL at 08:52

## 2025-02-02 RX ADMIN — SENNOSIDES 8.6 MG: 8.6 TABLET, FILM COATED ORAL at 21:50

## 2025-02-02 RX ADMIN — GABAPENTIN 300 MG: 300 CAPSULE ORAL at 21:50

## 2025-02-02 RX ADMIN — ENOXAPARIN SODIUM 30 MG: 100 INJECTION SUBCUTANEOUS at 08:53

## 2025-02-02 RX ADMIN — HYDROCODONE BITARTRATE AND ACETAMINOPHEN 1 TABLET: 5; 325 TABLET ORAL at 08:52

## 2025-02-02 RX ADMIN — ALLOPURINOL 300 MG: 300 TABLET ORAL at 08:52

## 2025-02-02 RX ADMIN — LEVOTHYROXINE SODIUM 125 MCG: 0.12 TABLET ORAL at 06:02

## 2025-02-02 RX ADMIN — SODIUM CHLORIDE: 9 INJECTION, SOLUTION INTRAVENOUS at 21:23

## 2025-02-02 RX ADMIN — CEPHALEXIN 250 MG: 250 CAPSULE ORAL at 08:52

## 2025-02-02 RX ADMIN — ATORVASTATIN CALCIUM 40 MG: 40 TABLET, FILM COATED ORAL at 08:52

## 2025-02-02 RX ADMIN — PANTOPRAZOLE SODIUM 40 MG: 40 TABLET, DELAYED RELEASE ORAL at 06:02

## 2025-02-02 ASSESSMENT — PAIN SCALES - WONG BAKER
WONGBAKER_NUMERICALRESPONSE: NO HURT
WONGBAKER_NUMERICALRESPONSE: NO HURT

## 2025-02-02 ASSESSMENT — PAIN SCALES - GENERAL
PAINLEVEL_OUTOF10: 0
PAINLEVEL_OUTOF10: 4

## 2025-02-02 ASSESSMENT — PAIN - FUNCTIONAL ASSESSMENT: PAIN_FUNCTIONAL_ASSESSMENT: ACTIVITIES ARE NOT PREVENTED

## 2025-02-02 ASSESSMENT — PAIN DESCRIPTION - ORIENTATION: ORIENTATION: RIGHT

## 2025-02-02 ASSESSMENT — PAIN DESCRIPTION - DESCRIPTORS: DESCRIPTORS: ACHING

## 2025-02-02 ASSESSMENT — PAIN DESCRIPTION - LOCATION: LOCATION: HIP

## 2025-02-02 NOTE — PROGRESS NOTES
[facility]   IN-PATIENT SERVICE   [facility]    Progress Note             Date:   2/2/2025  Patient name:  Yaz Allison  Date of admission:  1/31/2025  4:24 AM  MRN:   [id number]  Account:  [id number]  YOB: 1940  PCP:    Gabe Cintron MD  Room:   2071/2071-01  Code Status:    Full Code    Chief Complaint:     Chief Complaint   Patient presents with    Fall    Hip Pain     Right       History Obtained From:     patient    History of Present Illness:     The patient is a 84 y.o.   /  female who presents with Fall and Hip Pain (Right)   and she is admitted to the hospital for the management of      Patient is 84-year-old female with past medical history of hypothyroidism, hypertension, hyperlipidemia , recurrent UTIs presented to the ER with fall.  Patient states that she was getting up to go to the bathroom and her cane and she fell down  No dizziness lightheadedness, no loss of consciousness, does not that she hit her head   In the ER, found to right femur fracture, head CT neck  Patient was seen and examined on the floor denies any chest pain shortness of breath.  Past Medical History:     Past Medical History:   Diagnosis Date    Abdominal pain, unspecified site     Acquired cyst of kidney     Acute gouty arthropathy     Allergic rhinitis, cause unspecified     Anxiety state, unspecified     Arthropathy, unspecified, site unspecified     Chronic airway obstruction, not elsewhere classified     Chronic kidney disease, stage III (moderate) (HCC)     Diarrhea     Edema     Esophageal reflux     Essential 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 localized, unspecified site     Other general symptoms(780.99)   UNIT/GM powder Apply 3 times daily.  Patient not taking: Reported on 9/13/2024 6/16/24   Gabe Cintron MD   allopurinol (ZYLOPRIM) 300 MG tablet TAKE 1 TABLET BY MOUTH DAILY 6/3/24   Gabe Cintron MD   Multiple Vitamins-Minerals (HAIR/SKIN/NAILS) TABS Take 1 tablet by mouth daily    ProviderAudi MD   Cholecalciferol (VITAMIN D) 2000 UNITS CAPS capsule Take 1 capsule by mouth daily    ProviderAudi MD        Allergies:     Penicillins, Seasonal, and Etomidate    Social History:     Tobacco:    reports that she quit smoking about 6 years ago. Her smoking use included cigarettes. She started smoking about 66 years ago. She has a 45 pack-year smoking history. She has never used smokeless tobacco.  Alcohol:      reports no history of alcohol use.  Drug Use:  reports no history of drug use.    Family History:     Family History   Problem Relation Age of Onset    Coronary Art Dis Father        Review of Systems:     Positive and Negative as described in HPI.    CONSTITUTIONAL:  negative for fevers, chills, sweats, fatigue, weight loss  HEENT:  negative for vision, hearing changes, runny nose, throat pain  RESPIRATORY:  negative for shortness of breath, cough, congestion, wheezing.  CARDIOVASCULAR:  negative for chest pain, palpitations.  GASTROINTESTINAL:  negative for nausea, vomiting, diarrhea, constipation, change in bowel habits, abdominal pain   GENITOURINARY:  negative for difficulty of urination, burning with urination, frequency   INTEGUMENT:  negative for rash, skin lesions, easy bruising   HEMATOLOGIC/LYMPHATIC:  negative for swelling/edema   ALLERGIC/IMMUNOLOGIC:  negative for urticaria , itching  ENDOCRINE:  negative increase in drinking, increase in urination, hot or cold intolerance  MUSCULOSKELETAL:  negative joint pains, muscle aches, swelling of joints  NEUROLOGICAL:  negative for headaches, dizziness, lightheadedness, numbness, pain, tingling extremities  BEHAVIOR/PSYCH:

## 2025-02-02 NOTE — CONSENT
Informed Consent for Blood Component Transfusion Note    I have discussed with the patient the rationale for blood component transfusion; its benefits in treating or preventing fatigue, organ damage, or death; and its risk which includes mild transfusion reactions, rare risk of blood borne infection, or more serious but rare reactions. I have discussed the alternatives to transfusion, including the risk and consequences of not receiving transfusion. The patient had an opportunity to ask questions and had agreed to proceed with transfusion of blood components.    Electronically signed by Rosie Drew MD on 2/2/25 at 2:37 PM EST

## 2025-02-02 NOTE — PLAN OF CARE
Problem: Discharge Planning  Goal: Discharge to home or other facility with appropriate resources  2/2/2025 0254 by Vargas Valle RN  Outcome: Progressing  Flowsheets (Taken 2/1/2025 2020 by Katia Fang, RN)  Discharge to home or other facility with appropriate resources:   Identify barriers to discharge with patient and caregiver   Arrange for needed discharge resources and transportation as appropriate   Identify discharge learning needs (meds, wound care, etc)   Refer to discharge planning if patient needs post-hospital services based on physician order or complex needs related to functional status, cognitive ability or social support system  2/1/2025 1806 by Bibiana Mcmullen RN  Outcome: Progressing     Problem: Pain  Goal: Verbalizes/displays adequate comfort level or baseline comfort level  2/2/2025 0254 by Vargas Valle RN  Outcome: Progressing  2/1/2025 1806 by Bibiana Mcmullen RN  Outcome: Progressing     Problem: Skin/Tissue Integrity  Goal: Skin integrity remains intact  Description: 1.  Monitor for areas of redness and/or skin breakdown  2.  Assess vascular access sites hourly  3.  Every 4-6 hours minimum:  Change oxygen saturation probe site  4.  Every 4-6 hours:  If on nasal continuous positive airway pressure, respiratory therapy assess nares and determine need for appliance change or resting period  2/2/2025 0254 by Vargas Valle RN  Outcome: Progressing  Flowsheets (Taken 2/1/2025 2020 by Katia Fang, RN)  Skin Integrity Remains Intact:   Monitor for areas of redness and/or skin breakdown   Every 4-6 hours minimum: Change oxygen saturation probe site   Assess vascular access sites hourly  2/1/2025 1806 by Bibiana Mcmullen RN  Outcome: Progressing     Problem: Safety - Adult  Goal: Free from fall injury  Outcome: Progressing     Problem: ABCDS Injury Assessment  Goal: Absence of physical injury  Outcome: Progressing  Flowsheets (Taken 2/1/2025 2244 by Katia Fang, RN)  Absence of

## 2025-02-02 NOTE — PROGRESS NOTES
Patient hgb 6.1. Dr. De Oliveira notified. Verbal orders to transfuse 1UPRBC. Orders placed. Patient and patient daughter updated. Patient denies feeling dizzy or lightheaded and has no complaints. Assisted back to bed by 2 staff members.

## 2025-02-02 NOTE — PROGRESS NOTES
Physical Therapy  [facility]   Physical Therapy Evaluation  Date: 25  Patient Name: Yaz Allison       Room:   MRN: [id number]  Account: [id number]   : 1940  (84 y.o.) Gender: female     Discharge Recommendations:  Discharge Recommendations: Patient able to tolerate 3 hours of therapy per day, Patient would benefit from continued therapy after discharge, Therapy recommended at discharge     PT Equipment Recommendations  Equipment Needed:  (TBD)     Past Medical History:  has a past medical history of Abdominal pain, unspecified site, Acquired cyst of kidney, Acute gouty arthropathy, Allergic rhinitis, cause unspecified, Anxiety state, unspecified, Arthropathy, unspecified, site unspecified, Chronic airway obstruction, not elsewhere classified, Chronic kidney disease, stage III (moderate) (HCC), Diarrhea, Edema, Esophageal reflux, Essential hypertension, benign, Herpes zoster with other nervous system complications(053.19), Herpes zoster without mention of complication, 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  Past Surgical History:   has a past surgical history that includes Dilation and curettage of uterus; Colonoscopy; Upper gastrointestinal endoscopy (N/A, 2019); Spine surgery (N/A, 2022); and Upper gastrointestinal endoscopy (N/A, 5/3/2024).    Subjective  Subjective  Subjective: Pt reports that she fell at home while walking to the bathroom on Friday.     General  Patient assessed for rehabilitation services?: Yes  Additional

## 2025-02-02 NOTE — PROGRESS NOTES
[facility]   Occupational Therapy Evaluation  Date: 25  Patient Name: Yaz Allison       Room:   MRN: [id number]  Account: [id number]   : 1940  (84 y.o.) Gender: female     Discharge Recommendations:  Further Occupational Therapy is recommended upon facility discharge.    OT Equipment Recommendations  Other: continue to assess    Referring Practitioner: Pedro Luis De Oliveira MD  Diagnosis: Closed fracture of right hip (HCC)        Treatment Diagnosis: impaired self care status    Past Medical History:  has a past medical history of Abdominal pain, unspecified site, Acquired cyst of kidney, Acute gouty arthropathy, Allergic rhinitis, cause unspecified, Anxiety state, unspecified, Arthropathy, unspecified, site unspecified, Chronic airway obstruction, not elsewhere classified, Chronic kidney disease, stage III (moderate) (HCC), Diarrhea, Edema, Esophageal reflux, Essential hypertension, benign, Herpes zoster with other nervous system complications(053.19), Herpes zoster without mention of complication, 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    Past Surgical History:   has a past surgical history that includes Dilation and curettage of uterus; Colonoscopy; Upper gastrointestinal endoscopy (N/A, 2019); Spine surgery (N/A, 2022); and Upper gastrointestinal endoscopy (N/A, 5/3/2024).    Restrictions  Restrictions/Precautions  Restrictions/Precautions: Fall Risk, Weight Bearing (IV left antecubital,  urinary catheter, troponins 19 on  Pt. stated she got very sick from flu vaccine in her past

## 2025-02-02 NOTE — PROGRESS NOTES
No events O/N. Pain appropriately controlled. Up in bedside chair at this time.     BP (!) 102/52   Pulse 78   Temp 98.4 °F (36.9 °C) (Oral)   Resp 18   Ht 1.702 m (5' 7\")   Wt 49.9 kg (110 lb)   SpO2 98%   BMI 17.23 kg/m²   Right hip dressings are clean, dry, and intact. 2+ pedal pulses. Able to DF/PF her toes and foot.     Impression and plan: 83 yo lady sp right hip IT fracture IMN POD 1  - Pain control  - DVT ppx  - Mobilize with PT. WBAT  - Dressing change tomorrow  - Dispo: Patient and family interested in ARU. PM and R consult.

## 2025-02-03 ENCOUNTER — APPOINTMENT (OUTPATIENT)
Dept: GENERAL RADIOLOGY | Age: 85
DRG: 481 | End: 2025-02-03
Payer: MEDICARE

## 2025-02-03 LAB
ABO/RH: NORMAL
ALBUMIN SERPL-MCNC: 2.3 G/DL (ref 3.5–5.2)
ALP SERPL-CCNC: 73 U/L (ref 35–104)
ALT SERPL-CCNC: 7 U/L (ref 10–35)
ANION GAP SERPL CALCULATED.3IONS-SCNC: 6 MMOL/L (ref 9–16)
ANTIBODY SCREEN: NEGATIVE
ARM BAND NUMBER: NORMAL
AST SERPL-CCNC: 24 U/L (ref 10–35)
BASOPHILS # BLD: 0 K/UL (ref 0–0.2)
BASOPHILS NFR BLD: 0 % (ref 0–2)
BILIRUB DIRECT SERPL-MCNC: 0.2 MG/DL (ref 0–0.3)
BILIRUB INDIRECT SERPL-MCNC: 0.3 MG/DL (ref 0–1)
BILIRUB SERPL-MCNC: 0.5 MG/DL (ref 0–1.2)
BLOOD BANK BLOOD PRODUCT EXPIRATION DATE: NORMAL
BLOOD BANK DISPENSE STATUS: NORMAL
BLOOD BANK ISBT PRODUCT BLOOD TYPE: 5100
BLOOD BANK PRODUCT CODE: NORMAL
BLOOD BANK SAMPLE EXPIRATION: NORMAL
BLOOD BANK UNIT TYPE AND RH: NORMAL
BPU ID: NORMAL
BUN SERPL-MCNC: 17 MG/DL (ref 8–23)
CALCIUM SERPL-MCNC: 8.5 MG/DL (ref 8.6–10.4)
CHLORIDE SERPL-SCNC: 107 MMOL/L (ref 98–107)
CO2 SERPL-SCNC: 24 MMOL/L (ref 20–31)
COMPONENT: NORMAL
CREAT SERPL-MCNC: 0.9 MG/DL (ref 0.7–1.2)
CROSSMATCH RESULT: NORMAL
EKG ATRIAL RATE: 64 BPM
EKG P AXIS: 85 DEGREES
EKG P-R INTERVAL: 234 MS
EKG Q-T INTERVAL: 430 MS
EKG QRS DURATION: 90 MS
EKG QTC CALCULATION (BAZETT): 443 MS
EKG R AXIS: 52 DEGREES
EKG T AXIS: 72 DEGREES
EKG VENTRICULAR RATE: 64 BPM
EOSINOPHIL # BLD: 0 K/UL (ref 0–0.4)
EOSINOPHILS RELATIVE PERCENT: 0 % (ref 0–4)
ERYTHROCYTE [DISTWIDTH] IN BLOOD BY AUTOMATED COUNT: 15.7 % (ref 11.5–14.9)
FOLATE SERPL-MCNC: 4.6 NG/ML (ref 4.8–24.2)
GFR, ESTIMATED: 62 ML/MIN/1.73M2
GLUCOSE SERPL-MCNC: 96 MG/DL (ref 74–99)
HCT VFR BLD AUTO: 22.9 % (ref 36–46)
HCT VFR BLD AUTO: 23.8 % (ref 36–46)
HCT VFR BLD AUTO: 24 % (ref 36–46)
HGB BLD-MCNC: 7.6 G/DL (ref 12–16)
HGB BLD-MCNC: 7.7 G/DL (ref 12–16)
HGB BLD-MCNC: 7.9 G/DL (ref 12–16)
LYMPHOCYTES NFR BLD: 1.08 K/UL (ref 1–4.8)
LYMPHOCYTES RELATIVE PERCENT: 13 % (ref 24–44)
MCH RBC QN AUTO: 31.2 PG (ref 26–34)
MCHC RBC AUTO-ENTMCNC: 33.8 G/DL (ref 31–37)
MCV RBC AUTO: 92.1 FL (ref 80–100)
MONOCYTES NFR BLD: 0.75 K/UL (ref 0.1–1.3)
MONOCYTES NFR BLD: 9 % (ref 1–7)
MORPHOLOGY: ABNORMAL
MORPHOLOGY: ABNORMAL
NEUTROPHILS NFR BLD: 78 % (ref 36–66)
NEUTS SEG NFR BLD: 6.47 K/UL (ref 1.3–9.1)
PLATELET # BLD AUTO: 126 K/UL (ref 150–450)
PMV BLD AUTO: 7.6 FL (ref 6–12)
POTASSIUM SERPL-SCNC: 4.1 MMOL/L (ref 3.7–5.3)
PROT SERPL-MCNC: 4.3 G/DL (ref 6.6–8.7)
RBC # BLD AUTO: 2.48 M/UL (ref 4–5.2)
SODIUM SERPL-SCNC: 137 MMOL/L (ref 136–145)
TRANSFUSION STATUS: NORMAL
UNIT DIVISION: 0
UNIT ISSUE DATE/TIME: NORMAL
UNIT TAG COMMENT: NORMAL
VIT B12 SERPL-MCNC: 318 PG/ML (ref 232–1245)
WBC OTHER # BLD: 8.3 K/UL (ref 3.5–11)

## 2025-02-03 PROCEDURE — 2500000003 HC RX 250 WO HCPCS: Performed by: ANESTHESIOLOGY

## 2025-02-03 PROCEDURE — 97530 THERAPEUTIC ACTIVITIES: CPT

## 2025-02-03 PROCEDURE — 36415 COLL VENOUS BLD VENIPUNCTURE: CPT

## 2025-02-03 PROCEDURE — 6370000000 HC RX 637 (ALT 250 FOR IP): Performed by: ORTHOPAEDIC SURGERY

## 2025-02-03 PROCEDURE — 6370000000 HC RX 637 (ALT 250 FOR IP)

## 2025-02-03 PROCEDURE — 85018 HEMOGLOBIN: CPT

## 2025-02-03 PROCEDURE — 82248 BILIRUBIN DIRECT: CPT

## 2025-02-03 PROCEDURE — 85014 HEMATOCRIT: CPT

## 2025-02-03 PROCEDURE — 93010 ELECTROCARDIOGRAM REPORT: CPT | Performed by: INTERNAL MEDICINE

## 2025-02-03 PROCEDURE — 80053 COMPREHEN METABOLIC PANEL: CPT

## 2025-02-03 PROCEDURE — 97116 GAIT TRAINING THERAPY: CPT

## 2025-02-03 PROCEDURE — 97535 SELF CARE MNGMENT TRAINING: CPT

## 2025-02-03 PROCEDURE — 85025 COMPLETE CBC W/AUTO DIFF WBC: CPT

## 2025-02-03 PROCEDURE — 82746 ASSAY OF FOLIC ACID SERUM: CPT

## 2025-02-03 PROCEDURE — 2580000003 HC RX 258

## 2025-02-03 PROCEDURE — 1200000000 HC SEMI PRIVATE

## 2025-02-03 PROCEDURE — 97110 THERAPEUTIC EXERCISES: CPT

## 2025-02-03 PROCEDURE — 82607 VITAMIN B-12: CPT

## 2025-02-03 PROCEDURE — 73562 X-RAY EXAM OF KNEE 3: CPT

## 2025-02-03 PROCEDURE — 99223 1ST HOSP IP/OBS HIGH 75: CPT | Performed by: PHYSICAL MEDICINE & REHABILITATION

## 2025-02-03 PROCEDURE — 99233 SBSQ HOSP IP/OBS HIGH 50: CPT | Performed by: INTERNAL MEDICINE

## 2025-02-03 RX ADMIN — SODIUM CHLORIDE: 9 INJECTION, SOLUTION INTRAVENOUS at 10:53

## 2025-02-03 RX ADMIN — SODIUM CHLORIDE: 9 INJECTION, SOLUTION INTRAVENOUS at 17:42

## 2025-02-03 RX ADMIN — LEVOTHYROXINE SODIUM 125 MCG: 0.12 TABLET ORAL at 06:32

## 2025-02-03 RX ADMIN — CEPHALEXIN 250 MG: 250 CAPSULE ORAL at 09:00

## 2025-02-03 RX ADMIN — ATORVASTATIN CALCIUM 40 MG: 40 TABLET, FILM COATED ORAL at 09:00

## 2025-02-03 RX ADMIN — HYDROCODONE BITARTRATE AND ACETAMINOPHEN 1 TABLET: 5; 325 TABLET ORAL at 09:13

## 2025-02-03 RX ADMIN — HYDROCODONE BITARTRATE AND ACETAMINOPHEN 1 TABLET: 5; 325 TABLET ORAL at 01:21

## 2025-02-03 RX ADMIN — GABAPENTIN 300 MG: 300 CAPSULE ORAL at 22:29

## 2025-02-03 RX ADMIN — SENNOSIDES 8.6 MG: 8.6 TABLET, FILM COATED ORAL at 22:29

## 2025-02-03 RX ADMIN — SODIUM CHLORIDE, PRESERVATIVE FREE 10 ML: 5 INJECTION INTRAVENOUS at 09:00

## 2025-02-03 RX ADMIN — PANTOPRAZOLE SODIUM 40 MG: 40 TABLET, DELAYED RELEASE ORAL at 06:32

## 2025-02-03 RX ADMIN — GABAPENTIN 300 MG: 300 CAPSULE ORAL at 09:00

## 2025-02-03 RX ADMIN — ALLOPURINOL 300 MG: 300 TABLET ORAL at 09:00

## 2025-02-03 ASSESSMENT — PAIN SCALES - GENERAL
PAINLEVEL_OUTOF10: 0
PAINLEVEL_OUTOF10: 0
PAINLEVEL_OUTOF10: 5

## 2025-02-03 ASSESSMENT — PAIN DESCRIPTION - ORIENTATION
ORIENTATION: RIGHT
ORIENTATION: RIGHT

## 2025-02-03 ASSESSMENT — PAIN - FUNCTIONAL ASSESSMENT: PAIN_FUNCTIONAL_ASSESSMENT: PREVENTS OR INTERFERES WITH ALL ACTIVE AND SOME PASSIVE ACTIVITIES

## 2025-02-03 ASSESSMENT — PAIN DESCRIPTION - LOCATION
LOCATION: HIP
LOCATION: LEG

## 2025-02-03 ASSESSMENT — PAIN DESCRIPTION - DESCRIPTORS
DESCRIPTORS: ACHING
DESCRIPTORS: ACHING;SORE

## 2025-02-03 ASSESSMENT — PAIN DESCRIPTION - ONSET: ONSET: ON-GOING

## 2025-02-03 ASSESSMENT — PAIN DESCRIPTION - PAIN TYPE: TYPE: SURGICAL PAIN

## 2025-02-03 ASSESSMENT — PAIN DESCRIPTION - FREQUENCY: FREQUENCY: CONTINUOUS

## 2025-02-03 NOTE — PROGRESS NOTES
02/03/25 1038   Encounter Summary   Encounter Overview/Reason Volunteer Encounter   Assessment/Intervention/Outcome   Intervention Sustaining Presence/Ministry of presence

## 2025-02-03 NOTE — CONSULTS
Cardiology Consultation         Date:   1/31/2025  Patient name: Yaz Allison  Date of admission:  1/31/2025  4:24 AM  MRN:   [id number]  YOB: 1940    Reason for Admission: hip fracture     Chief Complaint: fall at home     History of present illness:     Patient is 84-year-old female, no pertinent cardiac history, with past medical history of hypothyroidism, hypertension, hyperlipidemia , recurrent UTIs presented to the ER with fall.  Patient states that she was getting up to go to the bathroom and her cane and she fell down  No dizziness lightheadedness, no loss of consciousness, does not that she hit her head.  Imaging shows right femur fracture.  Anticipated plan of open reduction internal fixation per Ortho tomorrow.  Patient in no apparent distress this afternoon.  Denies any recent chest pain or dyspnea.  No lower extremity edema, orthopnea or palpitations.    Past Medical History:   has a past medical history of Abdominal pain, unspecified site, Acquired cyst of kidney, Acute gouty arthropathy, Allergic rhinitis, cause unspecified, Anxiety state, unspecified, Arthropathy, unspecified, site unspecified, Chronic airway obstruction, not elsewhere classified, Chronic kidney disease, stage III (moderate) (HCC), Diarrhea, Edema, Esophageal reflux, Essential hypertension, benign, Herpes zoster with other nervous system complications(053.19), Herpes zoster without mention of complication, 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    Past Surgical History:   has a past 
  Today's Date: 2/2/2025  Patient Name: Yaz Allison  Date of admission: 1/31/2025  4:24 AM  Patient's age: 84 y.o., 1940  Admission Dx: Closed fracture of right hip, initial encounter ([facility]) [S72.001A]  Closed right hip fracture, initial encounter ([facility]) [S72.001A]    Reason for Consult: Severe anemia  Requesting Physician: Pedro Luis De Oliveira MD    CHIEF COMPLAINT:  fall/right femoral fracture    History Obtained From:  patient, electronic medical record    HISTORY OF PRESENT ILLNESS:      The patient is a 84 y.o.  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    Past Medical History:   has a past medical history of Abdominal pain, unspecified site, Acquired cyst of kidney, Acute gouty arthropathy, Allergic rhinitis, cause unspecified, Anxiety state, unspecified, Arthropathy, unspecified, site unspecified, Chronic airway obstruction, not elsewhere classified, Chronic kidney disease, stage III (moderate) ([facility]), Diarrhea, Edema, Esophageal reflux, Essential hypertension, benign, Herpes zoster with other nervous system complications(053.19), Herpes zoster without mention of complication, 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 lung field, Other psoriasis, Pure hypercholesterolemia, Regional enteritis of unspecified 
Physical Medicine & Rehabilitation  Consult Note      Admitting Physician:   Pedro Luis De Oliveira MD    Primary Care Provider:   Gabe Cintron MD     Reason for Consult:  Acute Inpatient Rehabilitation    Chief Complaint: Fall    History of Present Illness:  Referring Provider is requesting an evaluation for appropriate placement upon discharge from acute care. History from chart review and patient.    Yaz Allison is a 84 y.o. RHD female admitted to [facility] on 1/31/2025.      Patient fell in the bathroom at home landing on her R hip. She had no LOC. Diagnostic studies showed comminuted R IT fracture with associated shortening. Dr. De Oliveira performed IM nail fixation on 2/1/25.     IM: managing medical comorbidities including HTN, hyponatremia, hypothyroidism. She received 1 unit PRBCs for anemia.     Cardiology: evaluated for pre-op clearance. Carvedilol was held for hypotension and she was treated with IV hydration.     Hem/Onc:consulted for acute on chronic anemia and MDS. Acute change was attributed to volume shift and surgical blood loss. She was treated with IV infusion.     Review of Systems:  Constitutional: negative for anorexia, chills, fatigue, fevers, sweats and weight loss  Eyes: negative for redness and visual disturbance  Ears, nose, mouth, throat, and face: negative for earaches, sore throat and tinnitus  Respiratory: negative for cough and shortness of breath  Cardiovascular: negative for chest pain, dyspnea and palpitations  Gastrointestinal: negative for abdominal pain, change in bowel habits, constipation, nausea and vomiting  Genitourinary:negative for dysuria, frequency, hesitancy and urinary incontinence  Integument/breast: negative for pruritus and rash  Musculoskeletal: positive for stiff joints and weakness worse in R leg and knee  Neurological: negative for dizziness, headaches   Behavioral/Psych: negative for decreased appetite, depression        Premorbid function:  Independent/Modified 
NEGATIVE mg/dL    Urobilinogen, Urine Normal 0.0 - 1.0 EU/dL    Nitrite, Urine NEGATIVE NEGATIVE    Leukocyte Esterase, Urine NEGATIVE NEGATIVE   Microscopic Urinalysis    Collection Time: 01/31/25  6:59 AM   Result Value Ref Range    WBC, UA 3 to 5 (A) 0 TO 5 /HPF    RBC, UA 10 TO 20 (A) 0 TO 2 /HPF    Casts UA 0 TO 2 (A) None /LPF    Epithelial Cells, UA 0 TO 2 /HPF    Bacteria, UA None None       Imaging/Diagnostics:        Assessment :      Primary Problem  Closed right hip fracture, initial encounter ([facility])    Active Hospital Problems    Diagnosis Date Noted    Closed right hip fracture, initial encounter ([facility]) [S72.001A] 01/31/2025       Plan:     Patient status Admit as inpatient in the med surg   Patient is 84-year-old female with past medical history of hypertension hypothyroidism hyperlipidemia presented with fall found to have right hip fracture, medicine has been consulted for medical management and clearance next  Mechanical fall, head CT C-spine nonacute, right hip fracture, Ortho primary, going for surgical intervention tomorrow  Anemia, hemoglobin is stable at baseline, might need transfusion.  Hyponatremia, sodium is 132, hypothyroidism, TSH is 26 T4 is normal increase the dose of levothyroxine to 125 patient advised to check TSH check as outpatient in 4 to 6 weeks     Hypertension, blood pressure is borderline low, patient denies any dizziness lightheadedness, hold onto Coreg    Her RCRI is 0, met is fair, cleared from our standpoint with moderate risk,  patient EKG reviewed sinus arrhythmia, no significant murmur noticed on exam,  Troponin elevation, does have chronic elevation in troponin will consult cardiology, echocardiogram was done last year showed preserved ejection fraction  Need cardiology clearance   ll  Consultations:   IP CONSULT TO INTERNAL MEDICINE     Patient is admitted as inpatient status because of co-morbidities listed above, severity of signs and symptoms as outlined,

## 2025-02-03 NOTE — PROGRESS NOTES
Ref Range    Hemoglobin 7.9 (L) 12.0 - 16.0 g/dL    Hematocrit 24.0 (L) 36 - 46 %         Imaging/Diagnostics:        Assessment :      Primary Problem  Closed fracture of right hip (HCC)    Active Hospital Problems    Diagnosis Date Noted    MDS (myelodysplastic syndrome) (HCC) [D46.9] 02/02/2025    Fall [W19.XXXA] 02/02/2025    Acute anemia [D64.9] 02/02/2025    Closed fracture of right hip (HCC) [S72.001A] 01/31/2025    Preop cardiovascular exam [Z01.810] 01/31/2025       Plan:     Patient status Admit as inpatient in the med surg   Patient is 84-year-old female with past medical history of hypertension hypothyroidism hyperlipidemia presented with fall found to have right hip fracture, medicine has been consulted for medical management and clearance next  Mechanical fall, head CT C-spine nonacute, right hip fracture, Ortho primary, going for surgical intervention tomorrow  Anemia, hemoglobin is stable at baseline, might need transfusion.  Hyponatremia, sodium is 132, hypothyroidism, TSH is 26 T4 is normal increase the dose of levothyroxine to 125 patient advised to check TSH check as outpatient in 4 to 6 weeks     Hypertension, blood pressure is borderline low, patient denies any dizziness lightheadedness, hold onto Coreg    Her RCRI is 0, met is fair, cleared from our standpoint with moderate risk,  patient EKG reviewed sinus arrhythmia, no significant murmur noticed on exam,  Troponin elevation, does have chronic elevation in troponin will consult cardiology, echocardiogram was done last year showed preserved ejection fraction  Need cardiology clearance     2/1  S/p Right femur intertrochanteric fracture surgical stabilization with intramedullary nail 2/1  Patient seen and examined at bedside.  Blood pressure on the lower side, hold Coreg.  TSH was elevated.  Increased dose of levothyroxine yesterday, patient need to follow-up with PCP with TSH in 4 to 6 weeks.  DVT prophylaxis as per primary.  Repeat CBC and

## 2025-02-03 NOTE — PLAN OF CARE
Problem: Discharge Planning  Goal: Discharge to home or other facility with appropriate resources  2/3/2025 0524 by Schober, Robyn L, RN  Outcome: Progressing  Flowsheets (Taken 2/2/2025 2138)  Discharge to home or other facility with appropriate resources: Identify barriers to discharge with patient and caregiver     Problem: Pain  Goal: Verbalizes/displays adequate comfort level or baseline comfort level  2/3/2025 0524 by Schober, Robyn L, RN  Outcome: Progressing  Flowsheets (Taken 2/3/2025 0121)  Verbalizes/displays adequate comfort level or baseline comfort level:   Encourage patient to monitor pain and request assistance   Assess pain using appropriate pain scale     Problem: Skin/Tissue Integrity  Goal: Skin integrity remains intact  Description: 1.  Monitor for areas of redness and/or skin breakdown  2.  Assess vascular access sites hourly  3.  Every 4-6 hours minimum:  Change oxygen saturation probe site  4.  Every 4-6 hours:  If on nasal continuous positive airway pressure, respiratory therapy assess nares and determine need for appliance change or resting period  2/3/2025 0524 by Schober, Robyn L, RN  Outcome: Progressing  Flowsheets (Taken 2/3/2025 0022)  Skin Integrity Remains Intact:   Monitor for areas of redness and/or skin breakdown   Assess vascular access sites hourly     Problem: Safety - Adult  Goal: Free from fall injury  Outcome: Progressing  Flowsheets (Taken 2/3/2025 0022)  Free From Fall Injury: Based on caregiver fall risk screen, instruct family/caregiver to ask for assistance with transferring infant if caregiver noted to have fall risk factors     Problem: ABCDS Injury Assessment  Goal: Absence of physical injury  Outcome: Progressing  Flowsheets (Taken 2/3/2025 0022)  Absence of Physical Injury: Implement safety measures based on patient assessment

## 2025-02-03 NOTE — PROGRESS NOTES
Spiritual Health History and Assessment/Progress Note  [facility]    (P) Initial Encounter    Name: Yaz Allison MRN: [id number]    Age: 84 y.o.     Sex: female   Language: English   Anabaptism: Baptist   Closed fracture of right hip (HCC)     Date: 2/3/2025            Total Time Calculated: (P) 15 min              Spiritual Assessment  in [facility] MED SURG        Referral/Consult From: (P) Volunteer   Encounter Overview/Reason: (P) Initial Encounter  Service Provided For: (P) Patient    Jessica, Belief, Meaning:   Patient identifies as spiritual, is connected with a jessica tradition or spiritual practice, and has beliefs or practices that help with coping during difficult times.  She shares that her visit today with Fr. Redd has brought her a sense of peace.  She also expresses gratitude for volunteer assisting her with receipt of the Eucharist today.   Family/Friends No family/friends present      Importance and Influence:  Patient has spiritual/personal beliefs that influence decisions regarding their health as she expresses that her jessica is profoundly impactful when making health decisions.  Family/Friends No family/friends present    Community:  Patient is connected with a spiritual community and feels well-supported. Support system includes: Children, Jessica Community, and Friends.  Patient shares that she relies greatly on her second oldest, daughter, whom she refers to as \"The General\".  Family/Friends No family/friends present    Assessment and Plan of Care:     Patient Interventions include: Facilitated expression of thoughts and feelings and Affirmed coping skills/support systems. Patient is calm and coping appropriately with current health condition.   Family/Friends Interventions include: No family/friends present    Patient Plan of Care: Spiritual Care available upon further referral as patient will continue to receive visits from Fr. Redd and the [facility] Volunteer.  Family/Friends Plan

## 2025-02-03 NOTE — PROGRESS NOTES
Today's Date: 2/3/2025  Patient Name: Yaz Allison  Date of admission: 1/31/2025  4:24 AM  Patient's age: 84 y.o., 1940  Admission Dx: Closed fracture of right hip, initial encounter ([facility]) [S72.001A]  Closed right hip fracture, initial encounter ([facility]) [S72.001A]    Reason for Consult: Severe anemia  Requesting Physician: Pedro Luis De Oliveira MD    CHIEF COMPLAINT:  fall/right femoral fracture    History Obtained From:  patient, electronic medical record    Interval history  Patient was seen and examined  Hemoglobin is stable at 7.7  Platelet dropped to 126  No bleeding    HISTORY OF PRESENT ILLNESS:      The patient is a 84 y.o.  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    Past Medical History:   has a past medical history of Abdominal pain, unspecified site, Acquired cyst of kidney, Acute gouty arthropathy, Allergic rhinitis, cause unspecified, Anxiety state, unspecified, Arthropathy, unspecified, site unspecified, Chronic airway obstruction, not elsewhere classified, Chronic kidney disease, stage III (moderate) ([facility]), Diarrhea, Edema, Esophageal reflux, Essential hypertension, benign, Herpes zoster with other nervous system complications(053.19), Herpes zoster without mention of complication, 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

## 2025-02-03 NOTE — DISCHARGE INSTR - COC
Continuity of Care Form    Patient Name: Yaz Allison   :  1940  MRN:  [id number]    Admit date:  2025  Discharge date:  25    Code Status Order: Full Code   Advance Directives:   Advance Care Flowsheet Documentation             Admitting Physician:  Pedro Luis De Oliveira MD  PCP: Gabe Cintron MD    Discharging Nurse: NICOLETTE Carroll  Discharging Hospital Unit/Room#:   Discharging Unit Phone Number: [phone]    Emergency Contact:   Extended Emergency Contact Information  Primary Emergency Contact: Cori Allison  Address: [address] [place], [place] [id number] [place] [address]  Home Phone: [phone]  Work Phone: [phone]  Mobile Phone: [phone]  Relation: Child  Secondary Emergency Contact: Ashley Allison  Address: [address] [place] [address] [facility]  Home Phone: [phone]  Work Phone: [phone]  Mobile Phone: [phone]  Relation: Child    Past Surgical History:  Past Surgical History:   Procedure Laterality Date    COLONOSCOPY      DILATION AND CURETTAGE OF UTERUS      FEMUR SURGERY Right 2025    FEMUR INTRAMEDULLARY NAIL STEFANO INSERTION ANTEGRADE  RIGHT performed by Pedro Luis De Oliveira MD at [facility]    SPINE SURGERY N/A 2022    LUMBOSACRAL VERTEBROPLASTY S1 performed by Ryan Engle MD at [facility] OR    UPPER GASTROINTESTINAL ENDOSCOPY N/A 2019    EGD FOREIGN BODY REMOVAL performed by Ben Ruiz MD at [facility] OR    UPPER GASTROINTESTINAL ENDOSCOPY N/A 5/3/2024    ESOPHAGOGASTRODUODENOSCOPY BIOPSY performed by Page Mejia MD at [facility] ENDO       Immunization History:   Immunization History   Administered Date(s) Administered    COVID-19, MODERNA BLUE border, Primary or Immunocompromised, (age 12y+), IM, 100 mcg/0.5mL 2021, 2021, 2021, 2022    COVID-19, MODERNA Bivalent, (age 12y+), IM, 50 mcg/0.5 mL 2023    COVID-19, MODERNA, (age 12y+), IM, 50mcg/0.5mL 2023    COVID-19, PFIZER,  SECTION    Prognosis: Good    Condition at Discharge: Stable    Rehab Potential (if transferring to Rehab): Good    Recommended Labs or Other Treatments After Discharge:     Physician Certification: I certify the above information and transfer of Yaz Allison  is necessary for the continuing treatment of the diagnosis listed and that she requires Skilled Nursing Facility for greater 30 days.     Update Admission H&P: No change in H&P    PHYSICIAN SIGNATURE:  Electronically signed by Pedro Luis De Oliveira MD on 2/6/25 at 10:46 AM EST

## 2025-02-03 NOTE — PROGRESS NOTES
No events O/N. Pain appropriately controlled.  Lying elevated in bed upon entering the room.  Does complain of some knee pain.    /69   Pulse 77   Temp 97.9 °F (36.6 °C) (Oral)   Resp 16   Ht 1.702 m (5' 7\")   Wt 49.9 kg (110 lb)   SpO2 100%   BMI 17.23 kg/m²     Right hip dressings are clean with some bloody discharge noted to the proximal incision site. 2+ pedal pulses. Able to DF/PF her toes and foot.  Mild to moderate effusion noted in the suprapatellar region without surrounding warmth or erythema.    Impression and plan: 85 yo lady sp right hip IT fracture IMN POD 2  - Continue current pain control regimen  - Dressings changed today, daily dressing changes starting tomorrow  - Elevate right knee as needed as well as ice pack to help with swelling and comfort.  - DVT ppx  - Continue to mobilize with PT. WBAT  - Discussions continue with case management for acute rehab versus home health.

## 2025-02-03 NOTE — PROGRESS NOTES
[facility]   Physical Therapy Treatment  Date: 2/3/25  Patient Name: Yaz Allison       Room: -  MRN: [id number]  Account: [id number]   : 1940  (84 y.o.) Gender: female     Discharge Recommendations:  Patient able to tolerate 3 hours of therapy per day, Patient would benefit from continued therapy after discharge, Therapy recommended at discharge     PT Equipment Recommendations  Equipment Needed:  (TBD)    General  Patient assessed for rehabilitation services?: Yes  Additional Pertinent Hx: Per H & P note:  Yaz Allison is a 84 y.o. female who presents to the hospital after a fall at home earlier this morning.  Patient states that she was at home trying to use the bathroom and was transitioning from using her cane to her walker and states that she lost her balance falling onto her right side and more specifically onto her right hip.  She denies any loss of consciousness.  She states that the pain at this time is localized to the hip region with no radiation down the leg although does complain of some spasming in the muscles in the anterior aspect of the upper leg.  Denies any numbness down the leg.     Patient does endorse a fall a couple years ago where she fractured her pelvis and had surgical intervention by Dr. Engle.  Response To Previous Treatment: Not applicable  Family/Caregiver Present: No  Referring Practitioner: Dr. ALEJANDRA De Oliveira  Referral Date : 25  Diagnosis: closed fracture right hip  Follows Commands: Within Functional Limits  Other (Comment): OK per nurse Mccarty to proceed w/ PT evaluation    Past Medical History:  has a past medical history of Abdominal pain, unspecified site, Acquired cyst of kidney, Acute gouty arthropathy, Allergic rhinitis, cause unspecified, Anxiety state, unspecified, Arthropathy, unspecified, site unspecified, Chronic airway obstruction, not elsewhere classified, Chronic kidney disease, stage III (moderate) ([facility]), Diarrhea, Edema,

## 2025-02-03 NOTE — PLAN OF CARE
Problem: Discharge Planning  Goal: Discharge to home or other facility with appropriate resources  2/3/2025 1609 by Maren Dunne RN  Outcome: Progressing  Flowsheets (Taken 2/2/2025 2138 by Schober, Robyn L, RN)  Discharge to home or other facility with appropriate resources: Identify barriers to discharge with patient and caregiver  2/3/2025 0524 by Schober, Robyn L, RN  Outcome: Progressing  Flowsheets (Taken 2/2/2025 2138)  Discharge to home or other facility with appropriate resources: Identify barriers to discharge with patient and caregiver     Problem: Pain  Goal: Verbalizes/displays adequate comfort level or baseline comfort level  2/3/2025 1609 by Maren Dunne RN  Outcome: Progressing  Flowsheets (Taken 2/3/2025 0121 by Schober, Robyn L, RN)  Verbalizes/displays adequate comfort level or baseline comfort level:   Encourage patient to monitor pain and request assistance   Assess pain using appropriate pain scale  2/3/2025 0524 by Schober, Robyn L, RN  Outcome: Progressing  Flowsheets (Taken 2/3/2025 0121)  Verbalizes/displays adequate comfort level or baseline comfort level:   Encourage patient to monitor pain and request assistance   Assess pain using appropriate pain scale     Problem: Skin/Tissue Integrity  Goal: Skin integrity remains intact  Description: 1.  Monitor for areas of redness and/or skin breakdown  2.  Assess vascular access sites hourly  3.  Every 4-6 hours minimum:  Change oxygen saturation probe site  4.  Every 4-6 hours:  If on nasal continuous positive airway pressure, respiratory therapy assess nares and determine need for appliance change or resting period  2/3/2025 1609 by Maren Dunne RN  Outcome: Progressing  Flowsheets (Taken 2/3/2025 0022 by Schober, Robyn L, RN)  Skin Integrity Remains Intact:   Monitor for areas of redness and/or skin breakdown   Assess vascular access sites hourly  2/3/2025 0524 by Schober, Robyn L, RN  Outcome: Progressing  Flowsheets (Taken 2/3/2025

## 2025-02-03 NOTE — PROGRESS NOTES
Physical Therapy    [facility]   Physical Therapy Treatment  Date: 2/3/25  Patient Name: Yaz Allison       Room: -  MRN: [id number]  Account: [id number]   : 1940  (84 y.o.) Gender: female     Discharge Recommendations:  Discharge Recommendations: Patient able to tolerate 3 hours of therapy per day, Patient would benefit from continued therapy after discharge, Therapy recommended at discharge     PT Equipment Recommendations  Equipment Needed:  (TBD)    General  Patient assessed for rehabilitation services?: Yes  Additional Pertinent Hx: Per H & P note:  Yaz Allison is a 84 y.o. female who presents to the hospital after a fall at home earlier this morning.  Patient states that she was at home trying to use the bathroom and was transitioning from using her cane to her walker and states that she lost her balance falling onto her right side and more specifically onto her right hip.  She denies any loss of consciousness.  She states that the pain at this time is localized to the hip region with no radiation down the leg although does complain of some spasming in the muscles in the anterior aspect of the upper leg.  Denies any numbness down the leg.     Patient does endorse a fall a couple years ago where she fractured her pelvis and had surgical intervention by Dr. Engle.  Response To Previous Treatment: Not applicable  Family/Caregiver Present: No  Referring Practitioner: Dr. ALEJANDRA De Oliveira  Referral Date : 25  Diagnosis: closed fracture right hip  Follows Commands: Within Functional Limits      Past Medical History:  has a past medical history of Abdominal pain, unspecified site, Acquired cyst of kidney, Acute gouty arthropathy, Allergic rhinitis, cause unspecified, Anxiety state, unspecified, Arthropathy, unspecified, site unspecified, Chronic airway obstruction, not elsewhere classified, Chronic kidney disease, stage III (moderate) ([facility]), Diarrhea, Edema, Esophageal reflux,

## 2025-02-03 NOTE — PROGRESS NOTES
able to complete shoulder flexion to ~85* 2* arthritis. no LOB noted    Patient Education  Patient Education  Education Given To: Patient  Education Provided: Role of Therapy, Plan of Care, ADL Adaptive Strategies, Precautions, Transfer Training, Mobility Training  Education Provided Comments: WBAT RLE, figure four adaptive technique for footwear management, safe hand placement for transfers, OT role and POC  Education Method: Demonstration, Verbal  Barriers to Learning: Cognition  Education Outcome: Verbalized understanding, Demonstrated understanding, Continued education needed    Goals  Short Term Goals  Time Frame for Short Term Goals: By discharge  Short Term Goal 1: Pt will perform UB ADLs at mod I and LB ADLs/toileting at min A with use of adaptive equipment/ techniques PRN and good safety  Short Term Goal 2: Pt will perform functional transfers and mobility at min A using LRAD and good safety to promote occupational engagement  Short Term Goal 3: Continue to assess bed mobility and sitting balance, notify OTR to update goal accordingly  Short Term Goal 4: Pt will engage in 15+ mins of therapeutic exercise/ activity of choice to improve activity tolerance and overall quality of life  Short Term Goal 5: Pt will engage in 8+ mins of standing at min A during functional activity to improve standing balance and tolerance for ADLs  Occupational Therapy Plan  Times Per Week: 5-7  Times Per Day: Once a day  Current Treatment Recommendations: Strengthening, ROM, Balance training, Functional mobility training, Endurance training, Pain management, Safety education & training, Patient/Caregiver education & training, Equipment evaluation, education, & procurement, Positioning, Self-Care / ADL, Home management training, Cognitive/Perceptual training, Co-Treatment    Assessment  Activity Tolerance  Activity Tolerance: Patient Tolerated treatment well  Assessment  Performance deficits / Impairments: Decreased functional  mobility , Decreased ADL status, Decreased ROM, Decreased strength, Decreased cognition, Decreased endurance, Decreased sensation, Decreased balance, Decreased posture  Treatment Diagnosis: impaired self care status  Prognosis: Good  Decision Making: Medium Complexity  Discharge Recommendations: Patient would benefit from continued therapy after discharge  OT Equipment Recommendations  Other: continue to assess  Safety Devices  Type of Devices: All fall risk precautions in place, Call light within reach, Gait belt, Patient at risk for falls, Left in bed, Bed alarm in place    AM-PAC Daily Activities Inpatient  AM-PAC Daily Activity - Inpatient   How much help is needed for putting on and taking off regular lower body clothing?: Total  How much help is needed for bathing (which includes washing, rinsing, drying)?: A Lot  How much help is needed for toileting (which includes using toilet, bedpan, or urinal)?: A Lot  How much help is needed for putting on and taking off regular upper body clothing?: A Little  How much help is needed for taking care of personal grooming?: A Little  How much help for eating meals?: A Little  AM-[facility] Inpatient Daily Activity Raw Score: 14  AM-PAC Inpatient ADL T-Scale Score : 33.39  ADL Inpatient CMS 0-100% Score: 59.67  ADL Inpatient CMS G-Code Modifier : CK    OT Minutes  OT Individual Minutes  Time In: 0909  Time Out: 0957  Minutes: 48    Co-treatment with PT warranted secondary to decreased safety and independence requiring 2 skilled therapy professionals to address individual discipline's goals. OT addressing preparation for ADL transfer, sitting balance for increased ADL performance, sitting/activity tolerance, functional reaching, and functional mobility for ADL transfers.    Electronically signed by DEANA Montoya on 2/3/25 at 10:30 AM EST

## 2025-02-04 LAB
HCT VFR BLD AUTO: 22.3 % (ref 36–46)
HCT VFR BLD AUTO: 23.2 % (ref 36–46)
HCT VFR BLD AUTO: 23.7 % (ref 36–46)
HCT VFR BLD AUTO: 24.4 % (ref 36–46)
HGB BLD-MCNC: 7.4 G/DL (ref 12–16)
HGB BLD-MCNC: 7.5 G/DL (ref 12–16)
HGB BLD-MCNC: 7.9 G/DL (ref 12–16)
HGB BLD-MCNC: 7.9 G/DL (ref 12–16)

## 2025-02-04 PROCEDURE — 85014 HEMATOCRIT: CPT

## 2025-02-04 PROCEDURE — 36415 COLL VENOUS BLD VENIPUNCTURE: CPT

## 2025-02-04 PROCEDURE — 2580000003 HC RX 258

## 2025-02-04 PROCEDURE — 85018 HEMOGLOBIN: CPT

## 2025-02-04 PROCEDURE — 1200000000 HC SEMI PRIVATE

## 2025-02-04 PROCEDURE — 6370000000 HC RX 637 (ALT 250 FOR IP): Performed by: ORTHOPAEDIC SURGERY

## 2025-02-04 PROCEDURE — 99232 SBSQ HOSP IP/OBS MODERATE 35: CPT | Performed by: INTERNAL MEDICINE

## 2025-02-04 RX ADMIN — PANTOPRAZOLE SODIUM 40 MG: 40 TABLET, DELAYED RELEASE ORAL at 05:32

## 2025-02-04 RX ADMIN — GABAPENTIN 300 MG: 300 CAPSULE ORAL at 20:00

## 2025-02-04 RX ADMIN — ALLOPURINOL 300 MG: 300 TABLET ORAL at 08:54

## 2025-02-04 RX ADMIN — ATORVASTATIN CALCIUM 40 MG: 40 TABLET, FILM COATED ORAL at 08:54

## 2025-02-04 RX ADMIN — LEVOTHYROXINE SODIUM 125 MCG: 0.12 TABLET ORAL at 05:32

## 2025-02-04 RX ADMIN — CEPHALEXIN 250 MG: 250 CAPSULE ORAL at 08:54

## 2025-02-04 RX ADMIN — SODIUM CHLORIDE: 9 INJECTION, SOLUTION INTRAVENOUS at 10:22

## 2025-02-04 RX ADMIN — GABAPENTIN 300 MG: 300 CAPSULE ORAL at 08:54

## 2025-02-04 RX ADMIN — HYDROCODONE BITARTRATE AND ACETAMINOPHEN 1 TABLET: 5; 325 TABLET ORAL at 09:34

## 2025-02-04 ASSESSMENT — PAIN SCALES - GENERAL
PAINLEVEL_OUTOF10: 5
PAINLEVEL_OUTOF10: 7
PAINLEVEL_OUTOF10: 0
PAINLEVEL_OUTOF10: 4
PAINLEVEL_OUTOF10: 5
PAINLEVEL_OUTOF10: 7

## 2025-02-04 ASSESSMENT — PAIN SCALES - WONG BAKER
WONGBAKER_NUMERICALRESPONSE: NO HURT
WONGBAKER_NUMERICALRESPONSE: NO HURT

## 2025-02-04 ASSESSMENT — PAIN DESCRIPTION - DESCRIPTORS
DESCRIPTORS: DISCOMFORT
DESCRIPTORS: ACHING
DESCRIPTORS: ACHING

## 2025-02-04 ASSESSMENT — PAIN DESCRIPTION - ORIENTATION
ORIENTATION: RIGHT

## 2025-02-04 ASSESSMENT — PAIN DESCRIPTION - LOCATION
LOCATION: HIP;KNEE
LOCATION: HIP;KNEE
LOCATION: KNEE

## 2025-02-04 ASSESSMENT — PAIN - FUNCTIONAL ASSESSMENT
PAIN_FUNCTIONAL_ASSESSMENT: ACTIVITIES ARE NOT PREVENTED
PAIN_FUNCTIONAL_ASSESSMENT: ACTIVITIES ARE NOT PREVENTED

## 2025-02-04 NOTE — PROGRESS NOTES
Today's Date: 2/4/2025  Patient Name: Yaz Allison  Date of admission: 1/31/2025  4:24 AM  Patient's age: 84 y.o., 1940  Admission Dx: Closed fracture of right hip, initial encounter ([facility]) [S72.001A]  Closed right hip fracture, initial encounter ([facility]) [S72.001A]    Reason for Consult: Severe anemia  Requesting Physician: Pedro Luis De Oliveira MD    CHIEF COMPLAINT:  fall/right femoral fracture    History Obtained From:  patient, electronic medical record    Interval history  Patient was seen and examined  Hemoglobin is stable at 7.9  Platelet dropped to 126 yesterday and noted to today  No bleeding    HISTORY OF PRESENT ILLNESS:      The patient is a 84 y.o.  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 I had a detailed discussion with the patient.I explained the significance of these abnormalities and possible etiology and management options   84-year-old woman with a chronic anemia status post IV iron infusion prior with iron panel recently show anemia of chronic illness(high ferritin and low saturation compatible with iron sequestration) and next gene sequencing did show SRSF2 mutation which could be seen in a primary bone marrow disorder like MDS or clonal cytopenia of undetermined significant(CCUS)> need bone marrow biopsy and aspirate to differentiate either diagnosis  The acute drop in hemoglobin related to blood loss after surgery and volume shift> IV iron infusion  Transfuse to keep hemoglobin above 7  Hemoglobin is stable  New onset thrombocytopenia likely consumption  Explain to the patient that for low-grade MDS treatment usually PRECIOUS and saturate iron store  CBC in a.m.  Follow-up with hematology after discharge      Discussed with patient and Nurse.      Thank you for asking us to see this patient.                                    Valeria Street MD                          [facility]                            This note is created with the assistance of a speech recognition program.  While intending to generate a document that actually reflects the content of the visit, the document can still have some errors including those of syntax and sound a like substitutions which may escape proof

## 2025-02-04 NOTE — PROGRESS NOTES
DATE: 2025    NAME: Yaz Allison  MRN: [id number]   : 1940    Patient not seen this date for Physical Therapy due to:      [] Cancel by RN or physician due to:    [] Hemodialysis    [] Critical Lab Value Level     [] Blood transfusion in progress    [] Acute or unstable cardiovascular status   _MAP < 55 or more than >115  _HR < 40 or > 130    [] Acute or unstable pulmonary status   -FiO2 > 60%   _RR < 5 or >40    _O2 sats < 85%    [] Strict Bedrest    [] Off Unit for surgery or procedure    [] Off Unit for testing       [] Pending imaging to R/O fracture    [x] Refusal by Patient, stating she was too fatigued from yesterday's therapy, stressed importance of therapy, patient continued to decline PT services, will continue to pursue at a later date.     [] Other      [] PT being discontinued at this time. Patient independent. No further needs.     [] PT being discontinued at this time as the patient has been transferred to hospice care. No further needs.      Sangeetha Sethi, PTA

## 2025-02-04 NOTE — PROGRESS NOTES
[facility]   IN-PATIENT SERVICE   [facility]    Progress Note             Date:   2/4/2025  Patient name:  Yaz Allison  Date of admission:  1/31/2025  4:24 AM  MRN:   [id number]  Account:  [id number]  YOB: 1940  PCP:    Gabe Cintron MD  Room:   2071/2071-01  Code Status:    Full Code    Chief Complaint:     Chief Complaint   Patient presents with    Fall    Hip Pain     Right       History Obtained From:     patient    History of Present Illness:     The patient is a 84 y.o.   /  female who presents with Fall and Hip Pain (Right)   and she is admitted to the hospital for the management of      Patient is 84-year-old female with past medical history of hypothyroidism, hypertension, hyperlipidemia , recurrent UTIs presented to the ER with fall.  Patient states that she was getting up to go to the bathroom and her cane and she fell down  No dizziness lightheadedness, no loss of consciousness, does not that she hit her head   In the ER, found to right femur fracture, head CT neck  Patient was seen and examined on the floor denies any chest pain shortness of breath.  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  Blood pressure on the lower side, hold Coreg.  TSH was elevated.  Increased dose of levothyroxine yesterday, patient need to follow-up with PCP with TSH in 4 to 6 weeks.  DVT prophylaxis as per primary.  Repeat CBC and BMP tomorrow.  Has Kwan catheter in place.    2/2  Patient seen and examined at bedside.  S/p right femur intertrochanteric fracture surgical stabilization with intramedullary nail 2/1.  Hemoglobin dropped to 6.1.  History of microcytic anemia, baseline at around 8.5  Patient follows up with oncology as outpatient.  Has been receiving IV Venofer.  Discussed with patient, new drop in hemoglobin could be due to acute blood loss on top of underlying microcytic anemia.  Repeat H&H every 12 hours.  No bruising noted around the surgical site.  Patient would want to reach out to his oncologist, will consult.  Hold off on DVT prophylaxis.  Disposition as per primary.  Hold off on Coreg.      2/4  84-year-old female admitted with mechanical fall and right hip fracture status post ORIF,  Anemia status post transfusion, hyponatremia improved, working with physical therapy, generalized weakness,  Will benefit from acute inpatient rehab,  Seen by PMNR physician and advised to be admitted to acute rehab, insurance authorization needed  Working with PT    Consultations:   IP CONSULT TO INTERNAL MEDICINE  IP CONSULT TO CARDIOLOGY  IP CONSULT TO PHYSICAL MEDICINE REHAB  IP CONSULT TO ONCOLOGY      Nelsy Weller MD  2/4/2025  4:32 PM      Please note that this chart was generated using voice recognition Dragon dictation software.  Although every effort was made to ensure the accuracy of this automated transcription, some errors in transcription may have occurred.

## 2025-02-04 NOTE — PLAN OF CARE
Problem: Discharge Planning  Goal: Discharge to home or other facility with appropriate resources  2/4/2025 0221 by Naya Zelaya, RN  Outcome: Progressing  Flowsheets (Taken 2/3/2025 2200)  Discharge to home or other facility with appropriate resources:   Identify barriers to discharge with patient and caregiver   Arrange for needed discharge resources and transportation as appropriate   Identify discharge learning needs (meds, wound care, etc)   Arrange for interpreters to assist at discharge as needed     Problem: Pain  Goal: Verbalizes/displays adequate comfort level or baseline comfort level  2/4/2025 0221 by Naya Zelaya, RN  Outcome: Progressing  Flowsheets (Taken 2/4/2025 0015)  Verbalizes/displays adequate comfort level or baseline comfort level:   Encourage patient to monitor pain and request assistance   Assess pain using appropriate pain scale   Administer analgesics based on type and severity of pain and evaluate response   Implement non-pharmacological measures as appropriate and evaluate response     Problem: Skin/Tissue Integrity  Goal: Skin integrity remains intact  Description: 1.  Monitor for areas of redness and/or skin breakdown  2.  Assess vascular access sites hourly  3.  Every 4-6 hours minimum:  Change oxygen saturation probe site  4.  Every 4-6 hours:  If on nasal continuous positive airway pressure, respiratory therapy assess nares and determine need for appliance change or resting period  2/4/2025 0221 by Naya Zelaya, RN  Outcome: Progressing  Flowsheets  Taken 2/4/2025 0219  Skin Integrity Remains Intact: Monitor for areas of redness and/or skin breakdown      Problem: Safety - Adult  Goal: Free from fall injury  2/4/2025 0221 by Naya Zelaya, RN  Outcome: Progressing, Patient remains free of incidence/ injury. Bed remains in low position. Side rails up x2.

## 2025-02-04 NOTE — PLAN OF CARE
Problem: Discharge Planning  Goal: Discharge to home or other facility with appropriate resources  2/4/2025 1449 by Maren Dunne RN  Outcome: Progressing  Flowsheets (Taken 2/3/2025 2200 by Naya Zelaya, RN)  Discharge to home or other facility with appropriate resources:   Identify barriers to discharge with patient and caregiver   Arrange for needed discharge resources and transportation as appropriate   Identify discharge learning needs (meds, wound care, etc)   Arrange for interpreters to assist at discharge as needed  2/4/2025 0221 by Naya Zelaya RN  Outcome: Progressing  Flowsheets (Taken 2/3/2025 2200)  Discharge to home or other facility with appropriate resources:   Identify barriers to discharge with patient and caregiver   Arrange for needed discharge resources and transportation as appropriate   Identify discharge learning needs (meds, wound care, etc)   Arrange for interpreters to assist at discharge as needed     Problem: Pain  Goal: Verbalizes/displays adequate comfort level or baseline comfort level  2/4/2025 1449 by Maren Dunne RN  Outcome: Progressing  Flowsheets (Taken 2/4/2025 0015 by Naya Zelaya, RN)  Verbalizes/displays adequate comfort level or baseline comfort level:   Encourage patient to monitor pain and request assistance   Assess pain using appropriate pain scale   Administer analgesics based on type and severity of pain and evaluate response   Implement non-pharmacological measures as appropriate and evaluate response  2/4/2025 0221 by Naya Zelaya, RN  Outcome: Progressing  Flowsheets (Taken 2/4/2025 0015)  Verbalizes/displays adequate comfort level or baseline comfort level:   Encourage patient to monitor pain and request assistance   Assess pain using appropriate pain scale   Administer analgesics based on type and severity of pain and evaluate response   Implement non-pharmacological measures as appropriate and evaluate response     Problem: Skin/Tissue Integrity  Goal: Skin

## 2025-02-04 NOTE — PROGRESS NOTES
[facility]   OCCUPATIONAL THERAPY MISSED TREATMENT NOTE   INPATIENT   Date: 25  Patient Name: Yaz Allison       Room:   MRN: [id number]   Account #: [id number]    : 1940  (84 y.o.)  Gender: female   Referring Practitioner: Pedro Luis De Oliveira MD  Diagnosis: Closed fracture of right hip (HCC)             REASON FOR MISSED TREATMENT:  Patient declined  all occupational therapy services this date. Pt stating,\" We worked really hard yesterday and I just want a break.\" Writer provided edu and encouragement with continued refusal. Will continue to follow           Electronically signed by DEANA Matta on 25 at 11:40 AM EST

## 2025-02-04 NOTE — PROGRESS NOTES
No events O/N. Pain appropriately controlled, mostly in right knee. Declined PT today    BP (!) 146/73   Pulse 83   Temp 99.1 °F (37.3 °C) (Oral)   Resp 16   Ht 1.702 m (5' 7\")   Wt 49.9 kg (110 lb)   SpO2 98%   BMI 17.23 kg/m²   Right hip dressings are clean, dry, and intact. 2+ pedal pulses. Able to DF/PF her toes and foot.     Impression and plan: 83 yo lady sp right hip IT fracture IMN POD 3  - Pain control  - DVT ppx  - Mobilize with PT. WBAT  - Dressing change daily  - Dispo: Patient expressed interest in ARU. Awaiting insurance approval. Speaking with patient today, not clear she's up for the rigor of PT typical of the ARU. Would recommend also exploring SNF options

## 2025-02-05 LAB
HCT VFR BLD AUTO: 22.3 % (ref 36–46)
HGB BLD-MCNC: 7.5 G/DL (ref 12–16)

## 2025-02-05 PROCEDURE — 99232 SBSQ HOSP IP/OBS MODERATE 35: CPT | Performed by: INTERNAL MEDICINE

## 2025-02-05 PROCEDURE — 85014 HEMATOCRIT: CPT

## 2025-02-05 PROCEDURE — 97535 SELF CARE MNGMENT TRAINING: CPT

## 2025-02-05 PROCEDURE — 6370000000 HC RX 637 (ALT 250 FOR IP): Performed by: ORTHOPAEDIC SURGERY

## 2025-02-05 PROCEDURE — 6370000000 HC RX 637 (ALT 250 FOR IP): Performed by: INTERNAL MEDICINE

## 2025-02-05 PROCEDURE — 97116 GAIT TRAINING THERAPY: CPT

## 2025-02-05 PROCEDURE — 97530 THERAPEUTIC ACTIVITIES: CPT

## 2025-02-05 PROCEDURE — 1200000000 HC SEMI PRIVATE

## 2025-02-05 PROCEDURE — 97110 THERAPEUTIC EXERCISES: CPT

## 2025-02-05 PROCEDURE — 85018 HEMOGLOBIN: CPT

## 2025-02-05 PROCEDURE — 36415 COLL VENOUS BLD VENIPUNCTURE: CPT

## 2025-02-05 RX ORDER — ACETAMINOPHEN 325 MG/1
650 TABLET ORAL EVERY 6 HOURS PRN
Status: DISCONTINUED | OUTPATIENT
Start: 2025-02-05 | End: 2025-02-06 | Stop reason: HOSPADM

## 2025-02-05 RX ADMIN — ACETAMINOPHEN 650 MG: 325 TABLET ORAL at 08:36

## 2025-02-05 RX ADMIN — HYDROCODONE BITARTRATE AND ACETAMINOPHEN 1 TABLET: 5; 325 TABLET ORAL at 23:44

## 2025-02-05 RX ADMIN — CEPHALEXIN 250 MG: 250 CAPSULE ORAL at 08:22

## 2025-02-05 RX ADMIN — ACETAMINOPHEN 650 MG: 325 TABLET ORAL at 20:09

## 2025-02-05 RX ADMIN — PANTOPRAZOLE SODIUM 40 MG: 40 TABLET, DELAYED RELEASE ORAL at 05:48

## 2025-02-05 RX ADMIN — ALLOPURINOL 300 MG: 300 TABLET ORAL at 08:22

## 2025-02-05 RX ADMIN — GABAPENTIN 300 MG: 300 CAPSULE ORAL at 08:23

## 2025-02-05 RX ADMIN — ATORVASTATIN CALCIUM 40 MG: 40 TABLET, FILM COATED ORAL at 08:23

## 2025-02-05 RX ADMIN — GABAPENTIN 300 MG: 300 CAPSULE ORAL at 20:10

## 2025-02-05 RX ADMIN — LEVOTHYROXINE SODIUM 125 MCG: 0.12 TABLET ORAL at 05:48

## 2025-02-05 ASSESSMENT — PAIN DESCRIPTION - ORIENTATION
ORIENTATION: RIGHT

## 2025-02-05 ASSESSMENT — PAIN DESCRIPTION - LOCATION
LOCATION: KNEE;HIP
LOCATION: HIP
LOCATION: HIP;KNEE

## 2025-02-05 ASSESSMENT — PAIN DESCRIPTION - DESCRIPTORS
DESCRIPTORS: ACHING
DESCRIPTORS: ACHING;DISCOMFORT
DESCRIPTORS: DISCOMFORT;ACHING

## 2025-02-05 ASSESSMENT — PAIN SCALES - GENERAL
PAINLEVEL_OUTOF10: 6
PAINLEVEL_OUTOF10: 4
PAINLEVEL_OUTOF10: 6
PAINLEVEL_OUTOF10: 7

## 2025-02-05 ASSESSMENT — PAIN - FUNCTIONAL ASSESSMENT: PAIN_FUNCTIONAL_ASSESSMENT: ACTIVITIES ARE NOT PREVENTED

## 2025-02-05 ASSESSMENT — PAIN SCALES - WONG BAKER: WONGBAKER_NUMERICALRESPONSE: NO HURT

## 2025-02-05 NOTE — PROGRESS NOTES
СВЕТЛАНА left on med surg  phone at [phone] requesting if patient wants ARU.  Awaiting callback.     857 am Message received from Zari LARA wanting to know about the determination of P2P.  Message Zari back to follow up with patient to see if she still wants ARU and able to tolerate 3 hours of therapy to provide update to Dr. Kamara.      908 am Call from Dr. Kamara stating she has clinic this am and only has 2 hours to do P2P, and needs an answer from the patient.  Spoke with [facility] manager requested follow  up.      924 am Call from Zari, patient wants to pursue P2P.  Update provided to Dr. Kamara via PS.     949 am Message from Dr. Kamara, P2P completed and denial upheld.  Message sent to Zari MÁRQUEZ with an update.  Inquired if patient would like to appeal.      1010 am Call from [facility], states p2p completed and denial upheld.  [facility] approved.  If patient would like to appeal fax [phone]. Telephone [phone].

## 2025-02-05 NOTE — PROGRESS NOTES
Peer to Peer with physician for [facility] who declined to identify himself. Reviewed patient's current medical and therapy status. He is upholding denial for acute rehab due to lack of medical necessity for a physiatrist to manage. Patient is eligible to appeal the denial or can submit for SNF placement.

## 2025-02-05 NOTE — PROGRESS NOTES
Physical Therapy  [facility]   Physical Therapy Treatment  Date: 25  Patient Name: Yaz Allison       Room: -  MRN: [id number]  Account: [id number]   : 1940  (84 y.o.) Gender: female     Discharge Recommendations:  Discharge Recommendations: Patient able to tolerate 3 hours of therapy per day, Patient would benefit from continued therapy after discharge, Therapy recommended at discharge     PT Equipment Recommendations  Equipment Needed:  (TBD)    General  Patient assessed for rehabilitation services?: Yes  Additional Pertinent Hx: Per H & P note:  Yaz Allison is a 84 y.o. female who presents to the hospital after a fall at home earlier this morning.  Patient states that she was at home trying to use the bathroom and was transitioning from using her cane to her walker and states that she lost her balance falling onto her right side and more specifically onto her right hip.  She denies any loss of consciousness.  She states that the pain at this time is localized to the hip region with no radiation down the leg although does complain of some spasming in the muscles in the anterior aspect of the upper leg.  Denies any numbness down the leg.     Patient does endorse a fall a couple years ago where she fractured her pelvis and had surgical intervention by Dr. Engle.  Response To Previous Treatment: Not applicable  Family/Caregiver Present: No  Referring Practitioner: Dr. ALEJANDRA De Oliveira  Referral Date : 25  Diagnosis: closed fracture right hip  Follows Commands: Within Functional Limits  Other (Comment): OK per nurse Mccarty to proceed w/ PT evaluation    Past Medical History:  has a past medical history of Abdominal pain, unspecified site, Acquired cyst of kidney, Acute gouty arthropathy, Allergic rhinitis, cause unspecified, Anxiety state, unspecified, Arthropathy, unspecified, site unspecified, Chronic airway obstruction, not elsewhere classified, Chronic kidney disease, stage  Diagnosis: impaired mobility due to weakness and pain S/P surgical repair right hip fracture  Therapy Prognosis: Good  Decision Making: Medium Complexity  History: pt admitted due to a closed right hip fracture  Exam: ROM, MMT, balance and mobility assessments  Clinical Presentation: Mod x 2 for rolling, sit > stand and pivot bed > chair taking 4 steps using a wheeled walker right LE WBAT; max x 2 supine > sit and stand > sit; FALL RISK  Discharge Recommendations: Patient able to tolerate 3 hours of therapy per day, Patient would benefit from continued therapy after discharge, Therapy recommended at discharge  Activity Tolerance  Activity Tolerance: Patient limited by pain  Activity Tolerance Comments: therapeutic rest breaks PRN     Patient Education  Patient Education  Education Given To: Patient  Education Provided: Transfer Training, Mobility Training, Home Exercise Program, Plan of Care, Precautions, Energy Conservation, Fall Prevention Strategies  Education Provided Comments: educated on obtaining hip neuttral position throughout the day vs keeping hips flexed during recovery process to avoid losing ROM  Education Method: Verbal, Teach Back  Barriers to Learning: None  Education Outcome: Verbalized understanding, Demonstrated understanding, Continued education needed     Functional Outcome Measures  AM-PAC Basic Mobility - Inpatient   How much help is needed turning from your back to your side while in a flat bed without using bedrails?: A Lot  How much help is needed moving from lying on your back to sitting on the side of a flat bed without using bedrails?: A Little  How much help is needed moving to and from a bed to a chair?: Total  How much help is needed standing up from a chair using your arms?: Total  How much help is needed walking in hospital room?: A Little  How much help is needed climbing 3-5 steps with a railing?: Total  AM-PAC Inpatient Mobility Raw Score : 11  AM-PAC Inpatient T-Scale Score :

## 2025-02-05 NOTE — PROGRESS NOTES
Occupational Therapy  Facility/Department: [facility] MED SURG  Daily Treatment Note  NAME: Yaz Allison  : 1940  MRN: [id number]    Date of Service: 2025    RN reports patient is medically stable for therapy treatment this date.    Chart reviewed prior to treatment and patient is agreeable for therapy.  All lines intact and patient positioned comfortably at end of treatment.  All patient needs addressed prior to ending therapy session.      Discharge Recommendations:  Patient would benefit from continued therapy after discharge  Pt is currently functional below baseline and recommend comprehensive and intensive skilled therapy by a multidisciplinary team. Would expect patient to be able to tolerate 3 hours of therapy per day and able to tolerate at least one hour up in chair.  Please refer to AM-PAC score for current mobility/adl level.   OT Equipment Recommendations  Equipment Needed: Yes  Mobility Devices: ADL Assistive Devices  ADL Assistive Devices: Long-handled Sponge;Reacher;Leg ;Long-handled Shoe Horn;Emergency Alert System;Sock-Aid Hard  Other: continue to assess      Patient Diagnosis(es): The encounter diagnosis was Closed fracture of right hip, initial encounter ([facility]).     Assessment   Activity Tolerance: Patient tolerated treatment well  Discharge Recommendations: Patient would benefit from continued therapy after discharge  Equipment Needed: Yes  Mobility Devices: ADL Assistive Devices  Other: continue to assess     Plan  Occupational Therapy Plan  Times Per Week: 5-7  Times Per Day: Once a day  Current Treatment Recommendations: Strengthening;ROM;Balance training;Functional mobility training;Endurance training;Pain management;Safety education & training;Patient/Caregiver education & training;Equipment evaluation, education, & procurement;Positioning;Self-Care / ADL;Home management training;Cognitive/Perceptual

## 2025-02-05 NOTE — PLAN OF CARE
Problem: Discharge Planning  Goal: Discharge to home or other facility with appropriate resources  2/5/2025 1419 by Maren Dunne, RN  Outcome: Progressing  Flowsheets (Taken 2/4/2025 2000 by Naya Zelaya, RN)  Discharge to home or other facility with appropriate resources:   Identify barriers to discharge with patient and caregiver   Arrange for needed discharge resources and transportation as appropriate   Identify discharge learning needs (meds, wound care, etc)   Arrange for interpreters to assist at discharge as needed   Refer to discharge planning if patient needs post-hospital services based on physician order or complex needs related to functional status, cognitive ability or social support system  2/5/2025 0225 by Naya Zelaya, RN  Outcome: Progressing  Flowsheets (Taken 2/4/2025 2000)  Discharge to home or other facility with appropriate resources:   Identify barriers to discharge with patient and caregiver   Arrange for needed discharge resources and transportation as appropriate   Identify discharge learning needs (meds, wound care, etc)   Arrange for interpreters to assist at discharge as needed   Refer to discharge planning if patient needs post-hospital services based on physician order or complex needs related to functional status, cognitive ability or social support system     Problem: Pain  Goal: Verbalizes/displays adequate comfort level or baseline comfort level  2/5/2025 1419 by Maren Dunne, RN  Outcome: Progressing  Flowsheets (Taken 2/4/2025 2000 by Naya Zelaya, RN)  Verbalizes/displays adequate comfort level or baseline comfort level:   Encourage patient to monitor pain and request assistance   Assess pain using appropriate pain scale   Administer analgesics based on type and severity of pain and evaluate response   Implement non-pharmacological measures as appropriate and evaluate response  2/5/2025 0225 by Naya Zelaya, RN  Outcome: Progressing  Flowsheets (Taken 2/4/2025

## 2025-02-05 NOTE — PLAN OF CARE
Problem: Discharge Planning  Goal: Discharge to home or other facility with appropriate resources  2/5/2025 0225 by Naya Zelaya, RN  Outcome: Progressing  Flowsheets (Taken 2/4/2025 2000)  Discharge to home or other facility with appropriate resources:   Identify barriers to discharge with patient and caregiver   Arrange for needed discharge resources and transportation as appropriate   Identify discharge learning needs (meds, wound care, etc)   Arrange for interpreters to assist at discharge as needed   Refer to discharge planning if patient needs post-hospital services based on physician order or complex needs related to functional status, cognitive ability or social support system     Problem: Pain  Goal: Verbalizes/displays adequate comfort level or baseline comfort level  2/5/2025 0225 by Naya Zelaya, RN  Outcome: Progressing  Flowsheets (Taken 2/4/2025 2000)  Verbalizes/displays adequate comfort level or baseline comfort level:   Encourage patient to monitor pain and request assistance   Assess pain using appropriate pain scale   Administer analgesics based on type and severity of pain and evaluate response   Implement non-pharmacological measures as appropriate and evaluate response     Problem: Skin/Tissue Integrity  Goal: Skin integrity remains intact  Description: 1.  Monitor for areas of redness and/or skin breakdown  2.  Assess vascular access sites hourly  3.  Every 4-6 hours minimum:  Change oxygen saturation probe site  4.  Every 4-6 hours:  If on nasal continuous positive airway pressure, respiratory therapy assess nares and determine need for appliance change or resting period  2/5/2025 0225 by Naya Zelaya, RN  Outcome: Progressing  Flowsheets  Taken 2/5/2025 0224  Skin Integrity Remains Intact: Monitor for areas of redness and/or skin breakdown      Problem: Safety - Adult  Goal: Free from fall injury  2/5/2025 0225 by Naya Zelaya, RN  Outcome: Progressing, Patient remains free of incidence/  injury. Bed remains in low position. Side rails up x2.

## 2025-02-05 NOTE — PROGRESS NOTES
[facility]   IN-PATIENT SERVICE   [facility]    Progress Note             Date:   2/5/2025  Patient name:  Yaz Allison  Date of admission:  1/31/2025  4:24 AM  MRN:   [id number]  Account:  [id number]  YOB: 1940  PCP:    Gabe Cintron MD  Room:   2071/2071-01  Code Status:    Full Code    Chief Complaint:     Chief Complaint   Patient presents with    Fall    Hip Pain     Right       History Obtained From:     patient    History of Present Illness:     The patient is a 84 y.o.   /  female who presents with Fall and Hip Pain (Right)   and she is admitted to the hospital for the management of      Patient is 84-year-old female with past medical history of hypothyroidism, hypertension, hyperlipidemia , recurrent UTIs presented to the ER with fall.  Patient states that she was getting up to go to the bathroom and her cane and she fell down  No dizziness lightheadedness, no loss of consciousness, does not that she hit her head   In the ER, found to right femur fracture, head CT neck  Patient was seen and examined on the floor denies any chest pain shortness of breath.  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

## 2025-02-05 NOTE — PROGRESS NOTES
Physical Therapy  [facility]   Physical Therapy Treatment  Date: 25  Patient Name: Yaz Allison       Room: -  MRN: [id number]  Account: [id number]   : 1940  (84 y.o.) Gender: female     Discharge Recommendations:  Discharge Recommendations: Patient able to tolerate 3 hours of therapy per day, Patient would benefit from continued therapy after discharge, Therapy recommended at discharge     PT Equipment Recommendations  Equipment Needed:  (TBD)    General  Patient assessed for rehabilitation services?: Yes  Additional Pertinent Hx: Per H & P note:  Yaz Allison is a 84 y.o. female who presents to the hospital after a fall at home earlier this morning.  Patient states that she was at home trying to use the bathroom and was transitioning from using her cane to her walker and states that she lost her balance falling onto her right side and more specifically onto her right hip.  She denies any loss of consciousness.  She states that the pain at this time is localized to the hip region with no radiation down the leg although does complain of some spasming in the muscles in the anterior aspect of the upper leg.  Denies any numbness down the leg.     Patient does endorse a fall a couple years ago where she fractured her pelvis and had surgical intervention by Dr. Engle.  Response To Previous Treatment: Not applicable  Family/Caregiver Present: No  Referring Practitioner: Dr. ALEJANDRA De Oliveira  Referral Date : 25  Diagnosis: closed fracture right hip  Follows Commands: Within Functional Limits  Other (Comment): OK per nurse Mccarty to proceed w/ PT evaluation    Past Medical History:  has a past medical history of Abdominal pain, unspecified site, Acquired cyst of kidney, Acute gouty arthropathy, Allergic rhinitis, cause unspecified, Anxiety state, unspecified, Arthropathy, unspecified, site unspecified, Chronic airway obstruction, not elsewhere classified, Chronic kidney disease, stage      Balance  Balance  Posture: Fair  Sitting - Static: Good, -  Sitting - Dynamic: Fair  Standing - Static: Fair, + (@ RW)  Standing - Dynamic: Fair, - (@ RW)     PT Exercises  Exercise Treatment: bed ex & bed mobility  A/AROM Exercises: supine bilat LE SLR, SAQ, heel sldies, hip abd, hip marches x10 reps each (writer providing assist with right LE due to pain)  Circulation/Endurance Exercises: ankle pumps, quad sets, glut sets x15 reps  Pressure Relief Exercises: patient assisted with repositioing for comfort prior to exiting room  Functional Mobility Circuit Training: sit<>stands from EOB<>RW & toilet<>RW  Static Sitting Balance Exercises: sat trunk unsupported; feet on floor ~5 min  Dynamic Sitting Balance Exercises: scooting, weight shifting, postural ex trunk unsupported  Breathing Techniques: cueing for pursed lip breathing for pain management  Motor Control/Coordination: cueing for proper execution of ex (left LE limited by pain in knee & hip)  Disease-specific Exercises: educated patient on pain management requesting pain meds as prescribed vs waiting until pain is unbearable     Assessment  Assessment  Assessment: pt is very motivated to participate.  Pt has a home goal and supportive family to assist.  Pt would benefit from continued skilled therapy at discharge.  Pt is able to tolerate 3 hours of combined therapies daily.  Pt currently requires 2 assist for bed mobility, transfers and gait using a wheeled walker.  Treatment Diagnosis: impaired mobility due to weakness and pain S/P surgical repair right hip fracture  Therapy Prognosis: Good  Decision Making: Medium Complexity  History: pt admitted due to a closed right hip fracture  Exam: ROM, MMT, balance and mobility assessments  Clinical Presentation: Mod x 2 for rolling, sit > stand and pivot bed > chair taking 4 steps using a wheeled walker right LE WBAT; max x 2 supine > sit and stand > sit; FALL RISK  Discharge Recommendations: Patient able to

## 2025-02-06 VITALS
TEMPERATURE: 97.9 F | HEIGHT: 67 IN | HEART RATE: 91 BPM | BODY MASS INDEX: 17.27 KG/M2 | OXYGEN SATURATION: 99 % | SYSTOLIC BLOOD PRESSURE: 113 MMHG | RESPIRATION RATE: 16 BRPM | WEIGHT: 110 LBS | DIASTOLIC BLOOD PRESSURE: 71 MMHG

## 2025-02-06 LAB
BASOPHILS # BLD: 0.08 K/UL (ref 0–0.2)
BASOPHILS NFR BLD: 1 % (ref 0–2)
EOSINOPHIL # BLD: 0.24 K/UL (ref 0–0.4)
EOSINOPHILS RELATIVE PERCENT: 3 % (ref 0–4)
ERYTHROCYTE [DISTWIDTH] IN BLOOD BY AUTOMATED COUNT: 15.3 % (ref 11.5–14.9)
HCT VFR BLD AUTO: 23.2 % (ref 36–46)
HGB BLD-MCNC: 7.6 G/DL (ref 12–16)
LYMPHOCYTES NFR BLD: 1.22 K/UL (ref 1–4.8)
LYMPHOCYTES RELATIVE PERCENT: 15 % (ref 24–44)
MCH RBC QN AUTO: 30.4 PG (ref 26–34)
MCHC RBC AUTO-ENTMCNC: 32.9 G/DL (ref 31–37)
MCV RBC AUTO: 92.5 FL (ref 80–100)
MONOCYTES NFR BLD: 0.65 K/UL (ref 0.1–1.3)
MONOCYTES NFR BLD: 8 % (ref 1–7)
MORPHOLOGY: ABNORMAL
NEUTROPHILS NFR BLD: 73 % (ref 36–66)
NEUTS SEG NFR BLD: 5.91 K/UL (ref 1.3–9.1)
PLATELET # BLD AUTO: 213 K/UL (ref 150–450)
PMV BLD AUTO: 6.9 FL (ref 6–12)
RBC # BLD AUTO: 2.51 M/UL (ref 4–5.2)
WBC OTHER # BLD: 8.1 K/UL (ref 3.5–11)

## 2025-02-06 PROCEDURE — 6370000000 HC RX 637 (ALT 250 FOR IP): Performed by: INTERNAL MEDICINE

## 2025-02-06 PROCEDURE — 99239 HOSP IP/OBS DSCHRG MGMT >30: CPT | Performed by: INTERNAL MEDICINE

## 2025-02-06 PROCEDURE — 85025 COMPLETE CBC W/AUTO DIFF WBC: CPT

## 2025-02-06 PROCEDURE — 99232 SBSQ HOSP IP/OBS MODERATE 35: CPT | Performed by: INTERNAL MEDICINE

## 2025-02-06 PROCEDURE — 6370000000 HC RX 637 (ALT 250 FOR IP): Performed by: ORTHOPAEDIC SURGERY

## 2025-02-06 PROCEDURE — 97530 THERAPEUTIC ACTIVITIES: CPT

## 2025-02-06 PROCEDURE — 97116 GAIT TRAINING THERAPY: CPT

## 2025-02-06 PROCEDURE — 36415 COLL VENOUS BLD VENIPUNCTURE: CPT

## 2025-02-06 RX ORDER — GABAPENTIN 300 MG/1
300 CAPSULE ORAL 2 TIMES DAILY
Qty: 6 CAPSULE | Refills: 0 | Status: ON HOLD | OUTPATIENT
Start: 2025-02-06 | End: 2025-02-13 | Stop reason: HOSPADM

## 2025-02-06 RX ORDER — ENOXAPARIN SODIUM 100 MG/ML
30 INJECTION SUBCUTANEOUS DAILY
Qty: 6 ML | Refills: 0 | Status: SHIPPED | OUTPATIENT
Start: 2025-02-06 | End: 2025-02-26

## 2025-02-06 RX ORDER — ENOXAPARIN SODIUM 100 MG/ML
30 INJECTION SUBCUTANEOUS DAILY
Qty: 4.2 ML | Refills: 0 | Status: SHIPPED | OUTPATIENT
Start: 2025-02-06 | End: 2025-02-06 | Stop reason: HOSPADM

## 2025-02-06 RX ORDER — LEVOTHYROXINE SODIUM 125 UG/1
125 TABLET ORAL DAILY
Qty: 30 TABLET | Refills: 3 | Status: SHIPPED | OUTPATIENT
Start: 2025-02-07

## 2025-02-06 RX ORDER — HYDROCODONE BITARTRATE AND ACETAMINOPHEN 5; 325 MG/1; MG/1
1 TABLET ORAL EVERY 4 HOURS PRN
Qty: 18 TABLET | Refills: 0 | Status: SHIPPED | OUTPATIENT
Start: 2025-02-06 | End: 2025-02-09

## 2025-02-06 RX ADMIN — HYDROCODONE BITARTRATE AND ACETAMINOPHEN 1 TABLET: 5; 325 TABLET ORAL at 08:29

## 2025-02-06 RX ADMIN — ACETAMINOPHEN 650 MG: 325 TABLET ORAL at 04:10

## 2025-02-06 RX ADMIN — GABAPENTIN 300 MG: 300 CAPSULE ORAL at 08:29

## 2025-02-06 RX ADMIN — LEVOTHYROXINE SODIUM 125 MCG: 0.12 TABLET ORAL at 06:03

## 2025-02-06 RX ADMIN — CEPHALEXIN 250 MG: 250 CAPSULE ORAL at 08:29

## 2025-02-06 RX ADMIN — PANTOPRAZOLE SODIUM 40 MG: 40 TABLET, DELAYED RELEASE ORAL at 06:03

## 2025-02-06 RX ADMIN — ALLOPURINOL 300 MG: 300 TABLET ORAL at 08:29

## 2025-02-06 RX ADMIN — ATORVASTATIN CALCIUM 40 MG: 40 TABLET, FILM COATED ORAL at 08:29

## 2025-02-06 ASSESSMENT — PAIN DESCRIPTION - LOCATION
LOCATION: HIP
LOCATION: LEG

## 2025-02-06 ASSESSMENT — PAIN SCALES - GENERAL
PAINLEVEL_OUTOF10: 10
PAINLEVEL_OUTOF10: 3
PAINLEVEL_OUTOF10: 8
PAINLEVEL_OUTOF10: 5
PAINLEVEL_OUTOF10: 5

## 2025-02-06 ASSESSMENT — PAIN DESCRIPTION - DESCRIPTORS
DESCRIPTORS: SPASM;SHOOTING
DESCRIPTORS: ACHING

## 2025-02-06 ASSESSMENT — PAIN DESCRIPTION - ORIENTATION: ORIENTATION: RIGHT

## 2025-02-06 NOTE — PROGRESS NOTES
Report attempted to [facility] x 3. Phomne transferred to DON voicemail, and  voicemail x 3.    1400- Report called to [facility], all questions answered.

## 2025-02-06 NOTE — PROGRESS NOTES
Patient/Caregiver education & training, Equipment evaluation, education, & procurement, Positioning, Self-Care / ADL, Home management training, Cognitive/Perceptual training, Co-Treatment    Assessment  Activity Tolerance  Activity Tolerance: Patient Tolerated treatment well  Assessment  Performance deficits / Impairments: Decreased functional mobility , Decreased ADL status, Decreased ROM, Decreased strength, Decreased cognition, Decreased endurance, Decreased sensation, Decreased balance, Decreased posture  Treatment Diagnosis: impaired self care status  Prognosis: Good  Decision Making: Medium Complexity  Discharge Recommendations: Patient would benefit from continued therapy after discharge  OT Equipment Recommendations  Equipment Needed: Yes  Mobility Devices: ADL Assistive Devices  ADL Assistive Devices: Long-handled Sponge, Reacher, Leg , Long-handled Shoe Horn, Emergency Alert System, Sock-Aid Hard  Other: continue to assess  Safety Devices  Type of Devices: All fall risk precautions in place, Call light within reach, Gait belt (left sitting on toilet with call light. PCT notified)    AM[facility] Daily Activities Inpatient  AM-[facility] Daily Activity - Inpatient   How much help is needed for putting on and taking off regular lower body clothing?: A Lot  How much help is needed for bathing (which includes washing, rinsing, drying)?: A Lot  How much help is needed for toileting (which includes using toilet, bedpan, or urinal)?: A Little  How much help is needed for putting on and taking off regular upper body clothing?: A Little  How much help is needed for taking care of personal grooming?: A Little  How much help for eating meals?: A Little  [facility] Inpatient Daily Activity Raw Score: 16  AM-[facility] Inpatient ADL T-Scale Score : 35.96  ADL Inpatient CMS 0-100% Score: 53.32  ADL Inpatient CMS G-Code Modifier : CK    OT Minutes  OT Individual Minutes  Time In: 1030  Time Out: 1101  Minutes: 31      Electronically  signed by DEANA Montoya on 2/6/25 at 11:25 AM EST

## 2025-02-06 NOTE — PROGRESS NOTES
Today's Date: 2/5/2025  Patient Name: Yaz Allison  Date of admission: 1/31/2025  4:24 AM  Patient's age: 84 y.o., 1940  Admission Dx: Closed fracture of right hip, initial encounter ([facility]) [S72.001A]  Closed right hip fracture, initial encounter ([facility]) [S72.001A]    Reason for Consult: Severe anemia  Requesting Physician: Pedro Luis De Oliveira MD    CHIEF COMPLAINT:  fall/right femoral fracture    History Obtained From:  patient, electronic medical record    Interval history  Patient was seen and examined  Hemoglobin is stable   No bleeding    HISTORY OF PRESENT ILLNESS:      The patient is a 84 y.o.  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

## 2025-02-06 NOTE — PLAN OF CARE
Problem: Discharge Planning  Goal: Discharge to home or other facility with appropriate resources  2/6/2025 0245 by Naya Zelaya, RN  Outcome: Progressing  Flowsheets (Taken 2/5/2025 2100)  Discharge to home or other facility with appropriate resources:   Identify barriers to discharge with patient and caregiver   Arrange for needed discharge resources and transportation as appropriate   Identify discharge learning needs (meds, wound care, etc)   Arrange for interpreters to assist at discharge as needed   Refer to discharge planning if patient needs post-hospital services based on physician order or complex needs related to functional status, cognitive ability or social support system     Problem: Pain  Goal: Verbalizes/displays adequate comfort level or baseline comfort level  2/6/2025 0245 by Naya Zelaya, RN  Outcome: Progressing, Patient expresses relief following administration of prn pain medication.      Problem: Skin/Tissue Integrity  Goal: Skin integrity remains intact  Description: 1.  Monitor for areas of redness and/or skin breakdown  2.  Assess vascular access sites hourly  3.  Every 4-6 hours minimum:  Change oxygen saturation probe site  4.  Every 4-6 hours:  If on nasal continuous positive airway pressure, respiratory therapy assess nares and determine need for appliance change or resting period  2/6/2025 0245 by Naya Zelaya, RN  Outcome: Progressing  Flowsheets  Taken 2/6/2025 0242  Skin Integrity Remains Intact: Monitor for areas of redness and/or skin breakdown       Problem: Safety - Adult  Goal: Free from fall injury  2/6/2025 0245 by Naya Zelaya, RN  Outcome: Progressing, Patient remains free of incidence/ injury. Bed remains in low position. Side rails up x2.

## 2025-02-06 NOTE — DISCHARGE SUMMARY
Discharge Summary    Attending Physician: Pedro Luis De Oliveira MD  Admit Date: 1/31/2025  Discharge Date: 2/6/2025   Primary Care Physician: Gabe Cintron MD    Admitting Diagnosis:  Right femur intertrochanteric fracture    Discharge Diagnoses:  Same as above    Past Medical History:   Diagnosis Date    Abdominal pain, unspecified site     Acquired cyst of kidney     Acute gouty arthropathy     Allergic rhinitis, cause unspecified     Anxiety state, unspecified     Arthropathy, unspecified, site unspecified     Chronic airway obstruction, not elsewhere classified     Chronic kidney disease, stage III (moderate) (HCC)     Diarrhea     Edema     Esophageal reflux     Essential 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 localized, unspecified site     Other general symptoms(780.99)     Other iatrogenic hypothyroidism     Other nonspecific abnormal finding of lung field     Other psoriasis     Pure hypercholesterolemia     Regional enteritis of unspecified site     Senile osteoporosis     Sprain of ankle, unspecified site     Unspecified vitamin D deficiency        Procedures Performed and Findings  Procedure(s) with comments:  FEMUR INTRAMEDULLARY NAIL STEFANO INSERTION ANTEGRADE  RIGHT - SYNTHES  NOTIFIED  SEEMA  IP 2071     Consultations Obtained  IP CONSULT TO INTERNAL MEDICINE  IP CONSULT TO CARDIOLOGY  IP CONSULT TO PHYSICAL MEDICINE REHAB  IP CONSULT TO ONCOLOGY    Hospital Course  Patient was admitted to [facility] for a right femur IT fracture. she consented to proceed with surgery to stabilize her fracture after demonstrating an understanding of the discussion we had regarding treatment options, risks, and benefits of the proposed procedure, expected

## 2025-02-06 NOTE — PROGRESS NOTES
[facility]   IN-PATIENT SERVICE   [facility]    Progress Note             Date:   2/6/2025  Patient name:  Yaz Allison  Date of admission:  1/31/2025  4:24 AM  MRN:   [id number]  Account:  [id number]  YOB: 1940  PCP:    Gabe Cintron MD  Room:   2071/2071-01  Code Status:    Full Code    Chief Complaint:     Chief Complaint   Patient presents with    Fall    Hip Pain     Right       History Obtained From:     patient    History of Present Illness:     The patient is a 84 y.o.   /  female who presents with Fall and Hip Pain (Right)   and she is admitted to the hospital for the management of      Patient is 84-year-old female with past medical history of hypothyroidism, hypertension, hyperlipidemia , recurrent UTIs presented to the ER with fall.  Patient states that she was getting up to go to the bathroom and her cane and she fell down  No dizziness lightheadedness, no loss of consciousness, does not that she hit her head   In the ER, found to right femur fracture, head CT neck  Patient was seen and examined on the floor denies any chest pain shortness of breath.  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

## 2025-02-06 NOTE — DISCHARGE INSTRUCTIONS
Pedro Luis De Oliveira MD  [facility]  Specializing in Shoulder and Elbow Surgery      POSTOPERATIVE INSTRUCTIONS    Surgery: Right hip fracture surgical stabilization     DIET  Begin with clear liquids and light foods (jellos, soups, etc.).  Progress to your normal diet if you are not nauseated.    WOUND CARE  Maintain your operative dressing, may loosen bandage if swelling occurs.  It is normal for joints to bleed and swell following surgery - if blood soaks onto the bandage, do not become alarmed - reinforce with additional dressing.  Surgical dressing is changed on the second post-operative day, and daily after that with clean gauze and tape. If it gets wet, it should be changed right away.   To avoid infection, keep surgical incisions clean and dry - you may shower by  placing a large garbage bag over the extremity starting the day after surgery OR if the incision is small enough a large water proof band aids sealed on all sides may be applied before showering - NO immersion of operative site (i.e. bath, pool).    MEDICATIONS  Most patients will require some narcotic pain medication for a short period of time - Start it before numbing medicine wears off, and use as directed on the bottle.  Common side effects of the pain medication are nausea, drowsiness, and  constipation - to decrease the side effects, take medication with food - if  constipation occurs, consider taking an over-the-counter laxative.  If you are having problems with nausea and vomiting, take your anti-emetic if prescribed, otherwise, contact the office to possibly have your medication changed ([phone]).  Do not drive a car or operate machinery while taking the narcotic medication.  You've been placed on a blood thinner for 3 weeks after surgery to decrease the chance of developing blood clots.  Please resume all home medications, unless instructed otherwise.    ACTIVITY  Keep the limb elevated for several  days following surgery to decrease swelling.  NO driving while taking narcotic medications or until instructed otherwise by physician.    CRUTCHES/WALKER  Use walker to provide you greater comfort and stability. You may weight bear as tolerated on your leg    ICE THERAPY  Begin immediately after surgery.  Do not place ice directly on the skin (place over an Ace wrap or thin clothing).  Use icing machine continuously or ice packs (if machine not prescribed) every 2  hours for 20 minutes daily for the first week.    EXERCISE  Continue physical therapy (PT) working on your gait, hip ROM, and gradual progressive strengthening.     EMERGENCIES  Contact Dr. De Oliveira or his nurse at [phone] if any of the following are present: Painful swelling or numbness, Unrelenting pain, Fever (over 101 - it is normal to have a low grade fever for the first day or two following surgery) or chills, Redness around incisions, Color change in foot or toes, Continuous drainage or bleeding from incision (a small amount of drainage is expected), Difficulty breathing, Excessive nausea/vomiting **If you have an emergency after office hours or on the weekend, contact the same office number ([phone]) and you will be connected to our page service - they will contact Dr. De Oliveira or his partner if he is unavailable. Do NOT  call the hospital or surgicenter.  **If you have an emergency that requires immediate attention, proceed to the nearest emergency room.    FOLLOW-UP CARE / QUESTIONS  If you have any questions/concerns, please call the office [phone].  Contact the office to confirm/schedule your post-op visits.  Typically post-operative appointments are made for 2 weeks following the date of  surgery.

## 2025-02-06 NOTE — CARE COORDINATION
ACUTE INPATIENT REHABILITATION  Financial Disclosure Statement: Eligibility and Benefit Verification    Patient Name: Yaz Allison MRN: [id number]     Disclosure Statement  [facility] Acute Inpatient Rehabilitation at [facility] provides 24 hour individualized service to patients with functional limitations due to but not limited to stroke, brain injury, spinal cord injury, major multiple trauma, hip fracture, amputation, and neurological disorders.  Acute Inpatient Rehabilitation provides rehabilitative nursing, physician coverage, as well as physical therapy, occupational therapy, speech therapy, recreational therapy and other services as deemed necessary by our Board Certified Physical Medicine and Rehabilitation Physicians Dr. Branden Hess, Dr. Nona Kamara, Dr. Lianet Palomino and Dr. Michael Shelby.    [facility] Acute Inpatient [facility] at [facility] is fully accredited by the Commission on Accreditation of Rehabilitation Facilities (CARF) and The Joint Commission (TJC).    At a minimum, you will receive 5 days of therapy services totaling at least 15 hours per week. Your treatment program will consist of physical therapy at least 7.5 hours per week; occupational therapy 7.5 hours per week; and speech therapy 1.5 hours per week, as applicable.    Your estimated length of stay is currently:  Approximately 2 Weeks  Please Note: Estimated length of stay is based on individual condition and Acute Inpatient Rehabilitation specific needs. Length of stay may vary based upon interdisciplinary team assessment, insurance approval, and patient progression.    Your insurance coverage has been verified as follows:   Date Verified: 02/03/2025   Method:  Online Portal    Primary Insurance: [facility] Medicare   Member/Subscriber ID:  [id number]  Coverage: Per Benefit Period  Plan Effective Date: 01/01/2025 Status: active  Deductible: $0.00   Co-Pay: $325.00/day for day 1-5  Co-Insurance: 0%  Maximum 
Acute Inpatient Rehabilitation referral received via Fax    \"Inpatient Consult to PM&R - Physiatry [[id number]]\" Consult Order Status: Verified as ordered, patient confirmed on consult list    PM&R physiatrist Dr. Nona Kamara on service for PM&R consult.    Updated Angelic CM: Via [employer] at this time.  
Case Management Assessment  Initial Evaluation    Date/Time of Evaluation: 2/1/2025 3:47 PM  Assessment Completed by: Angelic Mclaughlin    If patient is discharged prior to next notation, then this note serves as note for discharge by case management.    Patient Name: Yaz Allison                   YOB: 1940  Diagnosis: Closed fracture of right hip, initial encounter ([facility]) [S72.001A]  Closed right hip fracture, initial encounter ([facility]) [S72.001A]                   Date / Time: 1/31/2025  4:24 AM    Patient Admission Status: Inpatient   Readmission Risk (Low < 19, Mod (19-27), High > 27): Readmission Risk Score: 17.6    Current PCP: Gabe Cintron MD  PCP verified by CM? No    Chart Reviewed: Yes      History Provided by: Patient  Patient Orientation: Alert and Oriented    Patient Cognition: Alert    Hospitalization in the last 30 days (Readmission):  No    If yes, Readmission Assessment in CM Navigator will be completed.    Advance Directives:      Code Status: Full Code   Patient's Primary Decision Maker is: Legal Next of Kin    Primary Decision Maker: EstefanyCori James - [phone]    Primary Decision Maker: EstefanyAshley - [phone]    Primary Decision Maker: EstefanyLevi James - [phone]    Primary Decision Maker: [place] Allison    Discharge Planning:    Patient lives with: Family Members, Children Type of Home: House  Primary Care Giver: Self  Patient Support Systems include: Family Members   Current Financial resources: Medicare  Current community resources: None  Current services prior to admission: None            Current DME:              Type of Home Care services:  None    ADLS  Prior functional level: Independent in ADLs/IADLs  Current functional level: Independent in ADLs/IADLs    PT AM-PAC:   /24  OT AM-PAC:   /24    Family can provide assistance at DC: Yes  Would you like Case Management to discuss the discharge plan with any other family members/significant others, and 
DISCHARGE PLANNING NOTE:    LSW Following for potential discharge to ARU. PM&R completed. Patient is appropriate for inpatient rehab per Dr. Kamara. Insurance authorization to be submitted today per Irlanda in admissions.   
DISCHARGE PLANNING NOTE:    LSW following for potential discharge to SNF. Patient accepted at [facility]. Insurance authorization approved through 2/7. Patient can admit to facility once medically ready for discharge.   
Home [facility] portal checked auth #  [id number]  remains pending.  Updated Daxa LARA via [employer]evin [employer].     401 pm Spoke with Kym from [facility] offering a P2P.      Received Telephone Call from payor [facility] Medicare representative Kym. Call Ref#[id number]     Intent to deny admission to Acute Inpatient Rehabilitation Stay under Auth/CaseRef# [id number]      Rationale to deny Acute Inpatient Rehabilitation level of care: Does not meet medical necessity, care can be met at lower level     Peer to Peer Deadline: 02/05/2025 11 am EST.     Peer to Peer Instructions: Call [phone] opt 5.     Per PM&R physiatrist Dr. Nona Kamara,  needs clarification if patient is wanting ARU, patient stated she does not think she can tolerate 3 hours therapy.     Updated Daxa LARA:  Tevin kitchen who was aware of P2P   
ONGOING DISCHARGE PLAN:    Patient is alert and oriented x4.    Spoke with patient regarding discharge plan and patient confirms that plan is still ARU. Authorization started 2/3, still pending.    Ortho-POD #3 Right femur intertrochanteric fracture surgical stabilization with intramedullary nail. PT on. PO Keflex.     Will continue to follow for additional discharge needs.    If patient is discharged prior to next notation, then this note serves as note for discharge by case management.    Electronically signed by Daxa Shultz RN on 2/4/2025 at 12:56 PM    
ONGOING DISCHARGE PLAN:    Patient is alert and oriented x4.    Spoke with patient regarding discharge plan and patient confirms that plan is still to discharge to home [facility]    Patient would like to discharge to [facility]      Will need PM&R     PT ordered    Referral sent to [facility]     Will continue to follow for additional discharge needs.    If patient is discharged prior to next notation, then this note serves as note for discharge by case management.    Electronically signed by Angelic Mclaughlin on 2/2/2025 at 9:17 AM    
Peer to Peer offered for this patient.    Deadline is  @ 1100 am     Will need to call [phone]  Option 5    When Physician calls please provide Name,  and Member ID    Name: Yaz Allison    :  1940     Member ID: [id number]       Left message for Irlanda Sevilla in [facility].   @ [phone]  
Per PM&R physiatrist Dr. Nona Kamara, patient appropriate for Acute Inpatient Rehabilitation level of care.    Eligibility & Benefits Verified - See Note    Prior authorization request for admission initiated with primary insurance [facility] Medicare via: Online Portal: [employer]     Pending Case Reference/Authorization # [id number]     Clinical documentation submitted via Online Portal: [employer]     Deyanira Ledezma CM:  [employer]  at this time.   
Transportation arranged for patient to go to [facility] at 2P via [facility]. Facility informed. Daughter Cori updated. Bedside nurse informed. HENS completed. Document ID : [id number].     IMM letter provided to patient.  Patient offered four hours to make informed decision regarding appeal process; patient agreeable to discharge.     Number for Report: [phone]    
received from Alecia with [facility]. Facility accepts this pt if iron infusion is not required.    Call placed to [facility] admissions, spoke to Emily Feldman is currently in a meeting and call will be placed back to writer.    Call placed to [facility] Fax number has not been corrected at this time, will be changing soon.    Writer provided pt identifiers for Epic look up. Will place call back to writer.  Electronically signed by ROSELIA Connell on 2/5/2025 at 3:06 PM    Writer received call from Francia with [facility]. Facility is out of network, would have to request out of network benefits.   Electronically signed by ROSELIA Connell on 2/5/2025 at 4:13 PM    Writer met with pt for update on accepting facilities to obtain facility of choice. Call placed to daughter Cori, agreeable to [facility]. Call placed to Nguyen with [facility] at [facility] intake to confirm FOC at [phone]. Cori requests writer to schedule transportation at discharge for this pt.  Electronically signed by ROSELIA Connell on 2/5/2025 at 4:39 PM    Authorization submitted through [employer] portal. Auth ID [id number]   Electronically signed by ROSELIA Connell on 2/5/2025 at 5:02 PM

## 2025-02-06 NOTE — PROGRESS NOTES
Today's Date: 2/6/2025  Patient Name: Yaz Allison  Date of admission: 1/31/2025  4:24 AM  Patient's age: 84 y.o., 1940  Admission Dx: Closed fracture of right hip, initial encounter ([facility]) [S72.001A]  Closed right hip fracture, initial encounter ([facility]) [S72.001A]    Reason for Consult: Severe anemia  Requesting Physician: Pedro Luis De Oliveira MD    CHIEF COMPLAINT:  fall/right femoral fracture    History Obtained From:  patient, electronic medical record    Interval history  Patient was seen and examined  Hemoglobin is stable   No bleeding    HISTORY OF PRESENT ILLNESS:      The patient is a 84 y.o.  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    Past Medical History:   has a past medical history of Abdominal pain, unspecified site, Acquired cyst of kidney, Acute gouty arthropathy, Allergic rhinitis, cause unspecified, Anxiety state, unspecified, Arthropathy, unspecified, site unspecified, Chronic airway obstruction, not elsewhere classified, Chronic kidney disease, stage III (moderate) ([facility]), Diarrhea, Edema, Esophageal reflux, Essential hypertension, benign, Herpes zoster with other nervous system complications(053.19), Herpes zoster without mention of complication, 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

## 2025-02-06 NOTE — PROGRESS NOTES
Physical Therapy    [facility]   Physical Therapy Treatment  Date: 25  Patient Name: Yaz Allison       Room: -  MRN: [id number]  Account: [id number]   : 1940  (84 y.o.) Gender: female     Discharge Recommendations:  Discharge Recommendations: Patient able to tolerate 3 hours of therapy per day, Patient would benefit from continued therapy after discharge, Therapy recommended at discharge     PT Equipment Recommendations  Equipment Needed:  (TBD)    General  Patient assessed for rehabilitation services?: Yes  Additional Pertinent Hx: Per H & P note:  Yaz Allison is a 84 y.o. female who presents to the hospital after a fall at home earlier this morning.  Patient states that she was at home trying to use the bathroom and was transitioning from using her cane to her walker and states that she lost her balance falling onto her right side and more specifically onto her right hip.  She denies any loss of consciousness.  She states that the pain at this time is localized to the hip region with no radiation down the leg although does complain of some spasming in the muscles in the anterior aspect of the upper leg.  Denies any numbness down the leg.     Patient does endorse a fall a couple years ago where she fractured her pelvis and had surgical intervention by Dr. Engle.  Response To Previous Treatment: Not applicable  Family/Caregiver Present: No  Referring Practitioner: Dr. ALEJANDRA De Oliveira  Referral Date : 25  Diagnosis: closed fracture right hip  Follows Commands: Within Functional Limits  Other (Comment): OK per nurse Mccarty to proceed w/ PT evaluation    Past Medical History:  has a past medical history of Abdominal pain, unspecified site, Acquired cyst of kidney, Acute gouty arthropathy, Allergic rhinitis, cause unspecified, Anxiety state, unspecified, Arthropathy, unspecified, site unspecified, Chronic airway obstruction, not elsewhere classified, Chronic kidney disease, stage        Transfers  Transfers  Sit to Stand: Contact guard assistance (cues for hand placment with transfers)  Stand to Sit: Contact guard assistance  Stand Pivot Transfers: Contact guard assistance (with RW)     Mobility  Ambulation  WB Status: WBAT right LE   Ambulation  Surface: Level tile  Device: Rolling Walker  Other Apparatus:  (IV)  Assistance: Contact guard assistance  Quality of Gait: NBOS, downward gaze, heavy reliance on RW  Gait Deviations: Decreased step height, Decreased step length, Slow Damaris  Distance: 15ft x 1, 20ft x 1  Comments: vcs for forward gaze, good technique increasing weight bearing through UEs in right LE stance phase      Stairs  Stairs/Curb  Stairs?: No (has 5  NOLAN w/ 2 rails at home)    Bed Mobility  Bed mobility  Supine to Sit: Minimal assistance (assist with right LE)  Sit to Supine: Unable to assess (pt left sitting on toilet at end of session)  Scooting: Stand by assistance (hips to EOB)  Bed Mobility Comments: HOB elevated ~30º use of bed rial with cueing/exiting bed to pt's Right     Balance  Balance  Posture: Fair  Sitting - Static: Good, -  Sitting - Dynamic: Fair  Standing - Static: Fair, + (@ RW)  Standing - Dynamic: Fair, - (@ RW)     PT Exercises  A/AROM Exercises: Seated BLE ex x 10  Pressure Relief Exercises: self adjusts/lateral leans while sitting EOB  Static Sitting Balance Exercises: Pt sat EOB x5 minutes while writer was preping for amb with RW  Dynamic Sitting Balance Exercises: sitting EOB total of 8mins while performing LE ex and functional ADL tasks lead by LAMONT  Dynamic Standing Balance Exercises: total time standing at RW between functional reaching task lead by LAMONT and amb 9mins     Assessment  Assessment  Discharge Recommendations: Patient able to tolerate 3 hours of therapy per day, Patient would benefit from continued therapy after discharge, Therapy recommended at discharge  Activity Tolerance  Activity Tolerance: Patient tolerated treatment

## 2025-02-06 NOTE — PROGRESS NOTES
No events O/N. Pain appropriately controlled    /71   Pulse 91   Temp 97.9 °F (36.6 °C) (Oral)   Resp 16   Ht 1.702 m (5' 7\")   Wt 49.9 kg (110 lb)   SpO2 99%   BMI 17.23 kg/m²   Right hip incisions are clean, dry, and intact. 2+ pedal pulses. Able to DF/PF her toes and foot.     Impression and plan: 83 yo lady sp right hip IT fracture IMN   - Pain control  - DVT ppx  - Mobilize with PT. WBAT  - Dressing changed today. Continue  daily changes  - Dispo: DC to [facility] today

## 2025-02-07 ENCOUNTER — HOSPITAL ENCOUNTER (OUTPATIENT)
Age: 85
Setting detail: SPECIMEN
Discharge: HOME | End: 2025-02-07

## 2025-02-07 LAB
ALBUMIN SERPL-MCNC: 2.6 G/DL (ref 3.5–5.2)
ALBUMIN/GLOB SERPL: 1.1 {RATIO} (ref 1–2.5)
ALP SERPL-CCNC: 91 U/L (ref 35–104)
ALT SERPL-CCNC: 9 U/L (ref 10–35)
ANION GAP SERPL CALCULATED.3IONS-SCNC: 8 MMOL/L (ref 9–16)
AST SERPL-CCNC: 29 U/L (ref 10–35)
BILIRUB SERPL-MCNC: 0.8 MG/DL (ref 0–1.2)
BUN SERPL-MCNC: 15 MG/DL (ref 8–23)
CALCIUM SERPL-MCNC: 9 MG/DL (ref 8.8–10.2)
CHLORIDE SERPL-SCNC: 103 MMOL/L (ref 98–107)
CO2 SERPL-SCNC: 25 MMOL/L (ref 20–31)
CREAT SERPL-MCNC: 1 MG/DL (ref 0.5–0.9)
ERYTHROCYTE [DISTWIDTH] IN BLOOD BY AUTOMATED COUNT: 15 % (ref 11.8–14.4)
GFR, ESTIMATED: 57 ML/MIN/1.73M2
GLUCOSE SERPL-MCNC: 84 MG/DL (ref 82–115)
HCT VFR BLD AUTO: 24.8 % (ref 36.3–47.1)
HGB BLD-MCNC: 7.8 G/DL (ref 11.9–15.1)
MCH RBC QN AUTO: 29.9 PG (ref 25.2–33.5)
MCHC RBC AUTO-ENTMCNC: 31.5 G/DL (ref 28.4–34.8)
MCV RBC AUTO: 95 FL (ref 82.6–102.9)
NRBC BLD-RTO: 0.2 PER 100 WBC
PLATELET # BLD AUTO: 254 K/UL (ref 138–453)
PMV BLD AUTO: 9.4 FL (ref 8.1–13.5)
POTASSIUM SERPL-SCNC: 4.5 MMOL/L (ref 3.7–5.3)
PROT SERPL-MCNC: 4.9 G/DL (ref 6.6–8.7)
RBC # BLD AUTO: 2.61 M/UL (ref 3.95–5.11)
SODIUM SERPL-SCNC: 137 MMOL/L (ref 136–145)
WBC OTHER # BLD: 10.1 K/UL (ref 3.5–11.3)

## 2025-02-07 PROCEDURE — 85027 COMPLETE CBC AUTOMATED: CPT

## 2025-02-07 PROCEDURE — 80053 COMPREHEN METABOLIC PANEL: CPT

## 2025-02-07 PROCEDURE — 36415 COLL VENOUS BLD VENIPUNCTURE: CPT

## 2025-02-07 NOTE — PROGRESS NOTES
Physician Progress Note      PATIENT:               GABINO CLAY  CSN #:                  [id number]  :                       1940  ADMIT DATE:       2025 4:24 AM  DISCH DATE:        2025 2:01 PM  RESPONDING  PROVIDER #:        Pedro Luis De Oliveira MD          QUERY TEXT:    Pt admitted with Right femur intertrochanteric fracture. Pt noted to have   osteoporosis. If possible, please document in progress notes and discharge   summary if you are evaluating and/or treating any of the following:    The medical record reflects the following:  Risk Factors: Age 84 year female,  Clinical Indicators: H&P 2025-84 y.o. old female who presented to the   hospital after a fall earlier this morning, She does have an intertrochanteric   hip fracture    Progress Notes 2025-presented with fall found to have right hip   fracture, medicine has been consulted for medical management and clearance   next  Mechanical fall, head CT C-spine nonacute, right hip fracture, Ortho primary,   going for surgical intervention tomorrow    Physical Medicine and Rehabilitation Consults 2025-Senile osteoporosis      Treatment: S/p Right femur intertrochanteric fracture surgical stabilization   with intramedullary nail, Orthopedic consult,      Thank you  [facility]ica Gomez [facility]  Options provided:  -- Osteoporotic Right femur intertrochanteric fracture following fall which   would not usually break a normal, healthy bone  -- Traumatic Right femur intertrochanteric fracture.  -- Other - I will add my own diagnosis  -- Disagree - Not applicable / Not valid  -- Disagree - Clinically unable to determine / Unknown  -- Refer to Clinical Documentation Reviewer    PROVIDER RESPONSE TEXT:    This patient has a traumatic Right femur intertrochanteric fracture.    Query created by: Yessica Gomez on 2025 8:45 PM      Electronically signed by:  Pedro Luis De Oliveira MD 2025 8:18 AM

## 2025-02-12 ENCOUNTER — HOSPITAL ENCOUNTER (INPATIENT)
Age: 85
LOS: 1 days | Discharge: HOME HEALTH | DRG: 071 | End: 2025-02-13
Attending: EMERGENCY MEDICINE | Admitting: INTERNAL MEDICINE
Payer: MEDICARE

## 2025-02-12 ENCOUNTER — APPOINTMENT (OUTPATIENT)
Dept: GENERAL RADIOLOGY | Age: 85
DRG: 071 | End: 2025-02-12
Payer: MEDICARE

## 2025-02-12 ENCOUNTER — APPOINTMENT (OUTPATIENT)
Dept: MRI IMAGING | Age: 85
DRG: 071 | End: 2025-02-12
Payer: MEDICARE

## 2025-02-12 ENCOUNTER — APPOINTMENT (OUTPATIENT)
Dept: CT IMAGING | Age: 85
DRG: 071 | End: 2025-02-12
Payer: MEDICARE

## 2025-02-12 ENCOUNTER — APPOINTMENT (OUTPATIENT)
Dept: VASCULAR LAB | Age: 85
DRG: 071 | End: 2025-02-12
Attending: INTERNAL MEDICINE
Payer: MEDICARE

## 2025-02-12 DIAGNOSIS — M79.89 REDNESS AND SWELLING OF LOWER LEG: ICD-10-CM

## 2025-02-12 DIAGNOSIS — R79.89 ELEVATED TROPONIN: ICD-10-CM

## 2025-02-12 DIAGNOSIS — R41.82 ALTERED MENTAL STATUS, UNSPECIFIED ALTERED MENTAL STATUS TYPE: Primary | ICD-10-CM

## 2025-02-12 DIAGNOSIS — R23.8 REDNESS AND SWELLING OF LOWER LEG: ICD-10-CM

## 2025-02-12 DIAGNOSIS — M10.9 ACUTE GOUTY ARTHROPATHY: ICD-10-CM

## 2025-02-12 PROBLEM — G93.41 METABOLIC ENCEPHALOPATHY: Status: ACTIVE | Noted: 2025-02-12

## 2025-02-12 PROBLEM — E83.42 HYPOMAGNESEMIA: Status: ACTIVE | Noted: 2025-02-12

## 2025-02-12 PROBLEM — E87.1 HYPONATREMIA: Status: ACTIVE | Noted: 2025-02-12

## 2025-02-12 PROBLEM — G93.40 ENCEPHALOPATHY: Status: ACTIVE | Noted: 2025-02-12

## 2025-02-12 PROBLEM — E87.6 ACUTE HYPOKALEMIA: Status: ACTIVE | Noted: 2025-02-12

## 2025-02-12 LAB
ALBUMIN SERPL-MCNC: 3 G/DL (ref 3.5–5.2)
ALBUMIN/GLOB SERPL: 1 {RATIO} (ref 1–2.5)
ALP SERPL-CCNC: 144 U/L (ref 35–104)
ALT SERPL-CCNC: 9 U/L (ref 5–33)
ANION GAP SERPL CALCULATED.3IONS-SCNC: 12 MMOL/L (ref 9–17)
APAP SERPL-MCNC: <5 UG/ML (ref 10–30)
AST SERPL-CCNC: 29 U/L
BACTERIA URNS QL MICRO: ABNORMAL
BASOPHILS # BLD: 0.1 K/UL (ref 0–0.2)
BASOPHILS NFR BLD: 1 % (ref 0–2)
BILIRUB SERPL-MCNC: 1.2 MG/DL (ref 0.3–1.2)
BILIRUB UR QL STRIP: NEGATIVE
BUN SERPL-MCNC: 10 MG/DL (ref 8–23)
CALCIUM SERPL-MCNC: 9.4 MG/DL (ref 8.6–10.4)
CHARACTER UR: ABNORMAL
CHLORIDE SERPL-SCNC: 96 MMOL/L (ref 98–107)
CLARITY UR: CLEAR
CO2 SERPL-SCNC: 25 MMOL/L (ref 20–31)
COLOR UR: YELLOW
CREAT SERPL-MCNC: 0.8 MG/DL (ref 0.5–0.9)
EOSINOPHIL # BLD: 0.1 K/UL (ref 0–0.4)
EOSINOPHILS RELATIVE PERCENT: 1 % (ref 1–4)
EPI CELLS #/AREA URNS HPF: ABNORMAL /HPF (ref 0–5)
ERYTHROCYTE [DISTWIDTH] IN BLOOD BY AUTOMATED COUNT: 15.4 % (ref 12.5–15.4)
GFR, ESTIMATED: 72 ML/MIN/1.73M2
GLUCOSE SERPL-MCNC: 95 MG/DL (ref 70–99)
GLUCOSE UR STRIP-MCNC: NEGATIVE MG/DL
HCT VFR BLD AUTO: 27.1 % (ref 36–46)
HGB BLD-MCNC: 9 G/DL (ref 12–16)
HGB UR QL STRIP.AUTO: ABNORMAL
INR PPP: 1 (ref 0.9–1.2)
KETONES UR STRIP-MCNC: ABNORMAL MG/DL
LEUKOCYTE ESTERASE UR QL STRIP: NEGATIVE
LYMPHOCYTES NFR BLD: 1.3 K/UL (ref 1–4.8)
LYMPHOCYTES RELATIVE PERCENT: 17 % (ref 24–44)
MAGNESIUM SERPL-MCNC: 1.5 MG/DL (ref 1.6–2.6)
MCH RBC QN AUTO: 30.1 PG (ref 26–34)
MCHC RBC AUTO-ENTMCNC: 33.3 G/DL (ref 31–37)
MCV RBC AUTO: 90.5 FL (ref 80–100)
MONOCYTES NFR BLD: 0.5 K/UL (ref 0.1–1.2)
MONOCYTES NFR BLD: 6 % (ref 2–11)
NEUTROPHILS NFR BLD: 75 % (ref 36–66)
NEUTS SEG NFR BLD: 5.6 K/UL (ref 1.8–7.7)
NITRITE UR QL STRIP: NEGATIVE
PARTIAL THROMBOPLASTIN TIME: 22.8 SEC (ref 24–36)
PH UR STRIP: 8 [PH] (ref 5–8)
PLATELET # BLD AUTO: 402 K/UL (ref 140–450)
PMV BLD AUTO: 6.7 FL (ref 6–12)
POTASSIUM SERPL-SCNC: 3.3 MMOL/L (ref 3.7–5.3)
PROT SERPL-MCNC: 5.9 G/DL (ref 6.4–8.3)
PROT UR STRIP-MCNC: NEGATIVE MG/DL
PROTHROMBIN TIME: 13.5 SEC (ref 11.8–14.6)
RBC # BLD AUTO: 3 M/UL (ref 4–5.2)
RBC #/AREA URNS HPF: ABNORMAL /HPF (ref 0–2)
SODIUM SERPL-SCNC: 133 MMOL/L (ref 135–144)
SP GR UR STRIP: 1.01 (ref 1–1.03)
TROPONIN I SERPL HS-MCNC: 31 NG/L (ref 0–14)
TROPONIN I SERPL HS-MCNC: 40 NG/L (ref 0–14)
UROBILINOGEN UR STRIP-ACNC: NORMAL EU/DL (ref 0–1)
WBC #/AREA URNS HPF: ABNORMAL /HPF (ref 0–5)
WBC OTHER # BLD: 7.5 K/UL (ref 3.5–11)

## 2025-02-12 PROCEDURE — 93970 EXTREMITY STUDY: CPT

## 2025-02-12 PROCEDURE — 85730 THROMBOPLASTIN TIME PARTIAL: CPT

## 2025-02-12 PROCEDURE — 93005 ELECTROCARDIOGRAM TRACING: CPT

## 2025-02-12 PROCEDURE — 2580000003 HC RX 258: Performed by: INTERNAL MEDICINE

## 2025-02-12 PROCEDURE — 2500000003 HC RX 250 WO HCPCS: Performed by: EMERGENCY MEDICINE

## 2025-02-12 PROCEDURE — 6360000002 HC RX W HCPCS

## 2025-02-12 PROCEDURE — 96374 THER/PROPH/DIAG INJ IV PUSH: CPT

## 2025-02-12 PROCEDURE — 96375 TX/PRO/DX INJ NEW DRUG ADDON: CPT

## 2025-02-12 PROCEDURE — 96361 HYDRATE IV INFUSION ADD-ON: CPT

## 2025-02-12 PROCEDURE — 80143 DRUG ASSAY ACETAMINOPHEN: CPT

## 2025-02-12 PROCEDURE — G0378 HOSPITAL OBSERVATION PER HR: HCPCS

## 2025-02-12 PROCEDURE — 71045 X-RAY EXAM CHEST 1 VIEW: CPT

## 2025-02-12 PROCEDURE — 99223 1ST HOSP IP/OBS HIGH 75: CPT | Performed by: INTERNAL MEDICINE

## 2025-02-12 PROCEDURE — 36415 COLL VENOUS BLD VENIPUNCTURE: CPT

## 2025-02-12 PROCEDURE — 6370000000 HC RX 637 (ALT 250 FOR IP): Performed by: INTERNAL MEDICINE

## 2025-02-12 PROCEDURE — 70551 MRI BRAIN STEM W/O DYE: CPT

## 2025-02-12 PROCEDURE — 6360000002 HC RX W HCPCS: Performed by: INTERNAL MEDICINE

## 2025-02-12 PROCEDURE — 0202U NFCT DS 22 TRGT SARS-COV-2: CPT

## 2025-02-12 PROCEDURE — 87040 BLOOD CULTURE FOR BACTERIA: CPT

## 2025-02-12 PROCEDURE — 73502 X-RAY EXAM HIP UNI 2-3 VIEWS: CPT

## 2025-02-12 PROCEDURE — 70450 CT HEAD/BRAIN W/O DYE: CPT

## 2025-02-12 PROCEDURE — 85025 COMPLETE CBC W/AUTO DIFF WBC: CPT

## 2025-02-12 PROCEDURE — 99285 EMERGENCY DEPT VISIT HI MDM: CPT

## 2025-02-12 PROCEDURE — 84484 ASSAY OF TROPONIN QUANT: CPT

## 2025-02-12 PROCEDURE — 83735 ASSAY OF MAGNESIUM: CPT

## 2025-02-12 PROCEDURE — 2500000003 HC RX 250 WO HCPCS: Performed by: INTERNAL MEDICINE

## 2025-02-12 PROCEDURE — 6360000004 HC RX CONTRAST MEDICATION: Performed by: EMERGENCY MEDICINE

## 2025-02-12 PROCEDURE — 85610 PROTHROMBIN TIME: CPT

## 2025-02-12 PROCEDURE — 81001 URINALYSIS AUTO W/SCOPE: CPT

## 2025-02-12 PROCEDURE — 2060000000 HC ICU INTERMEDIATE R&B

## 2025-02-12 PROCEDURE — 70496 CT ANGIOGRAPHY HEAD: CPT

## 2025-02-12 PROCEDURE — 80053 COMPREHEN METABOLIC PANEL: CPT

## 2025-02-12 RX ORDER — AZITHROMYCIN 250 MG/1
500 TABLET, FILM COATED ORAL DAILY
Status: DISCONTINUED | OUTPATIENT
Start: 2025-02-12 | End: 2025-02-13 | Stop reason: HOSPADM

## 2025-02-12 RX ORDER — CARVEDILOL 3.12 MG/1
6.25 TABLET ORAL 2 TIMES DAILY WITH MEALS
Status: DISCONTINUED | OUTPATIENT
Start: 2025-02-12 | End: 2025-02-13 | Stop reason: HOSPADM

## 2025-02-12 RX ORDER — POTASSIUM CHLORIDE 1500 MG/1
40 TABLET, EXTENDED RELEASE ORAL PRN
Status: DISCONTINUED | OUTPATIENT
Start: 2025-02-12 | End: 2025-02-13 | Stop reason: HOSPADM

## 2025-02-12 RX ORDER — SODIUM CHLORIDE 0.9 % (FLUSH) 0.9 %
10 SYRINGE (ML) INJECTION PRN
Status: DISCONTINUED | OUTPATIENT
Start: 2025-02-12 | End: 2025-02-13 | Stop reason: HOSPADM

## 2025-02-12 RX ORDER — ACETAMINOPHEN 325 MG/1
650 TABLET ORAL EVERY 6 HOURS PRN
Status: DISCONTINUED | OUTPATIENT
Start: 2025-02-12 | End: 2025-02-13 | Stop reason: HOSPADM

## 2025-02-12 RX ORDER — ATORVASTATIN CALCIUM 40 MG/1
40 TABLET, FILM COATED ORAL DAILY
Status: DISCONTINUED | OUTPATIENT
Start: 2025-02-12 | End: 2025-02-13 | Stop reason: HOSPADM

## 2025-02-12 RX ORDER — ACETAMINOPHEN 650 MG/1
650 SUPPOSITORY RECTAL EVERY 6 HOURS PRN
Status: DISCONTINUED | OUTPATIENT
Start: 2025-02-12 | End: 2025-02-13 | Stop reason: HOSPADM

## 2025-02-12 RX ORDER — POLYETHYLENE GLYCOL 3350 17 G/17G
17 POWDER, FOR SOLUTION ORAL DAILY PRN
Status: DISCONTINUED | OUTPATIENT
Start: 2025-02-12 | End: 2025-02-13 | Stop reason: HOSPADM

## 2025-02-12 RX ORDER — ALLOPURINOL 300 MG/1
300 TABLET ORAL DAILY
Status: DISCONTINUED | OUTPATIENT
Start: 2025-02-12 | End: 2025-02-13 | Stop reason: HOSPADM

## 2025-02-12 RX ORDER — SODIUM CHLORIDE 9 MG/ML
INJECTION, SOLUTION INTRAVENOUS PRN
Status: DISCONTINUED | OUTPATIENT
Start: 2025-02-12 | End: 2025-02-13 | Stop reason: HOSPADM

## 2025-02-12 RX ORDER — ESTRADIOL 0.1 MG/G
1 CREAM VAGINAL
Status: DISCONTINUED | OUTPATIENT
Start: 2025-02-13 | End: 2025-02-13 | Stop reason: HOSPADM

## 2025-02-12 RX ORDER — 0.9 % SODIUM CHLORIDE 0.9 %
80 INTRAVENOUS SOLUTION INTRAVENOUS ONCE
Status: DISCONTINUED | OUTPATIENT
Start: 2025-02-12 | End: 2025-02-12

## 2025-02-12 RX ORDER — SODIUM CHLORIDE 0.9 % (FLUSH) 0.9 %
10 SYRINGE (ML) INJECTION PRN
Status: DISCONTINUED | OUTPATIENT
Start: 2025-02-12 | End: 2025-02-12

## 2025-02-12 RX ORDER — IOPAMIDOL 755 MG/ML
75 INJECTION, SOLUTION INTRAVASCULAR
Status: COMPLETED | OUTPATIENT
Start: 2025-02-12 | End: 2025-02-12

## 2025-02-12 RX ORDER — MAGNESIUM SULFATE 1 G/100ML
1000 INJECTION INTRAVENOUS PRN
Status: DISCONTINUED | OUTPATIENT
Start: 2025-02-12 | End: 2025-02-13 | Stop reason: HOSPADM

## 2025-02-12 RX ORDER — ENOXAPARIN SODIUM 100 MG/ML
30 INJECTION SUBCUTANEOUS DAILY
Status: DISCONTINUED | OUTPATIENT
Start: 2025-02-12 | End: 2025-02-13 | Stop reason: HOSPADM

## 2025-02-12 RX ORDER — SODIUM CHLORIDE 0.9 % (FLUSH) 0.9 %
5-40 SYRINGE (ML) INJECTION EVERY 12 HOURS SCHEDULED
Status: DISCONTINUED | OUTPATIENT
Start: 2025-02-12 | End: 2025-02-13 | Stop reason: HOSPADM

## 2025-02-12 RX ORDER — LORAZEPAM 2 MG/ML
0.5 INJECTION INTRAMUSCULAR ONCE
Status: COMPLETED | OUTPATIENT
Start: 2025-02-12 | End: 2025-02-12

## 2025-02-12 RX ORDER — PANTOPRAZOLE SODIUM 40 MG/1
40 TABLET, DELAYED RELEASE ORAL
Status: DISCONTINUED | OUTPATIENT
Start: 2025-02-13 | End: 2025-02-13 | Stop reason: HOSPADM

## 2025-02-12 RX ORDER — SODIUM CHLORIDE 9 MG/ML
INJECTION, SOLUTION INTRAVENOUS CONTINUOUS
Status: ACTIVE | OUTPATIENT
Start: 2025-02-12 | End: 2025-02-13

## 2025-02-12 RX ORDER — VITAMIN B COMPLEX
2000 TABLET ORAL DAILY
Status: DISCONTINUED | OUTPATIENT
Start: 2025-02-12 | End: 2025-02-13 | Stop reason: HOSPADM

## 2025-02-12 RX ORDER — POTASSIUM CHLORIDE 7.45 MG/ML
10 INJECTION INTRAVENOUS PRN
Status: DISCONTINUED | OUTPATIENT
Start: 2025-02-12 | End: 2025-02-13 | Stop reason: HOSPADM

## 2025-02-12 RX ORDER — LEVOTHYROXINE SODIUM 125 UG/1
125 TABLET ORAL DAILY
Status: DISCONTINUED | OUTPATIENT
Start: 2025-02-12 | End: 2025-02-13 | Stop reason: HOSPADM

## 2025-02-12 RX ORDER — M-VIT,TX,IRON,MINS/CALC/FOLIC 27MG-0.4MG
1 TABLET ORAL DAILY
Status: DISCONTINUED | OUTPATIENT
Start: 2025-02-12 | End: 2025-02-13 | Stop reason: HOSPADM

## 2025-02-12 RX ADMIN — Medication 0.5 MG: at 10:01

## 2025-02-12 RX ADMIN — SODIUM CHLORIDE: 9 INJECTION, SOLUTION INTRAVENOUS at 16:02

## 2025-02-12 RX ADMIN — IOPAMIDOL 75 ML: 755 INJECTION, SOLUTION INTRAVENOUS at 09:19

## 2025-02-12 RX ADMIN — CEFTRIAXONE SODIUM 1000 MG: 1 INJECTION, POWDER, FOR SOLUTION INTRAMUSCULAR; INTRAVENOUS at 17:06

## 2025-02-12 RX ADMIN — Medication 80 ML: at 09:20

## 2025-02-12 RX ADMIN — ALLOPURINOL 300 MG: 300 TABLET ORAL at 17:11

## 2025-02-12 RX ADMIN — Medication 2000 UNITS: at 17:10

## 2025-02-12 RX ADMIN — SODIUM CHLORIDE, PRESERVATIVE FREE 10 ML: 5 INJECTION INTRAVENOUS at 09:20

## 2025-02-12 RX ADMIN — LEVOTHYROXINE SODIUM 125 MCG: 125 TABLET ORAL at 17:10

## 2025-02-12 RX ADMIN — CARVEDILOL 6.25 MG: 3.12 TABLET, FILM COATED ORAL at 17:10

## 2025-02-12 RX ADMIN — ATORVASTATIN CALCIUM 40 MG: 40 TABLET, FILM COATED ORAL at 17:10

## 2025-02-12 RX ADMIN — Medication 1 TABLET: at 17:11

## 2025-02-12 RX ADMIN — AZITHROMYCIN DIHYDRATE 500 MG: 250 TABLET, FILM COATED ORAL at 17:11

## 2025-02-12 ASSESSMENT — PAIN SCALES - GENERAL: PAINLEVEL_OUTOF10: 0

## 2025-02-12 ASSESSMENT — PAIN - FUNCTIONAL ASSESSMENT: PAIN_FUNCTIONAL_ASSESSMENT: NONE - DENIES PAIN

## 2025-02-12 NOTE — H&P
[facility]  Office: [phone]  Fawad Elliott DO, Erasto Reed DO, Hemanth Blake DO, Jarad Rome DO, Nga Dominguez MD, Veronica Peña MD, Kiko Ray MD, Gabrielle Sultana MD,  Dm Augustin MD, Chalo Ceja MD, Israel Delarosa MD,  Beatris Moreno DO, Perico Sequeira MD, Brandon Arriaga MD, Baudilio Elliott DO, Tila Moe MD,  Lalito Ignacio DO, Savanna Pepper MD, Helena Key MD, Laura Cortez MD, Nhung Ramos MD,  Elton Cervantes MD, Emily Cote MD, Linda Lozano MD, Mena Vogel MD, Juan Glez MD, Puma Romo MD, Hesham العلي DO, Aj Phillips MD, Beatris Mina MD, Mohsin Reza, MD, Shirley Waterhouse, CNP,  Ida Moya CNP, Hesham Caicedo, CNP,  Judy Chowdary, DNP, Edita Robertson, CNP, Rochelle Mcdonald, CNP, Lillian Tovar, CNP, Christal Goode CNP, PADMINI Allen-C, Sierra Pollock, CNP, Anaid Vega, CNP,  Shantelle Solis, CNP, Nadya Mcdermott, CNP, Guadalupe Arellano, CNP,  Ramandeep Laird, CNS, Arleen Knott CNP, Oralia Zaidi CNP,   Nguyen Paulino, CNP         [facility]   IN-PATIENT SERVICE   [facility]    HISTORY AND PHYSICAL EXAMINATION            Date:   2/12/2025  Patient name:  Yaz Allison  Date of admission:  2/12/2025  8:46 AM  MRN:   [id number]  Account:  [id number]  YOB: 1940  PCP:    Gabe Cintron MD  Room:   [address] [place] [address]  Code Status:    Full Code      History Obtained From:     family member -daughter x 2    History of Present Illness:     Yaz Allison is a 84 y.o. female who presents to the hospital for evaluation of confusion, incomprehensible speech and increased discomfort that started while she was at her rehab facility.  Patient is accompanied by her 2 daughters who are able to provide most of the history.  Patient at baseline is talkative, oriented to person place and time.  This morning, patient was found to be more confused by staff at the rehab facility while they are passing breakfast.   hypercholesterolemia     Regional enteritis of unspecified site     Senile osteoporosis     Sprain of ankle, unspecified site     Unspecified vitamin D deficiency         Past Surgical History:     Past Surgical History:   Procedure Laterality Date    COLONOSCOPY      DILATION AND CURETTAGE OF UTERUS      FEMUR SURGERY Right 2/1/2025    FEMUR INTRAMEDULLARY NAIL STEFANO INSERTION ANTEGRADE  RIGHT performed by Pedro Luis De Oliveira MD at [facility] OR    SPINE SURGERY N/A 6/6/2022    LUMBOSACRAL VERTEBROPLASTY S1 performed by Ryan Engle MD at [facility] OR    UPPER GASTROINTESTINAL ENDOSCOPY N/A 6/14/2019    EGD FOREIGN BODY REMOVAL performed by Ben Ruiz MD at [facility] OR    UPPER GASTROINTESTINAL ENDOSCOPY N/A 5/3/2024    ESOPHAGOGASTRODUODENOSCOPY BIOPSY performed by Page Mejia MD at [facility] ENDO        Medications Prior to Admission:     Prior to Admission medications    Medication Sig Start Date End Date Taking? Authorizing Provider   gabapentin (NEURONTIN) 300 MG capsule Take 1 capsule by mouth 2 times daily for 3 days. 2/6/25 2/9/25  Alyce Hernandez MD   levothyroxine (SYNTHROID) 125 MCG tablet Take 1 tablet by mouth Daily 2/7/25   Alyce Hernandez MD   enoxaparin Sodium (LOVENOX) 30 MG/0.3ML injection Inject 0.3 mLs into the skin daily for 20 doses 2/6/25 2/26/25  Alyce Hernandez MD   atorvastatin (LIPITOR) 40 MG tablet TAKE 1 TABLET BY MOUTH DAILY 1/22/25   Gabe Cintron MD   pantoprazole (PROTONIX) 40 MG tablet TAKE ONE TABLET BY MOUTH EVERY MORNING BEFORE BREAKFAST 12/11/24   Gabe Cintron MD   estradiol (ESTRACE VAGINAL) 0.1 MG/GM vaginal cream Place 1 g vaginally See Admin Instructions Apply pea-sized amount to vaginal opening once daily for 2 weeks, after two weeks switch to twice weekly. 10/21/24   Nathalia Pedro APRN - CNP   cephALEXin (KEFLEX) 250 MG capsule Take 1 capsule by mouth daily For uti prevention 10/21/24   Nathalia Pedro APRN - CNP   carvedilol (COREG) 6.25 MG tablet Take 1 tablet

## 2025-02-12 NOTE — ED PROVIDER NOTES
[facility] Emergency Department  [address]  [facility] [address]  Phone: [phone]  Fax: [phone]      Attending Physician Attestation    I performed a history and physical examination of the patient and discussed management with the mid level provideer. I reviewed the mid level provider's note and agree with the documented findings and plan of care. Any areas of disagreement are noted on the chart. I was personally present for the key portions of any procedures. I have documented in the chart those procedures where I was not present during the key portions. I have reviewed the emergency nurses triage note. I agree with the chief complaint, past medical history, past surgical history, allergies, medications, social and family history as documented unless otherwise noted below. Documentation of the HPI, Physical Exam and Medical Decision Making performed by mid level providers is based on my personal performance of the HPI, PE and MDM. For Physician Assistant/ Nurse Practitioner cases/documentation I have personally evaluated this patient and have completed at least one if not all key elements of the E/M (history, physical exam, and MDM). Additional findings are as noted.      CHIEF COMPLAINT       Chief Complaint   Patient presents with    Altered Mental Status     From [facility], unsure of last know well was noticed when they passed breakfast, pt unclear of complaints, total R hip replacement on 2/1, staff states normally talkative         PAST MEDICAL HISTORY    has a past medical history of Abdominal pain, unspecified site, Acquired cyst of kidney, Acute gouty arthropathy, Allergic rhinitis, cause unspecified, Anxiety state, unspecified, Arthropathy, unspecified, site unspecified, Chronic airway obstruction, not elsewhere classified, Chronic kidney disease, stage III (moderate) ([facility]), Diarrhea, Edema, Esophageal reflux, Essential hypertension, benign, Herpes zoster with other nervous system  NEGATIVE    Ketones, Urine TRACE (A) NEGATIVE mg/dL    Specific Gravity, UA 1.015 1.005 - 1.030    Urine Hgb SMALL (A) NEGATIVE    pH, Urine 8.0 5.0 - 8.0    Protein, UA NEGATIVE NEGATIVE mg/dL    Urobilinogen, Urine Normal 0.0 - 1.0 EU/dL    Nitrite, Urine NEGATIVE NEGATIVE    Leukocyte Esterase, Urine NEGATIVE NEGATIVE   Acetaminophen Level   Result Value Ref Range    Acetaminophen Level <5 (L) 10 - 30 ug/mL   Protime-INR   Result Value Ref Range    Protime 13.5 11.8 - 14.6 sec    INR 1.0 0.9 - 1.2   APTT   Result Value Ref Range    APTT 22.8 (L) 24.0 - 36.0 sec   Magnesium   Result Value Ref Range    Magnesium 1.5 (L) 1.6 - 2.6 mg/dL   Microscopic Urinalysis   Result Value Ref Range    WBC, UA 2 TO 5 0 - 5 /HPF    RBC, UA 5 TO 10 0 - 2 /HPF    Epithelial Cells, UA None 0 - 5 /HPF    Bacteria, UA None None    Other Observations UA (A) NOT REQ.     Utilizing a urinalysis as the only screening method to exclude a potential uropathogen can be unreliable in many patient populations.  Rapid screening tests are less sensitive than culture and if UTI is a clinical possibility, culture should be considered despite a negative urinalysis.     EKG 12 Lead   Result Value Ref Range    Ventricular Rate 95 BPM    Atrial Rate 95 BPM    P-R Interval 164 ms    QRS Duration 68 ms    Q-T Interval 358 ms    QTc Calculation (Bazett) 449 ms    P Axis 101 degrees    R Axis 54 degrees    T Axis 123 degrees   EKG 12 Lead   Result Value Ref Range    Ventricular Rate 71 BPM    Atrial Rate 71 BPM    P-R Interval 204 ms    QRS Duration 74 ms    Q-T Interval 418 ms    QTc Calculation (Bazett) 454 ms    P Axis 43 degrees    R Axis 6 degrees    T Axis 80 degrees         EMERGENCY DEPARTMENT COURSE:   Vitals:    Vitals:    02/12/25 0848 02/12/25 0906 02/12/25 1102 02/12/25 1200   BP: (!) 167/111 (!) 168/95 (!) 171/95 (!) 167/84   Pulse: 95 96 63 68   Resp: 18 20     Temp: 98.8 °F (37.1 °C)      TempSrc: Oral      SpO2: 97% 100% 100% 100%   Weight:  49.9 kg (110 lb)      Height: 1.702 m (5' 7\")        -------------------------  BP: (!) 167/84, Temp: 98.8 °F (37.1 °C), Pulse: 68, Respirations: 20      PERTINENT ATTENDING PHYSICIAN COMMENTS:    Patient presents with altered mental status.  Last known well was dinnertime last evening.  They did not notice she was altered until breakfast time today when she was confabulating and confused.  On my exam she is confused.  She is awake and otherwise alert and protecting her airway.  She does follow some commands.  Will initiate further altered mental status and stroke workup.    Patient doing better on reevaluation.  No obvious acute reason for the altered mental status to be confined.  CT angio shows no obvious flow-limiting lesion.  Will admit.  Hospitalist agreeable to admit.  Family comfortable with this as well    CONSULTS:    None    CRITICAL CARE:     None    PROCEDURES:    None    FINAL IMPRESSION      1. Altered mental status, unspecified altered mental status type    2. Elevated troponin          DISPOSITION/PLAN   DISPOSITION Admitted 02/12/2025 01:07:36 PM               Condition on Disposition    Improved    PATIENT REFERRED TO:  No follow-up provider specified.    DISCHARGE MEDICATIONS:  New Prescriptions    No medications on file       (Please note that portions of this note were completed with a voice recognition program.  Efforts were made to edit the dictations but occasionally words are mis-transcribed.)    Hesham Alford DO, DO  Attending Emergency Physician        Hesham Alford DO  02/12/25 1707

## 2025-02-12 NOTE — ED PROVIDER NOTES
[address]  EMERGENCY DEPARTMENT ENCOUNTER      Pt Name: Yaz Allison  MRN: [id number]  Birthdate 1940  Date of evaluation: 2/12/2025  Provider: CIRO Beach NP  8:30 AM    CHIEF COMPLAINT       Chief Complaint   Patient presents with    Altered Mental Status     From F, unsure of last know well was noticed when they passed breakfast, pt unclear of complaints, total R hip replacement on 2/1, staff states normally talkative         HISTORY OF PRESENT ILLNESS    Yaz Allison is a 84 y.o. female who presents to the emergency department     Patient came to the ER today from [facility].  She was sent and for altered mental status.  Reports that she normally is awake and answers questions appropriately.  She is confused at this time.  Unable to verbalize any complaints.  She is moaning in pain.  She did have her right hip replaced on February 1.  She is afebrile, nontachycardic.    The history is provided by the EMS personnel and medical records. The history is limited by the condition of the patient.       Nursing Notes were reviewed.    REVIEW OF SYSTEMS       Review of Systems   Neurological:         Altered mental status   All other systems reviewed and are negative.      Except as noted above the remainder of the review of systems was reviewed and negative.       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  Efforts were made to edit the dictations but occasionally words are mis-transcribed.)    This note was created with the assistance of a speech recognition program. While intending to generate a timely document that accurately reflects the content of the visit, no guarantee can be provided that every grammatical or spelling mistake has been or will be identified or corrected. Thank you for your understanding.      CIRO Beach - MARCELINO (electronically signed)  GRACIELA TANNER James Dave, APRN - NP  02/14/25 0820

## 2025-02-12 NOTE — ED NOTES
Infiltration Assessment 0 02/12/25 0917   Dressing Status Clean, dry & intact 02/12/25 0917   Dressing Type Transparent 02/12/25 0917   Dressing Intervention New 02/12/25 0917       Meds:  Medications   sodium chloride 0.9 % bolus 80 mL (80 mLs IntraVENous Bolus from Bag 2/12/25 0920)   sodium chloride flush 0.9 % injection 10 mL (10 mLs IntraVENous Given 2/12/25 0920)   iopamidol (ISOVUE-370) 76 % injection 75 mL (75 mLs IntraVENous Given 2/12/25 0919)   LORazepam (ATIVAN) injection 0.5 mg (0.5 mg IntraVENous Given 2/12/25 1001)          Ambulatory Status:  Presents to emergency department  because of falls (Syncope, seizure, or loss of consciousness): No, Age > 70: Yes, Altered Mental Status, Intoxication with alcohol or substance confusion (Disorientation, impaired judgment, poor safety awaremess, or inability to follow instructions): Yes, Impaired Mobility: Ambulates or transfers with assistive devices or assistance; Unable to ambulate or transer.: Yes, Nursing Judgement: Yes    Diagnosis:  DISPOSITION Admitted 02/12/2025 01:07:36 PM   Final diagnoses:   Altered mental status, unspecified altered mental status type   Elevated troponin            Assessment Abnormalities : (List)  Neuro: Confused   Yes[x] No[]    Respiratory : Labored  Yes[] No[x]  Lung Sounds Clear     Cardiac:   Regular  Yes[x] No[]  Irregular Yes[] No[x]    Rhythm: Sinus     :  Incontinent  Yes[x] No[]     Assessment Abnormalities: Unable to understand speech, incontinent briefs, recent hip surgery on R side       Skin Assessment: WDL        Pain Score:  Pain Assessment  Pain Assessment: None - Denies Pain    ISOLATION Status  No active isolations    Labs:  Labs Reviewed   CBC WITH AUTO DIFFERENTIAL - Abnormal; Notable for the following components:       Result Value    RBC 3.00 (*)     Hemoglobin 9.0 (*)     Hematocrit 27.1 (*)     Neutrophils % 75 (*)     Lymphocytes % 17 (*)     All other components within normal limits   COMPREHENSIVE  METABOLIC PANEL W/ REFLEX TO MG FOR LOW K - Abnormal; Notable for the following components:    Sodium 133 (*)     Potassium 3.3 (*)     Chloride 96 (*)     Total Protein 5.9 (*)     Albumin 3.0 (*)     Alkaline Phosphatase 144 (*)     All other components within normal limits   TROPONIN - Abnormal; Notable for the following components:    Troponin, High Sensitivity 31 (*)     All other components within normal limits   TROPONIN - Abnormal; Notable for the following components:    Troponin, High Sensitivity 40 (*)     All other components within normal limits   URINALYSIS WITH REFLEX TO CULTURE - Abnormal; Notable for the following components:    Ketones, Urine TRACE (*)     Urine Hgb SMALL (*)     All other components within normal limits   ACETAMINOPHEN LEVEL - Abnormal; Notable for the following components:    Acetaminophen Level <5 (*)     All other components within normal limits   APTT - Abnormal; Notable for the following components:    APTT 22.8 (*)     All other components within normal limits   MAGNESIUM - Abnormal; Notable for the following components:    Magnesium 1.5 (*)     All other components within normal limits   MICROSCOPIC URINALYSIS - Abnormal; Notable for the following components:    Other Observations UA   (*)     Value: Utilizing a urinalysis as the only screening method to exclude a potential uropathogen can be unreliable in many patient populations.  Rapid screening tests are less sensitive than culture and if UTI is a clinical possibility, culture should be considered despite a negative urinalysis.      All other components within normal limits   PROTIME-INR             ALLERGIES     Penicillins, Seasonal, and Etomidate    CURRENT MEDICATIONS       Previous Medications    ALLOPURINOL (ZYLOPRIM) 300 MG TABLET    TAKE 1 TABLET BY MOUTH DAILY    ATORVASTATIN (LIPITOR) 40 MG TABLET    TAKE 1 TABLET BY MOUTH DAILY    CARVEDILOL (COREG) 6.25 MG TABLET    Take 1 tablet by mouth 2 times daily (with  On exam today the patient is well groomed and has good hygiene.  Patient is calm and cooperative with good eye contact.    Speech is clear and of normal rate, rhythm, and volume.   Thought process: linear and goal directed  Thought content: with no evidence of psychotic beliefs.  Perception: Denies hallucinations.    Mood: "feeling good"    Affect: less constricted than on admission, congruent to mood    Patient denies passive or active suicidal ideation, intent and plan.    Patient denies active aggressive/homicidal ideation, intent or plan.   Patient is Alert and oriented in all spheres. Patient is cognitively grossly intact.   Insight and judgment are limited. Impulse control is intact at this time

## 2025-02-13 ENCOUNTER — TELEPHONE (OUTPATIENT)
Dept: INTERNAL MEDICINE CLINIC | Age: 85
End: 2025-02-13

## 2025-02-13 VITALS
OXYGEN SATURATION: 99 % | DIASTOLIC BLOOD PRESSURE: 56 MMHG | HEIGHT: 67 IN | HEART RATE: 66 BPM | SYSTOLIC BLOOD PRESSURE: 119 MMHG | BODY MASS INDEX: 17.27 KG/M2 | TEMPERATURE: 98.1 F | RESPIRATION RATE: 16 BRPM | WEIGHT: 110 LBS

## 2025-02-13 LAB
ALBUMIN SERPL-MCNC: 2.6 G/DL (ref 3.5–5.2)
ALBUMIN/GLOB SERPL: 1.1 {RATIO} (ref 1–2.5)
ALP SERPL-CCNC: 120 U/L (ref 35–104)
ALT SERPL-CCNC: 5 U/L (ref 5–33)
ANION GAP SERPL CALCULATED.3IONS-SCNC: 10 MMOL/L (ref 9–17)
AST SERPL-CCNC: 26 U/L
B PARAP IS1001 DNA NPH QL NAA+NON-PROBE: NOT DETECTED
B PERT DNA SPEC QL NAA+PROBE: NOT DETECTED
BASOPHILS # BLD: 0.1 K/UL (ref 0–0.2)
BASOPHILS NFR BLD: 1 % (ref 0–2)
BILIRUB DIRECT SERPL-MCNC: 0.3 MG/DL
BILIRUB INDIRECT SERPL-MCNC: 0.5 MG/DL (ref 0–1)
BILIRUB SERPL-MCNC: 0.8 MG/DL (ref 0.3–1.2)
BUN SERPL-MCNC: 11 MG/DL (ref 8–23)
C PNEUM DNA NPH QL NAA+NON-PROBE: NOT DETECTED
CALCIUM SERPL-MCNC: 8.5 MG/DL (ref 8.6–10.4)
CHLORIDE SERPL-SCNC: 101 MMOL/L (ref 98–107)
CO2 SERPL-SCNC: 25 MMOL/L (ref 20–31)
CREAT SERPL-MCNC: 0.8 MG/DL (ref 0.5–0.9)
ECHO BSA: 1.54 M2
EKG ATRIAL RATE: 71 BPM
EKG ATRIAL RATE: 95 BPM
EKG P AXIS: 101 DEGREES
EKG P AXIS: 43 DEGREES
EKG P-R INTERVAL: 164 MS
EKG P-R INTERVAL: 204 MS
EKG Q-T INTERVAL: 358 MS
EKG Q-T INTERVAL: 418 MS
EKG QRS DURATION: 68 MS
EKG QRS DURATION: 74 MS
EKG QTC CALCULATION (BAZETT): 449 MS
EKG QTC CALCULATION (BAZETT): 454 MS
EKG R AXIS: 54 DEGREES
EKG R AXIS: 6 DEGREES
EKG T AXIS: 123 DEGREES
EKG T AXIS: 80 DEGREES
EKG VENTRICULAR RATE: 71 BPM
EKG VENTRICULAR RATE: 95 BPM
EOSINOPHIL # BLD: 0.1 K/UL (ref 0–0.4)
EOSINOPHILS RELATIVE PERCENT: 1 % (ref 1–4)
ERYTHROCYTE [DISTWIDTH] IN BLOOD BY AUTOMATED COUNT: 16.1 % (ref 12.5–15.4)
FLUAV RNA NPH QL NAA+NON-PROBE: NOT DETECTED
FLUBV RNA NPH QL NAA+NON-PROBE: NOT DETECTED
GFR, ESTIMATED: 72 ML/MIN/1.73M2
GLUCOSE SERPL-MCNC: 71 MG/DL (ref 70–99)
HADV DNA NPH QL NAA+NON-PROBE: NOT DETECTED
HCOV 229E RNA NPH QL NAA+NON-PROBE: NOT DETECTED
HCOV HKU1 RNA NPH QL NAA+NON-PROBE: NOT DETECTED
HCOV NL63 RNA NPH QL NAA+NON-PROBE: NOT DETECTED
HCOV OC43 RNA NPH QL NAA+NON-PROBE: NOT DETECTED
HCT VFR BLD AUTO: 24 % (ref 36–46)
HGB BLD-MCNC: 7.9 G/DL (ref 12–16)
HMPV RNA NPH QL NAA+NON-PROBE: NOT DETECTED
HPIV1 RNA NPH QL NAA+NON-PROBE: NOT DETECTED
HPIV2 RNA NPH QL NAA+NON-PROBE: NOT DETECTED
HPIV3 RNA NPH QL NAA+NON-PROBE: NOT DETECTED
HPIV4 RNA NPH QL NAA+NON-PROBE: NOT DETECTED
INR PPP: 1.1 (ref 0.9–1.2)
LYMPHOCYTES NFR BLD: 1 K/UL (ref 1–4.8)
LYMPHOCYTES RELATIVE PERCENT: 15 % (ref 24–44)
M PNEUMO DNA NPH QL NAA+NON-PROBE: NOT DETECTED
MAGNESIUM SERPL-MCNC: 1.6 MG/DL (ref 1.6–2.6)
MCH RBC QN AUTO: 29.8 PG (ref 26–34)
MCHC RBC AUTO-ENTMCNC: 32.9 G/DL (ref 31–37)
MCV RBC AUTO: 90.6 FL (ref 80–100)
MONOCYTES NFR BLD: 0.5 K/UL (ref 0.1–1.2)
MONOCYTES NFR BLD: 8 % (ref 2–11)
NEUTROPHILS NFR BLD: 75 % (ref 36–66)
NEUTS SEG NFR BLD: 5.1 K/UL (ref 1.8–7.7)
PLATELET # BLD AUTO: 321 K/UL (ref 140–450)
PMV BLD AUTO: 6.9 FL (ref 6–12)
POTASSIUM SERPL-SCNC: 3.2 MMOL/L (ref 3.7–5.3)
PROT SERPL-MCNC: 4.9 G/DL (ref 6.4–8.3)
PROTHROMBIN TIME: 14.5 SEC (ref 11.8–14.6)
RBC # BLD AUTO: 2.65 M/UL (ref 4–5.2)
RSV RNA NPH QL NAA+NON-PROBE: NOT DETECTED
RV+EV RNA NPH QL NAA+NON-PROBE: NOT DETECTED
SARS-COV-2 RNA NPH QL NAA+NON-PROBE: NOT DETECTED
SODIUM SERPL-SCNC: 136 MMOL/L (ref 135–144)
SPECIMEN DESCRIPTION: NORMAL
WBC OTHER # BLD: 6.7 K/UL (ref 3.5–11)

## 2025-02-13 PROCEDURE — 85610 PROTHROMBIN TIME: CPT

## 2025-02-13 PROCEDURE — 97116 GAIT TRAINING THERAPY: CPT

## 2025-02-13 PROCEDURE — 6370000000 HC RX 637 (ALT 250 FOR IP): Performed by: INTERNAL MEDICINE

## 2025-02-13 PROCEDURE — 96361 HYDRATE IV INFUSION ADD-ON: CPT

## 2025-02-13 PROCEDURE — 2580000003 HC RX 258: Performed by: INTERNAL MEDICINE

## 2025-02-13 PROCEDURE — 97166 OT EVAL MOD COMPLEX 45 MIN: CPT

## 2025-02-13 PROCEDURE — 96372 THER/PROPH/DIAG INJ SC/IM: CPT

## 2025-02-13 PROCEDURE — 83735 ASSAY OF MAGNESIUM: CPT

## 2025-02-13 PROCEDURE — 80048 BASIC METABOLIC PNL TOTAL CA: CPT

## 2025-02-13 PROCEDURE — 99239 HOSP IP/OBS DSCHRG MGMT >30: CPT | Performed by: INTERNAL MEDICINE

## 2025-02-13 PROCEDURE — 97162 PT EVAL MOD COMPLEX 30 MIN: CPT

## 2025-02-13 PROCEDURE — 80076 HEPATIC FUNCTION PANEL: CPT

## 2025-02-13 PROCEDURE — 93010 ELECTROCARDIOGRAM REPORT: CPT | Performed by: INTERNAL MEDICINE

## 2025-02-13 PROCEDURE — 97535 SELF CARE MNGMENT TRAINING: CPT

## 2025-02-13 PROCEDURE — 96375 TX/PRO/DX INJ NEW DRUG ADDON: CPT

## 2025-02-13 PROCEDURE — 6360000002 HC RX W HCPCS: Performed by: INTERNAL MEDICINE

## 2025-02-13 PROCEDURE — G0378 HOSPITAL OBSERVATION PER HR: HCPCS

## 2025-02-13 PROCEDURE — 36415 COLL VENOUS BLD VENIPUNCTURE: CPT

## 2025-02-13 PROCEDURE — 93970 EXTREMITY STUDY: CPT | Performed by: STUDENT IN AN ORGANIZED HEALTH CARE EDUCATION/TRAINING PROGRAM

## 2025-02-13 PROCEDURE — 85025 COMPLETE CBC W/AUTO DIFF WBC: CPT

## 2025-02-13 PROCEDURE — 99222 1ST HOSP IP/OBS MODERATE 55: CPT | Performed by: NURSE PRACTITIONER

## 2025-02-13 RX ORDER — ALLOPURINOL 300 MG/1
300 TABLET ORAL DAILY
Qty: 100 TABLET | Refills: 2 | Status: SHIPPED | OUTPATIENT
Start: 2025-02-13

## 2025-02-13 RX ORDER — CEFADROXIL 1000 MG/1
1 TABLET ORAL 2 TIMES DAILY
DISCHARGE
Start: 2025-02-13 | End: 2025-02-13

## 2025-02-13 RX ORDER — CEFADROXIL 1000 MG/1
1 TABLET ORAL 2 TIMES DAILY
Qty: 10 TABLET | Refills: 0 | Status: SHIPPED | OUTPATIENT
Start: 2025-02-13 | End: 2025-02-18

## 2025-02-13 RX ADMIN — ATORVASTATIN CALCIUM 40 MG: 40 TABLET, FILM COATED ORAL at 09:08

## 2025-02-13 RX ADMIN — LEVOTHYROXINE SODIUM 125 MCG: 125 TABLET ORAL at 05:31

## 2025-02-13 RX ADMIN — Medication 2000 UNITS: at 09:07

## 2025-02-13 RX ADMIN — ALLOPURINOL 300 MG: 300 TABLET ORAL at 09:08

## 2025-02-13 RX ADMIN — CARVEDILOL 6.25 MG: 3.12 TABLET, FILM COATED ORAL at 09:08

## 2025-02-13 RX ADMIN — Medication 1 TABLET: at 09:09

## 2025-02-13 RX ADMIN — ENOXAPARIN SODIUM 30 MG: 100 INJECTION SUBCUTANEOUS at 09:09

## 2025-02-13 RX ADMIN — AZITHROMYCIN DIHYDRATE 500 MG: 250 TABLET, FILM COATED ORAL at 09:08

## 2025-02-13 RX ADMIN — SODIUM CHLORIDE 200 MG: 9 INJECTION, SOLUTION INTRAVENOUS at 10:21

## 2025-02-13 RX ADMIN — PANTOPRAZOLE SODIUM 40 MG: 40 TABLET, DELAYED RELEASE ORAL at 05:31

## 2025-02-13 RX ADMIN — SODIUM CHLORIDE: 9 INJECTION, SOLUTION INTRAVENOUS at 01:25

## 2025-02-13 ASSESSMENT — PAIN SCALES - GENERAL
PAINLEVEL_OUTOF10: 0
PAINLEVEL_OUTOF10: 0

## 2025-02-13 ASSESSMENT — PAIN SCALES - WONG BAKER: WONGBAKER_NUMERICALRESPONSE: NO HURT

## 2025-02-13 NOTE — CARE COORDINATION
02/13/25 1228   Readmission Assessment   Number of Days since last admission? 1-7 days   Previous Disposition SNF   Who is being Interviewed Caregiver   What was the patient's/caregiver's perception as to why they think they needed to return back to the hospital? Other (Comment)  (no complaints)   Did you visit your Primary Care Physician after you left the hospital, before you returned this time? No   Why weren't you able to visit your PCP? Did not have an appointment   Did you see a specialist, such as Cardiac, Pulmonary, Orthopedic Physician, etc. after you left the hospital? No   Who advised the patient to return to the hospital? Skilled Unit   Does the patient report anything that got in the way of taking their medications? No   In our efforts to provide the best possible care to you and others like you, can you think of anything that we could have done to help you after you left the hospital the first time, so that you might not have needed to return so soon? Other (Comment)  (no complaints)

## 2025-02-13 NOTE — PROGRESS NOTES
Occupational Therapy  Occupational Therapy Initial Evaluation  Facility/Department: [address]   Patient Name: Yaz Allison        MRN: [id number]    : 1940    Date of Service: 2025    Chief Complaint   Patient presents with    Altered Mental Status     From ECF, unsure of last know well was noticed when they passed breakfast, pt unclear of complaints, total R hip replacement on , staff states normally talkative     Past Medical History:  has a past medical history of Abdominal pain, unspecified site, Acquired cyst of kidney, Acute gouty arthropathy, Allergic rhinitis, cause unspecified, Anxiety state, unspecified, Arthropathy, unspecified, site unspecified, Chronic airway obstruction, not elsewhere classified, Chronic kidney disease, stage III (moderate) ([facility]), Diarrhea, Edema, Esophageal reflux, Essential hypertension, benign, Herpes zoster with other nervous system complications(053.19), Herpes zoster without mention of complication, 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  Past Surgical History:  has a past surgical history that includes Dilation and curettage of uterus; Colonoscopy; Upper gastrointestinal endoscopy (N/A, 2019); Spine surgery (N/A, 2022); Upper gastrointestinal endoscopy (N/A, 5/3/2024); and Femur Surgery (Right, 2025).    Discharge Recommendations  Discharge Recommendations: Patient would benefit from continued therapy after discharge  OT Equipment Recommendations  Other: AE/DME recommendations TBD    Assessment  Performance deficits /  Car  Occupation: Retired  Type of Occupation: [email or web address]  IADL Comments: Assist for IADLs  Additional Comments: Per hx lives with 2 dts; one dtr is of poor health however other dtr is healthy and able to assist PRN; son lives nearby as well    Vision/Hearing  Vision  Vision: Impaired  Vision Exceptions: Wears glasses at all times  Hearing  Hearing: Within functional limits    BUE Assessment  Gross Assessment  AROM: Generally decreased, functional (B shoulder abduction ~40 degrees (reports \"I have fibromyalgia\"); B elbow/wrist/hand AROM WFL)  Strength: Generally decreased, functional (BUE MMT 4/5 grossly)  Coordination: Generally decreased, functional (limited GMC secondary to decreased B shoulder AROM; decreased FMC however less diminished than GMC)  Sensation: Intact (Pt reports normal sensation grossly)  Hand Dominance: Right     Objective  Orientation  Overall Orientation Status: Within Functional Limits  Orientation Level: Disoriented to situation;Oriented to time;Oriented to person;Oriented to place  Cognition  Overall Cognitive Status: Exceptions  Arousal/Alertness: Appears intact  Following Commands: Follows multistep commands with increased time;Follows multistep commands with repitition;Follows one step commands consistently  Attention Span: Appears intact  Memory: Decreased recall of recent events  Safety Judgement: Decreased awareness of need for safety;Decreased awareness of need for assistance  Problem Solving: Assistance required to implement solutions;Assistance required to correct errors made;Assistance required to generate solutions;Assistance required to identify errors made;Decreased awareness of errors  Insights: Decreased awareness of deficits  Initiation: Appears intact  Sequencing: Requires cues for some    Activities of Daily Living  Feeding: Setup;Based on clinical judgement  Grooming: Contact guard assistance  Grooming Skilled Clinical Factors: CGA for safety sink side for hand  hygiene  UE Bathing: Stand by assistance;Based on clinical judgement  LE Bathing: Minimal assistance;Based on clinical judgement  UE Dressing: Stand by assistance;Based on clinical judgement  LE Dressing: Minimal assistance  LE Dressing Skilled Clinical Factors: Pt able to flex at torso and initiate threading of BLE through clean brief however assist to complete; pt then pulling to mid thigh; CGA upon standing while pt pulling clothing to waist  Putting On/Taking Off Footwear: Moderate assistance;Based on clinical judgement  Toileting: Contact guard assistance  Toileting Skilled Clinical Factors: CGA for standing clothing management    Balance  Balance  Sitting: Impaired (Static - CGA; dynamic - MIN A)  Standing: Impaired (Static - CGA; dynamic - MIN A)    Transfers/Mobility  Bed mobility  Supine to Sit: Minimal assistance  Scooting: Contact guard assistance  Bed Mobility Comments: HOB slightly elevated; assist for trunk and RLE progression; retired to recliner following eval    Transfers  Sit to stand: Minimal assistance  Stand to sit: Minimal assistance  Transfer Comments: MIN A sit<>stand with use of RW; vc's for hand placement and technique    Toilet Transfers  Toilet - Technique: Ambulating  Equipment Used: Standard toilet (Grab bars)  Toilet Transfer: Minimal assistance  Toilet Transfers Comments: Cues for hand placement and technique    Functional Mobility: Minimal assistance  Functional Mobility Skilled Clinical Factors: MIN A with use of RW from EOB>toilet>sink (standing)>recliner; increased time/effort to complete; mildly unsteady throughout    Patient Education  Patient Education  Education Given To: Patient  Education Provided: Role of Therapy;Transfer Training;Equipment;Plan of Care;Energy Conservation;Fall Prevention Strategies;ADL Adaptive Strategies  Education Method: Verbal;Demonstration  Barriers to Learning: None  Education Outcome: Verbalized understanding;Continued education

## 2025-02-13 NOTE — DISCHARGE INSTR - COC
Continuity of Care Form    Patient Name: Yaz Allison   :  1940  MRN:  [id number]    Admit date:  2025  Discharge date:  25    Code Status Order: Full Code   Advance Directives:   Advance Care Flowsheet Documentation             Admitting Physician:  Beatris SHOEMAKER DO  PCP: Gabe Cintron MD    Discharging [facility]  Discharging Unit Phone Number: [phone]    Emergency Contact:   Extended Emergency Contact Information  Primary Emergency Contact: Cori Allison  Address: [address] [address]  Home Phone: [phone]  Work Phone: [phone]  Mobile Phone: [phone]  Relation: Child  Secondary Emergency Contact: Ashley Allison  Address: [address] [place] [address] [facility]  Home Phone: [phone]  Work Phone: [phone]  Mobile Phone: [phone]  Relation: Child    Past Surgical History:  Past Surgical History:   Procedure Laterality Date    COLONOSCOPY      DILATION AND CURETTAGE OF UTERUS      FEMUR SURGERY Right 2025    FEMUR INTRAMEDULLARY NAIL STEFANO INSERTION ANTEGRADE  RIGHT performed by Pedro Luis De Oliveira MD at [facility] OR    SPINE SURGERY N/A 2022    LUMBOSACRAL VERTEBROPLASTY S1 performed by Ryan Engle MD at [facility] OR    UPPER GASTROINTESTINAL ENDOSCOPY N/A 2019    EGD FOREIGN BODY REMOVAL performed by Ben Ruiz MD at [facility] OR    UPPER GASTROINTESTINAL ENDOSCOPY N/A 5/3/2024    ESOPHAGOGASTRODUODENOSCOPY BIOPSY performed by Page Mejia MD at [facility] ENDO       Immunization History:   Immunization History   Administered Date(s) Administered    COVID-19, MODERNA BLUE border, Primary or Immunocompromised, (age 12y+), IM, 100 mcg/0.5mL 2021, 2021, 2021, 2022    COVID-19, MODERNA Bivalent, (age 12y+), IM, 50 mcg/0.5 mL 2023    COVID-19, MODERNA, (age 12y+), IM, 50mcg/0.5mL 2023    COVID-19, PFIZER,  (age 12y+), IM, 30mcg/0.3mL 11/06/2024    Influenza 11/01/2015    Influenza Virus Vaccine 11/01/2015, 08/28/2020    Influenza, FLUAD, (age 65 y+), IM, Quadv, 0.5mL 08/28/2020, 09/16/2021, 09/21/2022, 09/25/2023    Influenza, FLUAD, (age 65 y+), IM, Trivalent PF, 0.5mL 09/20/2017, 10/01/2018, 09/25/2019    Influenza, FLUARIX, FLULAVAL, FLUZONE (age 6 mo+) and AFLURIA, (age 3 y+), Quadv PF, 0.5mL 08/28/2020    Influenza, FLUZONE High Dose, (age 65 y+), IM, Trivalent PF, 0.5mL 11/30/2015, 09/20/2017, 10/01/2018    Pneumococcal Conjugate 7-valent (Prevnar7) 10/29/2004    Pneumococcal, PCV-13, PREVNAR 13, (age 6w+), IM, 0.5mL 05/28/2015    Pneumococcal, PPSV23, PNEUMOVAX 23, (age 2y+), SC/IM, 0.5mL 07/08/2016       Active Problems:  Patient Active Problem List   Diagnosis Code    Vitamin D deficiency E55.9    Other general symptoms R68.89    Abdominal pain R10.9    Herpes zoster with other nervous system complications B02.29    Herpes zoster B02.9    Sprain of ankle S93.409A    Edema R60.9    Acquired cyst of kidney N28.1    Myalgia and myositis [id number]    COPD (chronic obstructive pulmonary disease) ([facility]) J44.9    Mitral valve disorder I05.9    Allergic rhinitis J30.9    Diarrhea R19.7    Esophageal reflux K21.9    Other psoriasis L40.8    Arthropathy M12.9    Pure hypercholesterolemia E78.00    Anxiety state F41.1    Essential hypertension, benign I10    Chronic kidney disease, stage III (moderate) ([facility]) N18.30    Regional enteritis ([facility]) K50.90    Senile osteoporosis M81.0    Mononeuritis G58.9    Conjunctivitis H10.9    Skin lesion of left leg L98.9    Skin lesion of right leg L98.9    Fibromyalgia M79.7    DJD (degenerative joint disease) of knee M17.9    Gout attack M10.9    Colitis K52.9    Esophageal obstruction due to food impaction T18.128A, W44.F3XA    Pain of right hip joint M25.551    Chronic gout of multiple sites M1A.09X0    Neuropathy G62.9    Acquired hypothyroidism E03.9    Chronic pain of right knee  M25.561, G89.29    Pulmonary nodules R91.8    Sjogren's syndrome with keratoconjunctivitis sicca ([facility]) M35.01    Arthritis of right knee M17.11    Closed fracture of right inferior pubic ramus ([facility]) S32.591A    Acute cystitis without hematuria N30.00    Acute pain of right knee M25.561    Sacral insufficiency fracture with routine healing M84.48XD    Chronic renal disease, stage III ([facility]) [779259] N18.30    Coagulation defect ([facility]) D68.9    GI bleed K92.2    Anemia D64.9    Pulmonary HTN ([facility]) I27.20    History of colitis Z87.19    Iron deficiency anemia D50.9    Iron deficiency E61.1    Closed fracture of right hip ([facility]) S72.001A    Preop cardiovascular exam Z01.810    MDS (myelodysplastic syndrome) ([facility]) D46.9    Fall W19.XXXA    Chronic anemia D64.9    Metabolic encephalopathy G93.41    Hyponatremia E87.1    Acute hypokalemia E87.6    Hypomagnesemia E83.42       Isolation/Infection:   Isolation            No Isolation          Patient Infection Status       None to display                     Nurse Assessment:  Last Vital Signs: BP (!) 119/56   Pulse 66   Temp 98.1 °F (36.7 °C)   Resp 16   Ht 1.702 m (5' 7\")   Wt 49.9 kg (110 lb)   SpO2 99%   BMI 17.23 kg/m²     Last documented pain score (0-10 scale): Pain Level: 0  Last Weight:   Wt Readings from Last 1 Encounters:   02/12/25 49.9 kg (110 lb)     Mental Status:  oriented and alert    IV Access:  - None    Nursing Mobility/ADLs:  Walking   Assisted  Transfer  Assisted  Bathing  Assisted  Dressing  Independent  Toileting  Assisted  Feeding  Independent  Med Admin  Assisted  Med Delivery   whole    Wound Care Documentation and Therapy:  Incision 02/01/25 Thigh Right (Active)   Dressing Status Clean;Dry;Intact 02/13/25 0730   Dressing Change Due 02/05/25 02/06/25 0820   Dressing/Treatment Dry dressing;Tegaderm/transparent film dressing 02/13/25 0730   Closure Sutures;Steri-Strips 02/06/25 0820   Incision Assessment Other (Comment) 02/06/25 0820   Drainage

## 2025-02-13 NOTE — CARE COORDINATION
Case Management Assessment  Initial Evaluation    Date/Time of Evaluation: 2/13/2025 2:53 PM  Assessment Completed by: Sania Das RN    If patient is discharged prior to next notation, then this note serves as note for discharge by case management.    Patient Name: Yaz Allison                   YOB: 1940  Diagnosis: Encephalopathy [G93.40]  Elevated troponin [R79.89]  Altered mental status, unspecified altered mental status type [R41.82]                   Date / Time: 2/12/2025  8:46 AM    Patient Admission Status: Inpatient   Readmission Risk (Low < 19, Mod (19-27), High > 27): Readmission Risk Score: 22.8    Current PCP: Gabe Cintron MD  PCP verified by CM? Yes    Chart Reviewed: Yes      History Provided by: Patient, Medical Record, Child/Family  Patient Orientation: Alert and Oriented, Person, Place    Patient Cognition: Alert    Hospitalization in the last 30 days (Readmission):  Yes    If yes, Readmission Assessment in  Navigator will be completed.    Advance Directives:      Code Status: Full Code   Patient's Primary Decision Maker is: Legal Next of Kin    Primary Decision Maker: EstefanyCori Child - [phone]    Primary Decision Maker: EstefanyFelicitaAshley [place] Child - [phone]    Primary Decision Maker: MikeLevi laguerre [place] Child - [phone]    Primary Decision Maker: Venice Allison    Discharge Planning:    Patient lives with: Children Type of Home: House  Primary Care Giver: Family  Patient Support Systems include: Children, Family Members   Current Financial resources: Medicare  Current community resources:    Current services prior to admission: Durable Medical Equipment, Skilled Nursing Facility            Current DME: Walker, Cane, Wheelchair            Type of Home Care services:  None    ADLS  Prior functional level: Assistance with the following:, Cooking, Housework, Shopping, Mobility  Current functional level: Assistance with the following:, Cooking, Housework, Shopping,

## 2025-02-13 NOTE — ACP (ADVANCE CARE PLANNING)
Advance Care Planning     Advance Care Planning Activator (Inpatient)  Conversation Note      Date of ACP Conversation: 2/13/2025     Conversation Conducted with: Patient with Decision Making Capacity    ACP Activator: Sania Das RN    {Health Care Decision Maker:     Current Designated Health Care Decision Maker:     Primary Decision Maker: Cori Allison - Child - [phone]    Primary Decision Maker: Ashley Allison - Child - [phone]    Primary Decision Maker: Levi Allison - Child - [phone]    Primary Decision Maker: Venice Allison - James  Click here to complete Healthcare Decision Makers including section of the Healthcare Decision Maker Relationship (ie \"Primary\")  Today we documented Decision Maker(s) consistent with Legal Next of Kin hierarchy.    Care Preferences    Ventilation:  \"If you were in your present state of health and suddenly became very ill and were unable to breathe on your own, what would your preference be about the use of a ventilator (breathing machine) if it were available to you?\"      Would the patient desire the use of ventilator (breathing machine)?: yes    \"If your health worsens and it becomes clear that your chance of recovery is unlikely, what would your preference be about the use of a ventilator (breathing machine) if it were available to you?\"     Would the patient desire the use of ventilator (breathing machine)?: Yes      Resuscitation  \"CPR works best to restart the heart when there is a sudden event, like a heart attack, in someone who is otherwise healthy. Unfortunately, CPR does not typically restart the heart for people who have serious health conditions or who are very sick.\"    \"In the event your heart stopped as a result of an underlying serious health condition, would you want attempts to be made to restart your heart (answer \"yes\" for attempt to resuscitate) or would you prefer a natural death (answer \"no\" for do not attempt to resuscitate)?\" yes      Length of ACP  Conversation in minutes:  5    Conversation Outcomes:  ACP discussion completed

## 2025-02-13 NOTE — PROGRESS NOTES
[facility]  Office: [phone]  Fawad Elliott DO, Erasto Reed DO, Hemanth Blake DO, Jarad Rome DO, Nga Dominguez MD, Veronica Peña MD, Kiko Ray MD, Gabrielle Sultana MD,  Dm Augustin MD, Chalo Ceja MD, Israel Delarosa MD,  Beatris Moreno DO, Perico Sequeira MD, Brandon Arriaga MD, Baudilio Elliott DO, Tila Moe MD,  Lalito Ignacio DO, Savanna Pepper MD, Helena Key MD, Laura Cortez MD, Nhung Ramos MD,  Elton Cervantes MD, Emily Cote MD, Linda Lozano MD, Mena Vogel MD, Juan Glez MD, Puma Romo MD, Hesham العلي DO, Aj Phillips MD, Beatris Mina MD, Mohsin Reza, MD, Shirley Waterhouse, CNP,  Ida Moya CNP, Hesham Caicedo, CNP,  Judy Chowdary, MIREYA, Edita Robertson, CNP, Rochelle Mcdonald, CNP, Lillian Tovar, CNP, Christal Goode CNP, Itzel Morton PA-C, Sierra Pollock, CNP, Anaid Vega, CNP,  Shantelle Solis, CNP, Nadya Mcdermott, CNP, Guadalupe Arellano, CNP,  Ramandeep Laird, CNS, Arleen Knott CNP, Oralia Zaidi CNP,   Nguyen Paulino, CNP         [facility]   IN-PATIENT SERVICE   [facility]    Progress Note    2/13/2025    8:44 AM    Name:   Yaz Allison  MRN:     [id number]     Acct:      [id number]   Room:   346/346-[facility] Day:  1  Admit Date:  2/12/2025  8:46 AM    PCP:   Gabe Cintron MD  Code Status:  Full Code    Subjective:     Patient much better today.  He is awake, alert, oriented.  No acute distress.  No fevers or chills.  She feels like her mouth is dry.  Potassium is 3.2.  No chest pain hemoglobin appears near base      Brief History:     This is an 84-year-old female who presents to the hospital for evaluation of acute alteration in awareness.  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  Potassium was 3.3, sodium 133 on admission with a magnesium of 1.5.  CBC did show findings  acetaminophen **OR** acetaminophen    Data:     Vitals:  /60   Pulse 68   Temp 98.8 °F (37.1 °C) (Oral)   Resp 16   Ht 1.702 m (5' 7\")   Wt 49.9 kg (110 lb)   SpO2 97%   BMI 17.23 kg/m²   Temp (24hrs), Av.8 °F (37.1 °C), Min:98.8 °F (37.1 °C), Max:98.8 °F (37.1 °C)    No results for input(s): \"POCGLU\" in the last 72 hours.    I/O (24Hr):    Intake/Output Summary (Last 24 hours) at 2025 0844  Last data filed at 2025 0541  Gross per 24 hour   Intake 240 ml   Output 350 ml   Net -110 ml       Labs:  Hematology:  Recent Labs     25  0850 25  0509   WBC 7.5 6.7   RBC 3.00* 2.65*   HGB 9.0* 7.9*   HCT 27.1* 24.0*   MCV 90.5 90.6   MCH 30.1 29.8   MCHC 33.3 32.9   RDW 15.4 16.1*    321   MPV 6.7 6.9   INR 1.0 1.1     Chemistry:  Recent Labs     25  0850 25  1056 25  0509   *  --  136   K 3.3*  --  3.2*   CL 96*  --  101   CO2 25  --  25   GLUCOSE 95  --  71   BUN 10  --  11   CREATININE 0.8  --  0.8   MG 1.5*  --  1.6   ANIONGAP 12  --  10   LABGLOM 72  --  72   CALCIUM 9.4  --  8.5*   TROPHS 31* 40*  --      Recent Labs     25  0850 25  0509   AST 29 26   ALT 9 5   ALKPHOS 144* 120*   BILITOT 1.2 0.8   BILIDIR  --  0.3*     ABG:No results found for: \"POCPH\", \"PHART\", \"PH\", \"POCPCO2\", \"HPE1TVU\", \"PCO2\", \"POCPO2\", \"PO2ART\", \"PO2\", \"POCHCO3\", \"YVS6RWW\", \"HCO3\", \"NBEA\", \"PBEA\", \"BEART\", \"BE\", \"THGBART\", \"THB\", \"BTI2WFB\", \"GPRR1RXE\", \"L1BPGZYO\", \"O2SAT\", \"FIO2\"  Lab Results   Component Value Date/Time    SPECIAL LT ARM, 10ML 2025 03:58 PM     Lab Results   Component Value Date/Time    CULTURE NO GROWTH <24 HRS 2025 03:58 PM       Radiology:  MRI BRAIN WO CONTRAST    Result Date: 2025  No acute intracranial abnormality. 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 Mild chronic microvascular disease.     XR CHEST PORTABLE    Result Date: 2025  Small bilateral

## 2025-02-13 NOTE — PROGRESS NOTES
Patient stable for discharge.  Patient and daughters verbalized understanding of all discharge instructions.  Patient belongings gathered and carried down with daughters. Patient wheeled down to daughters car to be driven home by daughter.

## 2025-02-13 NOTE — PROGRESS NOTES
Physical Therapy  Facility/Department: [address]   Physical Therapy Initial Evaluation    Patient Name: Yaz Allison        MRN: [id number]    : 1940    Date of Service: 2025    Chief Complaint   Patient presents with    Altered Mental Status     From ECF, unsure of last know well was noticed when they passed breakfast, pt unclear of complaints, total R hip replacement on , staff states normally talkative     Past Medical History:  has a past medical history of Abdominal pain, unspecified site, Acquired cyst of kidney, Acute gouty arthropathy, Allergic rhinitis, cause unspecified, Anxiety state, unspecified, Arthropathy, unspecified, site unspecified, Chronic airway obstruction, not elsewhere classified, Chronic kidney disease, stage III (moderate) (HCC), Diarrhea, Edema, Esophageal reflux, Essential hypertension, benign, Herpes zoster with other nervous system complications(053.19), Herpes zoster without mention of complication, 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  Past Surgical History:  has a past surgical history that includes Dilation and curettage of uterus; Colonoscopy; Upper gastrointestinal endoscopy (N/A, 2019); Spine surgery (N/A, 2022); Upper gastrointestinal endoscopy (N/A, 5/3/2024); and Femur Surgery (Right, 2025).    Discharge Recommendations  Discharge Recommendations: Patient would benefit from continued therapy after discharge  PT Equipment Recommendations  Equipment Needed: No    Assessment  Body Structures, Functions, Activity Limitations  Requiring Skilled Therapeutic Intervention: Decreased functional mobility , Decreased balance, Decreased endurance, Decreased safe awareness  Assessment: The patient was admitted from SNF with metabolic encephalopathy She was at the SNF for therapy after sustaining a R femur fracture in the recent past. She is WBAT on RLE. During evaluation, she was able to perform bed mobility with Adams, transfers with RW with Adams, and ambulate 10ft + 15ft with RW with Adams. She would be appropriate to return to SNF for continued rehabilitation. She would benefit from acute PT in order to improve functional independence back to baseline.  Therapy Prognosis: Good  Decision Making: Medium Complexity  Requires PT Follow-Up: Yes  Activity Tolerance  Activity Tolerance: Patient tolerated evaluation without incident  Safety Devices  Type of Devices: Call light within reach, Chair alarm in place, Gait belt, Left in chair, Nurse notified  Restraints  Restraints Initially in Place: No    AM-PAC  AM-[facility] Basic Mobility - Inpatient   How much help is needed turning from your back to your side while in a flat bed without using bedrails?: A Little  How much help is needed moving from lying on your back to sitting on the side of a flat bed without using bedrails?: A Little  How much help is needed moving to and from a bed to a chair?: A Little  How much help is needed standing up from a chair using your arms?: A Little  How much help is needed walking in hospital room?: A Little  How much help is needed climbing 3-5 steps with a railing?: Total  AM-PAC Inpatient Mobility Raw Score : 16  AM-PAC Inpatient T-Scale Score : 40.78  Mobility Inpatient CMS 0-100% Score: 54.16  Mobility Inpatient CMS G-Code Modifier : CK    Restrictions/Precautions  Restrictions/Precautions  Restrictions/Precautions: General Precautions, Fall Risk, Bed Alarm, Weight Bearing  Activity Level: Up with Assist  Required Braces or Orthoses?: No  Lower Extremity Weight Bearing

## 2025-02-13 NOTE — CONSULTS
NEUROLOGY INPATIENT CONSULT NOTE      2/13/2025     Reason for Consult: AMS    Requesting Provider: Dr. Moreno        I had the pleasure of seeing your patient as a neurology consultation. As you would recall Yaz Allison is a  84 y.o. female with H/O anxiety, CKD, herpes zoster, HLD, who was admitted on 2/12/2025 with Encephalopathy [G93.40]  Elevated troponin [R79.89]  Altered mental status, unspecified altered mental status type [R41.82].      The patient presented to the hospital for evaluation of confusion with incomprehensible speech that started at her rehab facility.  Patient's daughters report that at baseline she is oriented and talkative.  She was found to be more confused by staff at her facility during morning breakfast past; no reported seizure-like activity.  There is no history of prior stroke or seizure.  She was recently discharged from [facility] where she was treated for a right femur IT fracture.  CTA head and neck showed no significant stenosis or LVO with a 40-50% stenosis of the proximal left ICA.  MRI brain with no acute stroke.  UA not consistent with infection.  Respiratory viral panel negative.  Chest x-ray with concern for underlying pneumonia and she was started on antibiotics.  Neurology is consulted for further evaluation of patient's altered mentation. Nursing staff report she is much improved today, is alert and oriented and back to baseline. She has no acute neurologic complaints.      No current facility-administered medications on file prior to encounter.     Current Outpatient Medications on File Prior to Encounter   Medication Sig Dispense Refill    gabapentin (NEURONTIN) 300 MG capsule Take 1 capsule by mouth 2 times daily for 3 days. 6 capsule 0    levothyroxine (SYNTHROID) 125 MCG tablet Take 1 tablet by mouth Daily 30 tablet 3    enoxaparin Sodium (LOVENOX) 30 MG/0.3ML injection Inject 0.3 mLs into the skin daily for 20 doses 6 mL 0    atorvastatin (LIPITOR) 40  with normal bulk, normal tone and no involuntary movements, no tremor   SENSORY FUNCTION:  Normal touch, normal pin   CEREBELLAR FUNCTION:  Intact fine motor control over upper limbs   REFLEX FUNCTION:  Symmetric, no perverted reflex, no Babinski sign   STATION and GAIT  Not tested       Data:    Lab Results:   CBC:   Recent Labs     02/12/25  0850 02/13/25  0509   WBC 7.5 6.7   HGB 9.0* 7.9*    321     BMP:    Recent Labs     02/12/25  0850 02/13/25  0509   * 136   K 3.3* 3.2*   CL 96* 101   CO2 25 25   BUN 10 11   CREATININE 0.8 0.8   GLUCOSE 95 71         Lab Results   Component Value Date    CHOL 139 08/05/2024    HDL 45 08/05/2024    TRIG 111 08/05/2024    ALT 5 02/13/2025    AST 26 02/13/2025    TSH 26.40 (H) 01/31/2025    INR 1.1 02/13/2025    [id number] 318 02/03/2025    MG 1.6 02/13/2025           Diagnostic data reviewed:  CT HEAD (2/12/25) -  Atrophy without acute intracranial abnormality.     Remote lacunar infarct in the right caudate head.      CTA HEAD AND NECK (2/12/25) -  Approximately 40-50% stenosis of the proximal left internal carotid artery.     No significant stenosis or evidence for large vessel occlusion intracranially.      BRAIN MRI (2/12/25) -  No acute intracranial abnormality.     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     Mild chronic microvascular disease.                    Impression:  -Acute metabolic encephalopathy in the setting of possible pneumonia as well as electrolyte derangement; improved    Plan:  -She remains on antibiotics and is back to baseline with no neurologic complaint  -No further inpatient neurologic testing at this time. We will sign off. Please call with any questions. Thank you for the consult.    Please note that this note was generated using a voice recognition dictation software. Although every effort was made to ensure the accuracy of this automated transcription, some

## 2025-02-13 NOTE — DISCHARGE INSTRUCTIONS
-Encourage oral intake  -Avoid narcotic  -PT OT  -Activity as tolerated with assistance  -Return to hospital as needed  -Monitor for any signs of infection

## 2025-02-13 NOTE — DISCHARGE SUMMARY
[facility]  Office: [phone]  Fawad Elliott DO, Erasto Reed DO, Hemanth Blake DO, Jarad Rome DO, Nga Dominguez MD, Veronica Peña MD, Kiko Ray MD, Gabrielle Sultana MD,  Dm Augustin MD, Chalo Ceja MD, Israel Delarosa MD,  Beatris Moreno DO, Perico Sequeira MD, Brandon Arriaga MD, Baudilio Elliott DO, Tila Moe MD,  Lalito Ignacio DO, Savanna Pepper MD, Helena Key MD, Laura Cortez MD, Nhung Ramos MD,  Elton Cervantes MD, Emily Cote MD, Linda Lozano MD, Mena Vogel MD, Juan Glez MD, Puma Romo MD, Hesham العلي DO, Aj Phillips MD, Beatris Mina MD, Mohsin Reza, MD, Shirley Waterhouse, CNP,  Ida Moya CNP, Hesham Caicedo, CNP,  Judy Chowdary, DNP, Edita Robertson, CNP, Rochelle Mcdonald, CNP, Lillian Tovar, CNP, Christal Goode, CNP, Itzel Morton PA-C, Sierra Pollock, CNP, Anaid Vega, CNP,  Shantelle Solis, CNP, Nadya Mcdermott, CNP, Guadalupe Arellano, CNP,  Ramandeep Laird, CNS, Arleen Knott CNP, Oralia Zaidi CNP,   Nguyen Paulino, CNP         [facility]   IN-PATIENT SERVICE   [facility]    Discharge Summary     Patient ID: Yaz Allison  :  1940   MRN: [id number]     ACCOUNT:  [id number]   Patient's PCP: Gabe Cintron MD  Admit Date: 2025   Discharge Date: 2025     Length of Stay: 1  Code Status:  Full Code  Admitting Physician: Beatris SHOEMAKER DO  Discharge Physician: Beatris Moreno DO     Active Discharge Diagnoses:     Hospital Problem Lists:  Principal Problem:    Metabolic encephalopathy  Active Problems:    Chronic anemia    Hyponatremia    Acute hypokalemia    Hypomagnesemia  Resolved Problems:    * No resolved hospital problems. *      Admission Condition:  stable     Discharged Condition: stable    Hospital Stay:     Hospital Course:  Yaz Allison is [id number] female who presents to the hospital for evaluation of acute alteration in awareness. MRI of her brain was negative  prevention  Start taking on: February 19, 2025  What changed: These instructions start on February 19, 2025. If you are unsure what to do until then, ask your doctor or other care provider.            CONTINUE taking these medications      allopurinol 300 MG tablet  Commonly known as: ZYLOPRIM  TAKE 1 TABLET BY MOUTH DAILY     atorvastatin 40 MG tablet  Commonly known as: LIPITOR  TAKE 1 TABLET BY MOUTH DAILY     carvedilol 6.25 MG tablet  Commonly known as: COREG  Take 1 tablet by mouth 2 times daily (with meals)     enoxaparin Sodium 30 MG/0.3ML injection  Commonly known as: LOVENOX  Inject 0.3 mLs into the skin daily for 20 doses     estradiol 0.1 MG/GM vaginal cream  Commonly known as: ESTRACE VAGINAL  Place 1 g vaginally See Admin Instructions Apply pea-sized amount to vaginal opening once daily for 2 weeks, after two weeks switch to twice weekly.     Hair/Skin/Nails Tabs     levothyroxine 125 MCG tablet  Commonly known as: SYNTHROID  Take 1 tablet by mouth Daily     pantoprazole 40 MG tablet  Commonly known as: PROTONIX  TAKE ONE TABLET BY MOUTH EVERY MORNING BEFORE BREAKFAST     vitamin D 50 MCG (2000 UT) Caps capsule  Commonly known as: CHOLECALCIFEROL            STOP taking these medications      gabapentin 300 MG capsule  Commonly known as: NEURONTIN            ASK your doctor about these medications      nystatin 604165 UNIT/GM powder  Commonly known as: MYCOSTATIN  Apply 3 times daily.               Where to Get Your Medications        These medications were sent to [facility] - [facility] [address] [place] [address] [phone] - F [phone]  [address] [address]      Phone: [phone]   allopurinol 300 MG tablet       Information about where to get these medications is not yet available    Ask your nurse or doctor about these medications  cefadroxil 1 g tablet  cephALEXin 250 MG capsule         No discharge procedures on file.    Time Spent on  discharge is  33 mins in patient examination, evaluation, counseling as well as medication reconciliation, prescriptions for required medications, discharge plan and follow up.    Electronically signed by   Beatris Moreno DO  2/13/2025  1:42 PM      Thank you Gabe Weiner MD for the opportunity to be involved in this patient's care.

## 2025-02-13 NOTE — CARE COORDINATION
CM spoke with daughter Cori, plan is home with [facility], request referral to [facility], has used service in past referral sent, declined list.    1300 Rec'd call from [facility] @ [facility] they can accept.    1330 CM spoke with [facility] [place] they can accept patient, may need new precert.  Updated that patient may be going home with [facility]>    1405 CM spoke with [place] @ [facility] patient can return to [facility] today under same precert if transport by 1159 today.    1410 Cm spoke with family and patient updated on the above  Plan is home with [facility].     CM discussed  RPM with family provided pamphlet for review. They  interested in service but would like to know cost,  email sent to ([email or web address] for follow.    1436 CM Teo Rn notified of plan and erika needs completed. [facility]      PS to Dr. Moreno to updated erika and place order for [facility].     [Coronary Artery Disease] : coronary artery disease

## 2025-02-13 NOTE — PLAN OF CARE
Problem: Safety - Adult  Goal: Free from fall injury  Outcome: Progressing       Problem: Discharge Planning  Goal: Discharge to home or other facility with appropriate resources  Outcome: Progressing  Flowsheets  Taken 2/12/2025 2001 by Charissa Bains RN  Discharge to home or other facility with appropriate resources:   Identify barriers to discharge with patient and caregiver   Arrange for needed discharge resources and transportation as appropriate   Identify discharge learning needs (meds, wound care, etc)   Refer to discharge planning if patient needs post-hospital services based on physician order or complex needs related to functional status, cognitive ability or social support system  Problem: Pain  Goal: Verbalizes/displays adequate comfort level or baseline comfort level  Outcome: Progressing     Problem: ABCDS Injury Assessment  Goal: Absence of physical injury  Outcome: Progressing  Flowsheets (Taken 2/12/2025 1620 by Teo Wyman RN)  Absence of Physical Injury: Implement safety measures based on patient assessment     Problem: Skin/Tissue Integrity  Goal: Skin integrity remains intact  Description: 1.  Monitor for areas of redness and/or skin breakdown  2.  Assess vascular access sites hourly  3.  Every 4-6 hours minimum:  Change oxygen saturation probe site  4.  Every 4-6 hours:  If on nasal continuous positive airway pressure, respiratory therapy assess nares and determine need for appliance change or resting period  Outcome: Progressing  Flowsheets  Taken 2/12/2025 2001 by Charissa Bains RN  Skin Integrity Remains Intact:   Monitor for areas of redness and/or skin breakdown   Assess vascular access sites hourly   Every 4-6 hours: If on nasal continuous positive airway pressure, respiratory therapy assesses nares and determine need for appliance change or resting period   Every 4-6 hours minimum: Change oxygen saturation probe site  Taken 2/12/2025 1620 by Teo Wyman RN  Skin Integrity Remains

## 2025-02-13 NOTE — TELEPHONE ENCOUNTER
[facility] home care  Katia calling to verify if Dr Cintron will be the following home care physician for orders. Confirmed.

## 2025-02-13 NOTE — DISCHARGE INSTR - DIET

## 2025-02-14 ENCOUNTER — CARE COORDINATION (OUTPATIENT)
Dept: CARE COORDINATION | Age: 85
End: 2025-02-14

## 2025-02-14 NOTE — CARE COORDINATION
Care Transitions Note    Initial Call - Call within 2 business days of discharge: Yes    Patient Current Location:  Home: [address]  [place] [id number]    Care Transition Nurse contacted the patient by telephone to perform post hospital discharge assessment, verified name and  as identifiers. Provided introduction to self, and explanation of the Care Transition Nurse role.     Patient: Yaz Allison    Patient : 1940   MRN: [id number]    Reason for Admission: metabolic encephalopathy  Discharge Date: 25  RURS: Readmission Risk Score: 22.9      Last Discharge Facility       Date Complaint Diagnosis Description Type Department Provider    25 Altered Mental Status Altered mental status, unspecified altered mental status type ... ED to Hosp-Admission (Discharged) (ADMITTED) [facility] Beatris Moreno, DO; Bryson...            Was this an external facility discharge? No    Additional needs identified to be addressed with provider   High priority: Patient states she is not taking lovenox, this would have been continued after last admit until  however states she only took while at SNF I have notified orthopedic surgery.  Gabapentin was discontinued per AVS, however patient states she needs this for shingles pain and intends to continue.  Patient declines HFU appt and prefers to keep 3/17 appt.               Method of communication with provider: none.    Patients top risk factors for readmission: medical condition-     Interventions to address risk factors:   Education: When to contact MD/ seek emergency care.  Review of patient management of conditions/medications: metabolic encephalopathy,     Care Summary Note: Patient reached for GEE call. States doing well after discharge metabolic encephalopathy/ pneumonia. [facility] has called to confirm accepted, they will call back to schedule. Reviewed PCP HFU, no appt available within 7 day window. Will route chart to PCP [facility]. Med rec

## 2025-02-16 LAB
MICROORGANISM SPEC CULT: NORMAL
SERVICE CMNT-IMP: NORMAL
SPECIMEN DESCRIPTION: NORMAL

## 2025-02-17 LAB
MICROORGANISM SPEC CULT: NORMAL
SERVICE CMNT-IMP: NORMAL
SPECIMEN DESCRIPTION: NORMAL

## 2025-02-18 ENCOUNTER — TELEPHONE (OUTPATIENT)
Dept: UROLOGY | Age: 85
End: 2025-02-18

## 2025-02-18 LAB
MICROORGANISM SPEC CULT: NORMAL
SERVICE CMNT-IMP: NORMAL
SPECIMEN DESCRIPTION: NORMAL

## 2025-02-18 NOTE — TELEPHONE ENCOUNTER
Patient called stated that she is having frequency urination and is getting up every 15 to 20min every night. Patient states that she broke her hip and she not able to get out the house. She believe she have a UTI and would like you to send her some anabiotic to the Pharmacy.

## 2025-02-18 NOTE — TELEPHONE ENCOUNTER
Patient follows with us for recurrent UTIs. She had a UA 2/12 which was unremarkable. It does appear she was discharged home with duricef, which is an antibiotic. She was also sent home with keflex 250mg once daily for prevention to start tomorrow.     Did she complete the duricef course and is she having any other UTI symptoms? Also ask if someone  a urine specimen from our office so she can do it at home?        With her recent hip surgery- please advise that she needs to elevate legs at least 1 hr prior to bedtime and also advise that she decreases her fluid intake 2 hours prior to bed. This will likely help her night time symptoms

## 2025-02-18 NOTE — TELEPHONE ENCOUNTER
Patient was informed that she was discharged home with duricef, which is an antibiotic. She was also sent home with keflex 250mg once daily for prevention to start tomorrow.   Patient state she is is only having the frequent urination and no other UTI symptoms? Ms. Allison, will send someone up to the clinic to  a urine specimen from our office so she can do it at home? She will Do the urine test 2 days after her last medication.  Patient was also informed With her recent hip surgery- please advise that she needs to elevate legs at least 1 hr prior to bedtime and also advise that she decreases her fluid intake 2 hours prior to bed. This will likely help her night time symptoms

## 2025-02-19 ENCOUNTER — CARE COORDINATION (OUTPATIENT)
Dept: CARE COORDINATION | Age: 85
End: 2025-02-19

## 2025-02-19 NOTE — CARE COORDINATION
Care Transitions Note    Follow Up Call     Patient Current Location:  Home: [address]  [place] [address]    Care Transition Nurse contacted the patient by telephone. Verified name and  as identifiers.    Additional needs identified to be addressed with provider                    Method of communication with provider: none.    Care Summary Note: Patient reached for follow up. States doing well, she denies respiratory or neurological symptoms of concern. She will follow up with orthopedic surgery tomorrow. PCP HFU is scheduled for . [facility] is in home and she has started physical therapy. She has followed up with urology re: urinary frequency, confirms keflex 250 mg daily for UTI prevention. Confirms has assistance in home from family. Educated to use [facility] as resource for non-emergent needs, reviewed need to seek emergency care for chest pain, shortness of breath or changes to mentation. Patient verbalizes understanding, denies further needs and agrees to follow up next week.     Plan of care updates since last contact:  Education: when to seek emergency care  Review of patient management of conditions/medications: symptoms, HFU appts, antibiotic  Home Health: [facility]         Advance Care Planning:   Does patient have an Advance Directive: patient declined education.    Medication Review:  Full medication reconciliation completed during previous call.    Remote Patient Monitoring:  Offered patient enrollment in the Remote Patient Monitoring (RPM) program for in-home monitoring: Yes, but did not enroll at this time: controlled chronic disease management.    Assessments:  Care Transitions Subsequent and Final Call    Schedule Follow Up Appointment with PCP: Completed  Subsequent and Final Calls  Do you have any ongoing symptoms?: No  Have your medications changed?: No  Do you have any questions related to your medications?: No  Do you currently have any active services?: No  Do you have any needs or

## 2025-02-20 ENCOUNTER — OFFICE VISIT (OUTPATIENT)
Dept: ORTHOPEDIC SURGERY | Age: 85
End: 2025-02-20

## 2025-02-20 VITALS — RESPIRATION RATE: 14 BRPM | BODY MASS INDEX: 17.27 KG/M2 | WEIGHT: 110 LBS | HEIGHT: 67 IN

## 2025-02-20 DIAGNOSIS — S72.141S CLOSED INTERTROCHANTERIC FRACTURE OF HIP, RIGHT, SEQUELA: ICD-10-CM

## 2025-02-20 DIAGNOSIS — Z98.890 STATUS POST HIP SURGERY: Primary | ICD-10-CM

## 2025-02-20 DIAGNOSIS — M25.551 RIGHT HIP PAIN: ICD-10-CM

## 2025-02-20 NOTE — PROGRESS NOTES
Procedure: Right hip intertrochanteric femur fracture surgical stabilization with intramedullary nail  Date of Procedure: 2/1/2025    HPI: Yaz Allison is a 84 y.o. old female who is approximately 3 weeks sp the aforementioned procedure. she reports that she is doing relatively well. her pain is adequately controlled. she denies having any numbness/tingling, fevers, chills, or sweats.  Patient did have about a week stay at a rehab facility but is now back home.  She did have an ED visit and subsequent admission due to dehydration but denies any new fall at that time.  States that she is doing fairly well and has been ambulating with a walker and really has no pain at the hip.  She does endorse some soreness down the leg and swelling extending down the leg.    Physical Exam:  Resp 14   Ht 1.702 m (5' 7\")   Wt 49.9 kg (110 lb)   BMI 17.23 kg/m²   General Appearance: alert, well appearing, and in no distress  Mental Status: alert, oriented to person, place, and time  Evaluation of the right hip and lower extremity demonstrates her incisions to be clean, dry, and intact. No active drainage or dehiscence is appreciated. Sensation is grossly intact to light touch in all dermatomes and she has 2+ pedal pulses distally. She does have 1+ pitting edema to the right lower leg.  Sensation is grossly intact to light touch in all dermatomes.  She is mildly tender with calf squeeze but no surrounding warmth, erythema or loss of sensation.    Imaging Studies: Xrays of the right hip/pelvis were obtained and reviewed independently today demonstrating the intertrochanteric fracture to be in acceptable alignment. No interval loss of alignment. Implants are in appropriate position without any obvious loosening or migration.  Lesser trochanteric fragment remains medially displaced without migration of the fragment.    Impression and plan: Yaz Allison is a 84 y.o. old female who is approximately 2 weeks sp a right hip intertrochanteric

## 2025-02-27 ENCOUNTER — TELEPHONE (OUTPATIENT)
Dept: UROLOGY | Age: 85
End: 2025-02-27

## 2025-02-27 ENCOUNTER — OFFICE VISIT (OUTPATIENT)
Dept: INTERNAL MEDICINE CLINIC | Age: 85
End: 2025-02-27

## 2025-02-27 ENCOUNTER — HOSPITAL ENCOUNTER (OUTPATIENT)
Age: 85
Setting detail: SPECIMEN
Discharge: HOME | End: 2025-02-27

## 2025-02-27 VITALS
DIASTOLIC BLOOD PRESSURE: 72 MMHG | BODY MASS INDEX: 17.07 KG/M2 | WEIGHT: 109 LBS | HEART RATE: 81 BPM | SYSTOLIC BLOOD PRESSURE: 106 MMHG | OXYGEN SATURATION: 99 %

## 2025-02-27 DIAGNOSIS — E03.9 ACQUIRED HYPOTHYROIDISM: Primary | ICD-10-CM

## 2025-02-27 DIAGNOSIS — I27.20 PULMONARY HTN (HCC): ICD-10-CM

## 2025-02-27 DIAGNOSIS — R35.0 URINE FREQUENCY: Primary | ICD-10-CM

## 2025-02-27 DIAGNOSIS — N39.0 RECURRENT UTI: ICD-10-CM

## 2025-02-27 DIAGNOSIS — J43.1 PANLOBULAR EMPHYSEMA (HCC): ICD-10-CM

## 2025-02-27 DIAGNOSIS — N18.31 STAGE 3A CHRONIC KIDNEY DISEASE (HCC): ICD-10-CM

## 2025-02-27 DIAGNOSIS — R35.0 URINE FREQUENCY: ICD-10-CM

## 2025-02-27 DIAGNOSIS — K50.10 CROHN'S DISEASE OF LARGE INTESTINE WITHOUT COMPLICATION (HCC): ICD-10-CM

## 2025-02-27 ASSESSMENT — ENCOUNTER SYMPTOMS
ABDOMINAL DISTENTION: 0
EYE DISCHARGE: 0
SHORTNESS OF BREATH: 0
TROUBLE SWALLOWING: 0
EYE PAIN: 0
COUGH: 0
DIARRHEA: 0
COLOR CHANGE: 0
BLOOD IN STOOL: 0
WHEEZING: 0

## 2025-02-27 ASSESSMENT — PATIENT HEALTH QUESTIONNAIRE - PHQ9
2. FEELING DOWN, DEPRESSED OR HOPELESS: NOT AT ALL
SUM OF ALL RESPONSES TO PHQ QUESTIONS 1-9: 0
SUM OF ALL RESPONSES TO PHQ9 QUESTIONS 1 & 2: 0
1. LITTLE INTEREST OR PLEASURE IN DOING THINGS: NOT AT ALL
SUM OF ALL RESPONSES TO PHQ QUESTIONS 1-9: 0

## 2025-02-27 NOTE — PROGRESS NOTES
\"Have you been to the ER, urgent care clinic since your last visit?  Hospitalized since your last visit?\"    YES - When: approximately 2/12-2/13/25 ago.  Where and Why:  .    “Have you seen or consulted any other health care providers outside our system since your last visit?”    NO           
From dc summary  Yaz Allison is [id number] female who presents to the hospital for evaluation of acute alteration in awareness. 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 Potassium was 3.3, sodium 133 on admission with a magnesium of 1.5. CBC did show findings consistent with hemoconcentration. Patient was admitted started with IV fluids for suspected volume depletion or electrolyte normalities with improvement in her symptoms. Patient quickly returned to baseline. No obvious source of infection was found. UA was unremarkable. Chest x-ray showed small bilateral pleural effusions with bibasilar airspace disease thought to represent atelectasis. No fevers and viral panel was negative. Currently, patient is medically stable for discharge. Needs to increase oral intake     Microcytic anemia  Gastritis and duodenitis per EGD  Positive stool occult blood test  Post iv infusion  Seeing dr lorena daniel  Also dr weiss  Has apt next Friday per pt    Tsh was high  Pt to bring pil bottels  And not sure pt dose for levothyroixine  125 mg prescribed  Pt taking half  Will keep same dose for now and recheck tsh reflex         Patient given educational materials - see patient instructions.Discussed use, benefit, and side effects of prescribed medications.  All patientquestions answered. Pt voiced understanding. Reviewed health maintenance.  Instructedto continue current medications, diet and exercise.  Patient agreed with treatmentplan. Follow up as directed.       Please note that this chart was generated using voice recognition Dragon dictation software.  Although every effort was made to ensure the accuracy of this automated transcription, some errors in transcription may have occurred.     Electronically signed by Gabe Cintron MD on 2/27/2025 at 1:43 PM

## 2025-02-27 NOTE — TELEPHONE ENCOUNTER
Patient came in and dropped off a urine sample. Patient is having UTI symptoms. Patients symptoms include: frequency, burning, and urgency.

## 2025-02-28 ENCOUNTER — HOSPITAL ENCOUNTER (OUTPATIENT)
Age: 85
Setting detail: SPECIMEN
Discharge: HOME | End: 2025-02-28

## 2025-02-28 ENCOUNTER — CARE COORDINATION (OUTPATIENT)
Dept: CARE COORDINATION | Age: 85
End: 2025-02-28

## 2025-02-28 DIAGNOSIS — E03.9 ACQUIRED HYPOTHYROIDISM: ICD-10-CM

## 2025-02-28 LAB
T4 FREE SERPL-MCNC: 0.7 NG/DL (ref 0.92–1.68)
TSH SERPL DL<=0.05 MIU/L-ACNC: 32.2 UIU/ML (ref 0.27–4.2)

## 2025-02-28 NOTE — CARE COORDINATION
Care Transitions Note    Follow Up Call     Attempted to reach patient for transitions of care follow up.  Unable to reach patient.      Outreach Attempts:   HIPAA compliant voicemail left for patient.     Care Summary Note: 1st attempt-noted patient dropped off urine sample yesterday for UTI symptoms-will attempt next outreach on Monday to follow up on urine culture.     Follow Up Appointment:   Future Appointments         Provider Specialty Dept Phone    3/7/2025 10:45 AM Partha Veloz MD Gastroenterology [phone]    3/17/2025 3:45 PM Gabe Cintron MD Internal Medicine [phone]    3/24/2025 11:15 AM Pedro Luis De Oliveira MD Orthopedic Surgery [phone]    5/1/2025 11:15 AM Gabe Cintron MD Internal Medicine [phone]            Plan for follow-up call in 2-5 days based on severity of symptoms and risk factors. Plan for next call:  self management-how is home PT going. Review ortho and PCP appt, any urinary symptoms?     Supriya Grewal LPN

## 2025-03-01 LAB
MICROORGANISM SPEC CULT: ABNORMAL
SERVICE CMNT-IMP: ABNORMAL
SPECIMEN DESCRIPTION: ABNORMAL

## 2025-03-02 PROBLEM — Z01.810 PREOP CARDIOVASCULAR EXAM: Status: RESOLVED | Noted: 2025-01-31 | Resolved: 2025-03-02

## 2025-03-03 ENCOUNTER — CARE COORDINATION (OUTPATIENT)
Dept: CARE COORDINATION | Age: 85
End: 2025-03-03

## 2025-03-03 NOTE — CARE COORDINATION
Care Transitions Note    Follow Up Call     Attempted to reach patient for transitions of care follow up.  Unable to reach patient.      Outreach Attempts:   Multiple attempts to contact patient at phone numbers on file.   HIPAA compliant voicemail left for patient.     Care Summary Note: Second attempt, left VM. Closing for GEE    Follow Up Appointment:   Future Appointments         Provider Specialty Dept Phone    3/7/2025 10:45 AM Partha Veloz MD Gastroenterology [phone]    3/17/2025 3:45 PM Gabe Cintron MD Internal Medicine [phone]    3/24/2025 11:15 AM Pedro Luis De Oliveira MD Orthopedic Surgery [phone]    5/1/2025 11:15 AM Gabe Cintron MD Internal Medicine [phone]            No further follow-up call indicated based on severity of symptoms and risk factors. Plan for next call:  DEAN Polanco RN
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
## 2025-03-24 ENCOUNTER — OFFICE VISIT (OUTPATIENT)
Dept: ORTHOPEDIC SURGERY | Age: 85
End: 2025-03-24

## 2025-03-24 VITALS — RESPIRATION RATE: 14 BRPM | HEIGHT: 67 IN | BODY MASS INDEX: 17.11 KG/M2 | WEIGHT: 109 LBS

## 2025-03-24 DIAGNOSIS — Z98.890 STATUS POST HIP SURGERY: Primary | ICD-10-CM

## 2025-03-24 PROCEDURE — 99024 POSTOP FOLLOW-UP VISIT: CPT | Performed by: ORTHOPAEDIC SURGERY

## 2025-03-24 NOTE — PROGRESS NOTES
Procedure: Right hip intertrochanteric femur fracture surgical stabilization with intramedullary nail  Date of Procedure: 2/1/2025    HPI: Yaz Allison is a 84 y.o. old female who is approximately 6 weeks sp the aforementioned procedure. she reports that she is doing relatively well. her pain is adequately controlled. she denies having any numbness/tingling, fevers, chills, or sweats.  She is currently ambulating with the use of a cane.  She states that she does use a walker at nighttime.    Physical Exam:  Resp 14   Ht 1.702 m (5' 7\")   Wt 49.4 kg (109 lb)   BMI 17.07 kg/m²   General Appearance: alert, well appearing, and in no distress  Mental Status: alert, oriented to person, place, and time  Evaluation of the right hip and lower extremity demonstrates her incision to be appropriately healed. she has a negative log roll and relatively good pain free range of motion through the hip. Sensation is grossly intact to light touch in all dermatomes and she has 2+ pedal pulses distally.    Imaging Studies: An AP x-ray of the pelvis as well as AP and lateral views of the right hip were obtained and reviewed independently on 3/24/2025 demonstrating the intertrochanteric fracture to be in acceptable alignment with interval consolidation across the fracture site. No interval loss of alignment. Implants are in appropriate position without any obvious loosening or migration.    Impression and plan: Yaz Allison is a 84 y.o. old female who is approximately 6 weeks sp a right hip intertrochanteric femur fracture surgical stabilization with intramedullary nail. she appears to be healing appropriately clinically. she is to continue weight bearing as tolerated and continue work in PT on range of motion, gait, and strength. she is to follow up in 6 weeks for re-evaluation but was instructed to return or call earlier with any questions or concerns.

## 2025-04-07 ENCOUNTER — TELEPHONE (OUTPATIENT)
Dept: GASTROENTEROLOGY | Age: 85
End: 2025-04-07

## 2025-04-07 NOTE — TELEPHONE ENCOUNTER
Pt left a  request a call back regarding blood in her stool. She left a secondary call back number for [phone]

## 2025-04-16 ENCOUNTER — HOSPITAL ENCOUNTER (OUTPATIENT)
Age: 85
Setting detail: SPECIMEN
Discharge: HOME | End: 2025-04-16

## 2025-04-16 ENCOUNTER — HOSPITAL ENCOUNTER (OUTPATIENT)
Age: 85
Discharge: HOME | End: 2025-04-16
Payer: MEDICARE

## 2025-04-16 ENCOUNTER — OFFICE VISIT (OUTPATIENT)
Dept: UROLOGY | Age: 85
End: 2025-04-16
Payer: MEDICARE

## 2025-04-16 VITALS — SYSTOLIC BLOOD PRESSURE: 120 MMHG | DIASTOLIC BLOOD PRESSURE: 72 MMHG | TEMPERATURE: 96.4 F | HEART RATE: 83 BPM

## 2025-04-16 DIAGNOSIS — D64.9 ANEMIA, UNSPECIFIED TYPE: ICD-10-CM

## 2025-04-16 DIAGNOSIS — D50.9 IRON DEFICIENCY ANEMIA, UNSPECIFIED IRON DEFICIENCY ANEMIA TYPE: ICD-10-CM

## 2025-04-16 DIAGNOSIS — N39.0 RECURRENT UTI: Primary | ICD-10-CM

## 2025-04-16 DIAGNOSIS — N32.81 OAB (OVERACTIVE BLADDER): ICD-10-CM

## 2025-04-16 DIAGNOSIS — N39.0 RECURRENT UTI: ICD-10-CM

## 2025-04-16 LAB
ERYTHROCYTE [DISTWIDTH] IN BLOOD BY AUTOMATED COUNT: 16.4 % (ref 12.5–15.4)
FERRITIN SERPL-MCNC: 296 NG/ML
HCT VFR BLD AUTO: 29.1 % (ref 36–46)
HGB BLD-MCNC: 8.9 G/DL (ref 12–16)
IRON SATN MFR SERPL: 16 % (ref 20–55)
IRON SERPL-MCNC: 33 UG/DL (ref 37–145)
MCH RBC QN AUTO: 28.6 PG (ref 26–34)
MCHC RBC AUTO-ENTMCNC: 30.6 G/DL (ref 31–37)
MCV RBC AUTO: 93.6 FL (ref 80–100)
PLATELET # BLD AUTO: 253 K/UL (ref 140–450)
PMV BLD AUTO: 9.3 FL (ref 8–14)
RBC # BLD AUTO: 3.11 M/UL (ref 4–5.2)
TIBC SERPL-MCNC: 207 UG/DL (ref 250–450)
UNSATURATED IRON BINDING CAPACITY: 174 UG/DL (ref 112–347)
WBC OTHER # BLD: 7.7 K/UL (ref 3.5–11)

## 2025-04-16 PROCEDURE — 1123F ACP DISCUSS/DSCN MKR DOCD: CPT | Performed by: NURSE PRACTITIONER

## 2025-04-16 PROCEDURE — 82728 ASSAY OF FERRITIN: CPT

## 2025-04-16 PROCEDURE — 1159F MED LIST DOCD IN RCRD: CPT | Performed by: NURSE PRACTITIONER

## 2025-04-16 PROCEDURE — 99213 OFFICE O/P EST LOW 20 MIN: CPT | Performed by: NURSE PRACTITIONER

## 2025-04-16 PROCEDURE — 36415 COLL VENOUS BLD VENIPUNCTURE: CPT

## 2025-04-16 PROCEDURE — 83540 ASSAY OF IRON: CPT

## 2025-04-16 PROCEDURE — 83550 IRON BINDING TEST: CPT

## 2025-04-16 PROCEDURE — 85027 COMPLETE CBC AUTOMATED: CPT

## 2025-04-16 ASSESSMENT — ENCOUNTER SYMPTOMS
EYES NEGATIVE: 1
EYE PAIN: 0
WHEEZING: 0
COUGH: 0
SHORTNESS OF BREATH: 0
GASTROINTESTINAL NEGATIVE: 1
EYE REDNESS: 0
ALLERGIC/IMMUNOLOGIC NEGATIVE: 1
VOMITING: 0
RESPIRATORY NEGATIVE: 1
NAUSEA: 0
BACK PAIN: 0
ABDOMINAL PAIN: 0

## 2025-04-16 NOTE — PROGRESS NOTES
Review of Systems   Constitutional: Negative.  Negative for activity change, chills and fever.   HENT: Negative.     Eyes: Negative.  Negative for pain, redness and visual disturbance.   Respiratory: Negative.  Negative for cough, shortness of breath and wheezing.    Cardiovascular: Negative.  Negative for chest pain and leg swelling.   Gastrointestinal: Negative.  Negative for abdominal pain, nausea and vomiting.   Endocrine: Negative.    Genitourinary:  Positive for difficulty urinating, frequency and urgency. Negative for dysuria, enuresis, flank pain and hematuria.   Musculoskeletal: Negative.  Negative for back pain, joint swelling and myalgias.   Skin: Negative.  Negative for rash and wound.   Allergic/Immunologic: Negative.    Neurological: Negative.  Negative for dizziness, tremors and numbness.   Hematological: Negative.  Does not bruise/bleed easily.   Psychiatric/Behavioral: Negative.         
Status Former    Current packs/day: 0.00    Average packs/day: 0.8 packs/day for 60.0 years (45.0 ttl pk-yrs)    Types: Cigarettes    Start date: 10/25/1958    Quit date: 10/25/2018    Years since quittin.4   Smokeless Tobacco Never      (If patient a smoker, smoking cessation counseling offered)     Social History     Substance and Sexual Activity   Alcohol Use No    Alcohol/week: 0.0 standard drinks of alcohol       REVIEW OF SYSTEMS:  Review of Systems      Physical Exam:      Vitals:    25 1041   BP: 120/72   Pulse: 83   Temp: (!) 96.4 °F (35.8 °C)     There is no height or weight on file to calculate BMI.  Patient is a 84 y.o. female in noacute distress and alert and oriented to person, place and time.  Physical Exam  Constitutional: Patient in no acute distress.  Neuro: Alert andoriented to person, place and time.    and Plan      1. Recurrent UTI    2. OAB (overactive bladder)           Plan:   Assessment & Plan   Urinary symptoms are stable.   Encouraged timed/double voiding.   Bladder irritant list reviewed.   Avoid constipation.   We discussed options for uti prevention again.   Will hold off on keflex, could consider resuming in future.  Will resume vaginal estrace cream.   She will call if she does not have any at home and we can refill.   Fu 6 mos or sooner if needed    Return in about 6 months (around 10/16/2025).      Total time spent 20 minutes for the visit on this date of service. This total time component includes both face-to-face  (counseling and education) and non face-to-face (care coordination).       Prescriptions Ordered:  No orders of the defined types were placed in this encounter.     Orders Placed:  Orders Placed This Encounter   Procedures    Culture, Urine     Standing Status:   Future     Expected Date:   2025     Expiration Date:   2026            CIRO Sanabria CNP    Reviewed and agree with the ROS entered by the MA.

## 2025-04-18 LAB
MICROORGANISM SPEC CULT: ABNORMAL
SERVICE CMNT-IMP: ABNORMAL
SPECIMEN DESCRIPTION: ABNORMAL

## 2025-04-21 ENCOUNTER — RESULTS FOLLOW-UP (OUTPATIENT)
Dept: UROLOGY | Age: 85
End: 2025-04-21

## 2025-04-21 DIAGNOSIS — N39.0 RECURRENT UTI: Primary | ICD-10-CM

## 2025-04-21 RX ORDER — SULFAMETHOXAZOLE AND TRIMETHOPRIM 800; 160 MG/1; MG/1
1 TABLET ORAL 2 TIMES DAILY
Qty: 14 TABLET | Refills: 0 | Status: SHIPPED | OUTPATIENT
Start: 2025-04-21 | End: 2025-04-28

## 2025-04-23 ENCOUNTER — OFFICE VISIT (OUTPATIENT)
Dept: ONCOLOGY | Age: 85
End: 2025-04-23
Payer: MEDICARE

## 2025-04-23 VITALS
SYSTOLIC BLOOD PRESSURE: 167 MMHG | DIASTOLIC BLOOD PRESSURE: 108 MMHG | HEART RATE: 95 BPM | RESPIRATION RATE: 96 BRPM | WEIGHT: 97.9 LBS | TEMPERATURE: 98.8 F | BODY MASS INDEX: 15.33 KG/M2

## 2025-04-23 DIAGNOSIS — D64.9 ANEMIA, UNSPECIFIED TYPE: Primary | ICD-10-CM

## 2025-04-23 DIAGNOSIS — D69.6 THROMBOCYTOPENIA: ICD-10-CM

## 2025-04-23 DIAGNOSIS — D46.9 MDS (MYELODYSPLASTIC SYNDROME) (HCC): ICD-10-CM

## 2025-04-23 DIAGNOSIS — E61.1 IRON DEFICIENCY: ICD-10-CM

## 2025-04-23 PROCEDURE — 99211 OFF/OP EST MAY X REQ PHY/QHP: CPT | Performed by: INTERNAL MEDICINE

## 2025-04-23 RX ORDER — DIPHENHYDRAMINE HYDROCHLORIDE 50 MG/ML
50 INJECTION, SOLUTION INTRAMUSCULAR; INTRAVENOUS
OUTPATIENT
Start: 2025-04-23

## 2025-04-23 RX ORDER — HEPARIN SODIUM (PORCINE) LOCK FLUSH IV SOLN 100 UNIT/ML 100 UNIT/ML
500 SOLUTION INTRAVENOUS PRN
OUTPATIENT
Start: 2025-04-23

## 2025-04-23 RX ORDER — SODIUM CHLORIDE 9 MG/ML
5-250 INJECTION, SOLUTION INTRAVENOUS PRN
OUTPATIENT
Start: 2025-04-23

## 2025-04-23 RX ORDER — HYDROCORTISONE SODIUM SUCCINATE 100 MG/2ML
100 INJECTION INTRAMUSCULAR; INTRAVENOUS
OUTPATIENT
Start: 2025-04-23

## 2025-04-23 RX ORDER — ACETAMINOPHEN 325 MG/1
650 TABLET ORAL
OUTPATIENT
Start: 2025-04-23

## 2025-04-23 RX ORDER — FAMOTIDINE 10 MG/ML
20 INJECTION, SOLUTION INTRAVENOUS
OUTPATIENT
Start: 2025-04-23

## 2025-04-23 RX ORDER — ONDANSETRON 2 MG/ML
8 INJECTION INTRAMUSCULAR; INTRAVENOUS
OUTPATIENT
Start: 2025-04-23

## 2025-04-23 RX ORDER — ALBUTEROL SULFATE 90 UG/1
4 INHALANT RESPIRATORY (INHALATION) PRN
OUTPATIENT
Start: 2025-04-23

## 2025-04-23 RX ORDER — EPINEPHRINE 1 MG/ML
0.3 INJECTION, SOLUTION, CONCENTRATE INTRAVENOUS PRN
OUTPATIENT
Start: 2025-04-23

## 2025-04-23 RX ORDER — SODIUM CHLORIDE 9 MG/ML
INJECTION, SOLUTION INTRAVENOUS CONTINUOUS
OUTPATIENT
Start: 2025-04-23

## 2025-04-23 RX ORDER — SODIUM CHLORIDE 0.9 % (FLUSH) 0.9 %
5-40 SYRINGE (ML) INJECTION PRN
OUTPATIENT
Start: 2025-04-23

## 2025-04-23 NOTE — PROGRESS NOTES
Yaz Allison                                                                                                                  4/23/2025  MRN:   [id number]  YOB: 1940  PCP:                           Gabe Cintron MD  Referring Physician: No ref. 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  The patient was here in the hospital recently with what seems to be GI bleeding.  She developed severe anemia.  EGD was essentially negative but she did have a colonoscopy.  That was planned as an outpatient and she has an appointment in the next few weeks.  However, the patient developed recurrent urinary tract infection/cystitis.  She grew Klebsiella and upon admission, she was switched to Levaquin.  We are consulted to see her because of recurrent anemia.  Her stool for occult blood was positive.  The patient remembers multiple episodes of anemia and she had an episode of GI bleeding secondary to colitis.  She went to [facility] who recommended conservative management.  She did not have a follow-up with GI for multiple years   Patient is seen and evaluated.  She denies any active bleeding.  She is very tired.  She has palpitation with minimal activity    Interim History:  History of Present Illness  The patient presents for evaluation of anemia and review lab workup.  Patient complains of dark bowel movements and

## 2025-04-28 ENCOUNTER — HOSPITAL ENCOUNTER (OUTPATIENT)
Age: 85
Setting detail: SPECIMEN
Discharge: HOME | End: 2025-04-28

## 2025-04-28 ENCOUNTER — OFFICE VISIT (OUTPATIENT)
Dept: GASTROENTEROLOGY | Age: 85
End: 2025-04-28
Payer: MEDICARE

## 2025-04-28 VITALS
DIASTOLIC BLOOD PRESSURE: 64 MMHG | BODY MASS INDEX: 15.35 KG/M2 | WEIGHT: 98 LBS | SYSTOLIC BLOOD PRESSURE: 101 MMHG | HEART RATE: 74 BPM

## 2025-04-28 DIAGNOSIS — D50.0 IRON DEFICIENCY ANEMIA DUE TO CHRONIC BLOOD LOSS: ICD-10-CM

## 2025-04-28 DIAGNOSIS — K29.01 GASTROINTESTINAL HEMORRHAGE ASSOCIATED WITH ACUTE GASTRITIS: Primary | ICD-10-CM

## 2025-04-28 DIAGNOSIS — D46.9 MDS (MYELODYSPLASTIC SYNDROME) (HCC): ICD-10-CM

## 2025-04-28 LAB
BASOPHILS # BLD: 0.1 K/UL (ref 0–0.2)
BASOPHILS NFR BLD: 1 % (ref 0–2)
EOSINOPHIL # BLD: 0.39 K/UL (ref 0–0.44)
EOSINOPHILS RELATIVE PERCENT: 5 % (ref 1–4)
ERYTHROCYTE [DISTWIDTH] IN BLOOD BY AUTOMATED COUNT: 16.6 % (ref 11.8–14.4)
HCT VFR BLD AUTO: 27.7 % (ref 36.3–47.1)
HGB BLD-MCNC: 8.1 G/DL (ref 11.9–15.1)
IMM GRANULOCYTES # BLD AUTO: <0.03 K/UL (ref 0–0.3)
IMM GRANULOCYTES NFR BLD: 0 %
LYMPHOCYTES NFR BLD: 2.01 K/UL (ref 1.1–3.7)
LYMPHOCYTES RELATIVE PERCENT: 25 % (ref 24–43)
MCH RBC QN AUTO: 28.1 PG (ref 25.2–33.5)
MCHC RBC AUTO-ENTMCNC: 29.2 G/DL (ref 28.4–34.8)
MCV RBC AUTO: 96.2 FL (ref 82.6–102.9)
MONOCYTES NFR BLD: 0.75 K/UL (ref 0.1–1.2)
MONOCYTES NFR BLD: 9 % (ref 3–12)
NEUTROPHILS NFR BLD: 60 % (ref 36–65)
NEUTS SEG NFR BLD: 4.71 K/UL (ref 1.5–8.1)
NRBC BLD-RTO: 0 PER 100 WBC
PLATELET # BLD AUTO: 226 K/UL (ref 138–453)
PMV BLD AUTO: 10.1 FL (ref 8.1–13.5)
RBC # BLD AUTO: 2.88 M/UL (ref 3.95–5.11)
RBC # BLD: ABNORMAL 10*6/UL
WBC OTHER # BLD: 8 K/UL (ref 3.5–11.3)

## 2025-04-28 PROCEDURE — 1159F MED LIST DOCD IN RCRD: CPT | Performed by: INTERNAL MEDICINE

## 2025-04-28 PROCEDURE — 1123F ACP DISCUSS/DSCN MKR DOCD: CPT | Performed by: INTERNAL MEDICINE

## 2025-04-28 PROCEDURE — 99214 OFFICE O/P EST MOD 30 MIN: CPT | Performed by: INTERNAL MEDICINE

## 2025-04-28 RX ORDER — PANTOPRAZOLE SODIUM 40 MG/1
40 TABLET, DELAYED RELEASE ORAL
Qty: 90 TABLET | Refills: 3 | Status: SHIPPED | OUTPATIENT
Start: 2025-04-28

## 2025-04-28 NOTE — PROGRESS NOTES
abnormal finding of lung field     Other psoriasis     Pure hypercholesterolemia     Regional enteritis of unspecified site     Senile osteoporosis     Sprain of ankle, unspecified site     Unspecified vitamin D deficiency        Past Surgical History:   Procedure Laterality Date    COLONOSCOPY      DILATION AND CURETTAGE OF UTERUS      FEMUR SURGERY Right 2/1/2025    FEMUR INTRAMEDULLARY NAIL STEFANO INSERTION ANTEGRADE  RIGHT performed by Pedro Luis De Oliveira MD at [facility] OR    SPINE SURGERY N/A 6/6/2022    LUMBOSACRAL VERTEBROPLASTY S1 performed by Ryan Engle MD at [facility] OR    UPPER GASTROINTESTINAL ENDOSCOPY N/A 6/14/2019    EGD FOREIGN BODY REMOVAL performed by Ben Ruiz MD at [facility] OR    UPPER GASTROINTESTINAL ENDOSCOPY N/A 5/3/2024    ESOPHAGOGASTRODUODENOSCOPY BIOPSY performed by Page Mejia MD at [facility] ENDO       CURRENT MEDICATIONS:    Current Outpatient Medications:     allopurinol (ZYLOPRIM) 300 MG tablet, TAKE 1 TABLET BY MOUTH DAILY, Disp: 100 tablet, Rfl: 2    levothyroxine (SYNTHROID) 125 MCG tablet, Take 1 tablet by mouth Daily, Disp: 30 tablet, Rfl: 3    atorvastatin (LIPITOR) 40 MG tablet, TAKE 1 TABLET BY MOUTH DAILY, Disp: 100 tablet, Rfl: 2    pantoprazole (PROTONIX) 40 MG tablet, TAKE ONE TABLET BY MOUTH EVERY MORNING BEFORE BREAKFAST, Disp: 90 tablet, Rfl: 1    estradiol (ESTRACE VAGINAL) 0.1 MG/GM vaginal cream, Place 1 g vaginally See Admin Instructions Apply pea-sized amount to vaginal opening once daily for 2 weeks, after two weeks switch to twice weekly., Disp: 42.5 g, Rfl: 3    carvedilol (COREG) 6.25 MG tablet, Take 1 tablet by mouth 2 times daily (with meals), Disp: 60 tablet, Rfl: 3    nystatin (MYCOSTATIN) 014818 UNIT/GM powder, Apply 3 times daily., Disp: 60 g, Rfl: 2    Multiple Vitamins-Minerals (HAIR/SKIN/NAILS) TABS, Take 1 tablet by mouth daily, Disp: , Rfl:     Cholecalciferol (VITAMIN D) 2000 UNITS CAPS capsule, Take 1 capsule by mouth daily, Disp: , Rfl:

## 2025-05-01 ENCOUNTER — OFFICE VISIT (OUTPATIENT)
Dept: INTERNAL MEDICINE CLINIC | Age: 85
End: 2025-05-01

## 2025-05-01 ENCOUNTER — HOSPITAL ENCOUNTER (OUTPATIENT)
Age: 85
Setting detail: SPECIMEN
Discharge: HOME | End: 2025-05-01

## 2025-05-01 VITALS
WEIGHT: 105.2 LBS | SYSTOLIC BLOOD PRESSURE: 108 MMHG | OXYGEN SATURATION: 98 % | HEART RATE: 62 BPM | BODY MASS INDEX: 16.51 KG/M2 | HEIGHT: 67 IN | DIASTOLIC BLOOD PRESSURE: 60 MMHG

## 2025-05-01 DIAGNOSIS — E61.1 IRON DEFICIENCY: ICD-10-CM

## 2025-05-01 DIAGNOSIS — D64.9 ANEMIA, UNSPECIFIED TYPE: ICD-10-CM

## 2025-05-01 DIAGNOSIS — Z00.00 MEDICARE ANNUAL WELLNESS VISIT, SUBSEQUENT: Primary | ICD-10-CM

## 2025-05-01 DIAGNOSIS — E03.9 ACQUIRED HYPOTHYROIDISM: ICD-10-CM

## 2025-05-01 DIAGNOSIS — D69.6 THROMBOCYTOPENIA: ICD-10-CM

## 2025-05-01 LAB
T4 FREE SERPL-MCNC: 0.8 NG/DL (ref 0.9–1.7)
TSH SERPL DL<=0.05 MIU/L-ACNC: 45 UIU/ML (ref 0.27–4.2)

## 2025-05-01 RX ORDER — GABAPENTIN 100 MG/1
100 CAPSULE ORAL 3 TIMES DAILY
Qty: 90 CAPSULE | Refills: 0 | Status: SHIPPED | OUTPATIENT
Start: 2025-05-01 | End: 2025-05-31

## 2025-05-01 RX ORDER — FOLIC ACID 1 MG/1
1 TABLET ORAL DAILY
Qty: 180 TABLET | Refills: 1 | Status: SHIPPED | OUTPATIENT
Start: 2025-05-01

## 2025-05-01 RX ORDER — ACETAMINOPHEN 500 MG
500 TABLET ORAL PRN
COMMUNITY
Start: 2025-02-11

## 2025-05-01 ASSESSMENT — PATIENT HEALTH QUESTIONNAIRE - PHQ9
SUM OF ALL RESPONSES TO PHQ QUESTIONS 1-9: 0
SUM OF ALL RESPONSES TO PHQ QUESTIONS 1-9: 0
2. FEELING DOWN, DEPRESSED OR HOPELESS: NOT AT ALL
1. LITTLE INTEREST OR PLEASURE IN DOING THINGS: NOT AT ALL
SUM OF ALL RESPONSES TO PHQ QUESTIONS 1-9: 0
SUM OF ALL RESPONSES TO PHQ QUESTIONS 1-9: 0

## 2025-05-01 NOTE — PROGRESS NOTES
Medicare Annual Wellness Visit    Yaz Allison is here for Medicare AWV, Gas (Patient reports having gas every so often), Discuss Medications (Gabapentin : Would like to have the 100mg and take 3 tabs twice daily instead of the 300mg. Patient does not want to take that much all the time. ), and Follow-up (Follow up from Hip Surgery)    Assessment & Plan   Medicare annual wellness visit, subsequent  Acquired hypothyroidism  -     TSH reflex to FT4; Future  Return in about 3 months (around 8/1/2025).  Orders Placed This Encounter   Procedures    TSH reflex to FT4     Standing Status:   Future     Expected Date:   5/1/2025     Expiration Date:   5/1/2026     Orders Placed This Encounter   Medications    gabapentin (NEURONTIN) 100 MG capsule     Sig: Take 1 capsule by mouth 3 times daily for 30 days. Intended supply: 90 days     Dispense:  90 capsule     Refill:  0    folic acid (FOLVITE) 1 MG tablet     Sig: Take 1 tablet by mouth daily     Dispense:  180 tablet     Refill:  1     Abnormal TSH  Will repeat today  Hr is  62  No wt gain         Pt s een dr weiss  For occult blood positive  Low foliac acid will order folic acid      Addendum on may 2  TSH 45  Increase levothyroxine to 150 mcg  Recheck tsh in six weeks  Office visit in two months        Return in about 3 months (around 8/1/2025).     Subjective       Patient's complete Health Risk Assessment and screening values have been reviewed and are found in Flowsheets. The following problems were reviewed today and where indicated follow up appointments were made and/or referrals ordered.    Positive Risk Factor Screenings with Interventions:    Fall Risk:  Do you feel unsteady or are you worried about falling? : (!) yes  2 or more falls in past year?: no  Fall with injury in past year?: (!) yes     Interventions:    Reviewed medications, home hazards, visual acuity, and co-morbidities that can increase risk for falls             Inactivity:  On average, how many days

## 2025-05-02 ENCOUNTER — RESULTS FOLLOW-UP (OUTPATIENT)
Dept: INTERNAL MEDICINE CLINIC | Age: 85
End: 2025-05-02

## 2025-05-02 DIAGNOSIS — E03.9 ACQUIRED HYPOTHYROIDISM: Primary | ICD-10-CM

## 2025-05-02 LAB — EPO SERPL-ACNC: 31 MU/ML (ref 4–27)

## 2025-05-02 RX ORDER — LEVOTHYROXINE SODIUM 150 UG/1
150 TABLET ORAL DAILY
Qty: 30 TABLET | Refills: 1 | Status: SHIPPED | OUTPATIENT
Start: 2025-05-02 | End: 2025-07-01

## 2025-05-05 ENCOUNTER — OFFICE VISIT (OUTPATIENT)
Dept: ORTHOPEDIC SURGERY | Age: 85
End: 2025-05-05

## 2025-05-05 VITALS — BODY MASS INDEX: 16.53 KG/M2 | WEIGHT: 105.3 LBS | HEIGHT: 67 IN | RESPIRATION RATE: 14 BRPM

## 2025-05-05 DIAGNOSIS — Z98.890 STATUS POST HIP SURGERY: Primary | ICD-10-CM

## 2025-05-05 PROCEDURE — 99024 POSTOP FOLLOW-UP VISIT: CPT | Performed by: ORTHOPAEDIC SURGERY

## 2025-05-05 NOTE — PROGRESS NOTES
Procedure: Right hip intertrochanteric femur fracture surgical stabilization with intramedullary nail  Date of Procedure: 2/1/2025    HPI: Yaz Allison is a 84 y.o. old female who is approximately 3 months sp the aforementioned procedure. she is doing very well at this time indicating that she has no pain in her hip.  She has tried to keep up with her home exercise program.  She is using a cane for ambulation    Physical Exam:  Resp 14   Ht 1.702 m (5' 7.01\")   Wt 47.8 kg (105 lb 4.8 oz)   BMI 16.49 kg/m²   General Appearance: alert, well appearing, and in no distress  Mental Status: alert, oriented to person, place, and time  Evaluation of the right hip and lower extremity demonstrates her incision to be appropriately healed. she has a negative log roll and relatively good pain free range of motion through the hip. Sensation is grossly intact to light touch in all dermatomes and she has 2+ pedal pulses distally.    Imaging Studies: An AP x-ray of the pelvis as well as AP and lateral views of the right hip were obtained and reviewed independently on 5/5/2025 demonstrating the intertrochanteric fracture to be in acceptable alignment with progressive consolidation across the fracture site. No interval loss of alignment. Implants are in appropriate position without any obvious loosening or migration.    Impression and plan: Yaz Allison is a 84 y.o. old female who is approximately 3 months sp a right hip intertrochanteric femur fracture surgical stabilization with intramedullary nail.  She is doing very well at this time healing appropriately both clinically and radiographically.  She was encouraged to keep up with her home exercise program and may continue weightbearing as tolerated with her cane.  I will see her back for reevaluation in 3 months but she was encouraged to return or call earlier with questions end or concerns.

## 2025-05-15 ENCOUNTER — TELEPHONE (OUTPATIENT)
Dept: GASTROENTEROLOGY | Age: 85
End: 2025-05-15

## 2025-05-15 NOTE — TELEPHONE ENCOUNTER
Patient called stating she has not received her Cologuard test as of today and is asking if an new order could be placed.  Order was placed on 4/28/25.    Please advise.    Thank you.

## 2025-05-16 ENCOUNTER — HOSPITAL ENCOUNTER (OUTPATIENT)
Dept: INFUSION THERAPY | Age: 85
Discharge: HOME | End: 2025-05-16
Payer: MEDICARE

## 2025-05-16 VITALS
DIASTOLIC BLOOD PRESSURE: 83 MMHG | OXYGEN SATURATION: 100 % | TEMPERATURE: 97.5 F | SYSTOLIC BLOOD PRESSURE: 140 MMHG | RESPIRATION RATE: 14 BRPM | HEART RATE: 86 BPM

## 2025-05-16 DIAGNOSIS — D50.9 IRON DEFICIENCY ANEMIA, UNSPECIFIED IRON DEFICIENCY ANEMIA TYPE: Primary | ICD-10-CM

## 2025-05-16 DIAGNOSIS — D46.9 MDS (MYELODYSPLASTIC SYNDROME) (HCC): ICD-10-CM

## 2025-05-16 LAB
BASOPHILS # BLD: 0.1 K/UL (ref 0–0.2)
BASOPHILS NFR BLD: 1 % (ref 0–2)
EOSINOPHIL # BLD: 0.1 K/UL (ref 0–0.4)
EOSINOPHILS RELATIVE PERCENT: 1 % (ref 1–4)
ERYTHROCYTE [DISTWIDTH] IN BLOOD BY AUTOMATED COUNT: 17.3 % (ref 12.5–15.4)
HCT VFR BLD AUTO: 29 % (ref 36–46)
HGB BLD-MCNC: 9.1 G/DL (ref 12–16)
LYMPHOCYTES NFR BLD: 1.3 K/UL (ref 1–4.8)
LYMPHOCYTES RELATIVE PERCENT: 20 % (ref 24–44)
MCH RBC QN AUTO: 28 PG (ref 26–34)
MCHC RBC AUTO-ENTMCNC: 31.5 G/DL (ref 31–37)
MCV RBC AUTO: 88.8 FL (ref 80–100)
MONOCYTES NFR BLD: 0.7 K/UL (ref 0.1–1.2)
MONOCYTES NFR BLD: 11 % (ref 2–11)
NEUTROPHILS NFR BLD: 67 % (ref 36–66)
NEUTS SEG NFR BLD: 4.3 K/UL (ref 1.8–7.7)
PLATELET # BLD AUTO: 269 K/UL (ref 140–450)
PMV BLD AUTO: 7 FL (ref 6–12)
RBC # BLD AUTO: 3.27 M/UL (ref 4–5.2)
WBC OTHER # BLD: 6.5 K/UL (ref 3.5–11)

## 2025-05-16 PROCEDURE — 6360000002 HC RX W HCPCS: Performed by: INTERNAL MEDICINE

## 2025-05-16 PROCEDURE — 96366 THER/PROPH/DIAG IV INF ADDON: CPT

## 2025-05-16 PROCEDURE — 2580000003 HC RX 258: Performed by: INTERNAL MEDICINE

## 2025-05-16 PROCEDURE — 96372 THER/PROPH/DIAG INJ SC/IM: CPT

## 2025-05-16 PROCEDURE — 36415 COLL VENOUS BLD VENIPUNCTURE: CPT

## 2025-05-16 PROCEDURE — 85025 COMPLETE CBC W/AUTO DIFF WBC: CPT

## 2025-05-16 PROCEDURE — 96365 THER/PROPH/DIAG IV INF INIT: CPT

## 2025-05-16 RX ORDER — SODIUM CHLORIDE 9 MG/ML
5-250 INJECTION, SOLUTION INTRAVENOUS PRN
OUTPATIENT
Start: 2025-06-13

## 2025-05-16 RX ORDER — DIPHENHYDRAMINE HYDROCHLORIDE 50 MG/ML
50 INJECTION, SOLUTION INTRAMUSCULAR; INTRAVENOUS
OUTPATIENT
Start: 2025-06-02

## 2025-05-16 RX ORDER — SODIUM CHLORIDE 0.9 % (FLUSH) 0.9 %
5-40 SYRINGE (ML) INJECTION PRN
OUTPATIENT
Start: 2025-06-13

## 2025-05-16 RX ORDER — HYDROCORTISONE SODIUM SUCCINATE 100 MG/2ML
100 INJECTION INTRAMUSCULAR; INTRAVENOUS
OUTPATIENT
Start: 2025-06-13

## 2025-05-16 RX ORDER — ACETAMINOPHEN 325 MG/1
650 TABLET ORAL
OUTPATIENT
Start: 2025-06-13

## 2025-05-16 RX ORDER — ONDANSETRON 2 MG/ML
8 INJECTION INTRAMUSCULAR; INTRAVENOUS
OUTPATIENT
Start: 2025-06-13

## 2025-05-16 RX ORDER — ALBUTEROL SULFATE 90 UG/1
4 INHALANT RESPIRATORY (INHALATION) PRN
OUTPATIENT
Start: 2025-06-02

## 2025-05-16 RX ORDER — SODIUM CHLORIDE 9 MG/ML
INJECTION, SOLUTION INTRAVENOUS CONTINUOUS
OUTPATIENT
Start: 2025-06-13

## 2025-05-16 RX ORDER — EPINEPHRINE 1 MG/ML
0.3 INJECTION, SOLUTION, CONCENTRATE INTRAVENOUS PRN
OUTPATIENT
Start: 2025-06-02

## 2025-05-16 RX ORDER — ALBUTEROL SULFATE 90 UG/1
4 INHALANT RESPIRATORY (INHALATION) PRN
OUTPATIENT
Start: 2025-06-13

## 2025-05-16 RX ORDER — DIPHENHYDRAMINE HYDROCHLORIDE 50 MG/ML
50 INJECTION, SOLUTION INTRAMUSCULAR; INTRAVENOUS
OUTPATIENT
Start: 2025-06-13

## 2025-05-16 RX ORDER — SODIUM CHLORIDE 9 MG/ML
INJECTION, SOLUTION INTRAVENOUS CONTINUOUS
OUTPATIENT
Start: 2025-06-02

## 2025-05-16 RX ORDER — SODIUM CHLORIDE 9 MG/ML
5-250 INJECTION, SOLUTION INTRAVENOUS PRN
Status: DISCONTINUED | OUTPATIENT
Start: 2025-05-16 | End: 2025-05-17 | Stop reason: HOSPADM

## 2025-05-16 RX ORDER — FAMOTIDINE 10 MG/ML
20 INJECTION, SOLUTION INTRAVENOUS
OUTPATIENT
Start: 2025-06-13

## 2025-05-16 RX ORDER — HEPARIN 100 UNIT/ML
500 SYRINGE INTRAVENOUS PRN
OUTPATIENT
Start: 2025-06-13

## 2025-05-16 RX ORDER — EPINEPHRINE 1 MG/ML
0.3 INJECTION, SOLUTION, CONCENTRATE INTRAVENOUS PRN
OUTPATIENT
Start: 2025-06-13

## 2025-05-16 RX ORDER — ONDANSETRON 2 MG/ML
8 INJECTION INTRAMUSCULAR; INTRAVENOUS
OUTPATIENT
Start: 2025-06-02

## 2025-05-16 RX ORDER — FAMOTIDINE 10 MG/ML
20 INJECTION, SOLUTION INTRAVENOUS
OUTPATIENT
Start: 2025-06-02

## 2025-05-16 RX ORDER — HYDROCORTISONE SODIUM SUCCINATE 100 MG/2ML
100 INJECTION INTRAMUSCULAR; INTRAVENOUS
OUTPATIENT
Start: 2025-06-02

## 2025-05-16 RX ORDER — ACETAMINOPHEN 325 MG/1
650 TABLET ORAL
OUTPATIENT
Start: 2025-06-02

## 2025-05-16 RX ADMIN — DARBEPOETIN ALFA 200 MCG: 200 INJECTION, SOLUTION INTRAVENOUS; SUBCUTANEOUS at 15:46

## 2025-05-16 RX ADMIN — SODIUM CHLORIDE 200 ML/HR: 0.9 INJECTION, SOLUTION INTRAVENOUS at 14:04

## 2025-05-16 RX ADMIN — IRON SUCROSE 400 MG: 20 INJECTION, SOLUTION INTRAVENOUS at 14:06

## 2025-05-16 NOTE — TELEPHONE ENCOUNTER
Writer completed form to send [employer] for the Cologuard tested to be mailed to patient, also faxed order along with testing order from Dr. Veloz.

## 2025-05-16 NOTE — PROGRESS NOTES
Patient arrived for aranesp and venofer 1 of 1.  Arrives ambulatory.  Denies any complaints.  Labs drawn, results reviewed.  Hgb 9.1  Injection and venofer completed without incident.  Post observation completed.  Discharged in stable condition.  Returns 6/6/2025 for office visit and possible aranesp.

## 2025-05-21 RX ORDER — GABAPENTIN 100 MG/1
100 CAPSULE ORAL 3 TIMES DAILY
Qty: 270 CAPSULE | Refills: 0 | Status: SHIPPED | OUTPATIENT
Start: 2025-05-21 | End: 2025-08-19

## 2025-05-27 ENCOUNTER — TELEPHONE (OUTPATIENT)
Dept: GASTROENTEROLOGY | Age: 85
End: 2025-05-27

## 2025-05-27 NOTE — TELEPHONE ENCOUNTER
Patient is wanting confirmation that it is OK to use another brand other than Colonguard for the at home test.  Her insurance is telling her that the Colonguard brand is not covered and they are sending her something else.  Dr. Veloz had told her to use the Cologuard brand......  Thanks,

## 2025-05-28 ENCOUNTER — TELEPHONE (OUTPATIENT)
Dept: UROLOGY | Age: 85
End: 2025-05-28

## 2025-05-28 ENCOUNTER — HOSPITAL ENCOUNTER (OUTPATIENT)
Age: 85
Setting detail: SPECIMEN
Discharge: HOME | End: 2025-05-28

## 2025-05-28 DIAGNOSIS — N39.0 RECURRENT UTI: Primary | ICD-10-CM

## 2025-05-28 DIAGNOSIS — N39.0 RECURRENT UTI: ICD-10-CM

## 2025-05-28 NOTE — TELEPHONE ENCOUNTER
Patient called into the office, stated that she is having burning and frequency. Writer advised to come to the office to give a sample. Patient stated that she will be in this afternoon to give a sample.

## 2025-05-28 NOTE — TELEPHONE ENCOUNTER
Writer attempted to contact patient to let her know that she may use any test that her insurance will cover, however, the phone rang once and went to dead air- unable to leave a voicemail.

## 2025-05-31 LAB
MICROORGANISM SPEC CULT: ABNORMAL
MICROORGANISM SPEC CULT: ABNORMAL
SERVICE CMNT-IMP: ABNORMAL
SPECIMEN DESCRIPTION: ABNORMAL

## 2025-06-02 ENCOUNTER — RESULTS FOLLOW-UP (OUTPATIENT)
Dept: UROLOGY | Age: 85
End: 2025-06-02

## 2025-06-02 DIAGNOSIS — N39.0 RECURRENT UTI: Primary | ICD-10-CM

## 2025-06-02 RX ORDER — SULFAMETHOXAZOLE AND TRIMETHOPRIM 800; 160 MG/1; MG/1
1 TABLET ORAL 2 TIMES DAILY
Qty: 14 TABLET | Refills: 0 | Status: SHIPPED | OUTPATIENT
Start: 2025-06-02 | End: 2025-06-09

## 2025-06-06 ENCOUNTER — HOSPITAL ENCOUNTER (OUTPATIENT)
Dept: INFUSION THERAPY | Age: 85
End: 2025-06-06

## 2025-06-26 RX ORDER — CARVEDILOL 6.25 MG/1
TABLET ORAL
Qty: 60 TABLET | Refills: 3 | Status: SHIPPED | OUTPATIENT
Start: 2025-06-26

## 2025-06-26 NOTE — TELEPHONE ENCOUNTER
Last Visit:  5/1/2025     Next Visit Date:  Future Appointments   Date Time Provider Department Center   6/27/2025 11:30 AM ST[facility] MED ONC FAST TRACK CHAIR 1 [facility]   7/18/2025  2:30 PM STV PB MED ONC FAST TRACK CHAIR 1 [facility]   7/23/2025  2:00 PM Gabe Cintron MD [facility]   7/30/2025 10:30 AM Partha Veloz MD [facility]   8/4/2025 10:15 AM Pedro Luis De Oliveira MD [facility]   8/8/2025  2:30 PM STV PB MED ONC FAST TRACK CHAIR 1 [facility]   10/16/2025 11:40 AM Nathalia Pedro, CIRO - CNP [facility]

## 2025-06-27 ENCOUNTER — TELEPHONE (OUTPATIENT)
Age: 85
End: 2025-06-27

## 2025-06-27 ENCOUNTER — OFFICE VISIT (OUTPATIENT)
Age: 85
End: 2025-06-27

## 2025-06-27 ENCOUNTER — HOSPITAL ENCOUNTER (OUTPATIENT)
Dept: INFUSION THERAPY | Age: 85
Discharge: HOME | End: 2025-06-27
Payer: MEDICARE

## 2025-06-27 VITALS
SYSTOLIC BLOOD PRESSURE: 138 MMHG | DIASTOLIC BLOOD PRESSURE: 89 MMHG | WEIGHT: 97.6 LBS | TEMPERATURE: 97.8 F | HEART RATE: 93 BPM | OXYGEN SATURATION: 98 % | BODY MASS INDEX: 15.28 KG/M2

## 2025-06-27 DIAGNOSIS — D69.6 THROMBOCYTOPENIA: ICD-10-CM

## 2025-06-27 DIAGNOSIS — D64.9 ANEMIA, UNSPECIFIED TYPE: ICD-10-CM

## 2025-06-27 DIAGNOSIS — E61.1 IRON DEFICIENCY: ICD-10-CM

## 2025-06-27 DIAGNOSIS — D46.9 MDS (MYELODYSPLASTIC SYNDROME) (HCC): Primary | ICD-10-CM

## 2025-06-27 LAB
BASOPHILS # BLD: 0.1 K/UL (ref 0–0.2)
BASOPHILS NFR BLD: 1 % (ref 0–2)
EOSINOPHIL # BLD: 0.1 K/UL (ref 0–0.4)
EOSINOPHILS RELATIVE PERCENT: 2 % (ref 1–4)
ERYTHROCYTE [DISTWIDTH] IN BLOOD BY AUTOMATED COUNT: 18.5 % (ref 12.5–15.4)
FERRITIN SERPL-MCNC: 162 NG/ML
HCT VFR BLD AUTO: 28.9 % (ref 36–46)
HGB BLD-MCNC: 9.1 G/DL (ref 12–16)
IRON SATN MFR SERPL: 11 % (ref 20–55)
IRON SERPL-MCNC: 20 UG/DL (ref 37–145)
LYMPHOCYTES NFR BLD: 1 K/UL (ref 1–4.8)
LYMPHOCYTES RELATIVE PERCENT: 20 % (ref 24–44)
MCH RBC QN AUTO: 27 PG (ref 26–34)
MCHC RBC AUTO-ENTMCNC: 31.4 G/DL (ref 31–37)
MCV RBC AUTO: 86.2 FL (ref 80–100)
MONOCYTES NFR BLD: 0.5 K/UL (ref 0.1–1.2)
MONOCYTES NFR BLD: 9 % (ref 2–11)
NEUTROPHILS NFR BLD: 68 % (ref 36–66)
NEUTS SEG NFR BLD: 3.5 K/UL (ref 1.8–7.7)
PLATELET # BLD AUTO: 240 K/UL (ref 140–450)
PMV BLD AUTO: 7.3 FL (ref 6–12)
RBC # BLD AUTO: 3.36 M/UL (ref 4–5.2)
TIBC SERPL-MCNC: 190 UG/DL (ref 250–450)
UNSATURATED IRON BINDING CAPACITY: 170 UG/DL (ref 112–347)
WBC OTHER # BLD: 5.2 K/UL (ref 3.5–11)

## 2025-06-27 PROCEDURE — 85025 COMPLETE CBC W/AUTO DIFF WBC: CPT

## 2025-06-27 PROCEDURE — 82728 ASSAY OF FERRITIN: CPT

## 2025-06-27 PROCEDURE — 36415 COLL VENOUS BLD VENIPUNCTURE: CPT

## 2025-06-27 PROCEDURE — 6370000000 HC RX 637 (ALT 250 FOR IP): Performed by: INTERNAL MEDICINE

## 2025-06-27 PROCEDURE — 83550 IRON BINDING TEST: CPT

## 2025-06-27 PROCEDURE — 83540 ASSAY OF IRON: CPT

## 2025-06-27 PROCEDURE — 6360000002 HC RX W HCPCS: Performed by: INTERNAL MEDICINE

## 2025-06-27 PROCEDURE — 96372 THER/PROPH/DIAG INJ SC/IM: CPT

## 2025-06-27 RX ORDER — FAMOTIDINE 10 MG/ML
20 INJECTION, SOLUTION INTRAVENOUS
OUTPATIENT
Start: 2025-07-13

## 2025-06-27 RX ORDER — ONDANSETRON 2 MG/ML
8 INJECTION INTRAMUSCULAR; INTRAVENOUS
OUTPATIENT
Start: 2025-07-13

## 2025-06-27 RX ORDER — ACETAMINOPHEN 325 MG/1
650 TABLET ORAL
OUTPATIENT
Start: 2025-07-13

## 2025-06-27 RX ORDER — EPINEPHRINE 1 MG/ML
0.3 INJECTION, SOLUTION, CONCENTRATE INTRAVENOUS PRN
OUTPATIENT
Start: 2025-07-13

## 2025-06-27 RX ORDER — ACETAMINOPHEN 500 MG
500 TABLET ORAL ONCE
Status: COMPLETED | OUTPATIENT
Start: 2025-06-27 | End: 2025-06-27

## 2025-06-27 RX ORDER — DIPHENHYDRAMINE HYDROCHLORIDE 50 MG/ML
50 INJECTION, SOLUTION INTRAMUSCULAR; INTRAVENOUS
OUTPATIENT
Start: 2025-07-13

## 2025-06-27 RX ORDER — ACETAMINOPHEN 500 MG
500 TABLET ORAL ONCE
Start: 2025-06-27 | End: 2025-06-27

## 2025-06-27 RX ORDER — HYDROCORTISONE SODIUM SUCCINATE 100 MG/2ML
100 INJECTION INTRAMUSCULAR; INTRAVENOUS
OUTPATIENT
Start: 2025-07-13

## 2025-06-27 RX ORDER — SODIUM CHLORIDE 9 MG/ML
INJECTION, SOLUTION INTRAVENOUS CONTINUOUS
OUTPATIENT
Start: 2025-07-13

## 2025-06-27 RX ORDER — ALBUTEROL SULFATE 90 UG/1
4 INHALANT RESPIRATORY (INHALATION) PRN
OUTPATIENT
Start: 2025-07-13

## 2025-06-27 RX ADMIN — ACETAMINOPHEN 500 MG: 500 TABLET ORAL at 13:21

## 2025-06-27 RX ADMIN — DARBEPOETIN ALFA 100 MCG: 100 INJECTION, SOLUTION INTRAVENOUS; SUBCUTANEOUS at 13:21

## 2025-06-27 NOTE — PROGRESS NOTES
Yaz Allison                                                                                                                  6/27/2025  MRN:   [id number]  YOB: 1940  PCP:                           Gabe Cintron MD  Referring Physician: No ref. 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  The patient was here in the hospital recently with what seems to be GI bleeding.  She developed severe anemia.  EGD was essentially negative but she did have a colonoscopy.  That was planned as an outpatient and she has an appointment in the next few weeks.  However, the patient developed recurrent urinary tract infection/cystitis.  She grew Klebsiella and upon admission, she was switched to Levaquin.  We are consulted to see her because of recurrent anemia.  Her stool for occult blood was positive.  The patient remembers multiple episodes of anemia and she had an episode of GI bleeding secondary to colitis.  She went to [facility] who recommended conservative management.  She did not have a follow-up with GI for multiple years   Patient is seen and evaluated.  She denies any active bleeding.  She is very tired.  She has palpitation with minimal activity    Interim History:  History of Present Illness  The patient presents to the clinic for a sick visit.  She complains of having an adverse reaction to an iron infusion and

## 2025-06-27 NOTE — PATIENT INSTRUCTIONS
Aranesp dose cut down to 100 mcg q 3 week  Give tylenol 500 mg pre med ( added to plan )   Rv in 3 week ( keep 7/18 apt )

## 2025-06-27 NOTE — TELEPHONE ENCOUNTER
Instructions   from Dr. Mane Britton MD    Aranesp dose cut down to 100 mcg q 3 week  Give tylenol 500 mg pre med ( added to plan )   Rv in 3 week ( keep 7/18 apt )       Infusion nurses notified ofAranesp dose cut down to 100 mcg q 3 week, Give tylenol 500 mg pre med   Rv  keeping appt on  7/18/25

## 2025-06-27 NOTE — PROGRESS NOTES
Patient arrived for aranesp injection.  Patient was seen by Dr. Britton and order received to cut aranesp dose down to 100 mcg and to give tylenol 500 mg as pre medication.  Labs drawn, results reviewed.  Hgb 9.1  Injection tolerated without incident.  Discharged in stable condition.  Returns 7/18/25 for office visit and aranesp.

## 2025-07-02 ENCOUNTER — TELEPHONE (OUTPATIENT)
Dept: INTERNAL MEDICINE CLINIC | Age: 85
End: 2025-07-02

## 2025-07-02 DIAGNOSIS — E03.9 ACQUIRED HYPOTHYROIDISM: ICD-10-CM

## 2025-07-02 RX ORDER — LEVOTHYROXINE SODIUM 150 UG/1
150 TABLET ORAL DAILY
Qty: 30 TABLET | Refills: 1 | Status: SHIPPED | OUTPATIENT
Start: 2025-07-02 | End: 2025-08-31

## 2025-07-02 NOTE — TELEPHONE ENCOUNTER
Attempted to contact patient to reschedule her appt on 07/23/25. No answer and voicemail not set up. Will send [employer] message and ticket. Will also attempt to call back later. Appt cancelled.

## 2025-07-18 ENCOUNTER — APPOINTMENT (OUTPATIENT)
Dept: GENERAL RADIOLOGY | Age: 85
DRG: 689 | End: 2025-07-18
Payer: MEDICARE

## 2025-07-18 ENCOUNTER — APPOINTMENT (OUTPATIENT)
Dept: CT IMAGING | Age: 85
DRG: 689 | End: 2025-07-18
Payer: MEDICARE

## 2025-07-18 ENCOUNTER — HOSPITAL ENCOUNTER (OUTPATIENT)
Dept: INFUSION THERAPY | Age: 85
Discharge: HOME | End: 2025-07-18

## 2025-07-18 ENCOUNTER — HOSPITAL ENCOUNTER (INPATIENT)
Age: 85
LOS: 3 days | Discharge: HOME | DRG: 689 | End: 2025-07-21
Attending: EMERGENCY MEDICINE | Admitting: INTERNAL MEDICINE
Payer: MEDICARE

## 2025-07-18 DIAGNOSIS — R41.82 ALTERED MENTAL STATUS, UNSPECIFIED ALTERED MENTAL STATUS TYPE: ICD-10-CM

## 2025-07-18 DIAGNOSIS — N30.00 ACUTE CYSTITIS WITHOUT HEMATURIA: ICD-10-CM

## 2025-07-18 DIAGNOSIS — E86.0 DEHYDRATION: Primary | ICD-10-CM

## 2025-07-18 PROBLEM — G93.41 ACUTE METABOLIC ENCEPHALOPATHY: Status: ACTIVE | Noted: 2025-07-18

## 2025-07-18 LAB
ABO + RH BLD: NORMAL
ANION GAP SERPL CALCULATED.3IONS-SCNC: 15 MMOL/L (ref 9–16)
ARM BAND NUMBER: NORMAL
BACTERIA URNS QL MICRO: ABNORMAL
BASOPHILS # BLD: 0.05 K/UL (ref 0–0.2)
BASOPHILS NFR BLD: 1 % (ref 0–2)
BILIRUB UR QL STRIP: NEGATIVE
BLOOD BANK SAMPLE EXPIRATION: NORMAL
BLOOD GROUP ANTIBODIES SERPL: NEGATIVE
BUN SERPL-MCNC: 24 MG/DL (ref 8–23)
CALCIUM SERPL-MCNC: 10 MG/DL (ref 8.6–10.4)
CASTS #/AREA URNS LPF: ABNORMAL /LPF
CHLORIDE SERPL-SCNC: 97 MMOL/L (ref 98–107)
CLARITY UR: ABNORMAL
CO2 SERPL-SCNC: 23 MMOL/L (ref 20–31)
COLOR UR: YELLOW
CREAT SERPL-MCNC: 1 MG/DL (ref 0.7–1.2)
EOSINOPHIL # BLD: 0.1 K/UL (ref 0–0.44)
EOSINOPHILS RELATIVE PERCENT: 1 % (ref 0–4)
EPI CELLS #/AREA URNS HPF: ABNORMAL /HPF
ERYTHROCYTE [DISTWIDTH] IN BLOOD BY AUTOMATED COUNT: 17.2 % (ref 11.5–14.9)
GFR, ESTIMATED: 55 ML/MIN/1.73M2
GLUCOSE SERPL-MCNC: 93 MG/DL (ref 74–99)
GLUCOSE UR STRIP-MCNC: NEGATIVE MG/DL
HCT VFR BLD AUTO: 32.7 % (ref 36–46)
HGB BLD-MCNC: 10.3 G/DL (ref 12–16)
HGB UR QL STRIP.AUTO: ABNORMAL
IMM GRANULOCYTES # BLD AUTO: 0.03 K/UL (ref 0–0.3)
IMM GRANULOCYTES NFR BLD: 0 %
INR PPP: 1
KETONES UR STRIP-MCNC: ABNORMAL MG/DL
LEUKOCYTE ESTERASE UR QL STRIP: ABNORMAL
LYMPHOCYTES NFR BLD: 1.7 K/UL (ref 1.1–3.7)
LYMPHOCYTES RELATIVE PERCENT: 21 % (ref 24–44)
MAGNESIUM SERPL-MCNC: 2 MG/DL (ref 1.6–2.4)
MCH RBC QN AUTO: 26.1 PG (ref 26–34)
MCHC RBC AUTO-ENTMCNC: 31.5 G/DL (ref 31–37)
MCV RBC AUTO: 82.8 FL (ref 80–100)
MONOCYTES NFR BLD: 0.65 K/UL (ref 0.1–1.2)
MONOCYTES NFR BLD: 8 % (ref 3–12)
NEUTROPHILS NFR BLD: 69 % (ref 36–66)
NEUTS SEG NFR BLD: 5.4 K/UL (ref 1.5–8.1)
NITRITE UR QL STRIP: NEGATIVE
NRBC BLD-RTO: 0 PER 100 WBC
PARTIAL THROMBOPLASTIN TIME: 27.3 SEC (ref 24–36)
PH UR STRIP: 7 [PH] (ref 5–8)
PHOSPHATE SERPL-MCNC: 3.2 MG/DL (ref 2.5–4.5)
PLATELET # BLD AUTO: 202 K/UL (ref 150–450)
PMV BLD AUTO: 10 FL (ref 8–13.5)
POTASSIUM SERPL-SCNC: 3.9 MMOL/L (ref 3.7–5.3)
PROT UR STRIP-MCNC: ABNORMAL MG/DL
PROTHROMBIN TIME: 14.3 SEC (ref 11.8–14.6)
RBC # BLD AUTO: 3.95 M/UL (ref 3.95–5.11)
RBC #/AREA URNS HPF: ABNORMAL /HPF
SODIUM SERPL-SCNC: 135 MMOL/L (ref 136–145)
SP GR UR STRIP: 1.01 (ref 1–1.03)
TROPONIN I SERPL HS-MCNC: 39 NG/L (ref 0–14)
TROPONIN I SERPL HS-MCNC: 42 NG/L (ref 0–14)
TROPONIN I SERPL HS-MCNC: 43 NG/L (ref 0–14)
UROBILINOGEN UR STRIP-ACNC: NORMAL EU/DL (ref 0–1)
WBC #/AREA URNS HPF: ABNORMAL /HPF
WBC OTHER # BLD: 7.9 K/UL (ref 3.5–11)

## 2025-07-18 PROCEDURE — 80048 BASIC METABOLIC PNL TOTAL CA: CPT

## 2025-07-18 PROCEDURE — 2500000003 HC RX 250 WO HCPCS: Performed by: EMERGENCY MEDICINE

## 2025-07-18 PROCEDURE — 70450 CT HEAD/BRAIN W/O DYE: CPT

## 2025-07-18 PROCEDURE — 87040 BLOOD CULTURE FOR BACTERIA: CPT

## 2025-07-18 PROCEDURE — 86850 RBC ANTIBODY SCREEN: CPT

## 2025-07-18 PROCEDURE — 87086 URINE CULTURE/COLONY COUNT: CPT

## 2025-07-18 PROCEDURE — 6360000002 HC RX W HCPCS: Performed by: EMERGENCY MEDICINE

## 2025-07-18 PROCEDURE — 2580000003 HC RX 258: Performed by: EMERGENCY MEDICINE

## 2025-07-18 PROCEDURE — 96360 HYDRATION IV INFUSION INIT: CPT

## 2025-07-18 PROCEDURE — 85730 THROMBOPLASTIN TIME PARTIAL: CPT

## 2025-07-18 PROCEDURE — 6360000002 HC RX W HCPCS: Performed by: INTERNAL MEDICINE

## 2025-07-18 PROCEDURE — 2060000000 HC ICU INTERMEDIATE R&B

## 2025-07-18 PROCEDURE — 83735 ASSAY OF MAGNESIUM: CPT

## 2025-07-18 PROCEDURE — 85610 PROTHROMBIN TIME: CPT

## 2025-07-18 PROCEDURE — 93005 ELECTROCARDIOGRAM TRACING: CPT | Performed by: EMERGENCY MEDICINE

## 2025-07-18 PROCEDURE — 81001 URINALYSIS AUTO W/SCOPE: CPT

## 2025-07-18 PROCEDURE — 6370000000 HC RX 637 (ALT 250 FOR IP): Performed by: INTERNAL MEDICINE

## 2025-07-18 PROCEDURE — 87186 SC STD MICRODIL/AGAR DIL: CPT

## 2025-07-18 PROCEDURE — 86901 BLOOD TYPING SEROLOGIC RH(D): CPT

## 2025-07-18 PROCEDURE — 86900 BLOOD TYPING SEROLOGIC ABO: CPT

## 2025-07-18 PROCEDURE — 71045 X-RAY EXAM CHEST 1 VIEW: CPT

## 2025-07-18 PROCEDURE — 99285 EMERGENCY DEPT VISIT HI MDM: CPT

## 2025-07-18 PROCEDURE — 36415 COLL VENOUS BLD VENIPUNCTURE: CPT

## 2025-07-18 PROCEDURE — 84484 ASSAY OF TROPONIN QUANT: CPT

## 2025-07-18 PROCEDURE — 84100 ASSAY OF PHOSPHORUS: CPT

## 2025-07-18 PROCEDURE — 99223 1ST HOSP IP/OBS HIGH 75: CPT | Performed by: INTERNAL MEDICINE

## 2025-07-18 PROCEDURE — 85025 COMPLETE CBC W/AUTO DIFF WBC: CPT

## 2025-07-18 PROCEDURE — 2500000003 HC RX 250 WO HCPCS: Performed by: INTERNAL MEDICINE

## 2025-07-18 PROCEDURE — 87077 CULTURE AEROBIC IDENTIFY: CPT

## 2025-07-18 PROCEDURE — 96361 HYDRATE IV INFUSION ADD-ON: CPT

## 2025-07-18 PROCEDURE — 2580000003 HC RX 258: Performed by: INTERNAL MEDICINE

## 2025-07-18 RX ORDER — 0.9 % SODIUM CHLORIDE 0.9 %
1000 INTRAVENOUS SOLUTION INTRAVENOUS ONCE
Status: COMPLETED | OUTPATIENT
Start: 2025-07-18 | End: 2025-07-18

## 2025-07-18 RX ORDER — SODIUM CHLORIDE 9 MG/ML
INJECTION, SOLUTION INTRAVENOUS CONTINUOUS
Status: ACTIVE | OUTPATIENT
Start: 2025-07-18 | End: 2025-07-19

## 2025-07-18 RX ORDER — ONDANSETRON 2 MG/ML
4 INJECTION INTRAMUSCULAR; INTRAVENOUS EVERY 6 HOURS PRN
Status: DISCONTINUED | OUTPATIENT
Start: 2025-07-18 | End: 2025-07-21 | Stop reason: HOSPADM

## 2025-07-18 RX ORDER — ALLOPURINOL 300 MG/1
300 TABLET ORAL DAILY
Status: DISCONTINUED | OUTPATIENT
Start: 2025-07-18 | End: 2025-07-21 | Stop reason: HOSPADM

## 2025-07-18 RX ORDER — PANTOPRAZOLE SODIUM 40 MG/1
40 TABLET, DELAYED RELEASE ORAL
Status: DISCONTINUED | OUTPATIENT
Start: 2025-07-19 | End: 2025-07-21 | Stop reason: HOSPADM

## 2025-07-18 RX ORDER — HEPARIN SODIUM 5000 [USP'U]/ML
5000 INJECTION, SOLUTION INTRAVENOUS; SUBCUTANEOUS 2 TIMES DAILY
Status: DISCONTINUED | OUTPATIENT
Start: 2025-07-18 | End: 2025-07-21 | Stop reason: HOSPADM

## 2025-07-18 RX ORDER — ACETAMINOPHEN 325 MG/1
650 TABLET ORAL EVERY 6 HOURS PRN
Status: DISCONTINUED | OUTPATIENT
Start: 2025-07-18 | End: 2025-07-21 | Stop reason: HOSPADM

## 2025-07-18 RX ORDER — ONDANSETRON 4 MG/1
4 TABLET, ORALLY DISINTEGRATING ORAL EVERY 8 HOURS PRN
Status: DISCONTINUED | OUTPATIENT
Start: 2025-07-18 | End: 2025-07-21 | Stop reason: HOSPADM

## 2025-07-18 RX ORDER — LEVOTHYROXINE SODIUM 75 UG/1
150 TABLET ORAL DAILY
Status: DISCONTINUED | OUTPATIENT
Start: 2025-07-19 | End: 2025-07-21 | Stop reason: HOSPADM

## 2025-07-18 RX ORDER — SODIUM CHLORIDE 0.9 % (FLUSH) 0.9 %
5-40 SYRINGE (ML) INJECTION EVERY 12 HOURS SCHEDULED
Status: DISCONTINUED | OUTPATIENT
Start: 2025-07-18 | End: 2025-07-21 | Stop reason: HOSPADM

## 2025-07-18 RX ORDER — MULTIVIT-MIN/FOLIC ACID/BIOTIN 66.7 MCG
1 TABLET ORAL DAILY
Status: DISCONTINUED | OUTPATIENT
Start: 2025-07-18 | End: 2025-07-18 | Stop reason: RX

## 2025-07-18 RX ORDER — FOLIC ACID 1 MG/1
1 TABLET ORAL DAILY
Status: DISCONTINUED | OUTPATIENT
Start: 2025-07-18 | End: 2025-07-21 | Stop reason: HOSPADM

## 2025-07-18 RX ORDER — POLYETHYLENE GLYCOL 3350 17 G/17G
17 POWDER, FOR SOLUTION ORAL DAILY PRN
Status: DISCONTINUED | OUTPATIENT
Start: 2025-07-18 | End: 2025-07-21 | Stop reason: HOSPADM

## 2025-07-18 RX ORDER — HYDRALAZINE HYDROCHLORIDE 20 MG/ML
10 INJECTION INTRAMUSCULAR; INTRAVENOUS EVERY 6 HOURS PRN
Status: DISCONTINUED | OUTPATIENT
Start: 2025-07-18 | End: 2025-07-21 | Stop reason: HOSPADM

## 2025-07-18 RX ORDER — SODIUM CHLORIDE 0.9 % (FLUSH) 0.9 %
5-40 SYRINGE (ML) INJECTION PRN
Status: DISCONTINUED | OUTPATIENT
Start: 2025-07-18 | End: 2025-07-21 | Stop reason: HOSPADM

## 2025-07-18 RX ORDER — ACETAMINOPHEN 650 MG/1
650 SUPPOSITORY RECTAL EVERY 6 HOURS PRN
Status: DISCONTINUED | OUTPATIENT
Start: 2025-07-18 | End: 2025-07-21 | Stop reason: HOSPADM

## 2025-07-18 RX ORDER — ATORVASTATIN CALCIUM 40 MG/1
40 TABLET, FILM COATED ORAL DAILY
Status: DISCONTINUED | OUTPATIENT
Start: 2025-07-18 | End: 2025-07-21 | Stop reason: HOSPADM

## 2025-07-18 RX ORDER — CARVEDILOL 6.25 MG/1
6.25 TABLET ORAL 2 TIMES DAILY WITH MEALS
Status: DISCONTINUED | OUTPATIENT
Start: 2025-07-18 | End: 2025-07-21 | Stop reason: HOSPADM

## 2025-07-18 RX ORDER — SODIUM CHLORIDE 9 MG/ML
INJECTION, SOLUTION INTRAVENOUS PRN
Status: DISCONTINUED | OUTPATIENT
Start: 2025-07-18 | End: 2025-07-21 | Stop reason: HOSPADM

## 2025-07-18 RX ADMIN — ALLOPURINOL 300 MG: 300 TABLET ORAL at 21:05

## 2025-07-18 RX ADMIN — SODIUM CHLORIDE 1000 ML: 0.9 INJECTION, SOLUTION INTRAVENOUS at 15:32

## 2025-07-18 RX ADMIN — SODIUM CHLORIDE, PRESERVATIVE FREE 10 ML: 5 INJECTION INTRAVENOUS at 21:05

## 2025-07-18 RX ADMIN — ATORVASTATIN CALCIUM 40 MG: 40 TABLET, FILM COATED ORAL at 21:05

## 2025-07-18 RX ADMIN — SODIUM CHLORIDE: 0.9 INJECTION, SOLUTION INTRAVENOUS at 21:04

## 2025-07-18 RX ADMIN — CARVEDILOL 6.25 MG: 6.25 TABLET, FILM COATED ORAL at 21:05

## 2025-07-18 RX ADMIN — MEROPENEM 1000 MG: 1 INJECTION INTRAVENOUS at 21:08

## 2025-07-18 RX ADMIN — HEPARIN SODIUM 5000 UNITS: 5000 INJECTION INTRAVENOUS; SUBCUTANEOUS at 21:05

## 2025-07-18 RX ADMIN — FOLIC ACID 1 MG: 1 TABLET ORAL at 21:05

## 2025-07-18 RX ADMIN — WATER 1000 MG: 1 INJECTION INTRAMUSCULAR; INTRAVENOUS; SUBCUTANEOUS at 18:04

## 2025-07-18 ASSESSMENT — HEART SCORE: ECG: NORMAL

## 2025-07-18 ASSESSMENT — PAIN - FUNCTIONAL ASSESSMENT: PAIN_FUNCTIONAL_ASSESSMENT: NONE - DENIES PAIN

## 2025-07-18 NOTE — ED PROVIDER NOTES
EMERGENCY DEPARTMENT ENCOUNTER    Pt Name: Yaz Allison  MRN: [id number]  Birthdate 1940  Date of evaluation: 7/18/25  CHIEF COMPLAINT       Chief Complaint   Patient presents with    Fatigue     Family concerned she may be dehydrated     HISTORY OF PRESENT ILLNESS   84-year-old female presenting to the ER with family because they are concerned that the patient is becoming a little confused and more weak.  The patient gets like this, per the family, when she is dehydrated.    The history is provided by the patient.   Altered Mental Status  Presenting symptoms: confusion    Associated symptoms: no abdominal pain, no hallucinations, no headaches, no nausea, no palpitations, no rash and no vomiting            REVIEW OF SYSTEMS     Review of Systems   Constitutional:  Positive for fatigue. Negative for activity change and appetite change.   HENT:  Negative for facial swelling, trouble swallowing and voice change.    Eyes:  Negative for photophobia and pain.   Respiratory:  Negative for chest tightness and shortness of breath.    Cardiovascular:  Negative for chest pain and palpitations.   Gastrointestinal:  Negative for abdominal pain, nausea and vomiting.   Genitourinary:  Negative for dysuria and urgency.   Musculoskeletal:  Negative for arthralgias and back pain.   Skin:  Negative for color change and rash.   Neurological:  Negative for dizziness, syncope and headaches.   Psychiatric/Behavioral:  Positive for confusion. Negative for behavioral problems and hallucinations.      PASTMEDICAL HISTORY     Past Medical History:   Diagnosis Date    Abdominal pain, unspecified site     Acquired cyst of kidney     Acute gouty arthropathy     Allergic rhinitis, cause unspecified     Anxiety state, unspecified     Arthropathy, unspecified, site unspecified     Chronic airway obstruction, not elsewhere classified     Chronic kidney disease, stage III (moderate) ([facility])     Diarrhea     Edema     Esophageal reflux     Essential

## 2025-07-18 NOTE — ED TRIAGE NOTES
Mode of arrival (squad #, walk in, police, etc) : walk in        Chief complaint(s): Fatigue        Arrival Note (brief scenario, treatment PTA, etc).: Fmaily is concerned she may be dehydrated. Noticed increased fatigue, lower responses at home over the last 2 days. Pt had hx of this. Family states they do not have AC at the house and were concerned over the hot days maybe she got dehydrated.         C= \"Have you ever felt that you should Cut down on your drinking?\"  No  A= \"Have people Annoyed you by criticizing your drinking?\"  No  G= \"Have you ever felt bad or Guilty about your drinking?\"  No  E= \"Have you ever had a drink as an Eye-opener first thing in the morning to steady your nerves or to help a hangover?\"  No      Deferred []      Reason for deferring: N/A    *If yes to two or more: probable alcohol abuse.*

## 2025-07-18 NOTE — H&P
[facility]   IN-PATIENT SERVICE   [facility]    HISTORY AND PHYSICAL EXAMINATION            Date:   7/18/2025  Patient name:  Yaz Allison  Date of admission:  7/18/2025  2:22 PM  MRN:   [id number]  Account:  [id number]  YOB: 1940  PCP:    Gabe Cintron MD  Room:   04/04  Code Status:    Prior    Chief Complaint:     Chief Complaint   Patient presents with    Fatigue     Family concerned she may be dehydrated       History Obtained From:     patient    History of Present Illness:     The patient is a 84 y.o.   /  female who presents with Fatigue (Family concerned she may be dehydrated)   and she is admitted to the hospital for the management of      Patient is 84-year-old female past medical history of hypertension, recurrent UTIs has b scented to the ER with confusion decreased p.o. intake and dehydration,  Patient daughters were present at the bedside, stated that patient has been confused last couple of days and feels that and she has been dehydrated, they are concerned about her possible UTI  Patient was alert and oriented on encounter, received IV fluids in the ER,    Currently denies any chest pain shortness of breath next    Past Medical History:     Past Medical History:   Diagnosis Date    Abdominal pain, unspecified site     Acquired cyst of kidney     Acute gouty arthropathy     Allergic rhinitis, cause unspecified     Anxiety state, unspecified     Arthropathy, unspecified, site unspecified     Chronic airway obstruction, not elsewhere classified     Chronic kidney disease, stage III (moderate) ([facility])     Diarrhea     Edema     Esophageal reflux     Essential 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)     Mononeuritis of unspecified site     Myalgia and myositis, unspecified     Osteoarthrosis, unspecified whether generalized

## 2025-07-19 LAB
ANION GAP SERPL CALCULATED.3IONS-SCNC: 10 MMOL/L (ref 9–16)
BASOPHILS # BLD: 0.05 K/UL (ref 0–0.2)
BASOPHILS NFR BLD: 1 % (ref 0–2)
BUN SERPL-MCNC: 18 MG/DL (ref 8–23)
CALCIUM SERPL-MCNC: 8.9 MG/DL (ref 8.6–10.4)
CHLORIDE SERPL-SCNC: 104 MMOL/L (ref 98–107)
CO2 SERPL-SCNC: 23 MMOL/L (ref 20–31)
CREAT SERPL-MCNC: 0.8 MG/DL (ref 0.7–1.2)
EKG ATRIAL RATE: 77 BPM
EKG P AXIS: 83 DEGREES
EKG P-R INTERVAL: 198 MS
EKG Q-T INTERVAL: 412 MS
EKG QRS DURATION: 84 MS
EKG QTC CALCULATION (BAZETT): 466 MS
EKG R AXIS: -5 DEGREES
EKG T AXIS: 81 DEGREES
EKG VENTRICULAR RATE: 77 BPM
EOSINOPHIL # BLD: 0.17 K/UL (ref 0–0.44)
EOSINOPHILS RELATIVE PERCENT: 3 % (ref 0–4)
ERYTHROCYTE [DISTWIDTH] IN BLOOD BY AUTOMATED COUNT: 17 % (ref 11.5–14.9)
GFR, ESTIMATED: 71 ML/MIN/1.73M2
GLUCOSE SERPL-MCNC: 105 MG/DL (ref 74–99)
HCT VFR BLD AUTO: 27.6 % (ref 36–46)
HGB BLD-MCNC: 8.4 G/DL (ref 12–16)
IMM GRANULOCYTES # BLD AUTO: <0.03 K/UL (ref 0–0.3)
IMM GRANULOCYTES NFR BLD: 0 %
LYMPHOCYTES NFR BLD: 1.32 K/UL (ref 1.1–3.7)
LYMPHOCYTES RELATIVE PERCENT: 25 % (ref 24–44)
MCH RBC QN AUTO: 25.8 PG (ref 26–34)
MCHC RBC AUTO-ENTMCNC: 30.4 G/DL (ref 31–37)
MCV RBC AUTO: 84.9 FL (ref 80–100)
MONOCYTES NFR BLD: 0.55 K/UL (ref 0.1–1.2)
MONOCYTES NFR BLD: 10 % (ref 3–12)
NEUTROPHILS NFR BLD: 61 % (ref 36–66)
NEUTS SEG NFR BLD: 3.16 K/UL (ref 1.5–8.1)
NRBC BLD-RTO: 0 PER 100 WBC
PLATELET # BLD AUTO: 171 K/UL (ref 150–450)
PMV BLD AUTO: 9.5 FL (ref 8–13.5)
POTASSIUM SERPL-SCNC: 3.6 MMOL/L (ref 3.7–5.3)
RBC # BLD AUTO: 3.25 M/UL (ref 3.95–5.11)
SODIUM SERPL-SCNC: 137 MMOL/L (ref 136–145)
TROPONIN I SERPL HS-MCNC: 41 NG/L (ref 0–14)
WBC OTHER # BLD: 5.3 K/UL (ref 3.5–11)

## 2025-07-19 PROCEDURE — 85025 COMPLETE CBC W/AUTO DIFF WBC: CPT

## 2025-07-19 PROCEDURE — 97166 OT EVAL MOD COMPLEX 45 MIN: CPT

## 2025-07-19 PROCEDURE — 80048 BASIC METABOLIC PNL TOTAL CA: CPT

## 2025-07-19 PROCEDURE — 1200000000 HC SEMI PRIVATE

## 2025-07-19 PROCEDURE — 6370000000 HC RX 637 (ALT 250 FOR IP): Performed by: INTERNAL MEDICINE

## 2025-07-19 PROCEDURE — 2580000003 HC RX 258: Performed by: INTERNAL MEDICINE

## 2025-07-19 PROCEDURE — 97530 THERAPEUTIC ACTIVITIES: CPT

## 2025-07-19 PROCEDURE — 99233 SBSQ HOSP IP/OBS HIGH 50: CPT | Performed by: INTERNAL MEDICINE

## 2025-07-19 PROCEDURE — 97162 PT EVAL MOD COMPLEX 30 MIN: CPT

## 2025-07-19 PROCEDURE — 36415 COLL VENOUS BLD VENIPUNCTURE: CPT

## 2025-07-19 PROCEDURE — 6360000002 HC RX W HCPCS: Performed by: INTERNAL MEDICINE

## 2025-07-19 PROCEDURE — 93010 ELECTROCARDIOGRAM REPORT: CPT | Performed by: INTERNAL MEDICINE

## 2025-07-19 RX ADMIN — FOLIC ACID 1 MG: 1 TABLET ORAL at 08:20

## 2025-07-19 RX ADMIN — HEPARIN SODIUM 5000 UNITS: 5000 INJECTION INTRAVENOUS; SUBCUTANEOUS at 08:20

## 2025-07-19 RX ADMIN — ALLOPURINOL 300 MG: 300 TABLET ORAL at 08:20

## 2025-07-19 RX ADMIN — LEVOTHYROXINE SODIUM 150 MCG: 0.07 TABLET ORAL at 05:54

## 2025-07-19 RX ADMIN — SODIUM CHLORIDE: 0.9 INJECTION, SOLUTION INTRAVENOUS at 08:26

## 2025-07-19 RX ADMIN — PANTOPRAZOLE SODIUM 40 MG: 40 TABLET, DELAYED RELEASE ORAL at 05:54

## 2025-07-19 RX ADMIN — MEROPENEM 1000 MG: 1 INJECTION INTRAVENOUS at 08:28

## 2025-07-19 RX ADMIN — MEROPENEM 1000 MG: 1 INJECTION INTRAVENOUS at 20:27

## 2025-07-19 RX ADMIN — CARVEDILOL 6.25 MG: 6.25 TABLET, FILM COATED ORAL at 08:20

## 2025-07-19 RX ADMIN — ATORVASTATIN CALCIUM 40 MG: 40 TABLET, FILM COATED ORAL at 08:20

## 2025-07-19 RX ADMIN — HEPARIN SODIUM 5000 UNITS: 5000 INJECTION INTRAVENOUS; SUBCUTANEOUS at 20:23

## 2025-07-19 RX ADMIN — CARVEDILOL 6.25 MG: 6.25 TABLET, FILM COATED ORAL at 16:35

## 2025-07-19 NOTE — CARE COORDINATION
Case Management Assessment  Initial Evaluation    Date/Time of Evaluation: 7/19/202510:39 AM  Assessment Completed by: Irlanda Neville RN    If patient is discharged prior to next notation, then this note serves as note for discharge by case management.    Patient Name: Yaz Allison                   YOB: 1940  Diagnosis: Dehydration [E86.0]  Altered mental state [R41.82]  Acute cystitis without hematuria [N30.00]  Altered mental status, unspecified altered mental status type [R41.82]  Acute metabolic encephalopathy [G93.41]                   Date / Time: 7/18/2025  2:22 PM    Patient Admission Status: Inpatient   Readmission Risk (Low < 19, Mod (19-27), High > 27): Readmission Risk Score: 17.3    Current PCP: Gabe Cintron MD  PCP verified by CM? Yes    Chart Reviewed: Yes      History Provided by: Patient  Patient Orientation: Alert and Oriented    Patient Cognition: Alert    Hospitalization in the last 30 days (Readmission):  No    If yes, Readmission Assessment in  Navigator will be completed.    Advance Directives:      Code Status: Full Code   Patient's Primary Decision Maker is: Legal Next of Kin    Primary Decision Maker: Cori Allison - [phone]    Primary Decision Maker: Ashley Allison - [phone]    Primary Decision Maker: Levi Allison - [phone]    Primary Decision Maker: EstefanyVenice    Discharge Planning:    Patient lives with: Family Members Type of Home: House  Primary Care Giver: Self  Patient Support Systems include: Family Members   Current Financial resources: Medicare  Current community resources: None  Current services prior to admission: Durable Medical Equipment            Current DME: Walker, Cane, Wheelchair, Shower Chair            Type of Home Care services:  None    ADLS  Prior functional level: Assistance with the following:, Mobility  Current functional level: Assistance with the following:, Mobility    PT AM-PAC: 20 /24  OT AM-PAC: 19

## 2025-07-19 NOTE — ACP (ADVANCE CARE PLANNING)
Advance Care Planning     Advance Care Planning Activator (Inpatient)  Conversation Note      Date of ACP Conversation: 7/19/2025     Conversation Conducted with: Patient with Decision Making Capacity    ACP Activator: Irlanda Neville RN    Health Care Decision Maker:     Current Designated Health Care Decision Maker:     Primary Decision Maker: Cori Allison - James - [phone]    Primary Decision Maker: Ashley Allison - Child - [phone]    Primary Decision Maker: Levi Allison - Child - [phone]    Primary Decision Maker: [place] Allison  Click here to complete Healthcare Decision Makers including section of the Healthcare Decision Maker Relationship (ie \"Primary\")  Today we documented Decision Maker(s) consistent with Legal Next of Kin hierarchy.    Care Preferences    Ventilation:  \"If you were in your present state of health and suddenly became very ill and were unable to breathe on your own, what would your preference be about the use of a ventilator (breathing machine) if it were available to you?\"      Would the patient desire the use of ventilator (breathing machine)?: yes    \"If your health worsens and it becomes clear that your chance of recovery is unlikely, what would your preference be about the use of a ventilator (breathing machine) if it were available to you?\"     Would the patient desire the use of ventilator (breathing machine)?: No      Resuscitation  \"CPR works best to restart the heart when there is a sudden event, like a heart attack, in someone who is otherwise healthy. Unfortunately, CPR does not typically restart the heart for people who have serious health conditions or who are very sick.\"    \"In the event your heart stopped as a result of an underlying serious health condition, would you want attempts to be made to restart your heart (answer \"yes\" for attempt to resuscitate) or would you prefer a natural death (answer \"no\" for do not attempt to resuscitate)?\" yes       [] Yes   [x] No

## 2025-07-19 NOTE — ED NOTES
Report given to PCU RN, RN from PCU.   Report method by phone   The following was reviewed with receiving RN:   Current vital signs:  BP (!) 177/90   Pulse 72   Temp 97.6 °F (36.4 °C) (Oral)   Resp 21   Ht 1.702 m (5' 7\")   Wt 45.4 kg (100 lb)   SpO2 99%   BMI 15.66 kg/m²                MEWS Score: 1     Any medication or safety alerts were reviewed. Any pending diagnostics and notifications were also reviewed, as well as any safety concerns or issues, abnormal labs, abnormal imaging, and abnormal assessment findings. Questions were answered.

## 2025-07-19 NOTE — PLAN OF CARE
Problem: Safety - Adult  Goal: Free from fall injury  Outcome: Progressing  Flowsheets (Taken 7/19/2025 0935 by Erica Chu RN)  Free From Fall Injury: Instruct family/caregiver on patient safety     Problem: Discharge Planning  Goal: Discharge to home or other facility with appropriate resources  Outcome: Progressing  Flowsheets  Taken 7/19/2025 1600 by Erica Chu RN  Discharge to home or other facility with appropriate resources:   Identify barriers to discharge with patient and caregiver   Arrange for needed discharge resources and transportation as appropriate   Identify discharge learning needs (meds, wound care, etc)   Refer to discharge planning if patient needs post-hospital services based on physician order or complex needs related to functional status, cognitive ability or social support system  Taken 7/19/2025 1200 by Erica Chu RN  Discharge to home or other facility with appropriate resources:   Identify barriers to discharge with patient and caregiver   Arrange for needed discharge resources and transportation as appropriate   Identify discharge learning needs (meds, wound care, etc)   Refer to discharge planning if patient needs post-hospital services based on physician order or complex needs related to functional status, cognitive ability or social support system  Taken 7/19/2025 0800 by Erica Chu RN  Discharge to home or other facility with appropriate resources:   Identify barriers to discharge with patient and caregiver   Arrange for needed discharge resources and transportation as appropriate   Identify discharge learning needs (meds, wound care, etc)   Refer to discharge planning if patient needs post-hospital services based on physician order or complex needs related to functional status, cognitive ability or social support system     Problem: ABCDS Injury Assessment  Goal: Absence of physical injury  Outcome: Progressing  Flowsheets (Taken 7/19/2025 0935 by Erica Chu

## 2025-07-20 LAB
BASOPHILS # BLD: 0.06 K/UL (ref 0–0.2)
BASOPHILS NFR BLD: 1 % (ref 0–2)
EOSINOPHIL # BLD: 0.34 K/UL (ref 0–0.44)
EOSINOPHILS RELATIVE PERCENT: 6 % (ref 0–4)
ERYTHROCYTE [DISTWIDTH] IN BLOOD BY AUTOMATED COUNT: 16.9 % (ref 11.5–14.9)
HCT VFR BLD AUTO: 27.6 % (ref 36–46)
HGB BLD-MCNC: 8.4 G/DL (ref 12–16)
IMM GRANULOCYTES # BLD AUTO: 0.04 K/UL (ref 0–0.3)
IMM GRANULOCYTES NFR BLD: 1 %
LYMPHOCYTES NFR BLD: 1.11 K/UL (ref 1.1–3.7)
LYMPHOCYTES RELATIVE PERCENT: 20 % (ref 24–44)
MCH RBC QN AUTO: 25.8 PG (ref 26–34)
MCHC RBC AUTO-ENTMCNC: 30.4 G/DL (ref 31–37)
MCV RBC AUTO: 84.9 FL (ref 80–100)
MONOCYTES NFR BLD: 0.53 K/UL (ref 0.1–1.2)
MONOCYTES NFR BLD: 9 % (ref 3–12)
NEUTROPHILS NFR BLD: 63 % (ref 36–66)
NEUTS SEG NFR BLD: 3.55 K/UL (ref 1.5–8.1)
NRBC BLD-RTO: 0 PER 100 WBC
PLATELET # BLD AUTO: 154 K/UL (ref 150–450)
PMV BLD AUTO: 9.5 FL (ref 8–13.5)
RBC # BLD AUTO: 3.25 M/UL (ref 3.95–5.11)
WBC OTHER # BLD: 5.6 K/UL (ref 3.5–11)

## 2025-07-20 PROCEDURE — 85025 COMPLETE CBC W/AUTO DIFF WBC: CPT

## 2025-07-20 PROCEDURE — 1200000000 HC SEMI PRIVATE

## 2025-07-20 PROCEDURE — 6360000002 HC RX W HCPCS: Performed by: INTERNAL MEDICINE

## 2025-07-20 PROCEDURE — 6370000000 HC RX 637 (ALT 250 FOR IP): Performed by: INTERNAL MEDICINE

## 2025-07-20 PROCEDURE — 2580000003 HC RX 258: Performed by: INTERNAL MEDICINE

## 2025-07-20 PROCEDURE — 36415 COLL VENOUS BLD VENIPUNCTURE: CPT

## 2025-07-20 PROCEDURE — 99233 SBSQ HOSP IP/OBS HIGH 50: CPT | Performed by: INTERNAL MEDICINE

## 2025-07-20 PROCEDURE — 2500000003 HC RX 250 WO HCPCS: Performed by: INTERNAL MEDICINE

## 2025-07-20 RX ADMIN — LEVOTHYROXINE SODIUM 150 MCG: 0.07 TABLET ORAL at 05:58

## 2025-07-20 RX ADMIN — MEROPENEM 1000 MG: 1 INJECTION INTRAVENOUS at 08:54

## 2025-07-20 RX ADMIN — POLYETHYLENE GLYCOL 3350 17 G: 17 POWDER, FOR SOLUTION ORAL at 18:12

## 2025-07-20 RX ADMIN — FOLIC ACID 1 MG: 1 TABLET ORAL at 08:49

## 2025-07-20 RX ADMIN — HEPARIN SODIUM 5000 UNITS: 5000 INJECTION INTRAVENOUS; SUBCUTANEOUS at 20:29

## 2025-07-20 RX ADMIN — HEPARIN SODIUM 5000 UNITS: 5000 INJECTION INTRAVENOUS; SUBCUTANEOUS at 08:50

## 2025-07-20 RX ADMIN — ALLOPURINOL 300 MG: 300 TABLET ORAL at 08:48

## 2025-07-20 RX ADMIN — CARVEDILOL 6.25 MG: 6.25 TABLET, FILM COATED ORAL at 08:48

## 2025-07-20 RX ADMIN — MEROPENEM 1000 MG: 1 INJECTION INTRAVENOUS at 20:29

## 2025-07-20 RX ADMIN — ATORVASTATIN CALCIUM 40 MG: 40 TABLET, FILM COATED ORAL at 08:48

## 2025-07-20 RX ADMIN — CARVEDILOL 6.25 MG: 6.25 TABLET, FILM COATED ORAL at 18:17

## 2025-07-20 RX ADMIN — SODIUM CHLORIDE, PRESERVATIVE FREE 10 ML: 5 INJECTION INTRAVENOUS at 20:20

## 2025-07-20 RX ADMIN — PANTOPRAZOLE SODIUM 40 MG: 40 TABLET, DELAYED RELEASE ORAL at 05:58

## 2025-07-20 NOTE — CARE COORDINATION
ONGOING DISCHARGE PLAN:    Patient is alert and oriented x4.    Spoke with patient regarding discharge plan and patient confirms that plan is still home. Daughters are teachable if need for IV atb. [place] if needed.    UTI-culture pending  IV merrem    PT/OT    IMM letter provided to patient.  Patient offered four hours to make informed decision regarding appeal process; patient agreeable to discharge.      Will continue to follow for additional discharge needs.    If patient is discharged prior to next notation, then this note serves as note for discharge by case management.    Electronically signed by Yani Baez RN on 7/20/2025 at 10:57 AM

## 2025-07-20 NOTE — PLAN OF CARE
Problem: Safety - Adult  Goal: Free from fall injury  7/20/2025 0233 by Rae Doss, RN  Outcome: Progressing     Problem: Discharge Planning  Goal: Discharge to home or other facility with appropriate resources  7/20/2025 0233 by Rae Doss, RN  Outcome: Progressing     Problem: ABCDS Injury Assessment  Goal: Absence of physical injury  7/20/2025 0233 by Rae Doss, RN  Outcome: Progressing

## 2025-07-20 NOTE — PLAN OF CARE
Problem: Safety - Adult  Goal: Free from fall injury  7/20/2025 1248 by Ramandeep Paul, RN  Outcome: Progressing  Flowsheets (Taken 7/20/2025 0943 by Anne-Marie Owens, RN)  Free From Fall Injury: Instruct family/caregiver on patient safety  7/20/2025 0233 by Rae Doss RN  Outcome: Progressing     Problem: Discharge Planning  Goal: Discharge to home or other facility with appropriate resources  7/20/2025 1248 by Ramandeep Paul RN  Outcome: Progressing  Flowsheets (Taken 7/19/2025 1600 by Erica Chu, RN)  Discharge to home or other facility with appropriate resources:   Identify barriers to discharge with patient and caregiver   Arrange for needed discharge resources and transportation as appropriate   Identify discharge learning needs (meds, wound care, etc)   Refer to discharge planning if patient needs post-hospital services based on physician order or complex needs related to functional status, cognitive ability or social support system  7/20/2025 0233 by Rae Doss RN  Outcome: Progressing     Problem: ABCDS Injury Assessment  Goal: Absence of physical injury  7/20/2025 1248 by Ramandeep Paul, RN  Outcome: Progressing  Flowsheets (Taken 7/20/2025 0943 by Anne-Marie Owens, RN)  Absence of Physical Injury: Implement safety measures based on patient assessment  7/20/2025 0233 by Rae Doss, RN  Outcome: Progressing      Hiatal hernia with GERD

## 2025-07-21 VITALS
BODY MASS INDEX: 15.7 KG/M2 | WEIGHT: 100 LBS | TEMPERATURE: 98.1 F | RESPIRATION RATE: 18 BRPM | SYSTOLIC BLOOD PRESSURE: 139 MMHG | HEIGHT: 67 IN | DIASTOLIC BLOOD PRESSURE: 71 MMHG | HEART RATE: 76 BPM | OXYGEN SATURATION: 100 %

## 2025-07-21 LAB
MICROORGANISM SPEC CULT: ABNORMAL
SPECIMEN DESCRIPTION: ABNORMAL

## 2025-07-21 PROCEDURE — G0545 PR INHERENT VISIT TO INPT: HCPCS | Performed by: INTERNAL MEDICINE

## 2025-07-21 PROCEDURE — 97530 THERAPEUTIC ACTIVITIES: CPT

## 2025-07-21 PROCEDURE — 6370000000 HC RX 637 (ALT 250 FOR IP): Performed by: INTERNAL MEDICINE

## 2025-07-21 PROCEDURE — 2500000003 HC RX 250 WO HCPCS: Performed by: INTERNAL MEDICINE

## 2025-07-21 PROCEDURE — 6360000002 HC RX W HCPCS: Performed by: INTERNAL MEDICINE

## 2025-07-21 PROCEDURE — 2580000003 HC RX 258: Performed by: INTERNAL MEDICINE

## 2025-07-21 PROCEDURE — 99239 HOSP IP/OBS DSCHRG MGMT >30: CPT | Performed by: INTERNAL MEDICINE

## 2025-07-21 PROCEDURE — 97116 GAIT TRAINING THERAPY: CPT

## 2025-07-21 PROCEDURE — 99222 1ST HOSP IP/OBS MODERATE 55: CPT | Performed by: INTERNAL MEDICINE

## 2025-07-21 RX ORDER — LEVOFLOXACIN 750 MG/1
750 TABLET, FILM COATED ORAL DAILY
Qty: 4 TABLET | Refills: 0 | Status: SHIPPED | OUTPATIENT
Start: 2025-07-21 | End: 2025-07-25

## 2025-07-21 RX ADMIN — FOLIC ACID 1 MG: 1 TABLET ORAL at 08:41

## 2025-07-21 RX ADMIN — MEROPENEM 1000 MG: 1 INJECTION INTRAVENOUS at 08:39

## 2025-07-21 RX ADMIN — CARVEDILOL 6.25 MG: 6.25 TABLET, FILM COATED ORAL at 08:40

## 2025-07-21 RX ADMIN — LEVOTHYROXINE SODIUM 150 MCG: 0.07 TABLET ORAL at 05:03

## 2025-07-21 RX ADMIN — PANTOPRAZOLE SODIUM 40 MG: 40 TABLET, DELAYED RELEASE ORAL at 05:03

## 2025-07-21 RX ADMIN — SODIUM CHLORIDE, PRESERVATIVE FREE 10 ML: 5 INJECTION INTRAVENOUS at 08:38

## 2025-07-21 RX ADMIN — ALLOPURINOL 300 MG: 300 TABLET ORAL at 08:41

## 2025-07-21 RX ADMIN — ATORVASTATIN CALCIUM 40 MG: 40 TABLET, FILM COATED ORAL at 08:41

## 2025-07-21 RX ADMIN — HEPARIN SODIUM 5000 UNITS: 5000 INJECTION INTRAVENOUS; SUBCUTANEOUS at 08:41

## 2025-07-21 NOTE — DISCHARGE SUMMARY
IN-PATIENT SERVICE   [facility] Internal Medicine    Discharge Summary     Patient ID: Yaz Allison  :  1940   MRN: [id number]     ACCOUNT:  [id number]   Patient's PCP: Gabe Cintron MD  Admit Date: 2025   Discharge Date: 2025    Length of Stay: 3  Code Status:  Full Code  Admitting Physician: Alyce Hernandez MD  Discharge Physician: Alyce Hernandez MD     Active Discharge Diagnoses:     Primary Problem  Altered mental state      Hospital Problems  Active Hospital Problems    Diagnosis Date Noted    Altered mental state [R41.82] 2025    Acute metabolic encephalopathy [G93.41] 2025    Dehydration [E86.0] 2025       Admission Condition:  fair     Discharged Condition: fair    Hospital Stay:     Hospital Course:  Yaz Allison is a 85 y.o. female who was admitted for the management of Altered mental state , presented to ER with Fatigue (Family concerned she may be dehydrated)        Significant therapeutic interventions:     Significant Diagnostic Studies:   Labs / Micro:        Radiology:    CT HEAD WO CONTRAST  Result Date: 2025  EXAMINATION: CT OF THE HEAD WITHOUT CONTRAST  2025 3:45 pm TECHNIQUE: CT of the head was performed without the administration of intravenous contrast. Automated exposure control, iterative reconstruction, and/or weight based adjustment of the mA/kV was utilized to reduce the radiation dose to as low as reasonably achievable. COMPARISON: Brain MRI 2025. HISTORY: ORDERING SYSTEM PROVIDED HISTORY: AMS TECHNOLOGIST PROVIDED HISTORY: AMS Decision Support Exception - unselect if not a suspected or confirmed emergency medical condition->Emergency Medical Condition (MA) Reason for Exam: AMS Additional signs and symptoms: family concerned that the patient is becoming a little confused and more weak. FINDINGS: BRAIN/VENTRICLES: There is no acute intracranial hemorrhage, mass effect or midline shift. No abnormal extra-axial fluid collection.

## 2025-07-21 NOTE — DISCHARGE SUMMARY
IN-PATIENT SERVICE   [facility] Internal Medicine    Discharge Summary     Patient ID: Yaz Allison  :  1940   MRN: [id number]     ACCOUNT:  [id number]   Patient's PCP: Gabe Cintron MD  Admit Date: 2025   Discharge Date: 2025    Length of Stay: 3  Code Status:  Full Code  Admitting Physician: Alyce Hernandez MD  Discharge Physician: Alyce Hernandez MD     Active Discharge Diagnoses:     Primary Problem  Altered mental state      Hospital Problems  Active Hospital Problems    Diagnosis Date Noted    Altered mental state [R41.82] 2025    Acute metabolic encephalopathy [G93.41] 2025    Dehydration [E86.0] 2025       Admission Condition:  fair     Discharged Condition: fair    Hospital Stay:     Hospital Course:  Yaz Allison is a 85 y.o. female who was admitted for the management of Altered mental state , presented to ER with Fatigue (Family concerned she may be dehydrated)  Patient is 84-year-old female past medical history of hypertension, recurrent UTIs has b scented to the ER with confusion decreased p.o. intake and dehydration,  Patient daughters were present at the bedside, stated that patient has been confused last couple of days and feels that and she has been dehydrated, they are concerned about her possible UTI  Patient was alert and oriented on encounter, received IV fluids in the ER      Patient was treated for UTI,  Urine culture grew ESBL, sensitive to Levaquin ID was consulted, discharged home on Levaquin, patient was alert oriented at the time of discharge, back to baseline mentation  Advised to follow-up with PCP as outpatient    Physical exam  General : Patient alert awake in no acute distress  HEENT : Atraumatic, normocephalic , oral mucosa membrane moist,  Eyes : Extraocular movements intact  Neck : Supple, range of motion intact,  Cardiovascular : Regular rate and rhythm, no murmur appreciated  Respiratory : Bilateral clear breath sounds, no wheezing

## 2025-07-21 NOTE — DISCHARGE INSTR - COC
Continuity of Care Form    Patient Name: Yaz Allison   :  1940  MRN:  [id number]    Admit date:  2025  Discharge date:  ***    Code Status Order: Full Code   Advance Directives:     Admitting Physician:  Alyce Hernandez MD  PCP: Gabe Cintron MD    Discharging Nurse: ***  Discharging Hospital Unit/Room#: 2046/2046-01  Discharging Unit Phone Number: ***    Emergency Contact:   Extended Emergency Contact Information  Primary Emergency Contact: Cori Allison  Address: [address]  Home Phone: [phone]  Work Phone: [phone]  Mobile Phone: [phone]  Relation: Child  Secondary Emergency Contact: Ashley Allison  Address: [address]  Home Phone: [phone]  Work Phone: [phone]  Mobile Phone: [phone]  Relation: Child    Past Surgical History:  Past Surgical History:   Procedure Laterality Date    COLONOSCOPY      DILATION AND CURETTAGE OF UTERUS      FEMUR SURGERY Right 2025    FEMUR INTRAMEDULLARY NAIL STEFANO INSERTION ANTEGRADE  RIGHT performed by Pedro Luis De Oliveira MD at [facility] OR    SPINE SURGERY N/A 2022    LUMBOSACRAL VERTEBROPLASTY S1 performed by Ryan Engle MD at [facility] OR    UPPER GASTROINTESTINAL ENDOSCOPY N/A 2019    EGD FOREIGN BODY REMOVAL performed by Ben Ruiz MD at [facility] OR    UPPER GASTROINTESTINAL ENDOSCOPY N/A 5/3/2024    ESOPHAGOGASTRODUODENOSCOPY BIOPSY performed by Page Mejia MD at [facility] ENDO       Immunization History:   Immunization History   Administered Date(s) Administered    COVID-19, MODERNA BLUE border, Primary or Immunocompromised, (age 12y+), IM, 100 mcg/0.5mL 2021, 2021, 2021, 2022    COVID-19, MODERNA Bivalent, (age 12y+), IM, 50 mcg/0.5 mL 2023    COVID-19, MODERNA, , (age 12y+), IM, 50mcg/0.5mL 2023    COVID-19, PFIZER, , (age 12y+), IM, 30mcg/0.3mL 2024    Influenza

## 2025-07-21 NOTE — PROGRESS NOTES
[facility]   IN-PATIENT SERVICE   [facility]    Progress            Date:   7/19/2025  Patient name:  Yaz Allison  Date of admission:  7/18/2025  2:22 PM  MRN:   [id number]  Account:  [id number]  YOB: 1940  PCP:    Gabe Cintron MD  Room:   [facility]-  Code Status:    Full Code    Chief Complaint:     Chief Complaint   Patient presents with    Fatigue     Family concerned she may be dehydrated       History Obtained From:     patient    History of Present Illness:     The patient is a 85 y.o.   /  female who presents with Fatigue (Family concerned she may be dehydrated)   and she is admitted to the hospital for the management of      Patient is 84-year-old female past medical history of hypertension, recurrent UTIs has b scented to the ER with confusion decreased p.o. intake and dehydration,  Patient daughters were present at the bedside, stated that patient has been confused last couple of days and feels that and she has been dehydrated, they are concerned about her possible UTI  Patient was alert and oriented on encounter, received IV fluids in the ER,    Currently denies any chest pain shortness of breath next    Past Medical History:     Past Medical History:   Diagnosis Date    Abdominal pain, unspecified site     Acquired cyst of kidney     Acute gouty arthropathy     Allergic rhinitis, cause unspecified     Anxiety state, unspecified     Arthropathy, unspecified, site unspecified     Chronic airway obstruction, not elsewhere classified     Chronic kidney disease, stage III (moderate) (HCC)     Diarrhea     Edema     Esophageal reflux     Essential 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 
[facility]   INPATIENT OCCUPATIONAL THERAPY  PROGRESS NOTE  Date: 2025  Patient Name: Yaz Allison       Room:   MRN: [id number]    : 1940  (85 y.o.)  Gender: female   Referring Practitioner: Alyce Hernandez MD  Diagnosis: Altered mental state      Discharge Recommendations:  Further Occupational Therapy is recommended upon facility discharge.    OT Equipment Recommendations  Equipment Needed: No    Restrictions/Precautions  Restrictions/Precautions  Restrictions/Precautions: General Precautions;Fall Risk (ESBL Contact precautions)  Activity Level: Up as Tolerated  Required Braces or Orthoses?: No  Implants Present? : Metal implants (R hip nail)    Vitals  O2 Device: None (Room air)    Subjective  Subjective  Subjective: \"I was pissed off.\" Pt cooperative and agreeable for OT treat  Pain  Pre-Pain: 0  Post-Pain: 0  Comments: Ok per RN Amesha for OT treat    Objective  Orientation  Overall Orientation Status: Within Functional Limits  Cognition  Overall Cognitive Status: Exceptions  Arousal/Alertness: Appears intact  Following Commands: Follows one step commands with increased time  Attention Span: Appears intact  Memory: Impaired  Safety Judgement: Decreased awareness of need for assistance;Decreased awareness of need for safety  Problem Solving: Assistance required to generate solutions;Assistance required to implement solutions;Assistance required to identify errors made;Assistance required to correct errors made  Insights: Decreased awareness of deficits  Initiation: Requires cues for some  Sequencing: Requires cues for some    Activities of Daily Living  Putting On/Taking Off Footwear: Modified independent   Putting On/Taking Off Footwear Skilled Clinical Factors: Pt seated EOB, able to don B slippers  Additional Comments: Writer seated EOB to don footwear this date. Pt demonstrated Good unsupported sitting balance. Pt currently limited by impaired strength, endurance, 
Dr. David torres and okay with transfer to med surg. Order placed for med surg transfer no telemetry.  
Patient presented with discharge instructions. Patient educated on the importance of follow up appts and completing all of antibiotics. Questions answered and  acknowledged. IV removed without complications. All belongings packed and on person. Patient transported by staff via wheelchair to her vehicle.  
Pharmacy Medication History Note      List of current medications patient is taking is complete.    Source of information: Sure Scripts, Care Everywhere, [place]ARRS     Changes made to medication list:  Medications removed (include reason, ex. therapy complete or physician discontinued, noncompliance):  None     Medications flagged for provider review:  Nystatin powder - reported not taking     Medications added/doses adjusted:  None     Other notes (ex. Recent course of antibiotics, Coumadin dosing):  Per OARRS, last fill of gabapentin was on 6/11/25 with quantity 270 for 90 days.   The patient filled a 7 day course of Bactrim on 6/2/25.       Current Home Medication List at Time of Admission:  Prior to Admission medications    Medication Sig   levothyroxine (SYNTHROID) 150 MCG tablet TAKE 1 TABLET BY MOUTH DAILY   carvedilol (COREG) 6.25 MG tablet TAKE 1 TABLET BY MOUTH TWICE A DAY WITH A MEAL   gabapentin (NEURONTIN) 100 MG capsule Take 1 capsule by mouth 3 times daily for 90 days.   acetaminophen (TYLENOL) 500 MG tablet Take 1 tablet by mouth every 6 hours as needed for Pain   folic acid (FOLVITE) 1 MG tablet Take 1 tablet by mouth daily   pantoprazole (PROTONIX) 40 MG tablet Take 1 tablet by mouth every morning (before breakfast)   allopurinol (ZYLOPRIM) 300 MG tablet TAKE 1 TABLET BY MOUTH DAILY   atorvastatin (LIPITOR) 40 MG tablet TAKE 1 TABLET BY MOUTH DAILY   estradiol (ESTRACE VAGINAL) 0.1 MG/GM vaginal cream Place 1 g vaginally See Admin Instructions Apply pea-sized amount to vaginal opening once daily for 2 weeks, after two weeks switch to twice weekly.   nystatin (MYCOSTATIN) 374288 UNIT/GM powder Apply 3 times daily.  Patient not taking: Reported on 7/18/2025   Multiple Vitamins-Minerals (HAIR/SKIN/NAILS) TABS Take 1 tablet by mouth daily   Cholecalciferol (VITAMIN D) 2000 UNITS CAPS capsule Take 1 capsule by mouth daily         Please let me know if you have any questions about this encounter. Thank 
Physical Therapy  Facility/Department: [facility] CARE  Physical Therapy Initial Assessment    Name: Yaz Allison  : 1940  MRN: [id number]  Date of Service: 2025    Discharge Recommendations:  Home with assist PRN          Patient Diagnosis(es): The primary encounter diagnosis was Dehydration. Diagnoses of Altered mental status, unspecified altered mental status type and Acute cystitis without hematuria were also pertinent to this visit.  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  Past Surgical History:  has a past surgical history that includes Dilation and curettage of uterus; Colonoscopy; Upper gastrointestinal endoscopy (N/A, 2019); Spine surgery (N/A, 2022); Upper gastrointestinal endoscopy (N/A, 5/3/2024); and Femur Surgery (Right, 2025).    Assessment  Assessment: Pt needed standby assistance for transfers and gt trng with a rolling walker at this time and would benefit from physical therapy intervention while in the hospital.  Treatment 
0     Objective    Transfers  Sit to Stand: Independent  Stand to Sit: Independent  Comment: Toilet transfer sit<>stand Independent, hygiene independent, donning/doffing pants independent     Mobility      Ambulation  Surface: Level tile  Device: Single point cane  Assistance: Supervision  Gait Deviations: Decreased step height, Decreased head and trunk rotation, Deviated path, Increased JLUIS (decreased dorsiflexion, forward posture)  Distance: 120ft, 10 ft x 2          Bed Mobility  Rolling to Left: Independent  Rolling to Right: Independent  Supine to Sit: Independent  Sit to Supine: Independent  Bed Mobility Comments: HOB flat no BR           PT Exercises  Dynamic Sitting Balance Exercises: Pt sat EOB x 12 minutes independent, able to shabbir/doff shoes independent, 15 reps LAQ, ankle pumps SBA (B)       Treatment Diagnosis: impaired functional mobility  Therapy Prognosis: Fair  Decision Making: Medium Complexity    Discharge Recommendations: Home with assist PRN    Activity Tolerance: Patient tolerated treatment well     Patient Education  Education Given To: Patient  Education Provided: Role of Therapy, Home Exercise Program, Transfer Training, Mobility Training, Fall Prevention Strategies  Education Method: Demonstration, Verbal  Barriers to Learning: None  Education Outcome: Verbalized understanding, Demonstrated understanding     Functional Outcome Measures  AM-PAC Basic Mobility - Inpatient   How much help is needed turning from your back to your side while in a flat bed without using bedrails?: None  How much help is needed moving from lying on your back to sitting on the side of a flat bed without using bedrails?: None  How much help is needed moving to and from a bed to a chair?: A Little  How much help is needed standing up from a chair using your arms?: None  How much help is needed walking in hospital room?: A Little  How much help is needed climbing 3-5 steps with a railing?: A Little  AM-PAC Inpatient 
transcription, some errors in transcription may have occurred.   
Safety education & training, Patient/Caregiver education & training, Self-Care / ADL, Coordination training, Cognitive/Perceptual training    OT Individual Minutes  OT Individual Minutes  Time In: 0830  Time Out: 0903  Minutes: 33  Time Code Minutes   Timed Code Treatment Minutes: 10 Minutes (co-eval and treat with PT Revati, timed code treatment minutes adjusted accordingly)    Electronically signed by LINDA James on 7/19/25 at 10:56 AM EDT

## 2025-07-21 NOTE — CARE COORDINATION
ONGOING DISCHARGE PLAN:    Patient is alert and oriented x4.    Spoke with patient regarding discharge plan and patient confirms that plan is still home. Denies needs    UTI-culture, proteus mirabilas +ESBL, final  IV merrem    PT/OT    IMM letter provided to patient.  Patient offered four hours to make informed decision regarding appeal process; patient agreeable to discharge.      Will continue to follow for additional discharge needs.    If patient is discharged prior to next notation, then this note serves as note for discharge by case management.    Electronically signed by Yani Baez RN on 7/21/2025 at 9:33 AM

## 2025-07-21 NOTE — PLAN OF CARE
Problem: Safety - Adult  Goal: Free from fall injury  Outcome: Progressing     Problem: Discharge Planning  Goal: Discharge to home or other facility with appropriate resources  Outcome: Progressing    Problem: ABCDS Injury Assessment  Goal: Absence of physical injury  Outcome: Progressing    Problem: Skin/Tissue Integrity  Goal: Skin integrity remains intact  Description: 1.  Monitor for areas of redness and/or skin breakdown  2.  Assess vascular access sites hourly  3.  Every 4-6 hours minimum:  Change oxygen saturation probe site  4.  Every 4-6 hours:  If on nasal continuous positive airway pressure, respiratory therapy assess nares and determine need for appliance change or resting period  Outcome: Progressing  Flowsheets (Taken 7/21/2025 0258)  Skin Integrity Remains Intact:   Monitor for areas of redness and/or skin breakdown   Assess vascular access sites hourly   Every 4-6 hours minimum:  Change oxygen saturation probe site   Every 4-6 hours:  If on nasal continuous positive airway pressure, assess nares and determine need for appliance change or resting period     Problem: Musculoskeletal - Adult  Goal: Return mobility to safest level of function  Outcome: Progressing  Flowsheets (Taken 7/21/2025 0258)  Return Mobility to Safest Level of Function:   Assess patient stability and activity tolerance for standing, transferring and ambulating with or without assistive devices   Assist with transfers and ambulation using safe patient handling equipment as needed   Ensure adequate protection for wounds/incisions during mobilization

## 2025-07-21 NOTE — CONSULTS
[facility]  Initial Consult Note  Date: 7/21/2025    Hospital day :3     Chief complaint/reason for consultation:   ESBL UTI    Assessment/Impression:   UTI, ESBL Proteus Mirabilis   HTN  History of ESBL UTI  History of recurrent UTI  Hypothyroidism    Plan/Recommendations   Patient has remained on IV Meropenem x 4 days okay to discharge on oral Levaquin 750mg daily x 7 days total through 7/25/25  Follow labs   Plan discussed with Dr. Grant    Infection Control Recommendations   [place] Precautions  Contact Isolation     Antimicrobial Stewardship Recommendations   Simplification of therapy  Targeted therapy      History of Present Illness:   Yaz Allison is a 85 y.o.-year-old female who was initially admitted on 7/18/2025 with concerns of dehydration.  Patient with a history of recurrent UTIs and hypertension.  Patient's reports that her most recent UTI was within 6 months.  She reports urinary frequency and urgency.  She denies any burning with urination, flank pain, abdominal or pelvic pain.  She denies any fever, chills, weakness, nausea, or vomiting.  Urine culture showed ESBL Proteus mirabilis, blood cultures negative x 2 days.  Chest x-ray negative.  CT of the head negative for acute findings.  Patient has remained hemodynamically stable, no leukocytosis.  Creatinine stable 0.8.  Our team was consulted for further recommendations on antibiotics for ESBL UTI.     Interval changes  7/21/2025   Patient examined at bedside, she appears cachectic, she has thin limbs, but she is not ill-appearing.  No CVA tenderness.  No nausea, no vomiting, no abdominal tenderness.    Patient Vitals for the past 8 hrs:   BP Temp Temp src Pulse Resp SpO2   07/21/25 0840 139/71 -- -- 76 -- --   07/21/25 0611 139/71 98.1 °F (36.7 °C) Oral 76 18 100 %     I have personally reviewed the past medical history, past surgical history, medications, social history, and family history, and I have updated the database

## 2025-07-22 ENCOUNTER — CARE COORDINATION (OUTPATIENT)
Dept: CARE COORDINATION | Age: 85
End: 2025-07-22

## 2025-07-22 DIAGNOSIS — G93.41 ACUTE METABOLIC ENCEPHALOPATHY: Primary | ICD-10-CM

## 2025-07-22 PROCEDURE — 1111F DSCHRG MED/CURRENT MED MERGE: CPT | Performed by: INTERNAL MEDICINE

## 2025-07-22 NOTE — CARE COORDINATION
Care Transitions Note    Initial Call - Call within 2 business days of discharge: Yes    Patient Current Location:  Home[place] [facility] [place] [id number]    Care Transition Nurse contacted the patient by telephone to perform post hospital discharge assessment, verified name and  as identifiers.  Provided introduction to self, and explanation of the Care Transition Nurse role.    Patient: Yaz Allison      Patient : 1940   MRN: [id number]      Reason for Admission: AMS, acute metabolic encephalopathy due to UTI  Discharge Date: 25    RURS: Readmission Risk Score: 17.5    - admission for AMS, acute metabolic encephalopathy due to UTI.  Discharged on remaining 4 days of levaquin.  F/U ID/[facility]aleb + PCP + GI    Last Discharge Facility       Date Complaint Diagnosis Description Type Department Provider    25 Fatigue Dehydration ... ED to Hosp-Admission (Discharged) (ADMITTED) [facility] Alyce Hernandez MD; Rajesh Granados,...            Was this an external facility discharge? No    Additional needs identified to be addressed with provider   No needs identified             Method of communication with provider: chart routing. Routed to PCP clinical staff for appt    Patients top risk factors for readmission: medical condition-UTI    Interventions to address risk factors:   Review of patient management of conditions/medications: REVIEWED  appts    Care Summary Note:   - admission for AMS, acute metabolic encephalopathy due to UTI.  Discharged on remaining 4 days of levaquin.  F/U ID/[facility] + PCP + GI    Yaz reports doing OK, but is quite tired today - has been doing alot since home and didn't sleep well last night, so taking it slow today. Has been up and doing more, so admits it wore her out.  Denies any further confusion and is very alert and appropriate during conversation.  Staying hydrated.  Denies any further s/s of UTI.    Reviewed medications and is completing final 4 doses of

## 2025-07-23 NOTE — TELEPHONE ENCOUNTER
Spoke with patient, she is starting to feel better, offered sooner appointment, denied. Will let us know if she needs anything prior to upcoming appointment

## 2025-07-24 LAB
MICROORGANISM SPEC CULT: NORMAL
MICROORGANISM SPEC CULT: NORMAL
SERVICE CMNT-IMP: NORMAL
SERVICE CMNT-IMP: NORMAL
SPECIMEN DESCRIPTION: NORMAL
SPECIMEN DESCRIPTION: NORMAL

## 2025-07-29 ENCOUNTER — CARE COORDINATION (OUTPATIENT)
Dept: CARE COORDINATION | Age: 85
End: 2025-07-29

## 2025-07-29 NOTE — CARE COORDINATION
Care Transitions Note    Follow Up Call     Patient Current Location:  Home: [id number]  [facility] [id number]    [facility] Care Coordinator contacted the patient by telephone. Verified name and  as identifiers.    Additional needs identified to be addressed with provider   No needs identified                 Method of communication with provider: none.    Care Summary Note:Writer spoke to Yaz for follow up call.  She reports feeling better states she sometimes still feels shaking when walking advised to continue to use cane/walker for balance she states that she does. Two daughter lives with her. She has completed  Levaquin advised to make sure she is staying hydrated denies hematuria, or dysuria fever or chills. Reports having normal BM. Appetite fine. She declined PCP hospital follow up. Has GI appointment tomorrow Dr. Veloz family will transport. ID appointment scheduled 8/13/15. Denies any needs or concerns at this time. Agreeable to follow up call next week.     Plan of care updates since last contact:  Review of patient management of conditions/medications: UTI       Advance Care Planning:   Does patient have an Advance Directive: reviewed during previous call, see note. .    Medication Review:  No changes since last call.     Remote Patient Monitoring:  Offered patient enrollment in the Remote Patient Monitoring (RPM) program for in-home monitoring: Yes, but did not enroll at this time: limited patient ability to navigate RPM/equipment.    Assessments:  Care Transitions Subsequent and Final Call    Subsequent and Final Calls  Do you have any ongoing symptoms?: No  Have your medications changed?: No  Do you have any questions related to your medications?: No  Do you currently have any active services?: No  Do you have any needs or concerns that I can assist you with?: No  Care Transitions Interventions  Other Interventions:              Follow Up Appointment:   Reviewed upcoming appointment(s).  Future

## 2025-07-30 ENCOUNTER — OFFICE VISIT (OUTPATIENT)
Dept: GASTROENTEROLOGY | Age: 85
End: 2025-07-30
Payer: MEDICARE

## 2025-07-30 ENCOUNTER — TELEPHONE (OUTPATIENT)
Dept: GASTROENTEROLOGY | Age: 85
End: 2025-07-30

## 2025-07-30 ENCOUNTER — TELEPHONE (OUTPATIENT)
Dept: INTERNAL MEDICINE CLINIC | Age: 85
End: 2025-07-30

## 2025-07-30 VITALS
HEART RATE: 87 BPM | TEMPERATURE: 98.4 F | HEIGHT: 67 IN | OXYGEN SATURATION: 97 % | WEIGHT: 97 LBS | SYSTOLIC BLOOD PRESSURE: 115 MMHG | RESPIRATION RATE: 18 BRPM | DIASTOLIC BLOOD PRESSURE: 66 MMHG | BODY MASS INDEX: 15.22 KG/M2

## 2025-07-30 DIAGNOSIS — R19.5 POSITIVE FIT (FECAL IMMUNOCHEMICAL TEST): Primary | ICD-10-CM

## 2025-07-30 PROCEDURE — 1123F ACP DISCUSS/DSCN MKR DOCD: CPT | Performed by: INTERNAL MEDICINE

## 2025-07-30 PROCEDURE — 1126F AMNT PAIN NOTED NONE PRSNT: CPT | Performed by: INTERNAL MEDICINE

## 2025-07-30 PROCEDURE — 1159F MED LIST DOCD IN RCRD: CPT | Performed by: INTERNAL MEDICINE

## 2025-07-30 PROCEDURE — 99214 OFFICE O/P EST MOD 30 MIN: CPT | Performed by: INTERNAL MEDICINE

## 2025-07-30 RX ORDER — BISACODYL 5 MG
TABLET, DELAYED RELEASE (ENTERIC COATED) ORAL
Qty: 4 TABLET | Refills: 0 | Status: SHIPPED | OUTPATIENT
Start: 2025-07-30

## 2025-07-30 RX ORDER — POLYETHYLENE GLYCOL 3350 17 G/17G
POWDER, FOR SOLUTION ORAL
Qty: 238 G | Refills: 0 | Status: SHIPPED | OUTPATIENT
Start: 2025-07-30

## 2025-07-30 NOTE — PROGRESS NOTES
updated    Thank you for allowing me to participate in the care of Ms. Allison. For any further questions please do not hesitate to contact me.    I have reviewed and agree with the ROS entered by the MA/RN.           Partha Veloz MD, [facility]  O: #[phone]

## 2025-07-30 NOTE — TELEPHONE ENCOUNTER
Procedure scheduled/Dr Veloz  Procedure: Colonoscopy  Dx: + FIT test  Date: 08/29/2025  Time: 2:30 PM/ 12:30 PM Arrival  Hospital: [facility]  PAT phone call: TBS  Bowel Prep instructions given: Miralax/Dulcolax  In office/via phone: office   Clearance needed: Faxed COPD clearance  GLP-1: NA

## 2025-08-04 ENCOUNTER — OFFICE VISIT (OUTPATIENT)
Dept: ORTHOPEDIC SURGERY | Age: 85
End: 2025-08-04
Payer: MEDICARE

## 2025-08-04 VITALS — BODY MASS INDEX: 15.22 KG/M2 | WEIGHT: 97 LBS | HEIGHT: 67 IN | RESPIRATION RATE: 14 BRPM

## 2025-08-04 DIAGNOSIS — Z98.890 STATUS POST HIP SURGERY: Primary | ICD-10-CM

## 2025-08-04 PROCEDURE — 99212 OFFICE O/P EST SF 10 MIN: CPT | Performed by: ORTHOPAEDIC SURGERY

## 2025-08-04 PROCEDURE — 1126F AMNT PAIN NOTED NONE PRSNT: CPT | Performed by: ORTHOPAEDIC SURGERY

## 2025-08-04 PROCEDURE — 1123F ACP DISCUSS/DSCN MKR DOCD: CPT | Performed by: ORTHOPAEDIC SURGERY

## 2025-08-07 ENCOUNTER — CARE COORDINATION (OUTPATIENT)
Dept: CARE COORDINATION | Age: 85
End: 2025-08-07

## 2025-08-08 ENCOUNTER — HOSPITAL ENCOUNTER (OUTPATIENT)
Dept: INFUSION THERAPY | Age: 85
Discharge: HOME | End: 2025-08-08
Payer: MEDICARE

## 2025-08-08 VITALS — SYSTOLIC BLOOD PRESSURE: 144 MMHG | HEART RATE: 55 BPM | DIASTOLIC BLOOD PRESSURE: 67 MMHG

## 2025-08-08 DIAGNOSIS — D46.9 MDS (MYELODYSPLASTIC SYNDROME) (HCC): Primary | ICD-10-CM

## 2025-08-08 LAB
BASOPHILS # BLD: 0.1 K/UL (ref 0–0.2)
BASOPHILS NFR BLD: 1 % (ref 0–2)
EOSINOPHIL # BLD: 0.3 K/UL (ref 0–0.4)
EOSINOPHILS RELATIVE PERCENT: 4 % (ref 1–4)
ERYTHROCYTE [DISTWIDTH] IN BLOOD BY AUTOMATED COUNT: 19.4 % (ref 12.5–15.4)
HCT VFR BLD AUTO: 28.4 % (ref 36–46)
HGB BLD-MCNC: 8.9 G/DL (ref 12–16)
LYMPHOCYTES NFR BLD: 1.6 K/UL (ref 1–4.8)
LYMPHOCYTES RELATIVE PERCENT: 20 % (ref 24–44)
MCH RBC QN AUTO: 26 PG (ref 26–34)
MCHC RBC AUTO-ENTMCNC: 31.3 G/DL (ref 31–37)
MCV RBC AUTO: 83 FL (ref 80–100)
MONOCYTES NFR BLD: 0.5 K/UL (ref 0.1–1.2)
MONOCYTES NFR BLD: 6 % (ref 2–11)
NEUTROPHILS NFR BLD: 69 % (ref 36–66)
NEUTS SEG NFR BLD: 5.5 K/UL (ref 1.8–7.7)
PLATELET # BLD AUTO: 205 K/UL (ref 140–450)
PMV BLD AUTO: 7.6 FL (ref 6–12)
RBC # BLD AUTO: 3.43 M/UL (ref 4–5.2)
WBC OTHER # BLD: 7.9 K/UL (ref 3.5–11)

## 2025-08-08 PROCEDURE — 6370000000 HC RX 637 (ALT 250 FOR IP): Performed by: INTERNAL MEDICINE

## 2025-08-08 PROCEDURE — 36415 COLL VENOUS BLD VENIPUNCTURE: CPT

## 2025-08-08 PROCEDURE — 96372 THER/PROPH/DIAG INJ SC/IM: CPT

## 2025-08-08 PROCEDURE — 6360000002 HC RX W HCPCS: Performed by: INTERNAL MEDICINE

## 2025-08-08 PROCEDURE — 85025 COMPLETE CBC W/AUTO DIFF WBC: CPT

## 2025-08-08 RX ORDER — SODIUM CHLORIDE 9 MG/ML
INJECTION, SOLUTION INTRAVENOUS CONTINUOUS
OUTPATIENT
Start: 2025-08-24

## 2025-08-08 RX ORDER — ACETAMINOPHEN 500 MG
500 TABLET ORAL ONCE
Status: COMPLETED | OUTPATIENT
Start: 2025-08-08 | End: 2025-08-08

## 2025-08-08 RX ORDER — ACETAMINOPHEN 500 MG
500 TABLET ORAL ONCE
Status: CANCELLED
Start: 2025-08-24 | End: 2025-08-24

## 2025-08-08 RX ORDER — HYDROCORTISONE SODIUM SUCCINATE 100 MG/2ML
100 INJECTION INTRAMUSCULAR; INTRAVENOUS
OUTPATIENT
Start: 2025-08-24

## 2025-08-08 RX ORDER — FAMOTIDINE 10 MG/ML
20 INJECTION, SOLUTION INTRAVENOUS
OUTPATIENT
Start: 2025-08-24

## 2025-08-08 RX ORDER — ACETAMINOPHEN 325 MG/1
650 TABLET ORAL
OUTPATIENT
Start: 2025-08-24

## 2025-08-08 RX ORDER — ALBUTEROL SULFATE 90 UG/1
4 INHALANT RESPIRATORY (INHALATION) PRN
OUTPATIENT
Start: 2025-08-24

## 2025-08-08 RX ORDER — EPINEPHRINE 1 MG/ML
0.3 INJECTION, SOLUTION, CONCENTRATE INTRAVENOUS PRN
OUTPATIENT
Start: 2025-08-24

## 2025-08-08 RX ORDER — ONDANSETRON 2 MG/ML
8 INJECTION INTRAMUSCULAR; INTRAVENOUS
OUTPATIENT
Start: 2025-08-24

## 2025-08-08 RX ORDER — DIPHENHYDRAMINE HYDROCHLORIDE 50 MG/ML
50 INJECTION, SOLUTION INTRAMUSCULAR; INTRAVENOUS
OUTPATIENT
Start: 2025-08-24

## 2025-08-08 RX ADMIN — ACETAMINOPHEN 500 MG: 500 TABLET ORAL at 15:38

## 2025-08-08 RX ADMIN — DARBEPOETIN ALFA 100 MCG: 100 INJECTION, SOLUTION INTRAVENOUS; SUBCUTANEOUS at 15:39

## 2025-08-11 RX ORDER — GABAPENTIN 100 MG/1
100 CAPSULE ORAL 3 TIMES DAILY
Qty: 270 CAPSULE | Refills: 0 | Status: SHIPPED | OUTPATIENT
Start: 2025-08-11 | End: 2025-11-09

## 2025-08-12 ENCOUNTER — OFFICE VISIT (OUTPATIENT)
Dept: INTERNAL MEDICINE CLINIC | Age: 85
End: 2025-08-12

## 2025-08-12 VITALS
HEIGHT: 67 IN | DIASTOLIC BLOOD PRESSURE: 60 MMHG | HEART RATE: 74 BPM | BODY MASS INDEX: 15.54 KG/M2 | SYSTOLIC BLOOD PRESSURE: 92 MMHG | OXYGEN SATURATION: 96 % | WEIGHT: 99 LBS

## 2025-08-12 DIAGNOSIS — N18.30 STAGE 3 CHRONIC KIDNEY DISEASE, UNSPECIFIED WHETHER STAGE 3A OR 3B CKD (HCC): ICD-10-CM

## 2025-08-12 DIAGNOSIS — M81.0 SENILE OSTEOPOROSIS: ICD-10-CM

## 2025-08-12 DIAGNOSIS — I10 ESSENTIAL HYPERTENSION, BENIGN: Primary | ICD-10-CM

## 2025-08-12 DIAGNOSIS — E03.9 ACQUIRED HYPOTHYROIDISM: ICD-10-CM

## 2025-08-12 DIAGNOSIS — R19.5 OCCULT BLOOD POSITIVE STOOL: ICD-10-CM

## 2025-08-12 DIAGNOSIS — M17.11 ARTHRITIS OF RIGHT KNEE: ICD-10-CM

## 2025-08-12 DIAGNOSIS — Z71.89 ACP (ADVANCE CARE PLANNING): ICD-10-CM

## 2025-08-12 RX ORDER — LEVOTHYROXINE SODIUM 150 UG/1
150 TABLET ORAL DAILY
Qty: 30 TABLET | Refills: 1 | Status: SHIPPED | OUTPATIENT
Start: 2025-08-12 | End: 2025-10-11

## 2025-08-12 RX ORDER — CARVEDILOL 6.25 MG/1
6.25 TABLET ORAL 2 TIMES DAILY
Qty: 180 TABLET | Refills: 3 | Status: SHIPPED | OUTPATIENT
Start: 2025-08-12

## 2025-08-13 ENCOUNTER — HOSPITAL ENCOUNTER (OUTPATIENT)
Age: 85
Setting detail: SPECIMEN
Discharge: HOME | End: 2025-08-13

## 2025-08-13 DIAGNOSIS — E03.9 ACQUIRED HYPOTHYROIDISM: ICD-10-CM

## 2025-08-13 LAB
T4 FREE SERPL-MCNC: 1.2 NG/DL (ref 0.92–1.68)
TSH SERPL DL<=0.05 MIU/L-ACNC: 4.61 UIU/ML (ref 0.27–4.2)

## 2025-08-14 ENCOUNTER — CARE COORDINATION (OUTPATIENT)
Dept: CARE COORDINATION | Age: 85
End: 2025-08-14

## 2025-08-15 ENCOUNTER — HOSPITAL ENCOUNTER (OUTPATIENT)
Dept: PREADMISSION TESTING | Age: 85
Discharge: HOME | End: 2025-08-19

## 2025-08-15 VITALS — HEIGHT: 67 IN | BODY MASS INDEX: 15.54 KG/M2 | WEIGHT: 99 LBS

## 2025-08-17 PROBLEM — E86.0 DEHYDRATION: Status: RESOLVED | Noted: 2025-07-18 | Resolved: 2025-08-17

## 2025-08-29 ENCOUNTER — HOSPITAL ENCOUNTER (OUTPATIENT)
Dept: INFUSION THERAPY | Age: 85
Discharge: HOME | End: 2025-08-29
Payer: MEDICARE

## 2025-08-29 VITALS — DIASTOLIC BLOOD PRESSURE: 65 MMHG | SYSTOLIC BLOOD PRESSURE: 105 MMHG | TEMPERATURE: 98.1 F

## 2025-08-29 DIAGNOSIS — D46.9 MDS (MYELODYSPLASTIC SYNDROME) (HCC): Primary | ICD-10-CM

## 2025-08-29 DIAGNOSIS — D64.9 ANEMIA, UNSPECIFIED TYPE: ICD-10-CM

## 2025-08-29 DIAGNOSIS — E61.1 IRON DEFICIENCY: ICD-10-CM

## 2025-08-29 LAB
BASOPHILS # BLD: 0.1 K/UL (ref 0–0.2)
BASOPHILS NFR BLD: 2 % (ref 0–2)
EOSINOPHIL # BLD: 0.3 K/UL (ref 0–0.4)
EOSINOPHILS RELATIVE PERCENT: 4 % (ref 1–4)
ERYTHROCYTE [DISTWIDTH] IN BLOOD BY AUTOMATED COUNT: 18.7 % (ref 12.5–15.4)
FERRITIN SERPL-MCNC: 38 NG/ML
HCT VFR BLD AUTO: 26.8 % (ref 36–46)
HGB BLD-MCNC: 8.6 G/DL (ref 12–16)
IRON SATN MFR SERPL: 8 % (ref 20–55)
IRON SERPL-MCNC: 20 UG/DL (ref 37–145)
LYMPHOCYTES NFR BLD: 1.7 K/UL (ref 1–4.8)
LYMPHOCYTES RELATIVE PERCENT: 18 % (ref 24–44)
MCH RBC QN AUTO: 26.2 PG (ref 26–34)
MCHC RBC AUTO-ENTMCNC: 32 G/DL (ref 31–37)
MCV RBC AUTO: 81.9 FL (ref 80–100)
MONOCYTES NFR BLD: 0.8 K/UL (ref 0.1–1.2)
MONOCYTES NFR BLD: 8 % (ref 2–11)
NEUTROPHILS NFR BLD: 68 % (ref 36–66)
NEUTS SEG NFR BLD: 6.6 K/UL (ref 1.8–7.7)
PLATELET # BLD AUTO: 218 K/UL (ref 140–450)
PMV BLD AUTO: 7.3 FL (ref 6–12)
RBC # BLD AUTO: 3.27 M/UL (ref 4–5.2)
TIBC SERPL-MCNC: 236 UG/DL (ref 250–450)
UNSATURATED IRON BINDING CAPACITY: 216 UG/DL (ref 112–347)
WBC OTHER # BLD: 9.6 K/UL (ref 3.5–11)

## 2025-08-29 PROCEDURE — 85025 COMPLETE CBC W/AUTO DIFF WBC: CPT

## 2025-08-29 PROCEDURE — 83550 IRON BINDING TEST: CPT

## 2025-08-29 PROCEDURE — 82728 ASSAY OF FERRITIN: CPT

## 2025-08-29 PROCEDURE — 6360000002 HC RX W HCPCS: Performed by: INTERNAL MEDICINE

## 2025-08-29 PROCEDURE — 96372 THER/PROPH/DIAG INJ SC/IM: CPT

## 2025-08-29 PROCEDURE — 83540 ASSAY OF IRON: CPT

## 2025-08-29 PROCEDURE — 36415 COLL VENOUS BLD VENIPUNCTURE: CPT

## 2025-08-29 RX ORDER — ALBUTEROL SULFATE 90 UG/1
4 INHALANT RESPIRATORY (INHALATION) PRN
OUTPATIENT
Start: 2025-09-19

## 2025-08-29 RX ORDER — SODIUM CHLORIDE 9 MG/ML
INJECTION, SOLUTION INTRAVENOUS CONTINUOUS
OUTPATIENT
Start: 2025-09-19

## 2025-08-29 RX ORDER — FAMOTIDINE 10 MG/ML
20 INJECTION, SOLUTION INTRAVENOUS
OUTPATIENT
Start: 2025-09-19

## 2025-08-29 RX ORDER — HYDROCORTISONE SODIUM SUCCINATE 100 MG/2ML
100 INJECTION INTRAMUSCULAR; INTRAVENOUS
OUTPATIENT
Start: 2025-09-19

## 2025-08-29 RX ORDER — ONDANSETRON 2 MG/ML
8 INJECTION INTRAMUSCULAR; INTRAVENOUS
OUTPATIENT
Start: 2025-09-19

## 2025-08-29 RX ORDER — DIPHENHYDRAMINE HYDROCHLORIDE 50 MG/ML
50 INJECTION, SOLUTION INTRAMUSCULAR; INTRAVENOUS
OUTPATIENT
Start: 2025-09-19

## 2025-08-29 RX ORDER — EPINEPHRINE 1 MG/ML
0.3 INJECTION, SOLUTION, CONCENTRATE INTRAVENOUS PRN
OUTPATIENT
Start: 2025-09-19

## 2025-08-29 RX ORDER — ACETAMINOPHEN 325 MG/1
650 TABLET ORAL
OUTPATIENT
Start: 2025-09-19

## 2025-08-29 RX ADMIN — DARBEPOETIN ALFA 100 MCG: 100 INJECTION, SOLUTION INTRAVENOUS; SUBCUTANEOUS at 14:57

## 2025-09-03 ENCOUNTER — RESULTS FOLLOW-UP (OUTPATIENT)
Dept: INFECTIOUS DISEASES | Age: 85
End: 2025-09-03

## 2025-09-03 ENCOUNTER — HOSPITAL ENCOUNTER (OUTPATIENT)
Dept: LAB | Age: 85
Discharge: HOME | End: 2025-09-03
Payer: MEDICARE

## 2025-09-03 ENCOUNTER — OFFICE VISIT (OUTPATIENT)
Dept: INFECTIOUS DISEASES | Age: 85
End: 2025-09-03
Payer: MEDICARE

## 2025-09-03 VITALS
SYSTOLIC BLOOD PRESSURE: 112 MMHG | WEIGHT: 96 LBS | BODY MASS INDEX: 15.07 KG/M2 | HEIGHT: 67 IN | OXYGEN SATURATION: 93 % | DIASTOLIC BLOOD PRESSURE: 71 MMHG | HEART RATE: 90 BPM

## 2025-09-03 DIAGNOSIS — R39.15 URINARY URGENCY: ICD-10-CM

## 2025-09-03 DIAGNOSIS — N39.0 URINARY TRACT INFECTION DUE TO PROTEUS: Primary | ICD-10-CM

## 2025-09-03 DIAGNOSIS — Z16.12 INFECTION DUE TO EXTENDED SPECTRUM BETA LACTAMASE (ESBL) PRODUCING PROTEUS MIRABILIS: ICD-10-CM

## 2025-09-03 DIAGNOSIS — A49.8 INFECTION DUE TO EXTENDED SPECTRUM BETA LACTAMASE (ESBL) PRODUCING PROTEUS MIRABILIS: ICD-10-CM

## 2025-09-03 DIAGNOSIS — B96.4 URINARY TRACT INFECTION DUE TO PROTEUS: Primary | ICD-10-CM

## 2025-09-03 LAB
BACTERIA URNS QL MICRO: ABNORMAL
BASOPHILS # BLD: 0.11 K/UL (ref 0–0.2)
BASOPHILS NFR BLD: 1 % (ref 0–2)
BILIRUB UR QL STRIP: NEGATIVE
BUN SERPL-MCNC: 11 MG/DL (ref 8–23)
CASTS #/AREA URNS LPF: ABNORMAL /LPF
CLARITY UR: ABNORMAL
COLOR UR: YELLOW
CREAT SERPL-MCNC: 1 MG/DL (ref 0.7–1.2)
EOSINOPHIL # BLD: 0.32 K/UL (ref 0–0.44)
EOSINOPHILS RELATIVE PERCENT: 4 % (ref 0–4)
EPI CELLS #/AREA URNS HPF: ABNORMAL /HPF
ERYTHROCYTE [DISTWIDTH] IN BLOOD BY AUTOMATED COUNT: 17.8 % (ref 11.5–14.9)
GFR, ESTIMATED: 55 ML/MIN/1.73M2
GLUCOSE UR STRIP-MCNC: NEGATIVE MG/DL
HCT VFR BLD AUTO: 31.8 % (ref 36–46)
HGB BLD-MCNC: 9.5 G/DL (ref 12–16)
HGB UR QL STRIP.AUTO: NEGATIVE
IMM GRANULOCYTES # BLD AUTO: 0.03 K/UL (ref 0–0.3)
IMM GRANULOCYTES NFR BLD: 0 %
KETONES UR STRIP-MCNC: NEGATIVE MG/DL
LEUKOCYTE ESTERASE UR QL STRIP: ABNORMAL
LYMPHOCYTES NFR BLD: 1.63 K/UL (ref 1.1–3.7)
LYMPHOCYTES RELATIVE PERCENT: 18 % (ref 24–44)
MCH RBC QN AUTO: 25.3 PG (ref 26–34)
MCHC RBC AUTO-ENTMCNC: 29.9 G/DL (ref 31–37)
MCV RBC AUTO: 84.6 FL (ref 80–100)
MONOCYTES NFR BLD: 0.83 K/UL (ref 0.1–1.2)
MONOCYTES NFR BLD: 9 % (ref 3–12)
NEUTROPHILS NFR BLD: 68 % (ref 36–66)
NEUTS SEG NFR BLD: 6.3 K/UL (ref 1.5–8.1)
NITRITE UR QL STRIP: NEGATIVE
NRBC BLD-RTO: 0 PER 100 WBC
PH UR STRIP: 6.5 [PH] (ref 5–8)
PLATELET # BLD AUTO: 252 K/UL (ref 150–450)
PMV BLD AUTO: 9.2 FL (ref 8–13.5)
PROT UR STRIP-MCNC: NEGATIVE MG/DL
RBC # BLD AUTO: 3.76 M/UL (ref 3.95–5.11)
RBC #/AREA URNS HPF: ABNORMAL /HPF
SP GR UR STRIP: 1.01 (ref 1–1.03)
UROBILINOGEN UR STRIP-ACNC: NORMAL EU/DL (ref 0–1)
WBC #/AREA URNS HPF: ABNORMAL /HPF
WBC OTHER # BLD: 9.2 K/UL (ref 3.5–11)

## 2025-09-03 PROCEDURE — 87086 URINE CULTURE/COLONY COUNT: CPT

## 2025-09-03 PROCEDURE — 36415 COLL VENOUS BLD VENIPUNCTURE: CPT

## 2025-09-03 PROCEDURE — 84520 ASSAY OF UREA NITROGEN: CPT

## 2025-09-03 PROCEDURE — 81001 URINALYSIS AUTO W/SCOPE: CPT

## 2025-09-03 PROCEDURE — 82565 ASSAY OF CREATININE: CPT

## 2025-09-03 PROCEDURE — 99214 OFFICE O/P EST MOD 30 MIN: CPT | Performed by: NURSE PRACTITIONER

## 2025-09-03 PROCEDURE — 1123F ACP DISCUSS/DSCN MKR DOCD: CPT | Performed by: NURSE PRACTITIONER

## 2025-09-03 PROCEDURE — 85025 COMPLETE CBC W/AUTO DIFF WBC: CPT

## 2025-09-03 PROCEDURE — 1159F MED LIST DOCD IN RCRD: CPT | Performed by: NURSE PRACTITIONER

## 2025-09-03 RX ORDER — LEVOFLOXACIN 750 MG/1
750 TABLET, FILM COATED ORAL DAILY
Qty: 7 TABLET | Refills: 0 | Status: SHIPPED | OUTPATIENT
Start: 2025-09-03 | End: 2025-09-10

## 2025-09-03 ASSESSMENT — ENCOUNTER SYMPTOMS
EYES NEGATIVE: 1
ALLERGIC/IMMUNOLOGIC NEGATIVE: 1
ABDOMINAL PAIN: 1

## 2025-09-04 LAB
MICROORGANISM SPEC CULT: NORMAL
SPECIMEN DESCRIPTION: NORMAL

## (undated) DEVICE — 20 ML SYRINGE LUER-LOCK TIP: Brand: MONOJECT

## (undated) DEVICE — SOLUTION IRRIG 1000ML 0.9% SOD CHL USP POUR PLAS BTL

## (undated) DEVICE — TOWEL,OR,DSP,ST,BLUE,STD,6/PK,12PK/CS: Brand: MEDLINE

## (undated) DEVICE — GLOVE SURG SZ 75 L12IN FNGR THK79MIL GRN LTX FREE

## (undated) DEVICE — GAUZE,SPONGE,FLUFF,6"X6.75",STRL,5/TRAY: Brand: MEDLINE

## (undated) DEVICE — GLOVE SURG SZ 8 L12IN FNGR THK79MIL GRN LTX FREE

## (undated) DEVICE — BONE TAMP KIT KPX203PB FF X2 20/3 1 STP: Brand: KYPHOPAK™ TRAY

## (undated) DEVICE — ELECTRODE ES L3IN S STL BLDE INSUL DISP VALLEYLAB EDGE

## (undated) DEVICE — DRAPE,MEDI-SLUSH,STERILE: Brand: MEDLINE

## (undated) DEVICE — SUTURE PROL SZ 3-0 L18IN NONABSORBABLE BLU L24MM FS-1 3/8 8684G

## (undated) DEVICE — STRIP,CLOSURE,WOUND,MEDI-STRIP,1/2X4: Brand: MEDLINE

## (undated) DEVICE — ENDO KIT W/SYRINGE: Brand: MEDLINE INDUSTRIES, INC.

## (undated) DEVICE — BITEBLOCK 54FR W/ DENT RIM BLOX

## (undated) DEVICE — ST CHARLES HIP FX: Brand: MEDLINE INDUSTRIES, INC.

## (undated) DEVICE — MARKER,SKIN,WI/RULER AND LABELS: Brand: MEDLINE

## (undated) DEVICE — 1010 S-DRAPE TOWEL DRAPE 10/BX: Brand: STERI-DRAPE™

## (undated) DEVICE — GOWN,AURORA,NONREINFORCED,LARGE: Brand: MEDLINE

## (undated) DEVICE — SUTURE VICRYL SZ 0 L36IN ABSRB UD CT-1 L36MM 1/2 CIR TAPR PNT VCP946H

## (undated) DEVICE — GUIDEWIRE ORTH L400MM DIA3.2MM FOR TFN

## (undated) DEVICE — RETRIEVER ENDOSCP L230CM DIA2.5MM NET W3XL5.5CM MIN WRK CHN

## (undated) DEVICE — SOLUTION IRRIG 1000ML STRL H2O USP PLAS POUR BTL

## (undated) DEVICE — SNARE POLYP SM W13MMXL240CM SHTH DIA1.9MM WRK CHN 2MM OVL

## (undated) DEVICE — MIXER A07A CEMENT

## (undated) DEVICE — SPONGE LAP W18XL18IN WHT COT 4 PLY FLD STRUNG RADPQ DISP ST

## (undated) DEVICE — PAD,NON-ADHERENT,3X8,STERILE,LF,1/PK: Brand: MEDLINE

## (undated) DEVICE — GLOVE ORTHO 8   MSG9480

## (undated) DEVICE — GLOVE ORANGE PI 7   MSG9070

## (undated) DEVICE — ELECTRODE PT RET AD L9FT HI MOIST COND ADH HYDRGEL CORDED

## (undated) DEVICE — COVER,TABLE,60X90,STERILE: Brand: MEDLINE

## (undated) DEVICE — DRAPE,ISOLATION,WIDE,INVISISHIELD: Brand: MEDLINE

## (undated) DEVICE — INTENDED FOR TISSUE SEPARATION, AND OTHER PROCEDURES THAT REQUIRE A SHARP SURGICAL BLADE TO PUNCTURE OR CUT.: Brand: BARD-PARKER ® CARBON RIB-BACK BLADES

## (undated) DEVICE — HYPODERMIC SAFETY NEEDLE: Brand: MAGELLAN

## (undated) DEVICE — BIT DRL L330MM DIA4.2MM CALIB 100MM 3 FLUT QUIK CPL

## (undated) DEVICE — COVER,MAYO STAND,STERILE: Brand: MEDLINE

## (undated) DEVICE — SINGLE-USE BIOPSY FORCEPS: Brand: RADIAL JAW 4

## (undated) DEVICE — BLANKET WRM W40.2XL55.9IN IORT LO BODY + MISTRAL AIR

## (undated) DEVICE — JACKSON / MODULAR SPINAL TABLE STYLE POSITIONING KIT - STANDARD: Brand: SOULE MEDICAL

## (undated) DEVICE — SUTURE VICRYL + SZ 2-0 L27IN ABSRB CLR CT-1 1/2 CIR TAPERCUT VCP259H

## (undated) DEVICE — APPLICATOR MEDICATED 26 CC SOLUTION HI LT ORNG CHLORAPREP

## (undated) DEVICE — 3M™ IOBAN™ 2 ANTIMICROBIAL INCISE DRAPE 6650EZ: Brand: IOBAN™ 2

## (undated) DEVICE — YANKAUER,OPEN TIP,W/O VENT,STERILE: Brand: MEDLINE INDUSTRIES, INC.

## (undated) DEVICE — TIDISHIELD BAND BAGS CLEAR POLYETHYLENE STERILE 20IN X 20IN 100 PER CASE: Brand: TIDISHIELD

## (undated) DEVICE — 6619 2 PTNT ISO SYS INCISE AREA&LT;(&GT;&&LT;)&GT;P: Brand: STERI-DRAPE™ IOBAN™ 2

## (undated) DEVICE — BLANKET WRM W29.9XL79.1IN UP BODY FORC AIR MISTRAL-AIR

## (undated) DEVICE — DRESSING TRNSPAR W5XL4.5IN FLM SHT SEMIPERMEABLE WIND

## (undated) DEVICE — NEPTUNE E-SEP SMOKE EVACUATION PENCIL, COATED, 70MM BLADE, PUSH BUTTON SWITCH: Brand: NEPTUNE E-SEP

## (undated) DEVICE — DRESSING,GAUZE,XEROFORM,CURAD,1"X8",ST: Brand: CURAD

## (undated) DEVICE — BANDAGE ADH W0.75XL3IN NAT PLAS CURAD

## (undated) DEVICE — DEFENDO AIR WATER SUCTION AND BIOPSY VALVE KIT FOR  OLYMPUS: Brand: DEFENDO AIR/WATER/SUCTION AND BIOPSY VALVE

## (undated) DEVICE — GLOVE ORANGE PI 8   MSG9080

## (undated) DEVICE — SYRINGE MED 10ML LUERLOCK TIP W/O SFTY DISP

## (undated) DEVICE — SINGLE PORT MANIFOLD: Brand: NEPTUNE 2

## (undated) DEVICE — SHEET, T, LAPAROTOMY, STERILE: Brand: MEDLINE

## (undated) DEVICE — NEEDLE SPNL 18GA L3.5IN W/ QNCKE SHARPER BVL DURA CLICK